# Patient Record
Sex: FEMALE | Race: WHITE | NOT HISPANIC OR LATINO | Employment: FULL TIME | URBAN - METROPOLITAN AREA
[De-identification: names, ages, dates, MRNs, and addresses within clinical notes are randomized per-mention and may not be internally consistent; named-entity substitution may affect disease eponyms.]

---

## 2017-01-16 ENCOUNTER — OFFICE VISIT (OUTPATIENT)
Dept: PAIN MEDICINE | Facility: CLINIC | Age: 32
End: 2017-01-16
Attending: NEUROLOGICAL SURGERY
Payer: COMMERCIAL

## 2017-01-16 VITALS
SYSTOLIC BLOOD PRESSURE: 115 MMHG | TEMPERATURE: 99 F | HEIGHT: 63 IN | RESPIRATION RATE: 18 BRPM | BODY MASS INDEX: 28.9 KG/M2 | WEIGHT: 163.13 LBS | HEART RATE: 87 BPM | DIASTOLIC BLOOD PRESSURE: 83 MMHG

## 2017-01-16 DIAGNOSIS — M79.10 MYALGIA: ICD-10-CM

## 2017-01-16 PROCEDURE — 20553 NJX 1/MLT TRIGGER POINTS 3/>: CPT | Mod: S$GLB,,, | Performed by: ANESTHESIOLOGY

## 2017-01-16 PROCEDURE — 99999 PR PBB SHADOW E&M-EST. PATIENT-LVL III: CPT | Mod: PBBFAC,,, | Performed by: ANESTHESIOLOGY

## 2017-01-16 PROCEDURE — 99244 OFF/OP CNSLTJ NEW/EST MOD 40: CPT | Mod: 25,S$GLB,, | Performed by: ANESTHESIOLOGY

## 2017-01-16 RX ORDER — ORPHENADRINE CITRATE 100 MG/1
100 TABLET, EXTENDED RELEASE ORAL 2 TIMES DAILY
COMMUNITY
End: 2018-10-26

## 2017-01-16 RX ORDER — BUPIVACAINE HYDROCHLORIDE 2.5 MG/ML
10 INJECTION, SOLUTION EPIDURAL; INFILTRATION; INTRACAUDAL
Status: COMPLETED | OUTPATIENT
Start: 2017-01-16 | End: 2017-01-16

## 2017-01-16 RX ORDER — METHYLPREDNISOLONE ACETATE 40 MG/ML
40 INJECTION, SUSPENSION INTRA-ARTICULAR; INTRALESIONAL; INTRAMUSCULAR; SOFT TISSUE ONCE
Status: COMPLETED | OUTPATIENT
Start: 2017-01-16 | End: 2017-01-16

## 2017-01-16 RX ADMIN — METHYLPREDNISOLONE ACETATE 40 MG: 40 INJECTION, SUSPENSION INTRA-ARTICULAR; INTRALESIONAL; INTRAMUSCULAR; SOFT TISSUE at 03:01

## 2017-01-16 RX ADMIN — BUPIVACAINE HYDROCHLORIDE 25 MG: 2.5 INJECTION, SOLUTION EPIDURAL; INFILTRATION; INTRACAUDAL at 03:01

## 2017-01-16 NOTE — MR AVS SNAPSHOT
[facility]  [address]  [place] [address]  Phone: [phone]  Fax: [phone]                  Jossy Leslie   2017 1:30 PM   Office Visit    Description:  Female : 1985   Provider:  Ruby Burroughs MD   Department:  [facility]           Reason for Visit     Neck Pain                To Do List           Future Appointments        Provider Department Dept Phone    2017 11:00 AM Aylin Rabago NP [facility] [phone]      Goals (5 Years of Data)     None      [facility]     [facility] -  Assistance  Registered nurses in the [facility] provide clinical advisement, health education, appointment booking, and other advisory services.  Call for this free service at [phone].             Medications           Message regarding Medications     Verify the changes and/or additions to your medication regime listed below are the same as discussed with your clinician today.  If any of these changes or additions are incorrect, please notify your healthcare provider.             Verify that the below list of medications is an accurate representation of the medications you are currently taking.  If none reported, the list may be blank. If incorrect, please contact your healthcare provider. Carry this list with you in case of emergency.           Current Medications     albuterol 90 mcg/actuation inhaler Inhale 2 puffs into the lungs every 6 (six) hours as needed for Wheezing.    cetirizine (ZYRTEC) 10 MG tablet Take 10 mg by mouth once daily.    eletriptan (RELPAX) 40 MG tablet Take 40 mg by mouth as needed. may repeat in 2 hours if necessary    montelukast (SINGULAIR) 10 mg tablet Take 1 tablet (10 mg total) by mouth every evening.    orphenadrine (NORFLEX) 100 mg tablet Take 100 mg by mouth 2 (two) times daily.    omeprazole (PRILOSEC) 10 MG capsule Take 1 capsule (10 mg total) by mouth  "once daily.           Clinical Reference Information           Vital Signs - Last Recorded  Most recent update: 1/16/2017  1:47 PM by Екатерина Ortega MA    BP Pulse Temp Resp Ht Wt    115/83 87 98.9 °F (37.2 °C) (Oral) 18 5' 3" (1.6 m) 74 kg (163 lb 2.3 oz)    BMI                28.9 kg/m2          Blood Pressure          Most Recent Value    BP  115/83      Allergies as of 1/16/2017     Shellfish Containing Products      Immunizations Administered on Date of Encounter - 1/16/2017     None      "

## 2017-01-16 NOTE — LETTER
January 16, 2017      Chintan Dockery MD  [address]  [facility] [address]           [facility]  [address]  [facility] [address]  Phone: [phone]  Fax: [phone]          Patient: Jossy Leslie   MR Number: [id number]   YOB: 1985   Date of Visit: 1/16/2017       Dear Dr. Chintan Dockery:    Thank you for referring Jossy Leslie to me for evaluation. Attached you will find relevant portions of my assessment and plan of care.    If you have questions, please do not hesitate to call me. I look forward to following Jossy Leslie along with you.    Sincerely,    Ruby Burroughs MD    Enclosure  CC:  No Recipients    If you would like to receive this communication electronically, please contact [email or web address] or [phone] to request more information on [facility] access.    For providers and/or their staff who would like to refer a patient to [facility], please contact us through our one-stop-shop provider referral line, [facility], [place], at [phone].    If you feel you have received this communication in error or would no longer like to receive these types of communications, please e-mail [email or web address]

## 2017-01-16 NOTE — PROGRESS NOTES
Chronic Pain - New Consult    Referring Physician: Chintan Dockery MD    Chief Complaint: No chief complaint on file.       SUBJECTIVE: Disclaimer: This note has been generated using voice-recognition software. There may be typographical errors that have been missed during proof-reading    Initial encounter:    Jossy Leslie presents to the clinic for the evaluation of neck pain. The pain started approximately 5 years ago following no injury or accident caused symptoms and symptoms have been unchanged.    Brief history:    32 yo woman with 4-5 years of neck pain. It is localized to the neck, denies arm pain, weakness, numbness or tingling. Is part of the healthy back program which helps with her pain. Has tried many muscle relaxants in the past and is currently taking Norflex with only minimal pain relief. Was referred for paracervical/trapezial TPIs.    Pain Description:    The pain is located in the neck area and radiates to the lower cervical area.      At BEST  1/10     At WORST  9/10 on the WORST day.      On average pain is rated as 3/10.     Today the pain is rated as 2/10    The pain is described as aching, burning, tight band and tingling      Symptoms interfere with daily activity.     Exacerbating factors: Sitting, Laying and Bending.      Mitigating factors medications and physical therapy.     Patient denies night fever/night sweats, urinary incontinence, bowel incontinence, significant weight loss, significant motor weakness and loss of sensations.  Patient denies any suicidal or homicidal ideations    Pain Medications:  Current:  norflex    Tried in Past:  NSAIDs -Never  TCA -Never  SNRI -Never  Anti-convulsants -Never  Muscle Relaxants -Multiple types with minimal efficacy  Opioids-Never    Physical Therapy/Home Exercise: yes  Healthy back program     report:  Not applicable    Pain Procedures: none    Chiropractor -never  Acupuncture - in past  TENS unit -uses. Helps with pain.  Spinal  decompression -never  Joint replacement -never    Imaging:   Xray Cervical spine  Cervical spine 4 views.  Soft tissues and airway are normal.  C1-C2 normal.  No fracture or subluxation.  Posterior articulation structures, alignment normal.  Limited range of motion in extension.   Impression    Limited range of motion on extension.  ______________________________________     Electronically signed by: MÓNICA DESAI MD  Date: 04/07/16  Time: 10:01          Past Medical History   Diagnosis Date    Eosinophilic esophagitis      Past Surgical History   Procedure Laterality Date    Tonsillectomy      Esophagogastroduodenoscopy       Social History     Social History    Marital status: Single     Spouse name: N/A    Number of children: N/A    Years of education: N/A     Occupational History    Not on file.     Social History Main Topics    Smoking status: Never Smoker    Smokeless tobacco: Not on file    Alcohol use No    Drug use: No    Sexual activity: Yes     Partners: Male     Birth control/ protection: Condom     Other Topics Concern    Not on file     Social History Narrative     Family History   Problem Relation Age of Onset    Hypertension Mother     Hypertension Father     Heart disease Maternal Grandmother     Cancer Maternal Grandfather     Diabetes Paternal Grandmother     Cancer Paternal Grandmother     No Known Problems Paternal Grandfather     No Known Problems Sister     No Known Problems Brother     No Known Problems Maternal Aunt     No Known Problems Maternal Uncle     No Known Problems Paternal Aunt     No Known Problems Paternal Uncle     Amblyopia Neg Hx     Blindness Neg Hx     Cataracts Neg Hx     Glaucoma Neg Hx     Macular degeneration Neg Hx     Retinal detachment Neg Hx     Strabismus Neg Hx     Stroke Neg Hx     Thyroid disease Neg Hx        Review of patient's allergies indicates:   Allergen Reactions    Shellfish containing products Itching       Current  "Outpatient Prescriptions   Medication Sig    albuterol 90 mcg/actuation inhaler Inhale 2 puffs into the lungs every 6 (six) hours as needed for Wheezing.    cetirizine (ZYRTEC) 10 MG tablet Take 10 mg by mouth once daily.    eletriptan (RELPAX) 40 MG tablet Take 40 mg by mouth as needed. may repeat in 2 hours if necessary    montelukast (SINGULAIR) 10 mg tablet Take 1 tablet (10 mg total) by mouth every evening.    omeprazole (PRILOSEC) 10 MG capsule Take 1 capsule (10 mg total) by mouth once daily.     No current facility-administered medications for this visit.        REVIEW OF SYSTEMS:    GENERAL:  No weight loss, malaise or fevers.  HEENT:   No recent changes in vision or hearing  NECK:  Negative for lumps, no difficulty with swallowing.  RESPIRATORY:  Negative for cough, wheezing or shortness of breath, patient denies any recent URI.  CARDIOVASCULAR:  Negative for chest pain, leg swelling or palpitations.  GI:  Negative for abdominal discomfort, blood in stools or black stools or change in bowel habits.  MUSCULOSKELETAL:  See HPI.  SKIN:  Negative for lesions, rash, and itching.  PSYCH:  No mood disorder or recent psychosocial stressors.  Patients sleep is not disturbed secondary to pain.  HEMATOLOGY/LYMPHOLOGY:  Negative for prolonged bleeding, bruising easily or swollen nodes.  Patient is not currently taking any anti-coagulants  ENDO: No history of diabetes or thyroid dysfunction  NEURO:   No history of headaches, syncope, paralysis, seizures or tremors.  All other reviewed and negative other than HPI.    OBJECTIVE:    Visit Vitals    /83    Pulse 87    Temp 98.9 °F (37.2 °C) (Oral)    Resp 18    Ht 5' 3" (1.6 m)    Wt 74 kg (163 lb 2.3 oz)    BMI 28.9 kg/m2       PHYSICAL EXAMINATION:    GENERAL: Well appearing, in no acute distress, alert and oriented x3.  PSYCH:  Mood and affect appropriate.  SKIN: Skin color, texture, turgor normal, no rashes or lesions.  HEAD/FACE:  Normocephalic, " atraumatic. Cranial nerves grossly intact.  NECK: + pain to palpation over the cervical paraspinous muscles and trapezius muscles. Spurling Negative. No pain with neck flexion, extension, or lateral flexion.   CV: RRR with palpation of the radial artery.  EXTREMITIES: UE joint ROM is full and pain free without obvious instability or laxity in all four extremities. No deformities, edema, or skin discoloration. Good capillary refill.  Bilateral upper extremity strength is normal and symmetric.  No atrophy or tone abnormalities are noted.  NEURO: Bilateral upper and lower extremity coordination and muscle stretch reflexes are physiologic and symmetric.  No loss of sensation is noted.  GAIT: normal.    ASSESSMENT: 31 y.o. year old female with neck pain, consistent with myofascial source of pain    Encounter Diagnosis   Name Primary?    Myalgia        PLAN:     -will proceed with paracervical and trapezial TPIs today  - RTC 6 weeks for followup  - Counseled patient regarding the importance of activity modification and constant sleeping habits.    The above plan and management options were discussed at length with patient. Patient is in agreement with the above and verbalized understanding. It will be communicated with the referring physician via electronic record, fax, or mail.    Procedure Note:  Trigger Point Injection:   The procedure was discussed with the patient including complications of damage, bleeding, infection, and failure of pain relief.     All medications, allergies, and relevant histories were reviewed. No recent antibiotics or infections.  A time-out was taken to verify the correct patient, procedure, laterality, and appropriate medications/allergies.    Trigger points were identified by palpation and marked. CHG prep of sites done. A 27-gauge needle was advanced to the point of maximal tenderness, and 0.5 mL of a mixture of 0.25% bupivacaine with Depomedrol 40 mg was injected after negative aspiration.  All sites done in the same manner. Patient tolerated the procedure well and without complications. Sites injected included 6 cervical/thoracic paraspinal and 6 trapezius trigger points.    The patient tolerated the procedure well and was discharged in excellent condition.      Doe Thompson MD  01/16/2017     I reviewed and edited the resident's note, I conducted my own interview and physical examination and agree with the findings.    Ruby Burroughs 01/16/2017

## 2017-02-08 ENCOUNTER — OFFICE VISIT (OUTPATIENT)
Dept: OBSTETRICS AND GYNECOLOGY | Facility: CLINIC | Age: 32
End: 2017-02-08
Payer: COMMERCIAL

## 2017-02-08 VITALS
DIASTOLIC BLOOD PRESSURE: 74 MMHG | WEIGHT: 163.56 LBS | BODY MASS INDEX: 28.98 KG/M2 | HEIGHT: 63 IN | SYSTOLIC BLOOD PRESSURE: 100 MMHG

## 2017-02-08 DIAGNOSIS — Z11.3 SCREEN FOR STD (SEXUALLY TRANSMITTED DISEASE): ICD-10-CM

## 2017-02-08 DIAGNOSIS — N94.3 PMS (PREMENSTRUAL SYNDROME): ICD-10-CM

## 2017-02-08 DIAGNOSIS — Z01.419 ENCOUNTER FOR GYNECOLOGICAL EXAMINATION WITHOUT ABNORMAL FINDING: Primary | ICD-10-CM

## 2017-02-08 PROCEDURE — 99395 PREV VISIT EST AGE 18-39: CPT | Mod: S$GLB,,, | Performed by: OBSTETRICS & GYNECOLOGY

## 2017-02-08 PROCEDURE — 99999 PR PBB SHADOW E&M-EST. PATIENT-LVL II: CPT | Mod: PBBFAC,,, | Performed by: OBSTETRICS & GYNECOLOGY

## 2017-02-08 PROCEDURE — 87591 N.GONORRHOEAE DNA AMP PROB: CPT

## 2017-02-08 NOTE — MR AVS SNAPSHOT
[facility] [address]  [address]  [facility] [address]  Phone: [phone]  Fax: [phone]                  Jossy Leslie   2017 10:00 AM   Office Visit    Description:  Female : 1985   Provider:  Patricia Mina MD   Department:  [facility] [address]           Reason for Visit     Well Woman                To Do List           Future Appointments        Provider Department Dept Phone    2017 10:00 AM Emily Saunders, PT [facility] [phone] 10:00 AM Tri Avila, PT [facility] [phone] 9:30 AM Sarah Gallegos, PTA [facility] [phone] 11:00 AM Aylin Rabago, NP [facility] [phone]      Goals (5 Years of Data)     None      [facility]     [facility] Nurse Care Line -  Assistance  Registered nurses in the [facility] provide clinical advisement, health education, appointment booking, and other advisory services.  Call for this free service at [phone].             Medications           Message regarding Medications     Verify the changes and/or additions to your medication regime listed below are the same as discussed with your clinician today.  If any of these changes or additions are incorrect, please notify your healthcare provider.             Verify that the below list of medications is an accurate representation of the medications you are currently taking.  If none reported, the list may be blank. If incorrect, please contact your healthcare provider. Carry this list with you in case of emergency.           Current Medications     albuterol 90 mcg/actuation inhaler Inhale 2 puffs into the lungs every 6 (six) hours as needed for Wheezing.    cetirizine (ZYRTEC) 10 MG tablet Take 10 mg by mouth once daily.    eletriptan (RELPAX) 40 MG tablet Take 40 mg by mouth as needed. may repeat in 2  "hours if necessary    montelukast (SINGULAIR) 10 mg tablet Take 1 tablet (10 mg total) by mouth every evening.    orphenadrine (NORFLEX) 100 mg tablet Take 100 mg by mouth 2 (two) times daily.    omeprazole (PRILOSEC) 10 MG capsule Take 1 capsule (10 mg total) by mouth once daily.           Clinical Reference Information           Your Vitals Were     BP Height Weight Last Period BMI    100/74 5' 3" (1.6 m) 74.2 kg (163 lb 9.3 oz) 01/01/2017 (Exact Date) 28.98 kg/m2      Blood Pressure          Most Recent Value    BP  100/74      Allergies as of 2/8/2017     Shellfish Containing Products      Immunizations Administered on Date of Encounter - 2/8/2017     None      Language Assistance Services     ATTENTION: Language assistance services are available, free of charge. Please call [phone].      ATENCIÓN: Si rosaline ortiz, tiene a morgan disposición servicios gratuitos de asistencia lingüística. Llame al [phone].     CHÚ Ý: N?u b?n nói Ti?ng Vi?t, có các d?ch v? h? tr? ngôn ng? mi?n phí dành cho b?n. G?i s? [phone].         [facility] [address] complies with applicable Federal civil rights laws and does not discriminate on the basis of race, color, national origin, age, disability, or sex.        "

## 2017-02-08 NOTE — PROGRESS NOTES
"CC: Well woman exam    Jossy Leslie is a 31 y.o. female  presents for a well woman exam.  She has  PMS sx. Cycles can be irregular  Not trying to get pregnant at this time.          Past Medical History   Diagnosis Date    Eosinophilic esophagitis        Past Surgical History   Procedure Laterality Date    Tonsillectomy      Esophagogastroduodenoscopy         OB History    Para Term  AB SAB TAB Ectopic Multiple Living   1 1 1       2      # Outcome Date GA Lbr Laurent/2nd Weight Sex Delivery Anes PTL Lv   1 Term     F Vag-Spont   Y          Family History   Problem Relation Age of Onset    Hypertension Mother     Hypertension Father     Heart disease Maternal Grandmother     Cancer Maternal Grandfather     Colon cancer Maternal Grandfather     Diabetes Paternal Grandmother     Cancer Paternal Grandmother     No Known Problems Sister     No Known Problems Brother     No Known Problems Maternal Aunt     No Known Problems Maternal Uncle     No Known Problems Paternal Aunt     No Known Problems Paternal Uncle     Amblyopia Neg Hx     Blindness Neg Hx     Cataracts Neg Hx     Glaucoma Neg Hx     Macular degeneration Neg Hx     Retinal detachment Neg Hx     Strabismus Neg Hx     Stroke Neg Hx     Thyroid disease Neg Hx     Breast cancer Neg Hx     Ovarian cancer Neg Hx        Social History   Substance Use Topics    Smoking status: Never Smoker    Smokeless tobacco: None    Alcohol use No       Visit Vitals    /74    Ht 5' 3" (1.6 m)    Wt 74.2 kg (163 lb 9.3 oz)    LMP 2017 (Exact Date)    BMI 28.98 kg/m2       ROS:  GENERAL: Denies weight gain or weight loss. Feeling well overall.   SKIN: Denies rash or lesions.   HEAD: Denies head injury or headache.   NODES: Denies enlarged lymph nodes.   CHEST: Denies chest pain or shortness of breath.   CARDIOVASCULAR: Denies palpitations or left sided chest pain.   ABDOMEN: No abdominal pain, constipation, " diarrhea, nausea, vomiting or rectal bleeding.   URINARY: No frequency, dysuria, hematuria, or burning on urination.  REPRODUCTIVE: See HPI.   BREASTS: The patient performs breast self-examination and denies pain, lumps, or nipple discharge.   HEMATOLOGIC: No easy bruisability or excessive bleeding.  MUSCULOSKELETAL: Denies joint pain or swelling.   NEUROLOGIC: Denies syncope or weakness.   PSYCHIATRIC: Denies depression, anxiety or mood swings.    Physical Exam:    APPEARANCE: Well nourished, well developed, in no acute distress.  AFFECT: WNL, alert and oriented x 3  SKIN: No acne or hirsutism  NECK: Neck symmetric without masses or thyromegaly  NODES: No inguinal, cervical, axillary, or femoral lymph node enlargement  CHEST: Good respiratory effect  ABDOMEN: Soft.  No tenderness or masses.  No hepatosplenomegaly.  No hernias.  BREASTS: Symmetrical, no skin changes or visible lesions.  No palpable masses, nipple discharge bilaterally.  PELVIC: Normal external genitalia without lesions.  Normal hair distribution.  Adequate perineal body, normal urethral meatus.  Vagina moist and well rugated without lesions or discharge.  Cervix pink, without lesions, discharge or tenderness.  No significant cystocele or rectocele.  Bimanual exam shows uterus to be normal size, regular, mobile and nontender.  Adnexa without masses or tenderness.    EXTREMITIES: No edema.      ASSESSMENT AND PLAN  1. Encounter for gynecological examination without abnormal finding     2. PMS (premenstrual syndrome)     3. Screen for STD (sexually transmitted disease)  C. trachomatis/N. gonorrhoeae by AMP DNA Cervix     Timed relations  Consider pregnancy when ready   Will plot her menses calendar    Patient was counseled today on A.C.S. Pap guidelines and recommendations for yearly pelvic exams, mammograms and monthly self breast exams; to see her PCP for other health maintenance.     F/U PRN

## 2017-02-09 ENCOUNTER — CLINICAL SUPPORT (OUTPATIENT)
Dept: REHABILITATION | Facility: OTHER | Age: 32
End: 2017-02-09
Attending: PHYSICAL MEDICINE & REHABILITATION
Payer: COMMERCIAL

## 2017-02-09 DIAGNOSIS — M50.30 DDD (DEGENERATIVE DISC DISEASE), CERVICAL: Primary | ICD-10-CM

## 2017-02-09 NOTE — PROGRESS NOTES
[facility]   CERVICAL SPINE TREATMENT VISIT 11      Date: 2/9/17   Pt returns to [facility] after a long absence.  Last visit 12/22/16.    Time: 10:00-11:00 am     Precautions: Standard, esophagitis    Pattern: 1 REP    Eval date:11/8/16  Reassessment due: 12/8/16, done 12/22/16  Next reassessment due:  1/22/17, done 2/9/17  Next reassessment due:  3/9/17    POC Requested.... 11/9/16  Next POC due... 2/19/17    PT/PTA face to face conference: 11/15/16 done  Conference due: 12/15 /16       Subjective     Pt returns to [facility] after a long absence reportedly due to a busy schedule during the holidays.  She last attended on 12/22/16. Since her last visit, pt reports that she has gotten trigger point injections, but is not sure if they have given her any relief of pain. She has been performing neck retraction and scapular retraction regularly throughout the day at work  and finds that they are helpful.  She continued to exercise at [employer] 3-4x per week.  Prior to treatment today, she reports 2/10 neck and right upper trap pain, which was a little better after her session.      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     Leisure: Walking around city and [place], photography    History     HPI Comments: Ms Leslie is a 30 yo female here for evaluation for the [facility] cervical program. 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 The pain is neck dominant the right is the worst, and goes to both shoulders and the back of the right head, she has occasional tingling in either hand. The  pain is intermittent, ranging from 0-7/10. The pain is a burning tightness and tingling pain. It is worse with sitting, looking down, riding in a car, watching TV, necklaces or heavy jackets on the neck. She feels best standing,  "walking or lying on her back. She feels like Excedrin and rest make it go away. She has a hard time wearing her stethoscope around her neck, because it hurts her neck. She has not done PT. She has done chiropractic care and massage therapy which has helped. She tried robaxin with no relief. She will take Excedrin or advil at times. Pattern 1     Present symptoms:  Present since: Neck pain in upper traps and scapula, tenderness aching. Inconsistent burning and "pinprick tingling" sometimes pinprick feeling into hands. 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 Recently has moved into house that requires a lot of renovations and has difficulty sanding/painting without significant increases in pain. She is planning on running for half marathon and doesn't have any LE symptoms but running does increase neck pain as well.     Headaches. Migraines weekly usually better with exidren, usually tolerable but worse about once a month. Hurts in occiput and on sides of temples, nausea sometimes concurrent, can sense when starting from neck tension     Current: 4/10  Worst: 6-7/10  Best 0/10    Worse: bending, stress, turning head, working at computer, working for several days in a row, finds scrubs pull down in area of pain, worst later in the day, running, overhead activity     Best: walking around, with movement, morning     Disturbed sleep: good sleeping habits    Previous treatments: Chiro, improved neck symptoms somewhat but increase low back pain     SPECIFIC QUESTIONS  Nausea: Can be associated with her menstral cycle but can increase with neck pain, not sure if associated with medication (excendrin migrane)  Swallowing: esophogitis, bolus can get stuck with certain foods,     Pattern of pain questions:    1.  Where is your pain the worst? Neck    2.  Is your pain constant or intermittent? Intermittent     3. Does bending forward make " "your typical pain worse? Yes     4.  What can't you do now that you use to? Get back into training for jogging for half marathon, wants to get back to jogging 3 miles without pain, Perform renovation duties without increased pain     Objective     No environmental, cultural, spiritual, developmental or education needs expressed or noted.    POSTURE At eval   Sitting: fair, forward head posture  Standing:  fair, forward head posture, reduced kyphotic curve   Protruded Head: positive    Correction of posture: Improved posture with slim-line, no significant change in symptoms  Relevant: yes, provided purchase information     NEUROLOGICAL At al   Motor deficit:   UE MMT: Right Left  Shld Elev. 5 5  Shld Flx 4 4  Bicep Flx 4 4  Tricep Ext 4 4  Wrist Ext 5 5  Wrist Flx 5 5  Finger Abd 5 5  Shld ER 4- 4-  Shld IR  4 4    MOVEMENT LOSS At eval   Reassessment 12/22/16    Reassessment 2/9/17  Protrusion: WFL    Pain: None  WFL no pain      WFL, no pain  Flexion: Mild bear  Pain: occiput stretching  WFL no pain, stretch only     WFL  Retraction: Mod bear    Pain: pleasant stretch  Min loss, stretch      Min loss, "feels good"  Extension: Mild bear  Pain: front neck stretching, increase pain at C7-T1 junction  Slight limitation ERP  Min loss, end range stretch  Lateral flexion RIGHT Mild bear     Pain:tight UT  WFL   WNL, no pain  Lateral flexion LEFT  WFL    Pain: None  WFL    WFL, end range stretch  Rotation RIGHT Mild bear    Pain: None  WFL    WNL  Rotation LEFT Mild    Pain: None  WFL c/o tightness on R    WFL c/o tightness on R        OTHER TESTS At eval   Scapular stability: moderate bilateral scapular winging   Special tests: distraction (+) compression (-)    Pt performed baseline isometric testing using the Med X equipment.  The test was conducted by the physical therapist.    Baseline IM Testing Results: 11/8/16  ROM: 90-30  Peak Torque: 184  Min Torque: 71  Flex/Ext Ratio: 2.37:1    Midpoint IM Testing Results:  ROM: " 24-96 degrees  Peak Torque:  246 in lbs  Min Torque:  141 in lbs  Flex/ext ratio: 1.74:1  Average change sum of forces:  62.8%  All test points at or above 1 SD below norms      Treatment       Pt was instructed in and performed the following:  Cardiovascular exercise and therapeutic exercise to improve posture, cervical spine and supporting musculature ROM, strength, and muscular endurance as follows:    [facility] 2/9/2017   Visit Number 11   VAS Pain Rating 2   Treadmill Time (in min.) 10   Speed 2   Retraction in Sitting 10   Scapular Retraction 10   Cervical Weight 162   Repetitions 20   Rating of Perceived Exertion 4.5   Ice - Z Lie (in min.) 10         Peripheral muscle strengthening which included 1 set of 15-20 repetitions at a slow, controlled 7 second per rep pace focused on strengthening supporting musculature for improved body mechanics and functional mobility.  Pt and therapist focused on proper form during treatment to ensure optimal strengthening of each targeted muscle group.  Machines were utilized including torso rotation, leg extension, leg curl, chest press, rows, tricep, bicep,leg press and hip abd.     Manual therapy: STM upper trap and cervical paraspinals, PAs and UPAs, downward glides grade 2-3 C4-T2, passive distraction, passive distraction with cervical retraction, passive distraction with AROM cervical retraction (supine) 10 min    HEP initiated as follows: (handouts given and patient expressed understanding and was able to demonstrate there ex appropriately prior to leaving)  Seated and supine chin tucks 10x  Scapular retractions 10x     11/11/16: Levator scapularis stretch [id number], 2x daily    11/30/16: Bilateral shoulder external rotations with theraband 2x10, 1x per day    12/22/16:  10 reps bilateral shoulder ER with red theraband    Patient demonstrated and expressed understanding of all information given.    Assessment     PT diagnosis:DDD Cervical     Pattern: Decreased  cervical strength and ROM compared to norms, bilateral scapular winging, decreased UE strength, decrease activity tolerance, fair postural alignment, moderate to severe pain severity and irritability     Pt returns to [facility] for cervical visit 11 after a long absence reportedly due to a busy schedule during the holidays.  She last attended on 12/22/16. She reports 2/10 neck and right upper trap pain prior to treatment, slightly better after her session.  She made some improvements with cervical AROM and demonstrated good form with the stretches she has been performing regularly since her last visit. She tolerated the cervical med x at her same weight and completed 20 reps with an RPE of 4.5. She completed all peripheral resistance exercises with no reports of increased symptoms or discomfort.  Pt would benefit from skilled PT care to improve strength and ROM to improve functional activity tolerance, reduce pain severity and irritability, and improve quality of life. Goals were met as noted below.    Medical necessity is demonstrated by the following problem list.  Pt presents with the following impairments:     Based on the above history, physical examination, and baseline and midpoint IM testing, an active physical therapy program is recommended.      Prognosis is: Good     GOALS: Pt is in agreement with the following goals.    Short term goals: (5 weeks or 10 visits)  1.  Pt will demonstrate increased cervical ROM as measured by med ex by 3 degrees from initial test  MET 12/22/16  2.  Pt will demonstrate increased isometric torque by 5% from initial test  MET 12/22/16  3.  Pt will tolerate exercising with dynamic weight loads increased by 5% from initial exercise  MET 12/22/16  4.  Pt will demonstrate reduced pain and improved functional outcomes as reported on the patient centered questionnaires  MET 12/22/16  5. Pt will demonstrate independence with reducing or controlling symptoms with ther ex, movement,  or position independently   MET 12/22/16    Long term goals: (10 weeks or 20 visits)  1.  Pt will demonstrate increased cervical ROM as measured by med ex by 6 degrees from initial test   MET 12/22/16  2.  Pt will demonstrate increased isometric torque by 10% from initial test  MET 12/22/16  3.  Pt will tolerate exercising with dynamic weight loads increased by 10% from initial exercise  4.  Pt will demonstrate reduced pain and improved functional outcomes as reported on the patient centered questionnaires   5. Pt will demonstrate independence with reducing or controlling symptoms with ther ex, movement, or position independently  6. Pt will demonstrate good posture and body mechanics while exercising  7. Pt will demonstrate independence with the HEP and stretching  8. Pt will demonstrate an improved flexion/extension ratio on the med ex isometric test when compared to the initial test with the ideal ratio being 1.4:1    Specific Patient expressed goals:  1. Pt will jog up to 3 miles without increase in neck pain pain.  2. Pt will perform household renovation activities without increase in neck pain.     OUTCOMES:  Neck Disability Index Review 12/22/2016 11/7/2016 4/7/2016   Pain Intensity 1 2 2   Personal Care (Washing, Dressing, etc.) 0 0 1   Lifting 1 1 3   Reading 1 3 1   Headaches 3 5 1   Concentration 1 2 1   Work 1 1 2   Driving 1 2 1   Sleeping 0 0 2   Recreation 1 2 1   Score: 10 18 15         Plan     Continue OPPT  per POC.

## 2017-02-10 LAB
C TRACH DNA SPEC QL NAA+PROBE: NEGATIVE
N GONORRHOEA DNA SPEC QL NAA+PROBE: NEGATIVE

## 2017-02-16 ENCOUNTER — CLINICAL SUPPORT (OUTPATIENT)
Dept: REHABILITATION | Facility: OTHER | Age: 32
End: 2017-02-16
Attending: PHYSICAL MEDICINE & REHABILITATION
Payer: COMMERCIAL

## 2017-02-16 DIAGNOSIS — M50.30 DDD (DEGENERATIVE DISC DISEASE), CERVICAL: Primary | ICD-10-CM

## 2017-02-16 NOTE — PROGRESS NOTES
[facility]   CERVICAL SPINE TREATMENT VISIT 12      Date: 2/16/17       Time: 10:00-11:00 am     Precautions: Standard, esophagitis    Pattern: 1 REP    Eval date:11/8/16  Reassessment due: 12/8/16, done 12/22/16  Next reassessment due:  1/22/17, done 2/9/17  Next reassessment due:  3/9/17    POC Requested.... 11/9/16  Next POC due... 2/19/17    PT/PTA face to face conference: 11/15/16 done  Conference due: 12/15 /16       Subjective     Pt returns to [facility] after a long absence reportedly due to a busy schedule during the holidays. She is eager to return to routine. She has been performing neck retraction and scapular retraction regularly throughout the day at work  and finds that they are helpful.  She continued to exercise at [employer] 3-4x per week.  Prior to treatment today, she reports 2/10 neck and right upper trap pain, which was a little better after her session.      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     Leisure: Walking around city and lake, photography    History     HPI Comments: Ms Leslie is a 30 yo female here for evaluation for the healthy back cervical program. 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 The pain is neck dominant the right is the worst, and goes to both shoulders and the back of the right head, she has occasional tingling in either hand. The  pain is intermittent, ranging from 0-7/10. The pain is a burning tightness and tingling pain. It is worse with sitting, looking down, riding in a car, watching TV, necklaces or heavy jackets on the neck. She feels best standing, walking or lying on her back. She feels like Excedrin and rest make it go away. She has a hard time wearing her stethoscope around her neck, because it hurts her neck. She has not done PT. She has done  "chiropractic care and massage therapy which has helped. She tried robaxin with no relief. She will take Excedrin or advil at times. Pattern 1     Present symptoms:  Present since: Neck pain in upper traps and scapula, tenderness aching. Inconsistent burning and "pinprick tingling" sometimes pinprick feeling into hands. 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 Recently has moved into house that requires a lot of renovations and has difficulty sanding/painting without significant increases in pain. She is planning on running for half marathon and doesn't have any LE symptoms but running does increase neck pain as well.     Headaches. Migraines weekly usually better with exidren, usually tolerable but worse about once a month. Hurts in occiput and on sides of temples, nausea sometimes concurrent, can sense when starting from neck tension     Current: 4/10  Worst: 6-7/10  Best 0/10    Worse: bending, stress, turning head, working at computer, working for several days in a row, finds scrubs pull down in area of pain, worst later in the day, running, overhead activity     Best: walking around, with movement, morning     Disturbed sleep: good sleeping habits    Previous treatments: Chiro, improved neck symptoms somewhat but increase low back pain     SPECIFIC QUESTIONS  Nausea: Can be associated with her menstral cycle but can increase with neck pain, not sure if associated with medication (excendrin migrane)  Swallowing: esophogitis, bolus can get stuck with certain foods,     Pattern of pain questions:    1.  Where is your pain the worst? Neck    2.  Is your pain constant or intermittent? Intermittent     3. Does bending forward make your typical pain worse? Yes     4.  What can't you do now that you use to? Get back into training for jogging for half marathon, wants to get back to jogging 3 miles without pain, Perform renovation " "duties without increased pain     Objective     No environmental, cultural, spiritual, developmental or education needs expressed or noted.    POSTURE At eval   Sitting: fair, forward head posture  Standing:  fair, forward head posture, reduced kyphotic curve   Protruded Head: positive    Correction of posture: Improved posture with slim-line, no significant change in symptoms  Relevant: yes, provided purchase information     NEUROLOGICAL At eval   Motor deficit:   UE MMT: Right Left  Shld Elev. 5 5  Shld Flx 4 4  Bicep Flx 4 4  Tricep Ext 4 4  Wrist Ext 5 5  Wrist Flx 5 5  Finger Abd 5 5  Shld ER 4- 4-  Shld IR  4 4    MOVEMENT LOSS At eval   Reassessment 12/22/16    Reassessment 2/9/17  Protrusion: WFL    Pain: None  WFL no pain      WFL, no pain  Flexion: Mild bear  Pain: occiput stretching  WFL no pain, stretch only     WFL  Retraction: Mod bear    Pain: pleasant stretch  Min loss, stretch      Min loss, "feels good"  Extension: Mild bear  Pain: front neck stretching, increase pain at C7-T1 junction  Slight limitation ERP  Min loss, end range stretch  Lateral flexion RIGHT Mild bear     Pain:tight UT  WFL   WNL, no pain  Lateral flexion LEFT  WFL    Pain: None  WFL    WFL, end range stretch  Rotation RIGHT Mild bear    Pain: None  WFL    WNL  Rotation LEFT Mild    Pain: None  WFL c/o tightness on R    WFL c/o tightness on R        OTHER TESTS At eval   Scapular stability: moderate bilateral scapular winging   Special tests: distraction (+) compression (-)    Pt performed baseline isometric testing using the Med X equipment.  The test was conducted by the physical therapist.    Baseline IM Testing Results: 11/8/16  ROM: 90-30  Peak Torque: 184  Min Torque: 71  Flex/Ext Ratio: 2.37:1    Midpoint IM Testing Results:  ROM: 24-96 degrees  Peak Torque:  246 in lbs  Min Torque:  141 in lbs  Flex/ext ratio: 1.74:1  Average change sum of forces:  62.8%  All test points at or above 1 SD below norms      Treatment       Pt was " instructed in and performed the following:  Cardiovascular exercise and therapeutic exercise to improve posture, cervical spine and supporting musculature ROM, strength, and muscular endurance as follows:  [facility] 2/16/2017   Visit Number 12   VAS Pain Rating 2   Treadmill Time (in min.) 10   Speed 2   Cervical Stretches - Retraction In Lying -   Retraction in Sitting 10   Scapular Retraction 10   Manual Therapy -   Cervical Extension Seat Pad -   Seat Adjustment -   Top Dead Center -   Counterweight -   Cervical Flexion -   Cervical Extension -   Cervical Peak Torque -   Cervical Weight 162   Repetitions 20   Rating of Perceived Exertion 5   Ice - Z Lie (in min.) 10         Peripheral muscle strengthening which included 1 set of 15-20 repetitions at a slow, controlled 7 second per rep pace focused on strengthening supporting musculature for improved body mechanics and functional mobility.  Pt and therapist focused on proper form during treatment to ensure optimal strengthening of each targeted muscle group.  Machines were utilized including torso rotation, leg extension, leg curl, chest press, rows, tricep, bicep,leg press and hip abd.     Manual therapy: STM upper trap and cervical paraspinals, PAs and UPAs, downward glides grade 2-3 C4-T2, passive distraction, passive distraction with cervical retraction, passive distraction with AROM cervical retraction (supine) 10 min    HEP initiated as follows: (handouts given and patient expressed understanding and was able to demonstrate there ex appropriately prior to leaving)  Seated and supine chin tucks 10x  Scapular retractions 10x     11/11/16: Levator scapularis stretch [id number], 2x daily    11/30/16: Bilateral shoulder external rotations with theraband 2x10, 1x per day    12/22/16:  10 reps bilateral shoulder ER with red theraband    Patient demonstrated and expressed understanding of all information given.    Assessment     PT diagnosis:DDD Cervical      Pattern: Decreased cervical strength and ROM compared to norms, bilateral scapular winging, decreased UE strength, decrease activity tolerance, fair postural alignment, moderate to severe pain severity and irritability     Pt returns to [facility] for cervical visit 12 after a long absence reportedly due to a busy schedule during the holidays. She reports 2/10 neck and right upper trap pain prior to treatment, slightly better after her session.  She made some improvements with cervical AROM and demonstrated good form with the stretches she has been performing regularly since her last visit. She tolerated the cervical med x at her same weight and completed 20 reps with an RPE of 5. She completed all peripheral resistance exercises with no reports of increased symptoms or discomfort.  Pt would benefit from skilled PT care to improve strength and ROM to improve functional activity tolerance, reduce pain severity and irritability, and improve quality of life. Goals were met as noted below.    Medical necessity is demonstrated by the following problem list.  Pt presents with the following impairments:     Based on the above history, physical examination, and baseline and midpoint IM testing, an active physical therapy program is recommended.      Prognosis is: Good     GOALS: Pt is in agreement with the following goals.    Short term goals: (5 weeks or 10 visits)  1.  Pt will demonstrate increased cervical ROM as measured by med ex by 3 degrees from initial test  MET 12/22/16  2.  Pt will demonstrate increased isometric torque by 5% from initial test  MET 12/22/16  3.  Pt will tolerate exercising with dynamic weight loads increased by 5% from initial exercise  MET 12/22/16  4.  Pt will demonstrate reduced pain and improved functional outcomes as reported on the patient centered questionnaires  MET 12/22/16  5. Pt will demonstrate independence with reducing or controlling symptoms with ther ex, movement, or position  independently   MET 12/22/16    Long term goals: (10 weeks or 20 visits)  1.  Pt will demonstrate increased cervical ROM as measured by med ex by 6 degrees from initial test   MET 12/22/16  2.  Pt will demonstrate increased isometric torque by 10% from initial test  MET 12/22/16  3.  Pt will tolerate exercising with dynamic weight loads increased by 10% from initial exercise  4.  Pt will demonstrate reduced pain and improved functional outcomes as reported on the patient centered questionnaires   5. Pt will demonstrate independence with reducing or controlling symptoms with ther ex, movement, or position independently  6. Pt will demonstrate good posture and body mechanics while exercising  7. Pt will demonstrate independence with the HEP and stretching  8. Pt will demonstrate an improved flexion/extension ratio on the med ex isometric test when compared to the initial test with the ideal ratio being 1.4:1    Specific Patient expressed goals:  1. Pt will jog up to 3 miles without increase in neck pain pain.  2. Pt will perform household renovation activities without increase in neck pain.     OUTCOMES:  Neck Disability Index Review 12/22/2016 11/7/2016 4/7/2016   Pain Intensity 1 2 2   Personal Care (Washing, Dressing, etc.) 0 0 1   Lifting 1 1 3   Reading 1 3 1   Headaches 3 5 1   Concentration 1 2 1   Work 1 1 2   Driving 1 2 1   Sleeping 0 0 2   Recreation 1 2 1   Score: 10 18 15         Plan     Continue OPPT  per POC.

## 2017-02-20 ENCOUNTER — CLINICAL SUPPORT (OUTPATIENT)
Dept: REHABILITATION | Facility: OTHER | Age: 32
End: 2017-02-20
Attending: PHYSICAL MEDICINE & REHABILITATION
Payer: COMMERCIAL

## 2017-02-20 ENCOUNTER — OFFICE VISIT (OUTPATIENT)
Dept: PAIN MEDICINE | Facility: CLINIC | Age: 32
End: 2017-02-20
Payer: COMMERCIAL

## 2017-02-20 VITALS
TEMPERATURE: 98 F | SYSTOLIC BLOOD PRESSURE: 138 MMHG | WEIGHT: 160 LBS | HEART RATE: 85 BPM | DIASTOLIC BLOOD PRESSURE: 95 MMHG | HEIGHT: 63 IN | BODY MASS INDEX: 28.35 KG/M2

## 2017-02-20 DIAGNOSIS — M54.2 CERVICALGIA: Primary | ICD-10-CM

## 2017-02-20 DIAGNOSIS — M50.30 DDD (DEGENERATIVE DISC DISEASE), CERVICAL: Primary | ICD-10-CM

## 2017-02-20 DIAGNOSIS — M79.10 MYALGIA: ICD-10-CM

## 2017-02-20 PROCEDURE — 99999 PR PBB SHADOW E&M-EST. PATIENT-LVL III: CPT | Mod: PBBFAC,,, | Performed by: NURSE PRACTITIONER

## 2017-02-20 PROCEDURE — 99213 OFFICE O/P EST LOW 20 MIN: CPT | Mod: S$GLB,,, | Performed by: NURSE PRACTITIONER

## 2017-02-20 NOTE — PROGRESS NOTES
[facility]   CERVICAL SPINE TREATMENT VISIT 13      Date: 2/20/17       Time: 9:30-10:30 am     Precautions: Standard, esophagitis    Pattern: 1 REP    Eval date:11/8/16  Reassessment due: 12/8/16, done 12/22/16  Next reassessment due:  1/22/17, done 2/9/17  Next reassessment due:  3/9/17    POC Requested.... 11/9/16  Next POC due... 2/19/17    PT/PTA face to face conference: 11/15/16 done  Conference due: 12/15 /16 done 2/10/17  Conference due: 3/10/17      Subjective     She c/o no neck pain today. She has been performing neck retraction and scapular retraction regularly throughout the day at work  and finds that they are helpful.  She continued to exercise at [place] 3-4x per week.        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     Leisure: Walking around city and [place], photography    History     HPI Comments: Ms Leslie is a 32 yo female here for evaluation for the healthy back cervical program. 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 The pain is neck dominant the right is the worst, and goes to both shoulders and the back of the right head, she has occasional tingling in either hand. The  pain is intermittent, ranging from 0-7/10. The pain is a burning tightness and tingling pain. It is worse with sitting, looking down, riding in a car, watching TV, necklaces or heavy jackets on the neck. She feels best standing, walking or lying on her back. She feels like Excedrin and rest make it go away. She has a hard time wearing her stethoscope around her neck, because it hurts her neck. She has not done PT. She has done chiropractic care and massage therapy which has helped. She tried robaxin with no relief. She will take Excedrin or advil at times. Pattern 1     Present symptoms:  Present since: Neck pain in  "upper traps and scapula, tenderness aching. Inconsistent burning and "pinprick tingling" sometimes pinprick feeling into hands. 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 Recently has moved into house that requires a lot of renovations and has difficulty sanding/painting without significant increases in pain. She is planning on running for half marathon and doesn't have any LE symptoms but running does increase neck pain as well.     Headaches. Migraines weekly usually better with exidren, usually tolerable but worse about once a month. Hurts in occiput and on sides of temples, nausea sometimes concurrent, can sense when starting from neck tension     Current: 4/10  Worst: 6-7/10  Best 0/10    Worse: bending, stress, turning head, working at computer, working for several days in a row, finds scrubs pull down in area of pain, worst later in the day, running, overhead activity     Best: walking around, with movement, morning     Disturbed sleep: good sleeping habits    Previous treatments: Chiro, improved neck symptoms somewhat but increase low back pain     SPECIFIC QUESTIONS  Nausea: Can be associated with her menstral cycle but can increase with neck pain, not sure if associated with medication (excendrin migrane)  Swallowing: esophogitis, bolus can get stuck with certain foods,     Pattern of pain questions:    1.  Where is your pain the worst? Neck    2.  Is your pain constant or intermittent? Intermittent     3. Does bending forward make your typical pain worse? Yes     4.  What can't you do now that you use to? Get back into training for jogging for half marathon, wants to get back to jogging 3 miles without pain, Perform renovation duties without increased pain     Objective     No environmental, cultural, spiritual, developmental or education needs expressed or noted.    POSTURE At eval   Sitting: fair, forward head " "posture  Standing:  fair, forward head posture, reduced kyphotic curve   Protruded Head: positive    Correction of posture: Improved posture with slim-line, no significant change in symptoms  Relevant: yes, provided purchase information     NEUROLOGICAL At eval   Motor deficit:   UE MMT: Right Left  Shld Elev. 5 5  Shld Flx 4 4  Bicep Flx 4 4  Tricep Ext 4 4  Wrist Ext 5 5  Wrist Flx 5 5  Finger Abd 5 5  Shld ER 4- 4-  Shld IR  4 4    MOVEMENT LOSS At eval   Reassessment 12/22/16    Reassessment 2/9/17  Protrusion: WFL    Pain: None  WFL no pain      WFL, no pain  Flexion: Mild bear  Pain: occiput stretching  WFL no pain, stretch only     WFL  Retraction: Mod bear    Pain: pleasant stretch  Min loss, stretch      Min loss, "feels good"  Extension: Mild bear  Pain: front neck stretching, increase pain at C7-T1 junction  Slight limitation ERP  Min loss, end range stretch  Lateral flexion RIGHT Mild bear     Pain:tight UT  WFL   WNL, no pain  Lateral flexion LEFT  WFL    Pain: None  WFL    WFL, end range stretch  Rotation RIGHT Mild bear    Pain: None  WFL    WNL  Rotation LEFT Mild    Pain: None  WFL c/o tightness on R    WFL c/o tightness on R        OTHER TESTS At eval   Scapular stability: moderate bilateral scapular winging   Special tests: distraction (+) compression (-)    Pt performed baseline isometric testing using the Med X equipment.  The test was conducted by the physical therapist.    Baseline IM Testing Results: 11/8/16  ROM: 90-30  Peak Torque: 184  Min Torque: 71  Flex/Ext Ratio: 2.37:1    Midpoint IM Testing Results:  ROM: 24-96 degrees  Peak Torque:  246 in lbs  Min Torque:  141 in lbs  Flex/ext ratio: 1.74:1  Average change sum of forces:  62.8%  All test points at or above 1 SD below norms      Treatment       Pt was instructed in and performed the following:  Cardiovascular exercise and therapeutic exercise to improve posture, cervical spine and supporting musculature ROM, strength, and muscular " endurance as follows:    [facility] 2/20/2017   Visit Number 13   VAS Pain Rating 0   Treadmill Time (in min.) 10   Speed 2   Cervical Stretches - Retraction In Lying -   Retraction in Sitting 10   Scapular Retraction 10   Cervical Weight 171   Repetitions 16   Rating of Perceived Exertion 4   Ice - Z Lie (in min.) 10       Peripheral muscle strengthening which included 1 set of 15-20 repetitions at a slow, controlled 7 second per rep pace focused on strengthening supporting musculature for improved body mechanics and functional mobility.  Pt and therapist focused on proper form during treatment to ensure optimal strengthening of each targeted muscle group.  Machines were utilized including torso rotation, leg extension, leg curl, chest press, rows, tricep, bicep,leg press and hip abd.     Manual therapy: STM upper trap and cervical paraspinals, PAs and UPAs, downward glides grade 2-3 C4-T2, passive distraction, passive distraction with cervical retraction, passive distraction with AROM cervical retraction (supine) 10 min    HEP initiated as follows: (handouts given and patient expressed understanding and was able to demonstrate there ex appropriately prior to leaving)  Seated and supine chin tucks 10x  Scapular retractions 10x     11/11/16: Levator scapularis stretch [id number], 2x daily    11/30/16: Bilateral shoulder external rotations with theraband 2x10, 1x per day    12/22/16:  10 reps bilateral shoulder ER with red theraband    Patient demonstrated and expressed understanding of all information given.    Assessment     PT diagnosis:DDD Cervical     Pattern: Decreased cervical strength and ROM compared to norms, bilateral scapular winging, decreased UE strength, decrease activity tolerance, fair postural alignment, moderate to severe pain severity and irritability      She reports 0/10 neck pain today.   She tolerated the cervical med with a weight ^ with no neck pain. She completed all peripheral  resistance exercises with no reports of increased symptoms or discomfort.  Pt would benefit from skilled PT care to improve strength and ROM to improve functional activity tolerance, reduce pain severity and irritability, and improve quality of life. Goals were met as noted below.    Medical necessity is demonstrated by the following problem list.  Pt presents with the following impairments:     Based on the above history, physical examination, and baseline and midpoint IM testing, an active physical therapy program is recommended.      Prognosis is: Good     GOALS: Pt is in agreement with the following goals.    Short term goals: (5 weeks or 10 visits)  1.  Pt will demonstrate increased cervical ROM as measured by med ex by 3 degrees from initial test  MET 12/22/16  2.  Pt will demonstrate increased isometric torque by 5% from initial test  MET 12/22/16  3.  Pt will tolerate exercising with dynamic weight loads increased by 5% from initial exercise  MET 12/22/16  4.  Pt will demonstrate reduced pain and improved functional outcomes as reported on the patient centered questionnaires  MET 12/22/16  5. Pt will demonstrate independence with reducing or controlling symptoms with ther ex, movement, or position independently   MET 12/22/16    Long term goals: (10 weeks or 20 visits)  1.  Pt will demonstrate increased cervical ROM as measured by med ex by 6 degrees from initial test   MET 12/22/16  2.  Pt will demonstrate increased isometric torque by 10% from initial test  MET 12/22/16  3.  Pt will tolerate exercising with dynamic weight loads increased by 10% from initial exercise  4.  Pt will demonstrate reduced pain and improved functional outcomes as reported on the patient centered questionnaires   5. Pt will demonstrate independence with reducing or controlling symptoms with ther ex, movement, or position independently  6. Pt will demonstrate good posture and body mechanics while exercising  7. Pt will demonstrate  independence with the HEP and stretching  8. Pt will demonstrate an improved flexion/extension ratio on the med ex isometric test when compared to the initial test with the ideal ratio being 1.4:1    Specific Patient expressed goals:  1. Pt will jog up to 3 miles without increase in neck pain pain.  2. Pt will perform household renovation activities without increase in neck pain.     OUTCOMES:  Neck Disability Index Review 12/22/2016 11/7/2016 4/7/2016   Pain Intensity 1 2 2   Personal Care (Washing, Dressing, etc.) 0 0 1   Lifting 1 1 3   Reading 1 3 1   Headaches 3 5 1   Concentration 1 2 1   Work 1 1 2   Driving 1 2 1   Sleeping 0 0 2   Recreation 1 2 1   Score: 10 18 15         Plan     Continue OPPT  per POC.

## 2017-02-20 NOTE — PROGRESS NOTES
Chronic Pain - Established Visit    Referring Physician: No ref. provider found    Chief Complaint:   Chief Complaint   Patient presents with    Neck Pain        SUBJECTIVE: Disclaimer: This note has been generated using voice-recognition software. There may be typographical errors that have been missed during proof-reading    Interval History 2/20/2017:  The patient returns to clinic today for follow up of neck pain. She reports significant benefit with TPIs and physical therapy. She reports intermittent use of Norflex for bad days. She continues to perform home stretches with benefit. She reports occasional radiating shoulder pain that is tight in nature. She denies any shooting pain into her arms. Her pain today is 1/10.     Initial encounter:    Jossy Leslie presents to the clinic for the evaluation of neck pain. The pain started approximately 5 years ago following no injury or accident caused symptoms and symptoms have been unchanged.    Brief history:    30 yo woman with 4-5 years of neck pain. It is localized to the neck, denies arm pain, weakness, numbness or tingling. Is part of the healthy back program which helps with her pain. Has tried many muscle relaxants in the past and is currently taking Norflex with only minimal pain relief. Was referred for paracervical/trapezial TPIs.    Pain Description:    The pain is located in the neck area and radiates to the lower cervical area.      At BEST  1/10     At WORST  9/10 on the WORST day.      On average pain is rated as 3/10.     Today the pain is rated as 2/10    The pain is described as aching, burning, tight band and tingling      Symptoms interfere with daily activity.     Exacerbating factors: Sitting, Laying and Bending.      Mitigating factors medications and physical therapy.     Patient denies night fever/night sweats, urinary incontinence, bowel incontinence, significant weight loss, significant motor weakness and loss of sensations.  Patient  denies any suicidal or homicidal ideations    Pain Medications:  Current:  norflex    Tried in Past:  NSAIDs -Never  TCA -Never  SNRI -Never  Anti-convulsants -Never  Muscle Relaxants -Multiple types with minimal efficacy  Opioids-Never    Physical Therapy/Home Exercise: yes  Healthy back program     report:  Not applicable    Pain Procedures: none    Chiropractor -never  Acupuncture - in past  TENS unit -uses. Helps with pain.  Spinal decompression -never  Joint replacement -never    Imaging:   Xray Cervical spine  Cervical spine 4 views.  Soft tissues and airway are normal.  C1-C2 normal.  No fracture or subluxation.  Posterior articulation structures, alignment normal.  Limited range of motion in extension.   Impression    Limited range of motion on extension.  ______________________________________     Electronically signed by: MÓNICA DESAI MD  Date: 04/07/16  Time: 10:01          Past Medical History   Diagnosis Date    Eosinophilic esophagitis      Past Surgical History   Procedure Laterality Date    Tonsillectomy      Esophagogastroduodenoscopy       Social History     Social History    Marital status: Single     Spouse name: N/A    Number of children: N/A    Years of education: N/A     Occupational History    Not on file.     Social History Main Topics    Smoking status: Never Smoker    Smokeless tobacco: Not on file    Alcohol use No    Drug use: No    Sexual activity: Yes     Partners: Male     Birth control/ protection: Condom     Other Topics Concern    Not on file     Social History Narrative     Family History   Problem Relation Age of Onset    Hypertension Mother     Hypertension Father     Heart disease Maternal Grandmother     Cancer Maternal Grandfather     Colon cancer Maternal Grandfather     Diabetes Paternal Grandmother     Cancer Paternal Grandmother     No Known Problems Sister     No Known Problems Brother     No Known Problems Maternal Aunt     No Known  Problems Maternal Uncle     No Known Problems Paternal Aunt     No Known Problems Paternal Uncle     Amblyopia Neg Hx     Blindness Neg Hx     Cataracts Neg Hx     Glaucoma Neg Hx     Macular degeneration Neg Hx     Retinal detachment Neg Hx     Strabismus Neg Hx     Stroke Neg Hx     Thyroid disease Neg Hx     Breast cancer Neg Hx     Ovarian cancer Neg Hx        Review of patient's allergies indicates:   Allergen Reactions    Shellfish containing products Itching       Current Outpatient Prescriptions   Medication Sig    albuterol 90 mcg/actuation inhaler Inhale 2 puffs into the lungs every 6 (six) hours as needed for Wheezing.    cetirizine (ZYRTEC) 10 MG tablet Take 10 mg by mouth once daily.    eletriptan (RELPAX) 40 MG tablet Take 40 mg by mouth as needed. may repeat in 2 hours if necessary    montelukast (SINGULAIR) 10 mg tablet Take 1 tablet (10 mg total) by mouth every evening.    orphenadrine (NORFLEX) 100 mg tablet Take 100 mg by mouth 2 (two) times daily.    omeprazole (PRILOSEC) 10 MG capsule Take 1 capsule (10 mg total) by mouth once daily.     No current facility-administered medications for this visit.        REVIEW OF SYSTEMS:    GENERAL:  No weight loss, malaise or fevers.  HEENT:   No recent changes in vision or hearing  NECK:  Negative for lumps, no difficulty with swallowing.  RESPIRATORY:  Negative for cough, wheezing or shortness of breath, patient denies any recent URI.  CARDIOVASCULAR:  Negative for chest pain, leg swelling or palpitations.  GI:  Negative for abdominal discomfort, blood in stools or black stools or change in bowel habits.  MUSCULOSKELETAL:  See HPI.  SKIN:  Negative for lesions, rash, and itching.  PSYCH:  No mood disorder or recent psychosocial stressors.  Patient's sleep is not disturbed secondary to pain.  HEMATOLOGY/LYMPHOLOGY:  Negative for prolonged bleeding, bruising easily or swollen nodes.  Patient is not currently taking any  "anti-coagulants  ENDO: No history of diabetes or thyroid dysfunction  NEURO:   No history of headaches, syncope, paralysis, seizures or tremors.  All other reviewed and negative other than HPI.    OBJECTIVE:    Visit Vitals    BP (!) 138/95    Pulse 85    Temp 98 °F (36.7 °C) (Oral)    Ht 5' 3" (1.6 m)    Wt 72.6 kg (160 lb)    LMP 02/13/2017    BMI 28.34 kg/m2       PHYSICAL EXAMINATION:    GENERAL: Well appearing, in no acute distress, alert and oriented x3.  PSYCH:  Mood and affect appropriate.  SKIN: Skin color, texture, turgor normal, no rashes or lesions.  HEAD/FACE:  Normocephalic, atraumatic. Cranial nerves grossly intact.  NECK: + pain to palpation over the cervical paraspinous muscles and trapezius muscles, right greater than left. Spurling Negative. No pain with neck flexion, extension, or lateral flexion.   CV: RRR with palpation of the radial artery.  EXTREMITIES: UE joint ROM is full and pain free without obvious instability or laxity in all four extremities. No deformities, edema, or skin discoloration. Good capillary refill.  Bilateral upper extremity strength is normal and symmetric.  No atrophy or tone abnormalities are noted.  NEURO: Bilateral upper and lower extremity coordination and muscle stretch reflexes are physiologic and symmetric.  No loss of sensation is noted.  GAIT: Normal.    ASSESSMENT: 31 y.o. year old female with neck pain, consistent with the following:    Encounter Diagnoses   Name Primary?    Cervicalgia Yes    Myalgia        PLAN:     - Previous imaging was reviewed and discussed with the patient today.    - Continue physical therapy and home exercises.     - Continue Norflex as needed.     - We can repeat trigger point injections as needed. She may call to schedule.     - RTC PRN.    - Counseled patient regarding the importance of activity modification and constant sleeping habits.    The above plan and management options were discussed at length with patient. Patient " is in agreement with the above and verbalized understanding.     Aylin Rabago, DARYL  02/20/2017

## 2017-03-07 ENCOUNTER — CLINICAL SUPPORT (OUTPATIENT)
Dept: REHABILITATION | Facility: OTHER | Age: 32
End: 2017-03-07
Attending: PHYSICAL MEDICINE & REHABILITATION
Payer: COMMERCIAL

## 2017-03-07 DIAGNOSIS — M50.30 DDD (DEGENERATIVE DISC DISEASE), CERVICAL: Primary | ICD-10-CM

## 2017-03-07 NOTE — PROGRESS NOTES
[facility]   CERVICAL SPINE TREATMENT VISIT 14      Date: 3/7/17       Time: 9:30-10:30 am     Precautions: Standard, esophagitis    Pattern: 1 REP    Eval date:11/8/16  Reassessment due: 12/8/16, done 12/22/16  Next reassessment due:  1/22/17, done 2/9/17  Next reassessment due:  3/9/17, done 3/7/17    POC Requested.... 11/9/16  Next POC due... 2/19/17, sent 3/7/17    PT/PTA face to face conference: 11/15/16 done  Conference due: 12/15 /16 done 2/10/17  Conference due: 3/10/17      Subjective     She c/o no neck pain today. She has been performing neck retraction and scapular retraction regularly throughout the day at work  and finds that they are helpful.  She continued to exercise at [place] 3-4x per week.  Discussed wellness options for one time a week to maintain cervical strength.       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     Leisure: Walking around city and lake, photography    History     HPI Comments: Ms Leslie is a 32 yo female here for evaluation for the healthy back cervical program. 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 The pain is neck dominant the right is the worst, and goes to both shoulders and the back of the right head, she has occasional tingling in either hand. The  pain is intermittent, ranging from 0-7/10. The pain is a burning tightness and tingling pain. It is worse with sitting, looking down, riding in a car, watching TV, necklaces or heavy jackets on the neck. She feels best standing, walking or lying on her back. She feels like Excedrin and rest make it go away. She has a hard time wearing her stethoscope around her neck, because it hurts her neck. She has not done PT. She has done chiropractic care and massage therapy which has helped. She tried robaxin with no relief. She  "will take Excedrin or advil at times. Pattern 1     Present symptoms:  Present since: Neck pain in upper traps and scapula, tenderness aching. Inconsistent burning and "pinprick tingling" sometimes pinprick feeling into hands. 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 Recently has moved into house that requires a lot of renovations and has difficulty sanding/painting without significant increases in pain. She is planning on running for half marathon and doesn't have any LE symptoms but running does increase neck pain as well.     Headaches. Migraines weekly usually better with exidren, usually tolerable but worse about once a month. Hurts in occiput and on sides of temples, nausea sometimes concurrent, can sense when starting from neck tension     Current: 4/10  Worst: 6-7/10  Best 0/10    Worse: bending, stress, turning head, working at computer, working for several days in a row, finds scrubs pull down in area of pain, worst later in the day, running, overhead activity     Best: walking around, with movement, morning     Disturbed sleep: good sleeping habits    Previous treatments: Chiro, improved neck symptoms somewhat but increase low back pain     SPECIFIC QUESTIONS  Nausea: Can be associated with her menstral cycle but can increase with neck pain, not sure if associated with medication (excendrin migrane)  Swallowing: esophogitis, bolus can get stuck with certain foods,     Pattern of pain questions:    1.  Where is your pain the worst? Neck    2.  Is your pain constant or intermittent? Intermittent     3. Does bending forward make your typical pain worse? Yes     4.  What can't you do now that you use to? Get back into training for jogging for half marathon, wants to get back to jogging 3 miles without pain, Perform renovation duties without increased pain     Objective     No environmental, cultural, spiritual, " "developmental or education needs expressed or noted.    POSTURE At eval   Sitting: fair, forward head posture  Standing:  fair, forward head posture, reduced kyphotic curve   Protruded Head: positive    Correction of posture: Improved posture with slim-line, no significant change in symptoms  Relevant: yes, provided purchase information     MOVEMENT LOSS 3/7/17    Flexion:    WFL  Retraction:   WNL, "feels good"  Extension:   WNL end range stretch  Lateral flexion RIGHT   WNL,   Lateral flexion LEFT    WNL,  Rotation RIGH    WNL  Rotation LEFT    WNL     Baseline IM Testing Results: 11/8/16  ROM: 90-30  Peak Torque: 184  Min Torque: 71  Flex/Ext Ratio: 2.37:1    Midpoint IM Testing Results:  ROM: 24-96 degrees  Peak Torque:  246 in lbs  Min Torque:  141 in lbs  Flex/ext ratio: 1.74:1  Average change sum of forces:  62.8%  All test points at or above 1 SD below norms      Treatment       Pt was instructed in and performed the following:  Cardiovascular exercise and therapeutic exercise to improve posture, cervical spine and supporting musculature ROM, strength, and muscular endurance as follows:    [facility] 3/7/2017   Visit Number 14   VAS Pain Rating 0   Treadmill Time (in min.) 10   Speed 2   Retraction in Sitting 10   Scapular Retraction 10   Cervical Weight 171   Repetitions 18   Rating of Perceived Exertion 4   Ice - Z Lie (in min.) 10       Peripheral muscle strengthening which included 1 set of 15-20 repetitions at a slow, controlled 7 second per rep pace focused on strengthening supporting musculature for improved body mechanics and functional mobility.  Pt and therapist focused on proper form during treatment to ensure optimal strengthening of each targeted muscle group.  Machines were utilized including torso rotation, leg extension, leg curl, chest press, rows, tricep, bicep,leg press and hip abd.       HEP  as follows: (handouts given and patient expressed understanding and was able to " demonstrate there ex appropriately prior to leaving)  Seated and supine chin tucks 10x  Scapular retractions 10x     11/11/16: Levator scapularis stretch [id number], 2x daily    11/30/16: Bilateral shoulder external rotations with theraband 2x10, 1x per day    12/22/16:  10 reps bilateral shoulder ER with red theraband    Patient demonstrated and expressed understanding of all information given.    Assessment     PT diagnosis:DDD Cervical     Pattern: Decreased cervical strength and ROM compared to norms, bilateral scapular winging, decreased UE strength, decrease activity tolerance, fair postural alignment, moderate to severe pain severity and irritability      She reports 0/10 neck pain today.   She tolerated the cervical med X with no c/o neck pain. She completed all peripheral resistance exercises with no reports of increased symptoms or discomfort. Excellent increase in ROM and minimal to no c/o cervical pain. No manual therapy today as she is doing well.   Pt would benefit from skilled PT care to improve strength and ROM to improve functional activity tolerance, reduce pain severity and irritability, and improve quality of life. Goals were met as noted below.    Medical necessity is demonstrated by the following problem list.  Pt presents with the following impairments:     Based on the above history, physical examination, and baseline and midpoint IM testing, an active physical therapy program is recommended.      Prognosis is: Good     GOALS: Pt is in agreement with the following goals.    Short term goals: (5 weeks or 10 visits)  1.  Pt will demonstrate increased cervical ROM as measured by med ex by 3 degrees from initial test  MET 12/22/16  2.  Pt will demonstrate increased isometric torque by 5% from initial test  MET 12/22/16  3.  Pt will tolerate exercising with dynamic weight loads increased by 5% from initial exercise  MET 12/22/16  4.  Pt will demonstrate reduced pain and improved functional outcomes  as reported on the patient centered questionnaires  MET 12/22/16  5. Pt will demonstrate independence with reducing or controlling symptoms with ther ex, movement, or position independently   MET 12/22/16    Long term goals: (10 weeks or 20 visits)  1.  Pt will demonstrate increased cervical ROM as measured by med ex by 6 degrees from initial test   MET 12/22/16  2.  Pt will demonstrate increased isometric torque by 10% from initial test  MET 12/22/16  3.  Pt will tolerate exercising with dynamic weight loads increased by 10% from initial exercise  4.  Pt will demonstrate reduced pain and improved functional outcomes as reported on the patient centered questionnaires   5. Pt will demonstrate independence with reducing or controlling symptoms with ther ex, movement, or position independently  6. Pt will demonstrate good posture and body mechanics while exercising  7. Pt will demonstrate independence with the HEP and stretching  8. Pt will demonstrate an improved flexion/extension ratio on the med ex isometric test when compared to the initial test with the ideal ratio being 1.4:1    Specific Patient expressed goals:  1. Pt will jog up to 3 miles without increase in neck pain pain.  2. Pt will perform household renovation activities without increase in neck pain.     OUTCOMES:  Neck Disability Index Review 12/22/2016 11/7/2016 4/7/2016   Pain Intensity 1 2 2   Personal Care (Washing, Dressing, etc.) 0 0 1   Lifting 1 1 3   Reading 1 3 1   Headaches 3 5 1   Concentration 1 2 1   Work 1 1 2   Driving 1 2 1   Sleeping 0 0 2   Recreation 1 2 1   Score: 10 18 15         Plan     Continue OPPT  per POC.

## 2017-03-07 NOTE — PLAN OF CARE
Assessment     PT diagnosis:DDD Cervical     Pattern: Decreased cervical strength and ROM compared to norms, bilateral scapular winging, decreased UE strength, decrease activity tolerance, fair postural alignment, moderate to severe pain severity and irritability      She reports 0/10 neck pain today.   She tolerated the cervical med X with no c/o neck pain. She completed all peripheral resistance exercises with no reports of increased symptoms or discomfort. Excellent increase in ROM and minimal to no c/o cervical pain. No manual therapy today as she is doing well.   Pt would benefit from skilled PT care to improve strength and ROM to improve functional activity tolerance, reduce pain severity and irritability, and improve quality of life. Goals were met as noted below.    Medical necessity is demonstrated by the following problem list.  Pt presents with the following impairments:     Based on the above history, physical examination, and baseline and midpoint IM testing, an active physical therapy program is recommended.      Prognosis is: Good     GOALS: Pt is in agreement with the following goals.    Short term goals: (5 weeks or 10 visits)  1.  Pt will demonstrate increased cervical ROM as measured by med ex by 3 degrees from initial test  MET 12/22/16  2.  Pt will demonstrate increased isometric torque by 5% from initial test  MET 12/22/16  3.  Pt will tolerate exercising with dynamic weight loads increased by 5% from initial exercise  MET 12/22/16  4.  Pt will demonstrate reduced pain and improved functional outcomes as reported on the patient centered questionnaires  MET 12/22/16  5. Pt will demonstrate independence with reducing or controlling symptoms with ther ex, movement, or position independently   MET 12/22/16    Long term goals: (10 weeks or 20 visits)  1.  Pt will demonstrate increased cervical ROM as measured by med ex by 6 degrees from initial test   MET 12/22/16  2.  Pt will demonstrate  increased isometric torque by 10% from initial test  MET 12/22/16  3.  Pt will tolerate exercising with dynamic weight loads increased by 10% from initial exercise  4.  Pt will demonstrate reduced pain and improved functional outcomes as reported on the patient centered questionnaires   5. Pt will demonstrate independence with reducing or controlling symptoms with ther ex, movement, or position independently  6. Pt will demonstrate good posture and body mechanics while exercising  7. Pt will demonstrate independence with the HEP and stretching  8. Pt will demonstrate an improved flexion/extension ratio on the med ex isometric test when compared to the initial test with the ideal ratio being 1.4:1    Specific Patient expressed goals:  1. Pt will jog up to 3 miles without increase in neck pain pain.  2. Pt will perform household renovation activities without increase in neck pain.     OUTCOMES:  Neck Disability Index Review 12/22/2016 11/7/2016 4/7/2016   Pain Intensity 1 2 2   Personal Care (Washing, Dressing, etc.) 0 0 1   Lifting 1 1 3   Reading 1 3 1   Headaches 3 5 1   Concentration 1 2 1   Work 1 1 2   Driving 1 2 1   Sleeping 0 0 2   Recreation 1 2 1   Score: 10 18 15         Plan     Continue OPPT  per POC.

## 2017-03-14 ENCOUNTER — CLINICAL SUPPORT (OUTPATIENT)
Dept: REHABILITATION | Facility: OTHER | Age: 32
End: 2017-03-14
Attending: PHYSICAL MEDICINE & REHABILITATION
Payer: COMMERCIAL

## 2017-03-14 DIAGNOSIS — M50.30 DDD (DEGENERATIVE DISC DISEASE), CERVICAL: Primary | ICD-10-CM

## 2017-03-14 NOTE — PROGRESS NOTES
[facility]   CERVICAL SPINE TREATMENT VISIT 15      Date: 3/14/17       Time: 9:30-10:30 am     Precautions: Standard, esophagitis    Pattern: 1 REP    Eval date:11/8/16  Reassessment due: 12/8/16, done 12/22/16  Next reassessment due:  1/22/17, done 2/9/17  Next reassessment due:  3/9/17, done 3/7/17    POC Requested.... 11/9/16  Next POC due... 2/19/17, sent 3/7/17    PT/PTA face to face conference: 11/15/16 done  Conference due: 12/15 /16 done 2/10/17  Conference due: 3/10/17 void due to not a 6th visit      Subjective     She c/o 2/10 neck pain today. She has been performing neck retraction and scapular retraction regularly throughout the day at work  and finds that they are helpful.  She continued to exercise at [place] 3-4x per week.        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     Leisure: Walking around city [place], photography    History     HPI Comments: Ms Leslie is a 32 yo female here for evaluation for the healthy back cervical program. 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 The pain is neck dominant the right is the worst, and goes to both shoulders and the back of the right head, she has occasional tingling in either hand. The  pain is intermittent, ranging from 0-7/10. The pain is a burning tightness and tingling pain. It is worse with sitting, looking down, riding in a car, watching TV, necklaces or heavy jackets on the neck. She feels best standing, walking or lying on her back. She feels like Excedrin and rest make it go away. She has a hard time wearing her stethoscope around her neck, because it hurts her neck. She has not done PT. She has done chiropractic care and massage therapy which has helped. She tried robaxin with no relief. She will take Excedrin or advil at times. Pattern 1  "    Present symptoms:  Present since: Neck pain in upper traps and scapula, tenderness aching. Inconsistent burning and "pinprick tingling" sometimes pinprick feeling into hands. 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 Recently has moved into house that requires a lot of renovations and has difficulty sanding/painting without significant increases in pain. She is planning on running for half marathon and doesn't have any LE symptoms but running does increase neck pain as well.     Headaches. Migraines weekly usually better with exidren, usually tolerable but worse about once a month. Hurts in occiput and on sides of temples, nausea sometimes concurrent, can sense when starting from neck tension     Current: 4/10  Worst: 6-7/10  Best 0/10    Worse: bending, stress, turning head, working at computer, working for several days in a row, finds scrubs pull down in area of pain, worst later in the day, running, overhead activity     Best: walking around, with movement, morning     Disturbed sleep: good sleeping habits    Previous treatments: Chiro, improved neck symptoms somewhat but increase low back pain     SPECIFIC QUESTIONS  Nausea: Can be associated with her menstral cycle but can increase with neck pain, not sure if associated with medication (excendrin migrane)  Swallowing: esophogitis, bolus can get stuck with certain foods,     Pattern of pain questions:    1.  Where is your pain the worst? Neck    2.  Is your pain constant or intermittent? Intermittent     3. Does bending forward make your typical pain worse? Yes     4.  What can't you do now that you use to? Get back into training for jogging for half marathon, wants to get back to jogging 3 miles without pain, Perform renovation duties without increased pain     Objective     No environmental, cultural, spiritual, developmental or education needs expressed or " "noted.    POSTURE At eval   Sitting: fair, forward head posture  Standing:  fair, forward head posture, reduced kyphotic curve   Protruded Head: positive    Correction of posture: Improved posture with slim-line, no significant change in symptoms  Relevant: yes, provided purchase information     MOVEMENT LOSS 3/7/17    Flexion:    WFL  Retraction:   WNL, "feels good"  Extension:   WNL end range stretch  Lateral flexion RIGHT   WNL,   Lateral flexion LEFT    WNL,  Rotation RIGH    WNL  Rotation LEFT    WNL     Baseline IM Testing Results: 11/8/16  ROM: 90-30  Peak Torque: 184  Min Torque: 71  Flex/Ext Ratio: 2.37:1    Midpoint IM Testing Results:  ROM: 24-96 degrees  Peak Torque:  246 in lbs  Min Torque:  141 in lbs  Flex/ext ratio: 1.74:1  Average change sum of forces:  62.8%  All test points at or above 1 SD below norms      Treatment       Pt was instructed in and performed the following:  Cardiovascular exercise and therapeutic exercise to improve posture, cervical spine and supporting musculature ROM, strength, and muscular endurance as follows:    [facility] 3/14/2017   Visit Number 15   VAS Pain Rating 2   Treadmill Time (in min.) 10   Speed 2   Cervical Stretches - Retraction In Lying -   Retraction in Sitting 10   Scapular Retraction 10   Cervical Weight 171   Repetitions 20   Rating of Perceived Exertion 4   Ice - Z Lie (in min.) 10       Peripheral muscle strengthening which included 1 set of 15-20 repetitions at a slow, controlled 7 second per rep pace focused on strengthening supporting musculature for improved body mechanics and functional mobility.  Pt and therapist focused on proper form during treatment to ensure optimal strengthening of each targeted muscle group.  Machines were utilized including torso rotation, leg extension, leg curl, chest press, rows, tricep, bicep,leg press and hip abd.       HEP  as follows: (handouts given and patient expressed understanding and was able to " demonstrate there ex appropriately prior to leaving)  Seated and supine chin tucks 10x  Scapular retractions 10x     11/11/16: Levator scapularis stretch [id number], 2x daily    11/30/16: Bilateral shoulder external rotations with theraband 2x10, 1x per day    12/22/16:  10 reps bilateral shoulder ER with red theraband    Patient demonstrated and expressed understanding of all information given.    Assessment     PT diagnosis:DDD Cervical     Pattern: Decreased cervical strength and ROM compared to norms, bilateral scapular winging, decreased UE strength, decrease activity tolerance, fair postural alignment, moderate to severe pain severity and irritability      She reports 2/10 neck pain today that did decrease during and post session.  She tolerated the cervical med X with no c/o neck pain. She completed all peripheral resistance exercises with no reports of increased symptoms or discomfort.  No manual therapy today as she is doing well.   Pt would benefit from skilled PT care to improve strength and ROM to improve functional activity tolerance, reduce pain severity and irritability, and improve quality of life. Goals were met as noted below.    Medical necessity is demonstrated by the following problem list.  Pt presents with the following impairments:     Based on the above history, physical examination, and baseline and midpoint IM testing, an active physical therapy program is recommended.      Prognosis is: Good     GOALS: Pt is in agreement with the following goals.    Short term goals: (5 weeks or 10 visits)  1.  Pt will demonstrate increased cervical ROM as measured by med ex by 3 degrees from initial test  MET 12/22/16  2.  Pt will demonstrate increased isometric torque by 5% from initial test  MET 12/22/16  3.  Pt will tolerate exercising with dynamic weight loads increased by 5% from initial exercise  MET 12/22/16  4.  Pt will demonstrate reduced pain and improved functional outcomes as reported on the  patient centered questionnaires  MET 12/22/16  5. Pt will demonstrate independence with reducing or controlling symptoms with ther ex, movement, or position independently   MET 12/22/16    Long term goals: (10 weeks or 20 visits)  1.  Pt will demonstrate increased cervical ROM as measured by med ex by 6 degrees from initial test   MET 12/22/16  2.  Pt will demonstrate increased isometric torque by 10% from initial test  MET 12/22/16  3.  Pt will tolerate exercising with dynamic weight loads increased by 10% from initial exercise  4.  Pt will demonstrate reduced pain and improved functional outcomes as reported on the patient centered questionnaires   5. Pt will demonstrate independence with reducing or controlling symptoms with ther ex, movement, or position independently  6. Pt will demonstrate good posture and body mechanics while exercising  7. Pt will demonstrate independence with the HEP and stretching  8. Pt will demonstrate an improved flexion/extension ratio on the med ex isometric test when compared to the initial test with the ideal ratio being 1.4:1    Specific Patient expressed goals:  1. Pt will jog up to 3 miles without increase in neck pain pain.  2. Pt will perform household renovation activities without increase in neck pain.     OUTCOMES:  Neck Disability Index Review 12/22/2016 11/7/2016 4/7/2016   Pain Intensity 1 2 2   Personal Care (Washing, Dressing, etc.) 0 0 1   Lifting 1 1 3   Reading 1 3 1   Headaches 3 5 1   Concentration 1 2 1   Work 1 1 2   Driving 1 2 1   Sleeping 0 0 2   Recreation 1 2 1   Score: 10 18 15         Plan     Continue OPPT  per POC.

## 2017-08-15 ENCOUNTER — DOCUMENTATION ONLY (OUTPATIENT)
Dept: REHABILITATION | Facility: OTHER | Age: 32
End: 2017-08-15

## 2017-08-15 NOTE — PROGRESS NOTES
Patient Discharge Summary    Date: 8/25/2017  Date of last visit: 3/14/2017  Visits attended:15    Goals met before discharge:  Short term goals: (5 weeks or 10 visits)  1.  Pt will demonstrate increased cervical ROM as measured by med ex by 3 degrees from initial test  MET 12/22/16  2.  Pt will demonstrate increased isometric torque by 5% from initial test  MET 12/22/16  3.  Pt will tolerate exercising with dynamic weight loads increased by 5% from initial exercise  MET 12/22/16  4.  Pt will demonstrate reduced pain and improved functional outcomes as reported on the patient centered questionnaires  MET 12/22/16  5. Pt will demonstrate independence with reducing or controlling symptoms with ther ex, movement, or position independently   MET 12/22/16     Long term goals: (10 weeks or 20 visits)  1.  Pt will demonstrate increased cervical ROM as measured by med ex by 6 degrees from initial test   MET 12/22/16  2.  Pt will demonstrate increased isometric torque by 10% from initial test  MET 12/22/16  3.  Pt will tolerate exercising with dynamic weight loads increased by 10% from initial exercise  4.  Pt will demonstrate reduced pain and improved functional outcomes as reported on the patient centered questionnaires   5. Pt will demonstrate independence with reducing or controlling symptoms with ther ex, movement, or position independently  6. Pt will demonstrate good posture and body mechanics while exercising  7. Pt will demonstrate independence with the HEP and stretching  8. Pt will demonstrate an improved flexion/extension ratio on the med ex isometric test when compared to the initial test with the ideal ratio being 1.4:1     Specific Patient expressed goals:  1. Pt will jog up to 3 miles without increase in neck pain pain.  2. Pt will perform household renovation activities without increase in neck pain.    Reason for discharge: Patient failed to attend visits following 15th visit.  Patient now discharged.

## 2017-09-19 ENCOUNTER — OFFICE VISIT (OUTPATIENT)
Dept: PSYCHIATRY | Facility: CLINIC | Age: 32
End: 2017-09-19
Payer: COMMERCIAL

## 2017-09-19 DIAGNOSIS — Z86.59 HISTORY OF ANXIETY: ICD-10-CM

## 2017-09-19 DIAGNOSIS — F33.41 MAJOR DEPRESSIVE DISORDER, RECURRENT EPISODE, IN PARTIAL REMISSION: Primary | ICD-10-CM

## 2017-09-19 PROCEDURE — 90791 PSYCH DIAGNOSTIC EVALUATION: CPT | Mod: S$GLB,,, | Performed by: PSYCHOLOGIST
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
## 2017-09-29 ENCOUNTER — TELEPHONE (OUTPATIENT)
Dept: OPTOMETRY | Facility: CLINIC | Age: 32
End: 2017-09-29

## 2017-09-29 ENCOUNTER — PATIENT MESSAGE (OUTPATIENT)
Dept: OPTOMETRY | Facility: CLINIC | Age: 32
End: 2017-09-29

## 2017-09-29 NOTE — TELEPHONE ENCOUNTER
One time extension for CL Rx. Will need annual exam for updated Rx in the future.     Contact Lens Final Rx     Final Contact Lens Rx       Brand Base Curve Diameter Sphere Cylinder    Right [employer] Vision My Day  8.4 14.2 -2.50 Sphere    Left [employer] My Day 8.4 14.2 -2.75 Sphere    Expiration Date:  3/30/2018    Replacement:  Daily    Wearing Schedule:  Daily wear

## 2017-10-06 ENCOUNTER — PATIENT MESSAGE (OUTPATIENT)
Dept: FAMILY MEDICINE | Facility: CLINIC | Age: 32
End: 2017-10-06

## 2017-10-06 NOTE — TELEPHONE ENCOUNTER
Spoke to patient advised she will have to see her PCP for refills. Patient states she will call to schedule with her PCP.

## 2017-10-19 ENCOUNTER — OFFICE VISIT (OUTPATIENT)
Dept: PSYCHIATRY | Facility: CLINIC | Age: 32
End: 2017-10-19
Payer: COMMERCIAL

## 2017-10-19 DIAGNOSIS — F33.41 MAJOR DEPRESSIVE DISORDER, RECURRENT, IN PARTIAL REMISSION: Primary | ICD-10-CM

## 2017-10-19 PROCEDURE — 90834 PSYTX W PT 45 MINUTES: CPT | Mod: S$GLB,,, | Performed by: PSYCHOLOGIST
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
## 2017-10-23 ENCOUNTER — OFFICE VISIT (OUTPATIENT)
Dept: URGENT CARE | Facility: CLINIC | Age: 32
End: 2017-10-23
Payer: COMMERCIAL

## 2017-10-23 VITALS
TEMPERATURE: 99 F | HEART RATE: 80 BPM | SYSTOLIC BLOOD PRESSURE: 130 MMHG | RESPIRATION RATE: 16 BRPM | DIASTOLIC BLOOD PRESSURE: 90 MMHG | OXYGEN SATURATION: 98 % | WEIGHT: 150 LBS | BODY MASS INDEX: 26.58 KG/M2 | HEIGHT: 63 IN

## 2017-10-23 DIAGNOSIS — G44.209 TENSION HEADACHE: ICD-10-CM

## 2017-10-23 DIAGNOSIS — G43.109 MIGRAINE WITH AURA AND WITHOUT STATUS MIGRAINOSUS, NOT INTRACTABLE: Primary | ICD-10-CM

## 2017-10-23 PROCEDURE — 99213 OFFICE O/P EST LOW 20 MIN: CPT | Mod: S$GLB,,, | Performed by: FAMILY MEDICINE

## 2017-10-23 RX ORDER — ELETRIPTAN HYDROBROMIDE 40 MG/1
40 TABLET, FILM COATED ORAL
Qty: 20 TABLET | Refills: 0 | Status: SHIPPED | OUTPATIENT
Start: 2017-10-23 | End: 2018-11-29 | Stop reason: SDUPTHER

## 2017-10-23 RX ORDER — METHYLPREDNISOLONE 4 MG/1
4 TABLET ORAL DAILY
Qty: 1 PACKAGE | Refills: 0 | Status: SHIPPED | OUTPATIENT
Start: 2017-10-23 | End: 2018-08-09

## 2017-10-23 NOTE — PROGRESS NOTES
"Subjective:       Patient ID: Jossy Leslie is a 32 y.o. female.    Vitals:  height is 5' 3" (1.6 m) and weight is 68 kg (150 lb). Her temperature is 98.7 °F (37.1 °C). Her blood pressure is 130/90 (abnormal) and her pulse is 80. Her respiration is 16 and oxygen saturation is 98%.     Chief Complaint: Headache    Headache    This is a recurrent problem. The current episode started in the past 7 days. The problem occurs intermittently. The problem has been unchanged. The pain is located in the bilateral region. The pain does not radiate. The pain quality is similar to prior headaches. The quality of the pain is described as throbbing. The pain is at a severity of 5/10. The pain is mild. Associated symptoms include nausea. Pertinent negatives include no blurred vision, dizziness, fever, neck pain, numbness, photophobia, seizures, tinnitus, vomiting or weakness. The symptoms are aggravated by bright light and noise. She has tried NSAIDs and Excedrin for the symptoms. The treatment provided mild relief.     Review of Systems   Constitution: Negative for chills, fever and weakness.   HENT: Negative for congestion and tinnitus.    Eyes: Negative for blurred vision and photophobia.   Skin: Negative for rash.   Musculoskeletal: Negative for neck pain.   Gastrointestinal: Positive for nausea. Negative for vomiting.   Neurological: Positive for difficulty with concentration, headaches and sensory change. Negative for disturbances in coordination, dizziness, numbness and seizures.   Psychiatric/Behavioral: Negative for altered mental status. The patient is not nervous/anxious.    All other systems reviewed and are negative.      Objective:      Physical Exam   Constitutional: She is oriented to person, place, and time. She appears well-developed.   HENT:   Head: Normocephalic.       Nose: Nose normal.   Mouth/Throat: Oropharynx is clear and moist. No posterior oropharyngeal erythema.   Eyes: Conjunctivae and EOM are normal. " Pupils are equal, round, and reactive to light.   Neck: Normal range of motion. Neck supple. Muscular tenderness present. No spinous process tenderness present. Normal range of motion present. No Brudzinski's sign and no Kernig's sign noted.   Cardiovascular: Normal rate, regular rhythm, normal heart sounds and intact distal pulses.    Pulmonary/Chest: Breath sounds normal.   Abdominal: Bowel sounds are normal.   Musculoskeletal: She exhibits tenderness.   Neurological: She is alert and oriented to person, place, and time.   Skin: Skin is warm.   Psychiatric: She has a normal mood and affect. Her behavior is normal.       Assessment:       1. Migraine with aura and without status migrainosus, not intractable    2. Tension headache        Plan:         Migraine with aura and without status migrainosus, not intractable  -     eletriptan (RELPAX) 40 MG tablet; Take 1 tablet (40 mg total) by mouth as needed. may repeat in 2 hours if necessary  Dispense: 20 tablet; Refill: 0  -     methylPREDNISolone (MEDROL DOSEPACK) 4 mg tablet; Take 1 tablet (4 mg total) by mouth once daily. use as directed  Dispense: 1 Package; Refill: 0    Tension headache    Continue muscle relaxer

## 2017-10-23 NOTE — PATIENT INSTRUCTIONS
Migraine Headache  This often severe type of headache is different from other types of headaches in that symptoms other than pain occur with the headache. Nausea and vomiting, lightheadedness, sensitivity to light (photophobia), and other visual disturbances are common migraine symptoms. The pain may last from a few hours to several days. It is not clear why migraines occur but certain factors called triggers can raise the risk of having a migraine attack. A migraine may be triggered by emotional stress or depression, or by hormone changes during the menstrual cycle. Other triggers include birth control pills, overuse of migraine medicines, alcohol or caffeine, foods with tyramine (such as aged cheese and wine), eyestrain, weather changes, missed meals, or too little or too much sleep.  Home care  Follow these tips when taking care of yourself at home:  · Dont drive yourself home if you were given pain medicine for your headache or are having visual symptoms. Instead, have someone else drive you home. Try to sleep when you get home. You should feel much better when you wake up.  · Cold can help ease migraine symptoms. Put an ice pack on your forehead or at the base of your skull. Put heat on the back of your neck to help ease any neck spasm.  · Drink only clear liquids or eat a light diet until your symptoms get better. This will help you avoid nausea and vomiting.  How to prevent migraines  Pay attention to what seems to trigger your headache. Try to avoid the triggers when you can. If you have frequent headaches, consider keeping a headache diary. In it, write down what you were doing, feeling, or eating in the hours before each headache. Show this to your healthcare provider to help find the cause of your headaches.  If stress seems to be a trigger for your headaches, figure out what is causing stress in your life. Learn new ways to handle your stress. Ideas include regular exercise, biofeedback,  self-hypnosis, yoga, and meditation. Talk with your healthcare provider to find out more information about managing stress. Many books and digital media are also available on this subject.  Tyramine is a substance found in many foods. It can trigger a migraine in some people. These foods contain tyramine:  · Chocolate  · Yogurt  · All cheeses, but especially aged cheeses  · Smoked or pickled fish and meat, including herring, caviar, bologna, pepperoni, and salami  · Liver  · Avocados  · Bananas  · Figs  · Raisins  · Red wine  Try staying away from these foods for 1 to 2 months to see if you have fewer headaches.  How to treat future headaches  · Take time out at the first sign of a headache, if possible. Find a quiet, dark, comfortable place to sit or lie down. Let yourself relax or sleep.  · Put an ice pack on your forehead or on the area of greatest pain. A heating pad and massage may help if you are having a muscle spasm and tightness in your neck.  · If you have been prescribed a medicine to stop a migraine headache, use this at the first warning sign of the headache for best results. First signs may be an aura or pain.  · If you need to take medicine often for your migraine, talk with your healthcare provider about other ways to prevent your headaches.  Follow-up care  Follow up with your healthcare provider, or as advised. Talk with your provider if you have frequent headaches. He or she can figure out a treatment plan. Ask if you can have medicine to take at home the next time you get a bad headache. This may keep you from having to visit the emergency department in the future. You may need to see a headache specialist (neurologist) if you continue to have headaches.  When to seek medical advice  Call your healthcare provider right away if any of these occur:  · Your head pain gets worse, or doesnt get better within 24 hours  · You cant keep liquids down (repeated vomiting)  · Pain in your sinuses, ears, or  throat  · Fever of 100.4º F (38º C) or higher, or as directed by your healthcare provider  · Stiff neck  · Extreme drowsiness, confusion, or fainting  · Dizziness, or dizziness with spinning sensation (vertigo)  · Weakness in an arm or leg, or on one side of your face  · Difficulty talking or seeing  Date Last Reviewed: 8/1/2016 © 2000-2017 [employer]. [address] [place] [address], [place] [address]. All rights reserved. This information is not intended as a substitute for professional medical care. Always follow your healthcare professional's instructions.        Tension Headaches     Massaging your neck muscles can help relieve tension headache pain.     Tension headaches cause a dull, steady pain on both sides of the head and in the neck and the back of the head. The eyes may also feel tired. Tension headaches can be triggered by muscle tension and clenching, lack of sleep, poor posture, eyestrain, stress,depression, anxiety, arthritis of the neck, and other factors.  To help prevent tension headaches  Take the following steps:  · Make sure your work area is properly set up to help you avoid neck strain and eyestrain.  · Make sure that your eyeglass prescription is current and is appropriate for the work you do.  · Learn techniques for relaxing and reducing emotional stress. These include deep breathing, progressive relaxation, yoga, meditation, and biofeedback.  · Maintain a regular exercise regimen under the guidance of a doctor. This can help keep your neck and back flexible, strong, and relaxed.  To relieve the pain  Take the following steps:  · Use moist heat to relax the muscles. Soak in a hot bath or wrap a warm, moist towel around your neck.  · Brush your scalp lightly with a soft hairbrush.  · Give yourself a massage. Knead the muscles running from your shoulders up the back of your skull.  · Use an ice pack. Apply this directly to the place where you feel pain.  · Rest. Sleeping often helps  relieve headache pain.  · Drink plenty of fluids. Dehydration is another trigger for headaches.  · Use pain medicine sparingly for moderate to severe pain.   Date Last Reviewed: 10/19/2015  © 4357-0260 [employer]. [address] [place] [address], [place] [address]. All rights reserved. This information is not intended as a substitute for professional medical care. Always follow your healthcare professional's instructions.

## 2017-11-14 ENCOUNTER — PATIENT MESSAGE (OUTPATIENT)
Dept: PSYCHIATRY | Facility: CLINIC | Age: 32
End: 2017-11-14

## 2017-11-29 ENCOUNTER — OFFICE VISIT (OUTPATIENT)
Dept: PSYCHIATRY | Facility: CLINIC | Age: 32
End: 2017-11-29
Payer: COMMERCIAL

## 2017-11-29 DIAGNOSIS — F33.41 MAJOR DEPRESSIVE DISORDER, RECURRENT EPISODE, IN PARTIAL REMISSION: Primary | ICD-10-CM

## 2017-11-29 DIAGNOSIS — Z86.59 HISTORY OF ANXIETY: ICD-10-CM

## 2017-11-29 PROCEDURE — 90834 PSYTX W PT 45 MINUTES: CPT | Mod: S$GLB,,, | Performed by: PSYCHOLOGIST

## 2017-11-29 NOTE — PROGRESS NOTES
Individual Psychotherapy (PhD/LCSW)    11/29/2017    Site:  [facility]         Therapeutic Intervention: Met with patient.  Outpatient - Insight oriented psychotherapy 45 min - CPT code 89975    Chief complaint/reason for encounter: depression     Interval history and content of current session:  Jossy arrived on time to her scheduled appointment.  She attempted to try DEAR MAN with her boyfriend, but found that it was unsuccessful when he was not responsive and she lost skillfulness.  She discussed several situations in which they are unable to promote effective communication, and it appears they engage in some over-generalizations and labeling during those times.  It appears that it is difficult for her to stay mindful and redirect him back to her assertion, and we practiced this in session.  We discussed using positive reinforcement and shaping for desired behaviors.  She was encouraged to consider her values for their relationship and focus on what she would like to build with him.  Despite these issues, she does believe that she is managing stressors well and that they are working through issues in their relationship.  She would like to continue in follow-up to enhance interpersonal effectiveness with him.    Treatment plan:  · Target symptoms: depression  · Why chosen therapy is appropriate versus another modality: relevant to diagnosis  · Outcome monitoring methods: self-report  · Therapeutic intervention type: insight oriented psychotherapy    Risk parameters:  Patient reports no suicidal ideation  Patient reports no homicidal ideation  Patient reports no self-injurious behavior  Patient reports no violent behavior    Verbal deficits: None    Patient's response to intervention:  The patient's response to intervention is accepting.    Progress toward goals and other mental status changes:  The patient's progress toward goals is fair .    Diagnosis:     ICD-10-CM ICD-9-CM   1. Major depressive disorder,  recurrent episode, in partial remission F33.41 296.35   2. History of anxiety Z86.59 V11.8       Plan:  individual psychotherapy    Return to clinic: 1 month    Length of Service (minutes): 45

## 2017-12-27 ENCOUNTER — PATIENT MESSAGE (OUTPATIENT)
Dept: PSYCHIATRY | Facility: CLINIC | Age: 32
End: 2017-12-27

## 2018-01-30 ENCOUNTER — OFFICE VISIT (OUTPATIENT)
Dept: OPTOMETRY | Facility: CLINIC | Age: 33
End: 2018-01-30
Payer: COMMERCIAL

## 2018-01-30 DIAGNOSIS — H52.13 MYOPIA, BILATERAL: ICD-10-CM

## 2018-01-30 DIAGNOSIS — Z01.00 EXAMINATION OF EYES AND VISION: Primary | ICD-10-CM

## 2018-01-30 PROCEDURE — 99999 PR PBB SHADOW E&M-EST. PATIENT-LVL I: CPT | Mod: PBBFAC,,, | Performed by: OPTOMETRIST

## 2018-01-30 PROCEDURE — 92014 COMPRE OPH EXAM EST PT 1/>: CPT | Mod: S$GLB,,, | Performed by: OPTOMETRIST

## 2018-01-30 PROCEDURE — 92015 DETERMINE REFRACTIVE STATE: CPT | Mod: S$GLB,,, | Performed by: OPTOMETRIST

## 2018-01-30 NOTE — PROGRESS NOTES
Subjective:       Patient ID: Jossy Leslie is a 32 y.o. female      Chief Complaint   Patient presents with    Concerns About Ocular Health     History of Present Illness  Dls: 9/28/16 Dr. Grant    Pt states no changes in vision. Pt wears scls and single vision glasses.   Pt states no tearing no itching no burning no pain no ha's no floaters.     Eye meds:  None     Assessment/Plan:     1. Examination of eyes and vision  Good ocular health OU    2. Myopia, bilateral  Educated patient on refractive error and discussed lens options. Dispensed updated spectacle Rx. Educated about adaptation period to new specs.    Eyeglass Final Rx     Eyeglass Final Rx       Sphere Cylinder Axis    Right -3.00 +0.75 085    Left -2.75 Sphere     Type:  SVL    Expiration Date:  1/31/2019                Follow-up in about 1 year (around 1/30/2019) for Annual eye exam, Contact Lens Follow Up.

## 2018-07-25 ENCOUNTER — OFFICE VISIT (OUTPATIENT)
Dept: URGENT CARE | Facility: CLINIC | Age: 33
End: 2018-07-25
Payer: COMMERCIAL

## 2018-07-25 VITALS
RESPIRATION RATE: 19 BRPM | SYSTOLIC BLOOD PRESSURE: 124 MMHG | TEMPERATURE: 98 F | DIASTOLIC BLOOD PRESSURE: 89 MMHG | OXYGEN SATURATION: 97 % | HEIGHT: 63 IN | BODY MASS INDEX: 30.12 KG/M2 | HEART RATE: 96 BPM | WEIGHT: 170 LBS

## 2018-07-25 DIAGNOSIS — R23.2 FLUSHING REACTION: Primary | ICD-10-CM

## 2018-07-25 PROBLEM — G44.219 EPISODIC TENSION-TYPE HEADACHE, NOT INTRACTABLE: Status: ACTIVE | Noted: 2018-07-25

## 2018-07-25 PROBLEM — J45.20 MILD INTERMITTENT ASTHMA WITHOUT COMPLICATION: Status: ACTIVE | Noted: 2018-07-25

## 2018-07-25 PROBLEM — G43.709 CHRONIC MIGRAINE WITHOUT AURA WITHOUT STATUS MIGRAINOSUS, NOT INTRACTABLE: Status: ACTIVE | Noted: 2018-07-25

## 2018-07-25 PROCEDURE — 99214 OFFICE O/P EST MOD 30 MIN: CPT | Mod: S$GLB,,, | Performed by: FAMILY MEDICINE

## 2018-07-25 PROCEDURE — 3008F BODY MASS INDEX DOCD: CPT | Mod: CPTII,S$GLB,, | Performed by: FAMILY MEDICINE

## 2018-07-25 NOTE — PATIENT INSTRUCTIONS
Please return here or go to the Emergency Department for any concerns or worsening of condition.    Please drink plenty of fluids.  Please get plenty of rest.  If you were not prescribed any reflux medications, it is OK to take over the counter Pepcid or Zantac or Prilosec as directed.    Please follow up with your primary care doctor or specialist as needed.    If you  smoke, please stop smoking.

## 2018-07-25 NOTE — PROGRESS NOTES
"Subjective:       Patient ID: Jossy Leslie is a 32 y.o. female.    Vitals:  height is 5' 3" (1.6 m) and weight is 77.1 kg (170 lb). Her temperature is 97.6 °F (36.4 °C). Her blood pressure is 124/89 and her pulse is 96. Her respiration is 19 and oxygen saturation is 97%.     Chief Complaint: No chief complaint on file.    Pt states for years she has been having a flushing feeling all over her body. It mostly hap(pens when she eats, right after her face , chest , neck get really read.      Rash   This is a new problem. The current episode started more than 1 year ago. The problem is unchanged. The affected locations include the face, neck and chest. It is unknown if there was an exposure to a precipitant. Pertinent negatives include no diarrhea, fever, joint pain, shortness of breath, sore throat or vomiting. Past treatments include nothing.     Review of Systems   Constitution: Negative for chills and fever.   HENT: Negative for sore throat.    Eyes: Negative for blurred vision.   Cardiovascular: Negative for chest pain.   Respiratory: Negative for shortness of breath.    Skin: Negative for rash.   Musculoskeletal: Negative for back pain and joint pain.   Gastrointestinal: Negative for abdominal pain, diarrhea, nausea and vomiting.   Neurological: Negative for headaches.   Psychiatric/Behavioral: The patient is not nervous/anxious.        Objective:      Physical Exam   Constitutional: She is oriented to person, place, and time. She appears well-developed.   HENT:   Head: Normocephalic.   Nose: Nose normal.   Mouth/Throat: Oropharynx is clear and moist.   Eyes: Conjunctivae and EOM are normal. Pupils are equal, round, and reactive to light.   Cardiovascular: Normal rate and regular rhythm.    Pulmonary/Chest: Breath sounds normal.   Abdominal: Bowel sounds are normal.   Neurological: She is alert and oriented to person, place, and time.       Assessment:       1. Flushing reaction        Plan:         Flushing " reaction  -     ranitidine (ZANTAC) 150 MG tablet; Take 1 tablet (150 mg total) by mouth nightly.  Dispense: 30 tablet; Refill: 1  -     Ambulatory referral to Gastroenterology  -     Ambulatory Referral to Family Practice    Consider carcinoid syndrome  Patient Instructions   Please return here or go to the Emergency Department for any concerns or worsening of condition.    Please drink plenty of fluids.  Please get plenty of rest.  If you were not prescribed any reflux medications, it is OK to take over the counter Pepcid or Zantac or Prilosec as directed.    Please follow up with your primary care doctor or specialist as needed.    If you  smoke, please stop smoking.

## 2018-07-26 ENCOUNTER — TELEPHONE (OUTPATIENT)
Dept: FAMILY MEDICINE | Facility: CLINIC | Age: 33
End: 2018-07-26

## 2018-07-26 NOTE — LETTER
July 27, 2018    Jossy Danielpar  [address] [place] [address]             [facility]  [address]  [place] [address]  Phone: [phone]  Fax: [phone] Dear Mrs. Leslie:    Sorry we were unable to contact you to schedule your Gastroenterology appointment. Please give the referral department a call at [phone].        If you have any questions or concerns, please don't hesitate to call.    Sincerely,        Ashanti Ross MA

## 2018-08-07 ENCOUNTER — PATIENT MESSAGE (OUTPATIENT)
Dept: FAMILY MEDICINE | Facility: CLINIC | Age: 33
End: 2018-08-07

## 2018-08-09 ENCOUNTER — OFFICE VISIT (OUTPATIENT)
Dept: FAMILY MEDICINE | Facility: CLINIC | Age: 33
End: 2018-08-09
Payer: COMMERCIAL

## 2018-08-09 VITALS
HEIGHT: 63 IN | SYSTOLIC BLOOD PRESSURE: 116 MMHG | BODY MASS INDEX: 30.12 KG/M2 | OXYGEN SATURATION: 97 % | DIASTOLIC BLOOD PRESSURE: 86 MMHG | HEART RATE: 81 BPM | TEMPERATURE: 98 F | WEIGHT: 170 LBS

## 2018-08-09 DIAGNOSIS — Z00.00 ROUTINE PHYSICAL EXAMINATION: ICD-10-CM

## 2018-08-09 DIAGNOSIS — K90.49 FOOD INTOLERANCE: ICD-10-CM

## 2018-08-09 DIAGNOSIS — R53.83 FATIGUE, UNSPECIFIED TYPE: ICD-10-CM

## 2018-08-09 DIAGNOSIS — R23.2 FLUSHING: Primary | ICD-10-CM

## 2018-08-09 PROCEDURE — 99214 OFFICE O/P EST MOD 30 MIN: CPT | Mod: S$GLB,,, | Performed by: NURSE PRACTITIONER

## 2018-08-09 PROCEDURE — 3008F BODY MASS INDEX DOCD: CPT | Mod: CPTII,S$GLB,, | Performed by: NURSE PRACTITIONER

## 2018-08-09 PROCEDURE — 99999 PR PBB SHADOW E&M-EST. PATIENT-LVL IV: CPT | Mod: PBBFAC,,, | Performed by: NURSE PRACTITIONER

## 2018-08-09 RX ORDER — ALBUTEROL SULFATE 90 UG/1
AEROSOL, METERED RESPIRATORY (INHALATION)
COMMUNITY
Start: 2018-07-25 | End: 2019-12-16 | Stop reason: ALTCHOICE

## 2018-08-09 RX ORDER — SUMATRIPTAN SUCCINATE 25 MG/1
25 TABLET ORAL
COMMUNITY
Start: 2018-07-27 | End: 2018-10-19 | Stop reason: SDUPTHER

## 2018-08-09 NOTE — PROGRESS NOTES
Subjective:       Patient ID: Jossy Leslie is a 32 y.o. female.    Chief Complaint: Bloated and Food Intolerance    32 year old female presents to the clinic with complaint of flushing after eating off and on. She is concerned about food intolerance. She has a appointment with a allergist next month. She knows that she is allergic to crabs, shrimp, and pumpkin seeds. She says she feels bloated at times. She also been having a lot of fatigue. She takes Zyrtec, Singulair, and Zantac. She has seen GI in the past and had a EGD. She would like some labs checked. She denies any abdominal pain, nausea, vomiting, diarrhea, constipation or blood in her stool.       Past Medical History:   Diagnosis Date    Eosinophilic esophagitis     Eosinophilic esophagitis      Past Surgical History:   Procedure Laterality Date    ESOPHAGOGASTRODUODENOSCOPY      TONSILLECTOMY        reports that she has never smoked. She has never used smokeless tobacco. She reports that she does not drink alcohol or use drugs.  Review of Systems   Constitutional: Negative for activity change.   Respiratory: Negative for cough, chest tightness, shortness of breath and wheezing.    Cardiovascular: Negative for chest pain, palpitations and leg swelling.   Gastrointestinal: Negative for abdominal pain, constipation, diarrhea, nausea and vomiting.   Musculoskeletal: Negative for gait problem.   Skin: Negative for color change.   Neurological: Negative for dizziness, syncope and light-headedness.       Objective:      Physical Exam   Constitutional: She is oriented to person, place, and time. She appears well-developed and well-nourished. No distress.   Eyes: Conjunctivae and EOM are normal. Pupils are equal, round, and reactive to light. Right eye exhibits no discharge. Left eye exhibits no discharge. No scleral icterus.   Cardiovascular: Normal rate, regular rhythm and normal heart sounds.    No murmur heard.  Pulmonary/Chest: Effort normal and  breath sounds normal. No respiratory distress. She has no wheezes. She has no rales.   Abdominal: Soft. Bowel sounds are normal. There is no tenderness.   Musculoskeletal: Normal range of motion. She exhibits no edema.   Neurological: She is alert and oriented to person, place, and time.   Skin: Skin is warm and dry. She is not diaphoretic.   Psychiatric: She has a normal mood and affect.       Assessment:       1. Flushing    2. Fatigue, unspecified type    3. Food intolerance        Plan:         Flushing  - continue Zyrtec and Singulair     Fatigue, unspecified type  -     CBC auto differential; Future; Expected date: 08/09/2018  -     Comprehensive metabolic panel; Future; Expected date: 08/09/2018  -     TSH; Future; Expected date: 08/09/2018  -     Iron and TIBC; Future; Expected date: 08/09/2018  -     Ferritin; Future; Expected date: 08/09/2018  -     Vitamin B12; Future; Expected date: 08/09/2018  -     Vitamin D; Future; Expected date: 08/09/2018    Food intolerance  - see allergist as scheduled

## 2018-08-10 ENCOUNTER — LAB VISIT (OUTPATIENT)
Dept: LAB | Facility: HOSPITAL | Age: 33
End: 2018-08-10
Attending: NURSE PRACTITIONER
Payer: COMMERCIAL

## 2018-08-10 DIAGNOSIS — R53.83 FATIGUE, UNSPECIFIED TYPE: ICD-10-CM

## 2018-08-10 LAB
25(OH)D3+25(OH)D2 SERPL-MCNC: 38 NG/ML
ALBUMIN SERPL BCP-MCNC: 4.2 G/DL
ALP SERPL-CCNC: 45 U/L
ALT SERPL W/O P-5'-P-CCNC: 40 U/L
ANION GAP SERPL CALC-SCNC: 9 MMOL/L
AST SERPL-CCNC: 26 U/L
BASOPHILS # BLD AUTO: 0.04 K/UL
BASOPHILS NFR BLD: 0.5 %
BILIRUB SERPL-MCNC: 0.4 MG/DL
BUN SERPL-MCNC: 12 MG/DL
CALCIUM SERPL-MCNC: 9.7 MG/DL
CHLORIDE SERPL-SCNC: 105 MMOL/L
CO2 SERPL-SCNC: 25 MMOL/L
CREAT SERPL-MCNC: 0.9 MG/DL
DIFFERENTIAL METHOD: ABNORMAL
EOSINOPHIL # BLD AUTO: 0.5 K/UL
EOSINOPHIL NFR BLD: 6.8 %
ERYTHROCYTE [DISTWIDTH] IN BLOOD BY AUTOMATED COUNT: 14.3 %
EST. GFR  (AFRICAN AMERICAN): >60 ML/MIN/1.73 M^2
EST. GFR  (NON AFRICAN AMERICAN): >60 ML/MIN/1.73 M^2
FERRITIN SERPL-MCNC: 5 NG/ML
GLUCOSE SERPL-MCNC: 93 MG/DL
HCT VFR BLD AUTO: 39.5 %
HGB BLD-MCNC: 12.8 G/DL
IRON SERPL-MCNC: 49 UG/DL
LYMPHOCYTES # BLD AUTO: 2.2 K/UL
LYMPHOCYTES NFR BLD: 30.2 %
MCH RBC QN AUTO: 26.2 PG
MCHC RBC AUTO-ENTMCNC: 32.4 G/DL
MCV RBC AUTO: 81 FL
MONOCYTES # BLD AUTO: 0.6 K/UL
MONOCYTES NFR BLD: 8.3 %
NEUTROPHILS # BLD AUTO: 4 K/UL
NEUTROPHILS NFR BLD: 54.1 %
PLATELET # BLD AUTO: 282 K/UL
PMV BLD AUTO: 11 FL
POTASSIUM SERPL-SCNC: 3.9 MMOL/L
PROT SERPL-MCNC: 7.8 G/DL
RBC # BLD AUTO: 4.88 M/UL
SATURATED IRON: 9 %
SODIUM SERPL-SCNC: 139 MMOL/L
T4 FREE SERPL-MCNC: 0.86 NG/DL
TOTAL IRON BINDING CAPACITY: 524 UG/DL
TRANSFERRIN SERPL-MCNC: 354 MG/DL
TSH SERPL DL<=0.005 MIU/L-ACNC: 5.83 UIU/ML
VIT B12 SERPL-MCNC: 717 PG/ML
WBC # BLD AUTO: 7.35 K/UL

## 2018-08-10 PROCEDURE — 84443 ASSAY THYROID STIM HORMONE: CPT

## 2018-08-10 PROCEDURE — 80053 COMPREHEN METABOLIC PANEL: CPT

## 2018-08-10 PROCEDURE — 82306 VITAMIN D 25 HYDROXY: CPT

## 2018-08-10 PROCEDURE — 84439 ASSAY OF FREE THYROXINE: CPT

## 2018-08-10 PROCEDURE — 85025 COMPLETE CBC W/AUTO DIFF WBC: CPT

## 2018-08-10 PROCEDURE — 83540 ASSAY OF IRON: CPT

## 2018-08-10 PROCEDURE — 82607 VITAMIN B-12: CPT

## 2018-08-10 PROCEDURE — 36415 COLL VENOUS BLD VENIPUNCTURE: CPT

## 2018-08-10 PROCEDURE — 82728 ASSAY OF FERRITIN: CPT

## 2018-08-14 ENCOUNTER — TELEPHONE (OUTPATIENT)
Dept: FAMILY MEDICINE | Facility: CLINIC | Age: 33
End: 2018-08-14

## 2018-08-14 RX ORDER — QUINIDINE GLUCONATE 324 MG
480 TABLET, EXTENDED RELEASE ORAL DAILY
Qty: 30 TABLET | Refills: 11 | Status: SHIPPED | OUTPATIENT
Start: 2018-08-14 | End: 2021-08-27

## 2018-08-14 NOTE — TELEPHONE ENCOUNTER
I spoke with the patient and explained that her iron level was low and she needed to start iron. I will send a prescription to the pharmacy.I explained it may cause constipation and may need to take Colace with it.  The rest of your labs are normal. Patient verbalized understanding of above.

## 2018-08-14 NOTE — TELEPHONE ENCOUNTER
I tried to call patient to discuss lab results and unable to leave a message on the patient's voice mail.

## 2018-09-05 ENCOUNTER — OFFICE VISIT (OUTPATIENT)
Dept: ALLERGY | Facility: CLINIC | Age: 33
End: 2018-09-05
Payer: COMMERCIAL

## 2018-09-05 VITALS
WEIGHT: 170.44 LBS | HEIGHT: 63 IN | HEART RATE: 91 BPM | DIASTOLIC BLOOD PRESSURE: 90 MMHG | OXYGEN SATURATION: 97 % | BODY MASS INDEX: 30.2 KG/M2 | SYSTOLIC BLOOD PRESSURE: 120 MMHG

## 2018-09-05 DIAGNOSIS — Z91.018 FOOD ALLERGY: ICD-10-CM

## 2018-09-05 DIAGNOSIS — R23.2 FLUSHING: Primary | ICD-10-CM

## 2018-09-05 DIAGNOSIS — K20.0 EOSINOPHILIC ESOPHAGITIS: ICD-10-CM

## 2018-09-05 PROCEDURE — 99204 OFFICE O/P NEW MOD 45 MIN: CPT | Mod: S$GLB,,, | Performed by: PEDIATRICS

## 2018-09-05 PROCEDURE — 99999 PR PBB SHADOW E&M-EST. PATIENT-LVL IV: CPT | Mod: PBBFAC,,, | Performed by: PEDIATRICS

## 2018-09-05 PROCEDURE — 3008F BODY MASS INDEX DOCD: CPT | Mod: CPTII,S$GLB,, | Performed by: PEDIATRICS

## 2018-09-05 RX ORDER — EPINEPHRINE 0.3 MG/.3ML
1 INJECTION SUBCUTANEOUS ONCE
Qty: 2 DEVICE | Refills: 1 | Status: SHIPPED | OUTPATIENT
Start: 2018-09-05 | End: 2018-09-14 | Stop reason: SDUPTHER

## 2018-09-05 RX ORDER — PANTOPRAZOLE SODIUM 40 MG/1
40 TABLET, DELAYED RELEASE ORAL DAILY
Qty: 30 TABLET | Refills: 11 | Status: SHIPPED | OUTPATIENT
Start: 2018-09-05 | End: 2019-09-09

## 2018-09-05 NOTE — PROGRESS NOTES
ALLERGY & IMMUNOLOGY CLINIC - INITIAL CONSULTATION     HISTORY OF PRESENT ILLNESS     Patient ID: Jossy Leslie is a 32 y.o. female    CC: Flushing    HPI: 32 year old female with a past medical history of eosinophilic esophagitis presents to clinic for workup of flushing which she think may be related to food allergy.     She reports that for the past several years her face neck and upper chest will frequently get red and splotchy with a feeling of warmth and becoming flushed. The symptoms last for an hour or so and always self resolve without any medication needed. Symptoms happen most frequently after eating, she especially notices that it happens after she eats lunch. Does not really matter what she eats. She also reports that she gets flushed easily not related to eating as well. She says she gets these symptoms at least once a day. There is no associated tachycardia, sweating, vomiting, diarrhea. Does report occasional bloating.    Patient does report a history of food allergies to pumpkin seeds, shrimp, crab, and crawfish. Particularly after eating pumpkin seeds she reports immediate onset of hives and swelling. Had blood tests done four years ago for these allergens which were all negative. These reactions have been different than the flushing mentioned above.    Patient does have a diagnosis of EOE for the past 5 years. Was having nausea and dysphagia with foods. Had EGD done at that time. Was prescribed swallowed budesonide in the past which she never had filled. Reports using zantac and Prilosec sparingly. Reports choking episodes with almost all of her meals and reports drinking large amounts of water with all of her meals.      Past medical history is otherwise significant for mild intermittent asthma for which she uses an inhaler every once in a while. Family history is significant for mother with a history of flushing.    REVIEW OF SYSTEMS   Review of Systems   Constitutional: Negative.    HENT:  Negative.    Eyes: Negative.    Respiratory: Negative.    Cardiovascular: Negative.    Gastrointestinal: Negative.    Genitourinary: Negative.    Musculoskeletal: Negative.    Skin: Negative.    Neurological: Negative.    Endo/Heme/Allergies: Negative.    Psychiatric/Behavioral: Negative.         MEDICAL HISTORY     Past Medical History:   Diagnosis Date    Asthma     Eosinophilic esophagitis     Eosinophilic esophagitis         PHYSICAL EXAM   Physical Exam   Constitutional: She is oriented to person, place, and time and well-developed, well-nourished, and in no distress. No distress.   HENT:   Head: Normocephalic and atraumatic.   Right Ear: External ear normal.   Left Ear: External ear normal.   Nose: Nose normal.   Mouth/Throat: Oropharynx is clear and moist. No oropharyngeal exudate.   Eyes: Conjunctivae and EOM are normal. Pupils are equal, round, and reactive to light. Right eye exhibits no discharge. Left eye exhibits no discharge. No scleral icterus.   Neck: Normal range of motion.   Cardiovascular: Normal rate, regular rhythm, normal heart sounds and intact distal pulses. Exam reveals no gallop and no friction rub.   No murmur heard.  Pulmonary/Chest: Effort normal and breath sounds normal. No respiratory distress. She has no wheezes. She has no rales. She exhibits no tenderness.   Abdominal: Soft. Bowel sounds are normal. She exhibits no distension and no mass. There is no tenderness. There is no rebound and no guarding.   Neurological: She is alert and oriented to person, place, and time. No cranial nerve deficit. GCS score is 15.   Skin: Skin is warm and dry. No rash noted. She is not diaphoretic. No erythema. No pallor.   Psychiatric: Mood, memory, affect and judgment normal.      ALLERGEN TESTING     Immunocaps:   Carrot, oyster, crab, pumpkin seed, tuna, shrimp, crayfish all negative     CHART REVIEW   Reviewed previous notes and imaging   ASSESSMENT & PLAN     Jossy Leslie is a 32 y.o. female  with     Flushing: Differential is broad and based on history no specific cause is identified. Systemic diseases such as mastocytocis, carcinoid, and pheochromocytoma are unlikely given lack of common findings in those conditions. Not currently taking any medications known to cause flushing. Due to symptoms being related to meals it could potentially food induced, however she says symptoms are not related to specific foods. Can rule out anaphylaxis based on symptoms.Most likely diagnosis at this time is gustatory flushing, however lack of sweating makes this diagnosis less likely. Emotional flushing is also still in the differential. She seems to have significant choking with eating and this may be triggering the flushing episodes.  -     Tryptase; Future; Expected date: 09/05/2018  -     Food diary over the next few weeks to determine if it is related to specific foods.    Food allergy: Testing in the past has been negative but history is consistent with a possible IgE mediated food reaction.   -     EPINEPHrine (EPIPEN) 0.3 mg/0.3 mL AtIn; Inject 0.3 mLs (0.3 mg total) into the muscle once. for 1 dose  Dispense: 2 Device; Refill: 1  -     Can consider skin testing at next visit. Counseled to discontinue antihistamines 1 week prior.    Eosinophilic esophagitis: Having significant symptoms at this time. Will begin with 6-8 week trial of PPI and reassess. If no improvement seen will discuss possible elimination diets. May also consider adding swallowed steroids. Discussed at length with patient that untreated the condition can cause significant problems going forward.   -     pantoprazole (PROTONIX) 40 MG tablet; Take 1 tablet (40 mg total) by mouth once daily.  Dispense: 30 tablet; Refill: 11        Follow up: 6-8 weeks     Mor Mckenzie DO  Allergy Immunology Fellow

## 2018-09-06 DIAGNOSIS — Z91.018 FOOD ALLERGY: ICD-10-CM

## 2018-09-06 RX ORDER — EPINEPHRINE 0.3 MG/.3ML
1 INJECTION SUBCUTANEOUS ONCE
Qty: 2 DEVICE | Refills: 1 | Status: CANCELLED | OUTPATIENT
Start: 2018-09-06 | End: 2018-09-06

## 2018-09-12 DIAGNOSIS — Z91.018 FOOD ALLERGY: ICD-10-CM

## 2018-09-14 RX ORDER — EPINEPHRINE 0.3 MG/.3ML
1 INJECTION SUBCUTANEOUS ONCE
Qty: 2 EACH | Refills: 1 | Status: SHIPPED | OUTPATIENT
Start: 2018-09-14 | End: 2021-12-29

## 2018-09-21 ENCOUNTER — PATIENT MESSAGE (OUTPATIENT)
Dept: FAMILY MEDICINE | Facility: CLINIC | Age: 33
End: 2018-09-21

## 2018-10-17 ENCOUNTER — PATIENT MESSAGE (OUTPATIENT)
Dept: FAMILY MEDICINE | Facility: CLINIC | Age: 33
End: 2018-10-17

## 2018-10-17 ENCOUNTER — LAB VISIT (OUTPATIENT)
Dept: LAB | Facility: HOSPITAL | Age: 33
End: 2018-10-17
Attending: PEDIATRICS
Payer: COMMERCIAL

## 2018-10-17 DIAGNOSIS — R23.2 FLUSHING: ICD-10-CM

## 2018-10-17 PROCEDURE — 36415 COLL VENOUS BLD VENIPUNCTURE: CPT

## 2018-10-17 PROCEDURE — 83520 IMMUNOASSAY QUANT NOS NONAB: CPT

## 2018-10-19 ENCOUNTER — OFFICE VISIT (OUTPATIENT)
Dept: FAMILY MEDICINE | Facility: CLINIC | Age: 33
End: 2018-10-19
Attending: FAMILY MEDICINE
Payer: COMMERCIAL

## 2018-10-19 ENCOUNTER — LAB VISIT (OUTPATIENT)
Dept: LAB | Facility: HOSPITAL | Age: 33
End: 2018-10-19
Attending: FAMILY MEDICINE
Payer: COMMERCIAL

## 2018-10-19 ENCOUNTER — PATIENT MESSAGE (OUTPATIENT)
Dept: FAMILY MEDICINE | Facility: CLINIC | Age: 33
End: 2018-10-19

## 2018-10-19 VITALS
BODY MASS INDEX: 30.62 KG/M2 | SYSTOLIC BLOOD PRESSURE: 116 MMHG | HEART RATE: 85 BPM | DIASTOLIC BLOOD PRESSURE: 89 MMHG | OXYGEN SATURATION: 97 % | WEIGHT: 172.81 LBS | HEIGHT: 63 IN

## 2018-10-19 DIAGNOSIS — D50.0 IRON DEFICIENCY ANEMIA DUE TO CHRONIC BLOOD LOSS: ICD-10-CM

## 2018-10-19 DIAGNOSIS — E03.8 HYPOTHYROIDISM DUE TO HASHIMOTO'S THYROIDITIS: Primary | ICD-10-CM

## 2018-10-19 DIAGNOSIS — Z00.00 ROUTINE GENERAL MEDICAL EXAMINATION AT A HEALTH CARE FACILITY: ICD-10-CM

## 2018-10-19 DIAGNOSIS — R79.89 ABNORMAL TSH: ICD-10-CM

## 2018-10-19 DIAGNOSIS — E06.3 HYPOTHYROIDISM DUE TO HASHIMOTO'S THYROIDITIS: Primary | ICD-10-CM

## 2018-10-19 DIAGNOSIS — G43.709 CHRONIC MIGRAINE WITHOUT AURA WITHOUT STATUS MIGRAINOSUS, NOT INTRACTABLE: ICD-10-CM

## 2018-10-19 DIAGNOSIS — Z00.00 ROUTINE GENERAL MEDICAL EXAMINATION AT A HEALTH CARE FACILITY: Primary | ICD-10-CM

## 2018-10-19 DIAGNOSIS — J45.20 MILD INTERMITTENT ASTHMA WITHOUT COMPLICATION: ICD-10-CM

## 2018-10-19 LAB
BASOPHILS # BLD AUTO: 0.02 K/UL
BASOPHILS NFR BLD: 0.3 %
CHOLEST SERPL-MCNC: 171 MG/DL
CHOLEST/HDLC SERPL: 5 {RATIO}
DIFFERENTIAL METHOD: ABNORMAL
EOSINOPHIL # BLD AUTO: 0.4 K/UL
EOSINOPHIL NFR BLD: 6.4 %
ERYTHROCYTE [DISTWIDTH] IN BLOOD BY AUTOMATED COUNT: 15.5 %
FERRITIN SERPL-MCNC: 6 NG/ML
HCT VFR BLD AUTO: 39.3 %
HDLC SERPL-MCNC: 34 MG/DL
HDLC SERPL: 19.9 %
HGB BLD-MCNC: 12.6 G/DL
IRON SERPL-MCNC: 63 UG/DL
LDLC SERPL CALC-MCNC: 114.8 MG/DL
LYMPHOCYTES # BLD AUTO: 1.8 K/UL
LYMPHOCYTES NFR BLD: 29.8 %
MCH RBC QN AUTO: 26 PG
MCHC RBC AUTO-ENTMCNC: 32.1 G/DL
MCV RBC AUTO: 81 FL
MONOCYTES # BLD AUTO: 0.4 K/UL
MONOCYTES NFR BLD: 6.9 %
NEUTROPHILS # BLD AUTO: 3.4 K/UL
NEUTROPHILS NFR BLD: 56.4 %
NONHDLC SERPL-MCNC: 137 MG/DL
PLATELET # BLD AUTO: 264 K/UL
PMV BLD AUTO: 10.4 FL
RBC # BLD AUTO: 4.84 M/UL
SATURATED IRON: 13 %
T4 FREE SERPL-MCNC: 0.84 NG/DL
THYROPEROXIDASE IGG SERPL-ACNC: 1229.2 IU/ML
TOTAL IRON BINDING CAPACITY: 490 UG/DL
TRANSFERRIN SERPL-MCNC: 331 MG/DL
TRIGL SERPL-MCNC: 111 MG/DL
TRYPTASE LEVEL: 2.6 NG/ML
TSH SERPL DL<=0.005 MIU/L-ACNC: 4.84 UIU/ML
WBC # BLD AUTO: 6.05 K/UL

## 2018-10-19 PROCEDURE — 83540 ASSAY OF IRON: CPT

## 2018-10-19 PROCEDURE — 85025 COMPLETE CBC W/AUTO DIFF WBC: CPT

## 2018-10-19 PROCEDURE — 84439 ASSAY OF FREE THYROXINE: CPT

## 2018-10-19 PROCEDURE — 84443 ASSAY THYROID STIM HORMONE: CPT

## 2018-10-19 PROCEDURE — 80061 LIPID PANEL: CPT

## 2018-10-19 PROCEDURE — 99395 PREV VISIT EST AGE 18-39: CPT | Mod: S$GLB,,, | Performed by: FAMILY MEDICINE

## 2018-10-19 PROCEDURE — 82728 ASSAY OF FERRITIN: CPT

## 2018-10-19 PROCEDURE — 36415 COLL VENOUS BLD VENIPUNCTURE: CPT

## 2018-10-19 PROCEDURE — 99999 PR PBB SHADOW E&M-EST. PATIENT-LVL III: CPT | Mod: PBBFAC,,, | Performed by: FAMILY MEDICINE

## 2018-10-19 PROCEDURE — 86376 MICROSOMAL ANTIBODY EACH: CPT

## 2018-10-19 PROCEDURE — 84466 ASSAY OF TRANSFERRIN: CPT

## 2018-10-19 RX ORDER — LEVOTHYROXINE SODIUM 25 UG/1
25 TABLET ORAL
Qty: 30 TABLET | Refills: 2 | Status: SHIPPED | OUTPATIENT
Start: 2018-10-19 | End: 2018-11-22

## 2018-10-19 NOTE — LETTER
October 19, 2018      Other  [address]  [place]  [place] [address]           [address]  [place] [address]  Phone: [phone]  Fax: [phone]          Patient: Jossy Leslie   MR Number: [id number]   YOB: 1985   Date of Visit: 10/19/2018       Dear Other:    Thank you for referring Jossy Leslie to me for evaluation. Attached you will find relevant portions of my assessment and plan of care.    If you have questions, please do not hesitate to call me. I look forward to following Jossy Leslie along with you.    Sincerely,    Ashok Nicholas MD    Enclosure  CC:  No Recipients    If you would like to receive this communication electronically, please contact [email or web address] or [phone] to request more information on [employer] access.    For providers and/or their staff who would like to refer a patient to [facility], please contact us through our one-stop-shop provider referral line, [facility], at [phone].    If you feel you have received this communication in error or would no longer like to receive these types of communications, please e-mail [email or web address]

## 2018-10-19 NOTE — PROGRESS NOTES
Subjective:       Patient ID: Jossy Leslie is a 33 y.o. female.    Chief Complaint: Annual Exam; Establish Care; and Results (Labs)    33 yr old pleasant white female with iron def anemia, migraine, asthma, and no other significant medical history presents today as new patient to me and for establishing primary care and also her evaluation of anemia. 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 There has been no bruising/bleeding easily, confusion, pallor or palpitations. Past treatments include oral iron supplements. There is no history of alcohol abuse, chronic liver disease, clotting disorder, heart failure, HIV/AIDS, inflammatory bowel disease, malnutrition, recent illness or recent trauma.     Review of Systems   Constitutional: Negative.  Negative for activity change, diaphoresis and unexpected weight change.   HENT: Negative.  Negative for congestion, ear discharge, hearing loss, rhinorrhea, sore throat, trouble swallowing and voice change.    Eyes: Negative.  Negative for pain, discharge and visual disturbance.   Respiratory: Negative.  Negative for chest tightness, shortness of breath and wheezing.    Cardiovascular: Negative.  Negative for chest pain and palpitations.   Gastrointestinal: Negative.  Negative for abdominal distention, anal bleeding, blood in stool, constipation, diarrhea, nausea and vomiting.   Endocrine: Negative.  Negative for cold intolerance, polydipsia and polyuria.   Genitourinary: Positive for menstrual problem. Negative for decreased urine volume, difficulty urinating, dysuria, frequency, hematuria and vaginal pain.   Musculoskeletal: Negative.  Negative for  arthralgias, gait problem, joint swelling, myalgias and neck pain.   Skin: Negative.  Negative for color change, pallor and wound.   Allergic/Immunologic: Negative.  Negative for environmental allergies and immunocompromised state.   Neurological: Positive for headaches. Negative for dizziness, tremors, seizures, speech difficulty and weakness.   Hematological: Negative.  Negative for adenopathy. Does not bruise/bleed easily.   Psychiatric/Behavioral: Negative.  Negative for agitation, confusion, decreased concentration, dysphoric mood, hallucinations, self-injury and suicidal ideas. The patient is not nervous/anxious.        Active Ambulatory Problems     Diagnosis Date Noted    Abnormal uterine bleeding 09/30/2015    Myalgia 01/16/2017    Mild intermittent asthma without complication 07/25/2018    Chronic migraine without aura without status migrainosus, not intractable 07/25/2018    Episodic tension-type headache, not intractable 07/25/2018    BMI 31.0-31.9,adult 10/19/2018    Iron deficiency anemia due to chronic blood loss 10/19/2018     Resolved Ambulatory Problems     Diagnosis Date Noted    No Resolved Ambulatory Problems     Past Medical History:   Diagnosis Date    Asthma     Eosinophilic esophagitis     Eosinophilic esophagitis      Past Surgical History:   Procedure Laterality Date    ESOPHAGOGASTRODUODENOSCOPY      TONSILLECTOMY       Family History   Problem Relation Age of Onset    Hypertension Mother     Hypertension Father     Heart disease Maternal Grandmother     Cancer Maternal Grandfather     Colon cancer Maternal Grandfather     Diabetes Paternal Grandmother     Cancer Paternal Grandmother     No Known Problems Sister     No Known Problems Brother     No Known Problems Maternal Aunt     No Known Problems Maternal Uncle     No Known Problems Paternal Aunt     No Known Problems Paternal Uncle     Amblyopia Neg Hx     Blindness Neg Hx     Cataracts Neg Hx     Glaucoma  Neg Hx     Macular degeneration Neg Hx     Retinal detachment Neg Hx     Strabismus Neg Hx     Stroke Neg Hx     Thyroid disease Neg Hx     Breast cancer Neg Hx     Ovarian cancer Neg Hx      Social History     Socioeconomic History    Marital status: Single     Spouse name: Not on file    Number of children: Not on file    Years of education: Not on file    Highest education level: Not on file   Social Needs    Financial resource strain: Not on file    Food insecurity - worry: Not on file    Food insecurity - inability: Not on file    Transportation needs - medical: Not on file    Transportation needs - non-medical: Not on file   Occupational History    Not on file   Tobacco Use    Smoking status: Never Smoker    Smokeless tobacco: Never Used   Substance and Sexual Activity    Alcohol use: No     Alcohol/week: 0.0 oz    Drug use: No    Sexual activity: Yes     Partners: Male     Birth control/protection: Condom   Other Topics Concern    Not on file   Social History Narrative    Not on file     Review of patient's allergies indicates:   Allergen Reactions    Pumpkin Anaphylaxis    Shellfish containing products Itching     Current Outpatient Medications on File Prior to Visit   Medication Sig Dispense Refill    albuterol 90 mcg/actuation inhaler albuterol sulfate 2 puffs inhalation   every 4 hours As needed Until directed to stop      aspirin-acetaminophen-caffeine 250-250-65 mg (EXCEDRIN MIGRAINE) 250-250-65 mg per tablet Excedrin Migraine           cetirizine (ZYRTEC) 10 MG tablet Take 10 mg by mouth once daily.      EPINEPHrine (EPIPEN) 0.3 mg/0.3 mL AtIn Inject 0.3 mLs (0.3 mg total) into the muscle once. for 1 dose 2 each 1    ferrous gluconate (FERGON) 240 (27 FE) MG tablet Take 2 tablets (480 mg total) by mouth once daily. 30 tablet 11    montelukast (SINGULAIR) 10 mg tablet Take 1 tablet (10 mg total) by mouth every evening. 30 tablet 2    pantoprazole (PROTONIX) 40 MG tablet  Take 1 tablet (40 mg total) by mouth once daily. 30 tablet 11    ranitidine (ZANTAC) 150 MG tablet Take 1 tablet (150 mg total) by mouth nightly. 30 tablet 1    sumatriptan (IMITREX) 25 MG Tab TAKE 1 TABLET BY MOUTH AS INSTRUCTED UNTIL DIRECTED TO STOP, TAKE ONE TABLET BY MOUTH EVERY 2 HOURS FOR ONE DOSE AS NEEDED FOR HEADACHES 10 tablet 1    eletriptan (RELPAX) 40 MG tablet Take 1 tablet (40 mg total) by mouth as needed. may repeat in 2 hours if necessary 20 tablet 0    orphenadrine (NORFLEX) 100 mg tablet Take 100 mg by mouth 2 (two) times daily.      [DISCONTINUED] sumatriptan (IMITREX) 25 MG Tab Take 25 mg by mouth every 2 (two) hours as needed.        No current facility-administered medications on file prior to visit.        Objective:       Vitals:    10/19/18 0925   BP: 116/89   Pulse: 85       Physical Exam   Constitutional: She is oriented to person, place, and time. She appears well-developed and well-nourished. No distress.   HENT:   Head: Normocephalic and atraumatic.   Right Ear: External ear normal.   Left Ear: External ear normal.   Nose: Nose normal.   Mouth/Throat: Oropharynx is clear and moist. No oropharyngeal exudate.   Eyes: Conjunctivae and EOM are normal. Pupils are equal, round, and reactive to light. Right eye exhibits no discharge. Left eye exhibits no discharge. No scleral icterus.   Neck: Normal range of motion. Neck supple. No JVD present. No tracheal deviation present. No thyromegaly present.   Cardiovascular: Normal rate, regular rhythm, normal heart sounds and intact distal pulses. Exam reveals no gallop and no friction rub.   No murmur heard.  Pulmonary/Chest: Effort normal and breath sounds normal. No stridor. She has no wheezes. She has no rales. She exhibits no tenderness.   Abdominal: Soft. Bowel sounds are normal. She exhibits no distension and no mass. There is no tenderness. There is no rebound and no guarding. No hernia.   Musculoskeletal: Normal range of motion. She  exhibits no edema or tenderness.   Lymphadenopathy:     She has no cervical adenopathy.   Neurological: She is alert and oriented to person, place, and time. She has normal reflexes. She displays normal reflexes. No cranial nerve deficit. She exhibits normal muscle tone. Coordination normal.   Skin: Skin is warm and dry. No rash noted. She is not diaphoretic. No erythema. No pallor.   Psychiatric: She has a normal mood and affect. Her behavior is normal. Judgment and thought content normal.       Assessment:       1. Routine general medical examination at a health care facility    2. BMI 31.0-31.9,adult    3. Iron deficiency anemia due to chronic blood loss    4. Mild intermittent asthma without complication    5. Chronic migraine without aura without status migrainosus, not intractable    6. Abnormal TSH        Plan:       Jossy was seen today for annual exam, establish care and results.    Diagnoses and all orders for this visit:    Routine general medical examination at a health care facility  -     Cancel: CBC auto differential; Future  -     Cancel: Lipid panel; Future  -     Cancel: TSH; Future  -     CBC auto differential; Future  -     Cancel: Comprehensive metabolic panel; Future  -     Lipid panel; Future  -     TSH; Future  -     Thyroid peroxidase antibody; Future  -     Iron and TIBC; Future  -     Ferritin; Future    BMI 31.0-31.9,adult    Iron deficiency anemia due to chronic blood loss  -     Cancel: CBC auto differential; Future  -     Cancel: Iron and TIBC; Future  -     Cancel: Ferritin; Future  -     CBC auto differential; Future  -     Iron and TIBC; Future  -     Ferritin; Future    Mild intermittent asthma without complication    Chronic migraine without aura without status migrainosus, not intractable    Abnormal TSH  -     Cancel: TSH; Future  -     Cancel: Thyroid peroxidase antibody; Future  -     TSH; Future  -     Thyroid peroxidase antibody; Future      Wellness check  -normal  exam  -labs    Asthma  -well controlled    Migraine  -controlled    Abnormal TSH  -labs    Anemia  -iron def  -lab repeat    Diet/exercise recommended    Spent adequate time in obtaining history and explaining differentials    40 minutes spent during this visit of which greater than 50% devoted to face-face counseling and coordination of care regarding diagnosis and management plan    Follow-up in about 1 year (around 10/19/2019), or if symptoms worsen or fail to improve.

## 2018-10-26 ENCOUNTER — OFFICE VISIT (OUTPATIENT)
Dept: OBSTETRICS AND GYNECOLOGY | Facility: CLINIC | Age: 33
End: 2018-10-26
Payer: COMMERCIAL

## 2018-10-26 VITALS
WEIGHT: 170.19 LBS | DIASTOLIC BLOOD PRESSURE: 66 MMHG | BODY MASS INDEX: 30.16 KG/M2 | SYSTOLIC BLOOD PRESSURE: 120 MMHG | HEIGHT: 63 IN

## 2018-10-26 DIAGNOSIS — Z12.4 PAP SMEAR FOR CERVICAL CANCER SCREENING: ICD-10-CM

## 2018-10-26 DIAGNOSIS — Z01.419 ENCOUNTER FOR ANNUAL ROUTINE GYNECOLOGICAL EXAMINATION: Primary | ICD-10-CM

## 2018-10-26 DIAGNOSIS — N93.9 ABNORMAL UTERINE BLEEDING (AUB): ICD-10-CM

## 2018-10-26 DIAGNOSIS — Z11.3 SCREENING EXAMINATION FOR STD (SEXUALLY TRANSMITTED DISEASE): ICD-10-CM

## 2018-10-26 PROCEDURE — 87624 HPV HI-RISK TYP POOLED RSLT: CPT

## 2018-10-26 PROCEDURE — 99395 PREV VISIT EST AGE 18-39: CPT | Mod: S$GLB,,, | Performed by: OBSTETRICS & GYNECOLOGY

## 2018-10-26 PROCEDURE — 88175 CYTOPATH C/V AUTO FLUID REDO: CPT

## 2018-10-26 PROCEDURE — 99999 PR PBB SHADOW E&M-EST. PATIENT-LVL III: CPT | Mod: PBBFAC,,, | Performed by: OBSTETRICS & GYNECOLOGY

## 2018-10-26 PROCEDURE — 87491 CHLMYD TRACH DNA AMP PROBE: CPT

## 2018-10-26 PROCEDURE — 87660 TRICHOMONAS VAGIN DIR PROBE: CPT

## 2018-10-27 LAB
C TRACH DNA SPEC QL NAA+PROBE: NOT DETECTED
CANDIDA RRNA VAG QL PROBE: NEGATIVE
G VAGINALIS RRNA GENITAL QL PROBE: NEGATIVE
N GONORRHOEA DNA SPEC QL NAA+PROBE: NOT DETECTED
T VAGINALIS RRNA GENITAL QL PROBE: NEGATIVE

## 2018-10-29 ENCOUNTER — PATIENT MESSAGE (OUTPATIENT)
Dept: OBSTETRICS AND GYNECOLOGY | Facility: CLINIC | Age: 33
End: 2018-10-29

## 2018-11-01 ENCOUNTER — PATIENT MESSAGE (OUTPATIENT)
Dept: OBSTETRICS AND GYNECOLOGY | Facility: CLINIC | Age: 33
End: 2018-11-01

## 2018-11-01 LAB
HPV HR 12 DNA CVX QL NAA+PROBE: NEGATIVE
HPV16 AG SPEC QL: NEGATIVE
HPV18 DNA SPEC QL NAA+PROBE: NEGATIVE

## 2018-11-06 ENCOUNTER — PATIENT MESSAGE (OUTPATIENT)
Dept: FAMILY MEDICINE | Facility: CLINIC | Age: 33
End: 2018-11-06

## 2018-11-06 DIAGNOSIS — E03.8 HYPOTHYROIDISM DUE TO HASHIMOTO'S THYROIDITIS: Primary | ICD-10-CM

## 2018-11-06 DIAGNOSIS — E06.3 HYPOTHYROIDISM DUE TO HASHIMOTO'S THYROIDITIS: Primary | ICD-10-CM

## 2018-11-09 ENCOUNTER — PATIENT MESSAGE (OUTPATIENT)
Dept: ALLERGY | Facility: CLINIC | Age: 33
End: 2018-11-09

## 2018-11-09 ENCOUNTER — TELEPHONE (OUTPATIENT)
Dept: ALLERGY | Facility: CLINIC | Age: 33
End: 2018-11-09

## 2018-11-09 DIAGNOSIS — K20.0 EOSINOPHILIC ESOPHAGITIS: Primary | ICD-10-CM

## 2018-11-09 NOTE — TELEPHONE ENCOUNTER
"Patient is requesting a call back from Dr. Mckenzie.  First patient is requesting a new referral to a GI doctor, then patient stated that she had Oral swelling and stabbing pain in throat and back. Occurred after eating something with Tumeric.  Patient took benadryl and symptoms resolved. Asked if she had to go to the ER. 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 Patient would like some food testing as well. I went down the entire list of foods that we do skin testing for. Patient wanted testing on foods that were not listed.     Please call back when in clinic.      "

## 2018-11-09 NOTE — TELEPHONE ENCOUNTER
----- Message from Minda Thakur sent at 11/8/2018  3:02 PM CST -----  Contact: self/[phone]  Pt is calling to speak with the doctor in regards to getting another referral for Gastro. She states that when she called the dept they state that they did not have a referral for the pt. Pt also states that she had a a reaction to something after lunch one day. She now wants to see if she can do a food allergy testing. Please advise.          Thanks

## 2018-11-21 ENCOUNTER — LAB VISIT (OUTPATIENT)
Dept: LAB | Facility: HOSPITAL | Age: 33
End: 2018-11-21
Attending: FAMILY MEDICINE
Payer: COMMERCIAL

## 2018-11-21 DIAGNOSIS — E06.3 HYPOTHYROIDISM DUE TO HASHIMOTO'S THYROIDITIS: ICD-10-CM

## 2018-11-21 DIAGNOSIS — E03.8 HYPOTHYROIDISM DUE TO HASHIMOTO'S THYROIDITIS: ICD-10-CM

## 2018-11-21 LAB
T3FREE SERPL-MCNC: 2.6 PG/ML
T4 FREE SERPL-MCNC: 0.94 NG/DL
TSH SERPL DL<=0.005 MIU/L-ACNC: 6.22 UIU/ML

## 2018-11-21 PROCEDURE — 84439 ASSAY OF FREE THYROXINE: CPT

## 2018-11-21 PROCEDURE — 84443 ASSAY THYROID STIM HORMONE: CPT

## 2018-11-21 PROCEDURE — 84481 FREE ASSAY (FT-3): CPT

## 2018-11-21 PROCEDURE — 36415 COLL VENOUS BLD VENIPUNCTURE: CPT

## 2018-11-22 ENCOUNTER — PATIENT MESSAGE (OUTPATIENT)
Dept: FAMILY MEDICINE | Facility: CLINIC | Age: 33
End: 2018-11-22

## 2018-11-22 DIAGNOSIS — E03.8 HYPOTHYROIDISM DUE TO HASHIMOTO'S THYROIDITIS: ICD-10-CM

## 2018-11-22 DIAGNOSIS — E06.3 HYPOTHYROIDISM DUE TO HASHIMOTO'S THYROIDITIS: ICD-10-CM

## 2018-11-22 RX ORDER — LEVOTHYROXINE SODIUM 50 UG/1
50 CAPSULE ORAL
Qty: 30 TABLET | Refills: 2 | Status: SHIPPED | OUTPATIENT
Start: 2018-11-22 | End: 2018-11-27

## 2018-11-23 ENCOUNTER — PATIENT MESSAGE (OUTPATIENT)
Dept: FAMILY MEDICINE | Facility: CLINIC | Age: 33
End: 2018-11-23

## 2018-11-26 ENCOUNTER — HOSPITAL ENCOUNTER (OUTPATIENT)
Dept: RADIOLOGY | Facility: HOSPITAL | Age: 33
Discharge: HOME | End: 2018-11-26
Attending: FAMILY MEDICINE
Payer: COMMERCIAL

## 2018-11-26 DIAGNOSIS — E03.8 HYPOTHYROIDISM DUE TO HASHIMOTO'S THYROIDITIS: ICD-10-CM

## 2018-11-26 DIAGNOSIS — E06.3 HYPOTHYROIDISM DUE TO HASHIMOTO'S THYROIDITIS: ICD-10-CM

## 2018-11-26 PROCEDURE — 76536 US EXAM OF HEAD AND NECK: CPT | Mod: TC

## 2018-11-26 PROCEDURE — 76536 US EXAM OF HEAD AND NECK: CPT | Mod: 26,,, | Performed by: RADIOLOGY

## 2018-11-27 DIAGNOSIS — E03.8 HYPOTHYROIDISM DUE TO HASHIMOTO'S THYROIDITIS: ICD-10-CM

## 2018-11-27 DIAGNOSIS — E06.3 HYPOTHYROIDISM DUE TO HASHIMOTO'S THYROIDITIS: ICD-10-CM

## 2018-11-27 RX ORDER — LEVOTHYROXINE SODIUM 50 UG/1
50 TABLET ORAL DAILY
Qty: 30 TABLET | Refills: 2 | Status: SHIPPED | OUTPATIENT
Start: 2018-11-27 | End: 2019-03-27

## 2018-11-27 NOTE — TELEPHONE ENCOUNTER
----- Message from Jeremy Brooke PharmD sent at 11/27/2018  7:40 AM CST -----  Good morning, a rx was sent over for levothyroxine capsules ( tirosint ) but these are not covered by pt insurance. Is it ok to dispense the tablets. If so please send over updated rx to [facility] in [place] thank you

## 2018-11-29 ENCOUNTER — OFFICE VISIT (OUTPATIENT)
Dept: NEUROLOGY | Facility: CLINIC | Age: 33
End: 2018-11-29
Payer: COMMERCIAL

## 2018-11-29 VITALS
HEART RATE: 88 BPM | DIASTOLIC BLOOD PRESSURE: 83 MMHG | BODY MASS INDEX: 30.82 KG/M2 | HEIGHT: 63 IN | WEIGHT: 173.94 LBS | SYSTOLIC BLOOD PRESSURE: 122 MMHG

## 2018-11-29 DIAGNOSIS — G43.109 MIGRAINE WITH AURA AND WITHOUT STATUS MIGRAINOSUS, NOT INTRACTABLE: ICD-10-CM

## 2018-11-29 DIAGNOSIS — G43.809 OTHER MIGRAINE WITHOUT STATUS MIGRAINOSUS, NOT INTRACTABLE: Primary | ICD-10-CM

## 2018-11-29 PROCEDURE — 99204 OFFICE O/P NEW MOD 45 MIN: CPT | Mod: S$GLB,,, | Performed by: NEUROLOGICAL SURGERY

## 2018-11-29 PROCEDURE — 99999 PR PBB SHADOW E&M-EST. PATIENT-LVL III: CPT | Mod: PBBFAC,,, | Performed by: NEUROLOGICAL SURGERY

## 2018-11-29 PROCEDURE — 3008F BODY MASS INDEX DOCD: CPT | Mod: CPTII,S$GLB,, | Performed by: NEUROLOGICAL SURGERY

## 2018-11-29 RX ORDER — TOPIRAMATE 50 MG/1
50 CAPSULE, EXTENDED RELEASE ORAL DAILY
Qty: 30 CAPSULE | Refills: 0 | Status: SHIPPED | OUTPATIENT
Start: 2018-11-29 | End: 2018-12-30

## 2018-11-29 RX ORDER — TOPIRAMATE 25 MG/1
25 CAPSULE, EXTENDED RELEASE ORAL DAILY
Qty: 30 CAPSULE | Refills: 0 | Status: SHIPPED | OUTPATIENT
Start: 2018-11-29 | End: 2019-03-27

## 2018-11-29 RX ORDER — ELETRIPTAN HYDROBROMIDE 40 MG/1
40 TABLET, FILM COATED ORAL
Qty: 12 TABLET | Refills: 5 | Status: SHIPPED | OUTPATIENT
Start: 2018-11-29 | End: 2019-03-27 | Stop reason: SDUPTHER

## 2018-11-29 NOTE — PROGRESS NOTES
Chief Complaint   Patient presents with    Headache        Jossy Leslie is a 33 y.o. female with a history of multiple medical diagnoses as listed below that presents for evaluation of headaches.  The patient has been having headaches for the last couple years without an explanation.  The patient has noted than increased levels of stress and decreased sleep has been a trigger for headaches.  Headaches tend to be in the base of the neck and also he has been having pain across the front of the head.  The pain across the front of the head feels like a throbbing sensation has been up to a 7 to 8/10 in intensity.  Pain seems to be ongoing for hours before she gets any relief.  She has been taking Excedrin migraine as well as sumatriptan and Relpax in order to treat her pain.  The symptoms have been the responsive to these treatments although she found that Relpax has been much more consistently reliable in controlling her headache pain rather than Imitrex.  The patient has been having headaches since she was a youth and says that overall the symptoms seem to be about the same as when she was younger with the exception of an increasing frequency compared to her younger years.  She has never taking any preventative medications for headaches.  She has a strong family history with several family members will also have headaches.  The patient has recently been diagnosed and treated for thyroid insufficiency.  She has no history of any other autoimmune phenomenon.     PAST MEDICAL HISTORY:  Past Medical History:   Diagnosis Date    Asthma     Eosinophilic esophagitis     Eosinophilic esophagitis     Thyroid disease        PAST SURGICAL HISTORY:  Past Surgical History:   Procedure Laterality Date    ESOPHAGOGASTRODUODENOSCOPY      TONSILLECTOMY         SOCIAL HISTORY:  Social History     Socioeconomic History    Marital status: Single     Spouse name: Not on file    Number of children: Not on file    Years of  education: Not on file    Highest education level: Not on file   Social Needs    Financial resource strain: Not on file    Food insecurity - worry: Not on file    Food insecurity - inability: Not on file    Transportation needs - medical: Not on file    Transportation needs - non-medical: Not on file   Occupational History    Not on file   Tobacco Use    Smoking status: Never Smoker    Smokeless tobacco: Never Used   Substance and Sexual Activity    Alcohol use: No     Alcohol/week: 0.0 oz    Drug use: No    Sexual activity: Not Currently     Partners: Male     Birth control/protection: Condom   Other Topics Concern    Not on file   Social History Narrative    Not on file       FAMILY HISTORY:  Family History   Problem Relation Age of Onset    Hypertension Mother     Hypertension Father     Heart disease Maternal Grandmother     Cancer Maternal Grandfather     Colon cancer Maternal Grandfather     Diabetes Paternal Grandmother     Cancer Paternal Grandmother     No Known Problems Sister     No Known Problems Brother     No Known Problems Maternal Aunt     No Known Problems Maternal Uncle     No Known Problems Paternal Aunt     No Known Problems Paternal Uncle     Amblyopia Neg Hx     Blindness Neg Hx     Cataracts Neg Hx     Glaucoma Neg Hx     Macular degeneration Neg Hx     Retinal detachment Neg Hx     Strabismus Neg Hx     Stroke Neg Hx     Thyroid disease Neg Hx     Breast cancer Neg Hx     Ovarian cancer Neg Hx        ALLERGIES AND MEDICATIONS: updated and reviewed.  Review of patient's allergies indicates:   Allergen Reactions    Pumpkin Anaphylaxis    Shellfish containing products Itching     Current Outpatient Medications   Medication Sig Dispense Refill    albuterol 90 mcg/actuation inhaler albuterol sulfate 2 puffs inhalation   every 4 hours As needed Until directed to stop      aspirin-acetaminophen-caffeine 250-250-65 mg (EXCEDRIN MIGRAINE) 250-250-65 mg per  tablet Excedrin Migraine           cetirizine (ZYRTEC) 10 MG tablet Take 10 mg by mouth once daily.      eletriptan (RELPAX) 40 MG tablet Take 1 tablet (40 mg total) by mouth as needed. may repeat in 2 hours if necessary 12 tablet 5    EPINEPHrine (EPIPEN) 0.3 mg/0.3 mL AtIn Inject 0.3 mLs (0.3 mg total) into the muscle once. for 1 dose 2 each 1    ferrous gluconate (FERGON) 240 (27 FE) MG tablet Take 2 tablets (480 mg total) by mouth once daily. 30 tablet 11    levothyroxine (SYNTHROID) 50 MCG tablet Take 1 tablet (50 mcg total) by mouth once daily. 30 tablet 2    montelukast (SINGULAIR) 10 mg tablet Take 1 tablet (10 mg total) by mouth every evening. 30 tablet 2    pantoprazole (PROTONIX) 40 MG tablet Take 1 tablet (40 mg total) by mouth once daily. 30 tablet 11    ranitidine (ZANTAC) 150 MG tablet Take 1 tablet (150 mg total) by mouth nightly. 30 tablet 1    sumatriptan (IMITREX) 25 MG Tab TAKE 1 TABLET BY MOUTH AS INSTRUCTED UNTIL DIRECTED TO STOP, TAKE ONE TABLET BY MOUTH EVERY 2 HOURS FOR ONE DOSE AS NEEDED FOR HEADACHES 10 tablet 1    topiramate (TROKENDI XR) 25 mg Cp24 Take one CAPSULE by mouth once daily 30 capsule 0    topiramate (TROKENDI XR) 50 mg Cp24 Take one CAPSULE by mouth once daily for 14 days 30 capsule 0     No current facility-administered medications for this visit.        Review of Systems   Constitutional: Negative for activity change, appetite change, fever and unexpected weight change.   HENT: Negative for trouble swallowing and voice change.    Eyes: Positive for photophobia and visual disturbance.   Respiratory: Negative for apnea and shortness of breath.    Cardiovascular: Negative for chest pain and leg swelling.   Gastrointestinal: Positive for nausea and vomiting. Negative for constipation.   Genitourinary: Negative for difficulty urinating.   Musculoskeletal: Negative for back pain, gait problem and neck pain.   Skin: Negative for color change and pallor.   Neurological:  Positive for headaches. Negative for dizziness, seizures, syncope, weakness and numbness.   Hematological: Negative for adenopathy.   Psychiatric/Behavioral: Negative for agitation, confusion and decreased concentration.       Neurologic Exam     Mental Status   Oriented to person, place, and time.   Registration: recalls 3 of 3 objects.   Attention: normal. Concentration: normal.   Speech: speech is normal   Level of consciousness: alert  Knowledge: good.     Cranial Nerves     CN II   Visual fields full to confrontation.   Right visual field deficit: none  Left visual field deficit: none     CN III, IV, VI   Pupils are equal, round, and reactive to light.  Extraocular motions are normal.   Right pupil: Size: 3 mm. Shape: regular. Accommodation: intact.   Left pupil: Size: 3 mm. Shape: regular. Accommodation: intact.   CN III: no CN III palsy  CN VI: no CN VI palsy  Nystagmus: none   Diplopia: none  Ophthalmoparesis: none  Upgaze: normal  Downgaze: normal  Conjugate gaze: present    CN V   Facial sensation intact.   Right facial sensation deficit: none  Left facial sensation deficit: none    CN VII   Facial expression full, symmetric.   Right facial weakness: none  Left facial weakness: none    CN VIII   CN VIII normal.     CN IX, X   CN IX normal.   CN X normal.   Palate: symmetric    CN XI   CN XI normal.   Right sternocleidomastoid strength: normal  Left sternocleidomastoid strength: normal  Right trapezius strength: normal  Left trapezius strength: normal    CN XII   CN XII normal.   Tongue deviation: none    Motor Exam   Muscle bulk: normal  Overall muscle tone: normal  Right arm tone: normal  Left arm tone: normal  Right leg tone: normal  Left leg tone: normal    Strength   Strength 5/5 throughout.     Sensory Exam   Right arm light touch: normal  Left arm light touch: normal  Right leg light touch: normal  Left leg light touch: normal  Right arm vibration: normal  Left arm vibration: normal  Right leg  vibration: normal  Left leg vibration: normal  Right arm proprioception: normal  Left arm proprioception: normal  Right leg proprioception: normal  Left leg proprioception: normal  Right arm pinprick: normal  Left arm pinprick: normal  Right leg pinprick: normal  Left leg pinprick: normal    Gait, Coordination, and Reflexes     Gait  Gait: normal    Coordination   Romberg: negative  Finger to nose coordination: normal  Heel to shin coordination: normal  Tandem walking coordination: normal    Tremor   Resting tremor: absent    Reflexes   Right brachioradialis: 2+  Left brachioradialis: 2+  Right biceps: 2+  Left biceps: 2+  Right triceps: 2+  Left triceps: 2+  Right patellar: 2+  Left patellar: 2+  Right achilles: 2+  Left achilles: 2+  Right plantar: normal  Left plantar: normal      Physical Exam   Constitutional: She is oriented to person, place, and time. She appears well-developed and well-nourished.   HENT:   Head: Normocephalic and atraumatic.   Eyes: EOM are normal. Pupils are equal, round, and reactive to light.   Neck: Normal range of motion.   Cardiovascular: Normal rate and intact distal pulses.   Pulmonary/Chest: Effort normal. No apnea. No respiratory distress.   Musculoskeletal: Normal range of motion.   Neurological: She is alert and oriented to person, place, and time. She has normal strength. She has a normal Finger-Nose-Finger Test, a normal Heel to Shin Test, a normal Romberg Test and a normal Tandem Gait Test. Gait normal.   Reflex Scores:       Tricep reflexes are 2+ on the right side and 2+ on the left side.       Bicep reflexes are 2+ on the right side and 2+ on the left side.       Brachioradialis reflexes are 2+ on the right side and 2+ on the left side.       Patellar reflexes are 2+ on the right side and 2+ on the left side.       Achilles reflexes are 2+ on the right side and 2+ on the left side.  Skin: Skin is warm and dry.   Psychiatric: She has a normal mood and affect. Her speech is  "normal and behavior is normal. Thought content normal.   Vitals reviewed.      Vitals:    11/29/18 1258   BP: 122/83   BP Location: Right arm   Patient Position: Sitting   BP Method: Large (Automatic)   Pulse: 88   Weight: 78.9 kg (173 lb 15.1 oz)   Height: 5' 2.5" (1.588 m)       Assessment & Plan:    Problem List Items Addressed This Visit     None      Visit Diagnoses     Other migraine without status migrainosus, not intractable    -  Primary    Relevant Medications    topiramate (TROKENDI XR) 25 mg Cp24    topiramate (TROKENDI XR) 50 mg Cp24    Other Relevant Orders    X-Ray Cervical Spine 5 View W Flex Extxt    Migraine with aura and without status migrainosus, not intractable        Relevant Medications    eletriptan (RELPAX) 40 MG tablet          Follow-up: Follow-up in about 3 months (around 2/28/2019).   More than 50% of this 45 minute encounter was spent in counseling and coordinating care of her headaches      "

## 2018-12-07 ENCOUNTER — PATIENT MESSAGE (OUTPATIENT)
Dept: OBSTETRICS AND GYNECOLOGY | Facility: CLINIC | Age: 33
End: 2018-12-07

## 2018-12-10 ENCOUNTER — HOSPITAL ENCOUNTER (OUTPATIENT)
Dept: RADIOLOGY | Facility: HOSPITAL | Age: 33
Discharge: HOME | End: 2018-12-10
Attending: NEUROLOGICAL SURGERY
Payer: COMMERCIAL

## 2018-12-10 DIAGNOSIS — G43.809 OTHER MIGRAINE WITHOUT STATUS MIGRAINOSUS, NOT INTRACTABLE: ICD-10-CM

## 2018-12-10 PROCEDURE — 72052 X-RAY EXAM NECK SPINE 6/>VWS: CPT | Mod: 26,,, | Performed by: RADIOLOGY

## 2018-12-10 PROCEDURE — 72052 X-RAY EXAM NECK SPINE 6/>VWS: CPT | Mod: TC,FY

## 2018-12-20 ENCOUNTER — PATIENT MESSAGE (OUTPATIENT)
Dept: OBSTETRICS AND GYNECOLOGY | Facility: CLINIC | Age: 33
End: 2018-12-20

## 2018-12-28 ENCOUNTER — PATIENT MESSAGE (OUTPATIENT)
Dept: OBSTETRICS AND GYNECOLOGY | Facility: CLINIC | Age: 33
End: 2018-12-28

## 2018-12-28 ENCOUNTER — PATIENT MESSAGE (OUTPATIENT)
Dept: FAMILY MEDICINE | Facility: CLINIC | Age: 33
End: 2018-12-28

## 2018-12-28 DIAGNOSIS — R10.2 ACUTE PELVIC PAIN: Primary | ICD-10-CM

## 2019-01-08 ENCOUNTER — PATIENT MESSAGE (OUTPATIENT)
Dept: OBSTETRICS AND GYNECOLOGY | Facility: CLINIC | Age: 34
End: 2019-01-08

## 2019-01-10 RX ORDER — SUMATRIPTAN SUCCINATE 25 MG/1
TABLET ORAL
Qty: 10 TABLET | Refills: 1 | Status: SHIPPED | OUTPATIENT
Start: 2019-01-10 | End: 2019-03-27 | Stop reason: SDUPTHER

## 2019-01-24 ENCOUNTER — PROCEDURE VISIT (OUTPATIENT)
Dept: OBSTETRICS AND GYNECOLOGY | Facility: CLINIC | Age: 34
End: 2019-01-24
Payer: COMMERCIAL

## 2019-01-24 DIAGNOSIS — R10.2 PELVIC PAIN IN FEMALE: Primary | ICD-10-CM

## 2019-01-24 DIAGNOSIS — N93.9 ABNORMAL UTERINE BLEEDING (AUB): ICD-10-CM

## 2019-01-24 PROCEDURE — 76830 TRANSVAGINAL US NON-OB: CPT | Mod: S$GLB,,, | Performed by: OBSTETRICS & GYNECOLOGY

## 2019-01-24 PROCEDURE — 76830 PR  ECHOGRAPHY,TRANSVAGINAL: ICD-10-PCS | Mod: S$GLB,,, | Performed by: OBSTETRICS & GYNECOLOGY

## 2019-01-25 ENCOUNTER — TELEPHONE (OUTPATIENT)
Dept: OBSTETRICS AND GYNECOLOGY | Facility: CLINIC | Age: 34
End: 2019-01-25

## 2019-01-25 DIAGNOSIS — N83.201 CYST OF RIGHT OVARY: Primary | ICD-10-CM

## 2019-01-25 NOTE — TELEPHONE ENCOUNTER
----- Message from Wen Boston sent at 1/25/2019 12:30 PM CST -----  Contact: Self [phone]  Patient would like to speak with you about getting test results. Please advise.

## 2019-01-25 NOTE — TELEPHONE ENCOUNTER
Called patient and discussed US with cyst. We discussed possibilities of endometriosis vs cystadenoma vs teratoma other ovarian pathology. Discussed treatment options would be observation vs surgery. She is no longer having the pain and would like to do the least invasive option at this point so will repeat US in 6-8 weeks and if not resolved will proceed with surgery.     Please call and schedule US in 6-8 weeks and a visit with me afterwards so we can discuss it.

## 2019-01-25 NOTE — TELEPHONE ENCOUNTER
Called patient to schedule. Patient states she will message back through [employer] because she is driving. Verbalized understanding

## 2019-02-22 ENCOUNTER — PATIENT MESSAGE (OUTPATIENT)
Dept: OBSTETRICS AND GYNECOLOGY | Facility: CLINIC | Age: 34
End: 2019-02-22

## 2019-02-22 ENCOUNTER — TELEPHONE (OUTPATIENT)
Dept: GASTROENTEROLOGY | Facility: HOSPITAL | Age: 34
End: 2019-02-22

## 2019-02-25 NOTE — TELEPHONE ENCOUNTER
Please see if we can move up her ultrasound. Okl to move to radiology if needed just let me know if you need an order.

## 2019-02-27 ENCOUNTER — TELEPHONE (OUTPATIENT)
Dept: OBSTETRICS AND GYNECOLOGY | Facility: CLINIC | Age: 34
End: 2019-02-27

## 2019-02-27 ENCOUNTER — PROCEDURE VISIT (OUTPATIENT)
Dept: OBSTETRICS AND GYNECOLOGY | Facility: CLINIC | Age: 34
End: 2019-02-27
Payer: COMMERCIAL

## 2019-02-27 DIAGNOSIS — N83.201 CYST OF RIGHT OVARY: ICD-10-CM

## 2019-02-27 PROCEDURE — 76830 TRANSVAGINAL US NON-OB: CPT | Mod: S$GLB,,, | Performed by: OBSTETRICS & GYNECOLOGY

## 2019-02-27 PROCEDURE — 76830 PR  ECHOGRAPHY,TRANSVAGINAL: ICD-10-PCS | Mod: S$GLB,,, | Performed by: OBSTETRICS & GYNECOLOGY

## 2019-02-27 NOTE — TELEPHONE ENCOUNTER
Called patient and discussed ultrasound. We discussed the cyst is still there and is about the same size. At this point I think it is unlikely to resolve on its own. We discussed the options of observation vs removal. We discussed the impact on fertility which she is concerned about. She would like to get pregnant but not right now. After long discussion will proceed with lsc cystectomy. She also need EGD now so will have to coordinate.

## 2019-03-12 ENCOUNTER — TELEPHONE (OUTPATIENT)
Dept: ADMINISTRATIVE | Facility: OTHER | Age: 34
End: 2019-03-12

## 2019-03-15 ENCOUNTER — TELEPHONE (OUTPATIENT)
Dept: ADMINISTRATIVE | Facility: OTHER | Age: 34
End: 2019-03-15

## 2019-03-19 ENCOUNTER — TELEPHONE (OUTPATIENT)
Dept: ADMINISTRATIVE | Facility: OTHER | Age: 34
End: 2019-03-19

## 2019-03-19 NOTE — TELEPHONE ENCOUNTER
----- Message from Eli Blevins MD sent at 2/27/2019  3:08 PM CST -----  Can you please set her up for lsc ovarian cystectomy? I would like an assistant thanks.     Eli Blevins

## 2019-03-19 NOTE — TELEPHONE ENCOUNTER
Called patient to schedule surgery and she stated that she was not ready to schedule surgery and will call the office when she is ready to proceed.

## 2019-03-27 ENCOUNTER — OFFICE VISIT (OUTPATIENT)
Dept: NEUROLOGY | Facility: CLINIC | Age: 34
End: 2019-03-27
Payer: COMMERCIAL

## 2019-03-27 VITALS
HEART RATE: 74 BPM | DIASTOLIC BLOOD PRESSURE: 82 MMHG | BODY MASS INDEX: 29.72 KG/M2 | SYSTOLIC BLOOD PRESSURE: 124 MMHG | HEIGHT: 63 IN | WEIGHT: 167.75 LBS

## 2019-03-27 DIAGNOSIS — G43.709 CHRONIC MIGRAINE WITHOUT AURA WITHOUT STATUS MIGRAINOSUS, NOT INTRACTABLE: ICD-10-CM

## 2019-03-27 DIAGNOSIS — G43.109 MIGRAINE WITH AURA AND WITHOUT STATUS MIGRAINOSUS, NOT INTRACTABLE: Primary | ICD-10-CM

## 2019-03-27 PROCEDURE — 99214 PR OFFICE/OUTPT VISIT, EST, LEVL IV, 30-39 MIN: ICD-10-PCS | Mod: S$GLB,,, | Performed by: NEUROLOGICAL SURGERY

## 2019-03-27 PROCEDURE — 99214 OFFICE O/P EST MOD 30 MIN: CPT | Mod: S$GLB,,, | Performed by: NEUROLOGICAL SURGERY

## 2019-03-27 PROCEDURE — 99999 PR PBB SHADOW E&M-EST. PATIENT-LVL III: CPT | Mod: PBBFAC,,, | Performed by: NEUROLOGICAL SURGERY

## 2019-03-27 PROCEDURE — 3008F PR BODY MASS INDEX (BMI) DOCUMENTED: ICD-10-PCS | Mod: CPTII,S$GLB,, | Performed by: NEUROLOGICAL SURGERY

## 2019-03-27 PROCEDURE — 3008F BODY MASS INDEX DOCD: CPT | Mod: CPTII,S$GLB,, | Performed by: NEUROLOGICAL SURGERY

## 2019-03-27 PROCEDURE — 99999 PR PBB SHADOW E&M-EST. PATIENT-LVL III: ICD-10-PCS | Mod: PBBFAC,,, | Performed by: NEUROLOGICAL SURGERY

## 2019-03-27 RX ORDER — SUMATRIPTAN SUCCINATE 25 MG/1
25 TABLET ORAL
Qty: 10 TABLET | Refills: 1 | Status: SHIPPED | OUTPATIENT
Start: 2019-03-27 | End: 2019-05-31 | Stop reason: SDUPTHER

## 2019-03-27 RX ORDER — ELETRIPTAN HYDROBROMIDE 40 MG/1
40 TABLET, FILM COATED ORAL
Qty: 12 TABLET | Refills: 5 | Status: SHIPPED | OUTPATIENT
Start: 2019-03-27 | End: 2021-05-31

## 2019-03-27 RX ORDER — LEVOTHYROXINE SODIUM 50 UG/1
50 TABLET ORAL DAILY
Qty: 90 TABLET | Refills: 0 | Status: SHIPPED | OUTPATIENT
Start: 2019-03-27 | End: 2019-09-09

## 2019-03-27 NOTE — PROGRESS NOTES
Chief Complaint   Patient presents with    Headache        Jossy Leslie is a 33 y.o. female with a history of multiple medical diagnoses as listed below that presents for evaluation of headaches.  The patient has been having headaches for the last couple years without an explanation.  The patient has noted than increased levels of stress and decreased sleep has been a trigger for headaches.  Headaches tend to be in the base of the neck and also he has been having pain across the front of the head.  The pain across the front of the head feels like a throbbing sensation has been up to a 7 to 8/10 in intensity.  Pain seems to be ongoing for hours before she gets any relief.  She has been taking Excedrin migraine as well as sumatriptan and Relpax in order to treat her pain.  The symptoms have been the responsive to these treatments although she found that Relpax has been much more consistently reliable in controlling her headache pain rather than Imitrex.  The patient has been having headaches since she was a youth and says that overall the symptoms seem to be about the same as when she was younger with the exception of an increasing frequency compared to her younger years.  She has never taking any preventative medications for headaches.  She has a strong family history with several family members will also have headaches.  The patient has recently been diagnosed and treated for thyroid insufficiency.  She has no history of any other autoimmune phenomenon.    Interval History  03/27/2019  Patient presents to clinic for routine follow-up.  She was taking extended release Topamax as directed but feels that the medication caused her to have too much alteration in her diet in appetite.  The medication caused both appetite suppression and cause her to have poor taste whenever she ate or drink anything including water which she feels was too much for her to tolerate the medication.  Since stopping medication she says  the headaches have been relatively sparse and have been well controlled with alternating Relpax and sumatriptan to address the headaches that still occur intermittently.  She says that this has been working for her allows her to be able to control her headaches relatively quickly despite the lack of a preventative medication.  She has considered alternative therapy such as a daily injectable medication, but feels that due to the sparse nature of headache she would like to defer from making decision at this time.     PAST MEDICAL HISTORY:  Past Medical History:   Diagnosis Date    Asthma     Eosinophilic esophagitis     Eosinophilic esophagitis     Thyroid disease        PAST SURGICAL HISTORY:  Past Surgical History:   Procedure Laterality Date    ESOPHAGOGASTRODUODENOSCOPY      TONSILLECTOMY         SOCIAL HISTORY:  Social History     Socioeconomic History    Marital status: Single     Spouse name: Not on file    Number of children: Not on file    Years of education: Not on file    Highest education level: Not on file   Occupational History    Not on file   Social Needs    Financial resource strain: Not on file    Food insecurity:     Worry: Not on file     Inability: Not on file    Transportation needs:     Medical: Not on file     Non-medical: Not on file   Tobacco Use    Smoking status: Never Smoker    Smokeless tobacco: Never Used   Substance and Sexual Activity    Alcohol use: No     Alcohol/week: 0.0 oz    Drug use: No    Sexual activity: Not Currently     Partners: Male     Birth control/protection: Condom   Lifestyle    Physical activity:     Days per week: Not on file     Minutes per session: Not on file    Stress: Not on file   Relationships    Social connections:     Talks on phone: Not on file     Gets together: Not on file     Attends Jain service: Not on file     Active member of club or organization: Not on file     Attends meetings of clubs or organizations: Not on file      Relationship status: Not on file    Intimate partner violence:     Fear of current or ex partner: Not on file     Emotionally abused: Not on file     Physically abused: Not on file     Forced sexual activity: Not on file   Other Topics Concern    Not on file   Social History Narrative    Not on file       FAMILY HISTORY:  Family History   Problem Relation Age of Onset    Hypertension Mother     Hypertension Father     Heart disease Maternal Grandmother     Cancer Maternal Grandfather     Colon cancer Maternal Grandfather     Diabetes Paternal Grandmother     Cancer Paternal Grandmother     No Known Problems Sister     No Known Problems Brother     No Known Problems Maternal Aunt     No Known Problems Maternal Uncle     No Known Problems Paternal Aunt     No Known Problems Paternal Uncle     Amblyopia Neg Hx     Blindness Neg Hx     Cataracts Neg Hx     Glaucoma Neg Hx     Macular degeneration Neg Hx     Retinal detachment Neg Hx     Strabismus Neg Hx     Stroke Neg Hx     Thyroid disease Neg Hx     Breast cancer Neg Hx     Ovarian cancer Neg Hx        ALLERGIES AND MEDICATIONS: updated and reviewed.  Review of patient's allergies indicates:   Allergen Reactions    Pumpkin Anaphylaxis    Shellfish containing products Itching     Current Outpatient Medications   Medication Sig Dispense Refill    albuterol 90 mcg/actuation inhaler albuterol sulfate 2 puffs inhalation   every 4 hours As needed Until directed to stop      aspirin-acetaminophen-caffeine 250-250-65 mg (EXCEDRIN MIGRAINE) 250-250-65 mg per tablet Excedrin Migraine           cetirizine (ZYRTEC) 10 MG tablet Take 10 mg by mouth once daily.      eletriptan (RELPAX) 40 MG tablet Take 1 tablet (40 mg total) by mouth as needed. may repeat in 2 hours if necessary 12 tablet 5    EPINEPHrine (EPIPEN) 0.3 mg/0.3 mL AtIn Inject 0.3 mLs (0.3 mg total) into the muscle once. for 1 dose 2 each 1    ferrous gluconate (FERGON) 240 (27 FE)  MG tablet Take 2 tablets (480 mg total) by mouth once daily. 30 tablet 11    levothyroxine (SYNTHROID) 50 MCG tablet Take 1 tablet (50 mcg total) by mouth once daily. 90 tablet 0    montelukast (SINGULAIR) 10 mg tablet Take 1 tablet (10 mg total) by mouth every evening. 30 tablet 2    pantoprazole (PROTONIX) 40 MG tablet Take 1 tablet (40 mg total) by mouth once daily. 30 tablet 11    ranitidine (ZANTAC) 150 MG tablet Take 1 tablet (150 mg total) by mouth nightly. 30 tablet 1    rifAXIMin (XIFAXAN) 550 mg Tab Take 1 tablet (550 mg total) by mouth 3 (three) times daily. 42 tablet 0    sumatriptan (IMITREX) 25 MG Tab TAKE 1 TABLET BY MOUTH AS INSTRUCTED UNTIL DIRECTED TO STOP, TAKE ONE TABLET BY MOUTH EVERY 2 HOURS FOR ONE DOSE AS NEEDED FOR HEADACHES 10 tablet 1     No current facility-administered medications for this visit.        Review of Systems   Constitutional: Negative for activity change, appetite change, fever and unexpected weight change.   HENT: Negative for trouble swallowing and voice change.    Eyes: Positive for photophobia and visual disturbance.   Respiratory: Negative for apnea and shortness of breath.    Cardiovascular: Negative for chest pain and leg swelling.   Gastrointestinal: Positive for nausea and vomiting. Negative for constipation.   Genitourinary: Negative for difficulty urinating.   Musculoskeletal: Negative for back pain, gait problem and neck pain.   Skin: Negative for color change and pallor.   Neurological: Positive for headaches. Negative for dizziness, seizures, syncope, weakness and numbness.   Hematological: Negative for adenopathy.   Psychiatric/Behavioral: Negative for agitation, confusion and decreased concentration.       Neurologic Exam     Mental Status   Oriented to person, place, and time.   Registration: recalls 3 of 3 objects.   Attention: normal. Concentration: normal.   Speech: speech is normal   Level of consciousness: alert  Knowledge: good.     Cranial  Nerves     CN II   Visual fields full to confrontation.   Right visual field deficit: none  Left visual field deficit: none     CN III, IV, VI   Pupils are equal, round, and reactive to light.  Extraocular motions are normal.   Right pupil: Size: 3 mm. Shape: regular. Accommodation: intact.   Left pupil: Size: 3 mm. Shape: regular. Accommodation: intact.   CN III: no CN III palsy  CN VI: no CN VI palsy  Nystagmus: none   Diplopia: none  Ophthalmoparesis: none  Upgaze: normal  Downgaze: normal  Conjugate gaze: present    CN V   Facial sensation intact.   Right facial sensation deficit: none  Left facial sensation deficit: none    CN VII   Facial expression full, symmetric.   Right facial weakness: none  Left facial weakness: none    CN VIII   CN VIII normal.     CN IX, X   CN IX normal.   CN X normal.   Palate: symmetric    CN XI   CN XI normal.   Right sternocleidomastoid strength: normal  Left sternocleidomastoid strength: normal  Right trapezius strength: normal  Left trapezius strength: normal    CN XII   CN XII normal.   Tongue deviation: none    Motor Exam   Muscle bulk: normal  Overall muscle tone: normal  Right arm tone: normal  Left arm tone: normal  Right leg tone: normal  Left leg tone: normal    Strength   Strength 5/5 throughout.     Sensory Exam   Right arm light touch: normal  Left arm light touch: normal  Right leg light touch: normal  Left leg light touch: normal  Right arm vibration: normal  Left arm vibration: normal  Right leg vibration: normal  Left leg vibration: normal  Right arm proprioception: normal  Left arm proprioception: normal  Right leg proprioception: normal  Left leg proprioception: normal  Right arm pinprick: normal  Left arm pinprick: normal  Right leg pinprick: normal  Left leg pinprick: normal    Gait, Coordination, and Reflexes     Gait  Gait: normal    Coordination   Romberg: negative  Finger to nose coordination: normal  Heel to shin coordination: normal  Tandem walking  "coordination: normal    Tremor   Resting tremor: absent    Reflexes   Right brachioradialis: 2+  Left brachioradialis: 2+  Right biceps: 2+  Left biceps: 2+  Right triceps: 2+  Left triceps: 2+  Right patellar: 2+  Left patellar: 2+  Right achilles: 2+  Left achilles: 2+  Right plantar: normal  Left plantar: normal      Physical Exam   Constitutional: She is oriented to person, place, and time. She appears well-developed and well-nourished.   HENT:   Head: Normocephalic and atraumatic.   Eyes: Pupils are equal, round, and reactive to light. EOM are normal.   Neck: Normal range of motion.   Cardiovascular: Normal rate and intact distal pulses.   Pulmonary/Chest: Effort normal. No apnea. No respiratory distress.   Musculoskeletal: Normal range of motion.   Neurological: She is alert and oriented to person, place, and time. She has normal strength. She has a normal Finger-Nose-Finger Test, a normal Heel to Shin Test, a normal Romberg Test and a normal Tandem Gait Test. Gait normal.   Reflex Scores:       Tricep reflexes are 2+ on the right side and 2+ on the left side.       Bicep reflexes are 2+ on the right side and 2+ on the left side.       Brachioradialis reflexes are 2+ on the right side and 2+ on the left side.       Patellar reflexes are 2+ on the right side and 2+ on the left side.       Achilles reflexes are 2+ on the right side and 2+ on the left side.  Skin: Skin is warm and dry.   Psychiatric: She has a normal mood and affect. Her speech is normal and behavior is normal. Thought content normal.   Vitals reviewed.      Vitals:    03/27/19 1455   BP: 124/82   BP Location: Right arm   Patient Position: Sitting   BP Method: Large (Automatic)   Pulse: 74   Weight: 76.1 kg (167 lb 12.3 oz)   Height: 5' 2.5" (1.588 m)       Assessment & Plan:    Problem List Items Addressed This Visit     Chronic migraine without aura without status migrainosus, not intractable    Overview     Patient did not respond well to " extended release Topamax to control her headaches, so at this time she will be treated only with abortive medications as needed.  Discussion has been started as to which medication would be the best for prevention in the future.  The patient seems to think that injectable medication such as a CGRP receptor blocker would be her best option           Other Visit Diagnoses     Migraine with aura and without status migrainosus, not intractable    -  Primary    Relevant Medications    sumatriptan (IMITREX) 25 MG Tab    eletriptan (RELPAX) 40 MG tablet          Follow-up: Follow up in about 6 months (around 9/27/2019).   More than 50% of this 25 minute encounter was spent in counseling and coordinating care of her headaches

## 2019-04-02 ENCOUNTER — TELEPHONE (OUTPATIENT)
Dept: GASTROENTEROLOGY | Facility: HOSPITAL | Age: 34
End: 2019-04-02

## 2019-04-02 ENCOUNTER — TELEPHONE (OUTPATIENT)
Dept: GASTROENTEROLOGY | Facility: CLINIC | Age: 34
End: 2019-04-02

## 2019-04-02 DIAGNOSIS — R13.10 DYSPHAGIA, UNSPECIFIED TYPE: Primary | ICD-10-CM

## 2019-04-17 ENCOUNTER — TELEPHONE (OUTPATIENT)
Dept: OBSTETRICS AND GYNECOLOGY | Facility: CLINIC | Age: 34
End: 2019-04-17

## 2019-04-17 NOTE — TELEPHONE ENCOUNTER
----- Message from Maryam Ramirez sent at 4/17/2019 10:37 AM CDT -----  Contact: Self/ [phone]  Patient called in requesting to speak with you. Patient prefers to speak with a nurse.     Please call and advise.

## 2019-04-18 ENCOUNTER — ANESTHESIA EVENT (OUTPATIENT)
Dept: ENDOSCOPY | Facility: HOSPITAL | Age: 34
End: 2019-04-18
Payer: COMMERCIAL

## 2019-04-18 ENCOUNTER — ANESTHESIA (OUTPATIENT)
Dept: ENDOSCOPY | Facility: HOSPITAL | Age: 34
End: 2019-04-18
Payer: COMMERCIAL

## 2019-04-18 ENCOUNTER — HOSPITAL ENCOUNTER (OUTPATIENT)
Facility: HOSPITAL | Age: 34
Discharge: HOME | End: 2019-04-18
Attending: INTERNAL MEDICINE | Admitting: INTERNAL MEDICINE
Payer: COMMERCIAL

## 2019-04-18 VITALS
WEIGHT: 165 LBS | RESPIRATION RATE: 18 BRPM | TEMPERATURE: 98 F | SYSTOLIC BLOOD PRESSURE: 118 MMHG | OXYGEN SATURATION: 98 % | HEART RATE: 71 BPM | BODY MASS INDEX: 29.23 KG/M2 | DIASTOLIC BLOOD PRESSURE: 78 MMHG | HEIGHT: 63 IN

## 2019-04-18 DIAGNOSIS — R13.10 DYSPHAGIA: ICD-10-CM

## 2019-04-18 PROCEDURE — 88305 TISSUE SPECIMEN TO PATHOLOGY - SURGERY: ICD-10-PCS | Mod: 26,,, | Performed by: PATHOLOGY

## 2019-04-18 PROCEDURE — 43249 ESOPH EGD DILATION <30 MM: CPT | Mod: ,,, | Performed by: INTERNAL MEDICINE

## 2019-04-18 PROCEDURE — 43249 ESOPH EGD DILATION <30 MM: CPT | Performed by: INTERNAL MEDICINE

## 2019-04-18 PROCEDURE — 63600175 PHARM REV CODE 636 W HCPCS: Performed by: NURSE ANESTHETIST, CERTIFIED REGISTERED

## 2019-04-18 PROCEDURE — 27201012 HC FORCEPS, HOT/COLD, DISP: Performed by: INTERNAL MEDICINE

## 2019-04-18 PROCEDURE — 25000003 PHARM REV CODE 250: Performed by: INTERNAL MEDICINE

## 2019-04-18 PROCEDURE — C1726 CATH, BAL DIL, NON-VASCULAR: HCPCS | Performed by: INTERNAL MEDICINE

## 2019-04-18 PROCEDURE — 43239 PR EGD, FLEX, W/BIOPSY, SGL/MULTI: ICD-10-PCS | Mod: 59,,, | Performed by: INTERNAL MEDICINE

## 2019-04-18 PROCEDURE — 37000009 HC ANESTHESIA EA ADD 15 MINS: Performed by: INTERNAL MEDICINE

## 2019-04-18 PROCEDURE — 37000008 HC ANESTHESIA 1ST 15 MINUTES: Performed by: INTERNAL MEDICINE

## 2019-04-18 PROCEDURE — 88305 TISSUE EXAM BY PATHOLOGIST: CPT | Performed by: PATHOLOGY

## 2019-04-18 PROCEDURE — 43249 PR EGD, FLEX, W/BALL DILATION, < 30MM: ICD-10-PCS | Mod: ,,, | Performed by: INTERNAL MEDICINE

## 2019-04-18 PROCEDURE — 43239 EGD BIOPSY SINGLE/MULTIPLE: CPT | Performed by: INTERNAL MEDICINE

## 2019-04-18 PROCEDURE — 43239 EGD BIOPSY SINGLE/MULTIPLE: CPT | Mod: 59,,, | Performed by: INTERNAL MEDICINE

## 2019-04-18 PROCEDURE — 88305 TISSUE EXAM BY PATHOLOGIST: CPT | Mod: 26,,, | Performed by: PATHOLOGY

## 2019-04-18 RX ORDER — ONDANSETRON 2 MG/ML
INJECTION INTRAMUSCULAR; INTRAVENOUS
Status: DISCONTINUED | OUTPATIENT
Start: 2019-04-18 | End: 2019-04-18

## 2019-04-18 RX ORDER — LIDOCAINE HCL/PF 100 MG/5ML
SYRINGE (ML) INTRAVENOUS
Status: DISCONTINUED | OUTPATIENT
Start: 2019-04-18 | End: 2019-04-18

## 2019-04-18 RX ORDER — MIDAZOLAM HYDROCHLORIDE 1 MG/ML
INJECTION, SOLUTION INTRAMUSCULAR; INTRAVENOUS
Status: DISCONTINUED | OUTPATIENT
Start: 2019-04-18 | End: 2019-04-18

## 2019-04-18 RX ORDER — FENTANYL CITRATE 50 UG/ML
INJECTION, SOLUTION INTRAMUSCULAR; INTRAVENOUS
Status: DISCONTINUED | OUTPATIENT
Start: 2019-04-18 | End: 2019-04-18

## 2019-04-18 RX ORDER — PROPOFOL 10 MG/ML
VIAL (ML) INTRAVENOUS
Status: DISCONTINUED | OUTPATIENT
Start: 2019-04-18 | End: 2019-04-18

## 2019-04-18 RX ORDER — SODIUM CHLORIDE 0.9 % (FLUSH) 0.9 %
10 SYRINGE (ML) INJECTION
Status: DISCONTINUED | OUTPATIENT
Start: 2019-04-18 | End: 2019-04-18 | Stop reason: HOSPADM

## 2019-04-18 RX ORDER — SODIUM CHLORIDE 9 MG/ML
INJECTION, SOLUTION INTRAVENOUS CONTINUOUS
Status: DISCONTINUED | OUTPATIENT
Start: 2019-04-18 | End: 2019-04-18 | Stop reason: HOSPADM

## 2019-04-18 RX ADMIN — PROPOFOL 30 MG: 10 INJECTION, EMULSION INTRAVENOUS at 08:04

## 2019-04-18 RX ADMIN — LIDOCAINE HYDROCHLORIDE 60 MG: 20 INJECTION, SOLUTION INTRAVENOUS at 08:04

## 2019-04-18 RX ADMIN — MIDAZOLAM 2 MG: 1 INJECTION INTRAMUSCULAR; INTRAVENOUS at 07:04

## 2019-04-18 RX ADMIN — FENTANYL CITRATE 50 MCG: 50 INJECTION, SOLUTION INTRAMUSCULAR; INTRAVENOUS at 08:04

## 2019-04-18 RX ADMIN — PROPOFOL 60 MG: 10 INJECTION, EMULSION INTRAVENOUS at 08:04

## 2019-04-18 RX ADMIN — SODIUM CHLORIDE: 0.9 INJECTION, SOLUTION INTRAVENOUS at 07:04

## 2019-04-18 RX ADMIN — ONDANSETRON 4 MG: 2 INJECTION, SOLUTION INTRAMUSCULAR; INTRAVENOUS at 08:04

## 2019-04-18 NOTE — [employer] PATIENT INSTRUCTIONS
Discharge Summary/Instructions after an Endoscopic Procedure  Patient Name: Jossy Leslie  Patient MRN: [id number]  Patient YOB: 1985  Thursday, April 18, 2019  Karmen Marquez MD  RESTRICTIONS:  During your procedure today, you received medications for sedation.  These   medications may affect your judgment, balance and coordination.  Therefore,   for 24 hours, you have the following restrictions:   - DO NOT drive a car, operate machinery, make legal/financial decisions,   sign important papers or drink alcohol.    ACTIVITY:  Today: no heavy lifting, straining or running due to procedural   sedation/anesthesia.  The following day: return to full activity including work.  DIET:  Eat and drink normally unless instructed otherwise.     TREATMENT FOR COMMON SIDE EFFECTS:  - Mild abdominal pain, nausea, belching, bloating or excessive gas:  rest,   eat lightly and use a heating pad.  - Sore Throat: treat with throat lozenges and/or gargle with warm salt   water.  - Because air was used during the procedure, expelling large amounts of air   from your rectum or belching is normal.  - If a bowel prep was taken, you may not have a bowel movement for 1-3 days.    This is normal.  SYMPTOMS TO WATCH FOR AND REPORT TO YOUR PHYSICIAN:  1. Abdominal pain or bloating, other than gas cramps.  2. Chest pain.  3. Back pain.  4. Signs of infection such as: chills or fever occurring within 24 hours   after the procedure.  5. Rectal bleeding, which would show as bright red, maroon, or black stools.   (A tablespoon of blood from the rectum is not serious, especially if   hemorrhoids are present.)  6. Vomiting.  7. Weakness or dizziness.  GO DIRECTLY TO THE NEAREST EMERGENCY ROOM IF YOU HAVE ANY OF THE FOLLOWING:      Difficulty breathing              Chills and/or fever over 101 F   Persistent vomiting and/or vomiting blood   Severe abdominal pain   Severe chest pain   Black, tarry stools   Bleeding- more than one  tablespoon   Any other symptom or condition that you feel may need urgent attention  Your doctor recommends these additional instructions:  If any biopsies were taken, your doctors clinic will contact you in 1 to 2   weeks with any results.  - Discharge patient to home (via wheelchair).   - Patient has a contact number available for emergencies.  The signs and   symptoms of potential delayed complications were discussed with the   patient.  Return to normal activities tomorrow.  Written discharge   instructions were provided to the patient.   - Resume previous diet.   - Continue present medications.   - Await pathology results.  For questions, problems or results please call your physician - Karmen Marquez MD at Work:  ( [phone].  EMERGENCY PHONE NUMBER: [phone],  LAB RESULTS: [phone]  IF A COMPLICATION OR EMERGENCY SITUATION ARISES AND YOU ARE UNABLE TO REACH   YOUR PHYSICIAN - GO DIRECTLY TO THE EMERGENCY ROOM.  Karmen Marquez MD  4/18/2019 8:17:02 AM  This report has been verified and signed electronically.  [employer]

## 2019-04-18 NOTE — ANESTHESIA POSTPROCEDURE EVALUATION
Anesthesia Post Evaluation    Patient: Jossy Leslie    Procedure(s) Performed: Procedure(s) (LRB):  ESOPHAGOGASTRODUODENOSCOPY (EGD) (N/A)    Final Anesthesia Type: MAC  Patient location during evaluation: GI PACU  Patient participation: Yes- Able to Participate  Level of consciousness: awake and alert and oriented  Post-procedure vital signs: reviewed and stable  Pain management: adequate  Airway patency: patent  PONV status at discharge: No PONV  Anesthetic complications: no      Cardiovascular status: blood pressure returned to baseline and hemodynamically stable  Respiratory status: unassisted, room air and spontaneous ventilation  Hydration status: euvolemic  Follow-up not needed.          Vitals Value Taken Time   /86 4/18/2019  7:04 AM   Temp 36.6 °C (97.9 °F) 4/18/2019  7:04 AM   Pulse 88 4/18/2019  7:04 AM   Resp 18 4/18/2019  7:04 AM   SpO2 98 % 4/18/2019  7:04 AM         No case tracking events are documented in the log.      Pain/Sudarshan Score: No data recorded

## 2019-04-18 NOTE — PLAN OF CARE
Past Medical History:   Diagnosis Date    Asthma     Duodenal ulcer     Eosinophilic esophagitis     Eosinophilic esophagitis     Hiatal hernia     Thyroid disease      Admission process complete. Patient ready for procedure. Plan of care reviewed with patient. Preoperative fasting appropriate for procedure and sedation. Call light in reach and bed in lowest position.

## 2019-04-18 NOTE — H&P
[facility]  Gastroenterology  H&P    Patient Name: Jossy Leslie  MRN: [id number]  Admission Date: 4/18/2019  Code Status: Full Code    Attending Provider: Karmen Marquez MD   Primary Care Physician: Ashok Nicholas MD  Principal Problem:<principal problem not specified>    Subjective:     History of Present Illness: Dysphagia    Past Medical History:   Diagnosis Date    Asthma     Duodenal ulcer     Eosinophilic esophagitis     Eosinophilic esophagitis     Hiatal hernia     Thyroid disease        Past Surgical History:   Procedure Laterality Date    ESOPHAGOGASTRODUODENOSCOPY      TONSILLECTOMY         Review of patient's allergies indicates:   Allergen Reactions    Pumpkin Anaphylaxis    Shellfish containing products Itching     Family History     Problem Relation (Age of Onset)    Cancer Maternal Grandfather, Paternal Grandmother    Colon cancer Maternal Grandfather    Diabetes Paternal Grandmother    Heart disease Maternal Grandmother    Hypertension Mother, Father    No Known Problems Sister, Brother, Maternal Aunt, Maternal Uncle, Paternal Aunt, Paternal Uncle        Tobacco Use    Smoking status: Never Smoker    Smokeless tobacco: Never Used   Substance and Sexual Activity    Alcohol use: No     Alcohol/week: 0.0 oz    Drug use: No    Sexual activity: Not Currently     Partners: Male     Birth control/protection: Condom     Review of Systems   Constitutional: Negative for appetite change and unexpected weight change.   HENT: Positive for trouble swallowing. Negative for nosebleeds.    Respiratory: Negative for apnea and chest tightness.    Cardiovascular: Negative for chest pain and palpitations.   Gastrointestinal: Negative for abdominal distention and abdominal pain.     Objective:     Vital Signs (Most Recent):  Temp: 97.9 °F (36.6 °C) (04/18/19 0704)  Pulse: 88 (04/18/19 0704)  Resp: 18 (04/18/19 0704)  BP: 133/86 (04/18/19 0704)  SpO2: 98 % (04/18/19 0704) Vital Signs  (24h Range):  Temp:  [97.9 °F (36.6 °C)] 97.9 °F (36.6 °C)  Pulse:  [88] 88  Resp:  [18] 18  SpO2:  [98 %] 98 %  BP: (133)/(86) 133/86     Weight: 74.8 kg (165 lb) (04/18/19 0704)  Body mass index is 29.7 kg/m².    No intake or output data in the 24 hours ending 04/18/19 0729    Lines/Drains/Airways     Peripheral Intravenous Line                 Peripheral IV - Single Lumen 04/18/19 0711 22 G Right Hand less than 1 day                Physical Exam   Constitutional: She is oriented to person, place, and time. She appears well-developed and well-nourished. No distress.   HENT:   Head: Normocephalic and atraumatic.   Eyes: Conjunctivae are normal. No scleral icterus.   Neck: Normal range of motion. Neck supple. No tracheal deviation present. No thyromegaly present.   Cardiovascular: Normal rate and regular rhythm. Exam reveals no gallop and no friction rub.   No murmur heard.  Pulmonary/Chest: Effort normal and breath sounds normal. No respiratory distress. She has no wheezes.   Abdominal: Soft. Bowel sounds are normal. She exhibits no distension. There is no tenderness.   Musculoskeletal:        Right wrist: She exhibits normal range of motion and no tenderness.        Left wrist: She exhibits normal range of motion and no tenderness.   Lymphadenopathy:        Head (right side): No submental and no submandibular adenopathy present.        Head (left side): No submental and no submandibular adenopathy present.   Neurological: She is alert and oriented to person, place, and time.   Skin: Skin is warm and dry. No rash noted. She is not diaphoretic. No erythema.   Psychiatric: She has a normal mood and affect. Her behavior is normal.   Nursing note and vitals reviewed.        Assessment/Plan:     Active Diagnoses:    Diagnosis Date Noted POA    Dysphagia [R13.10] 04/18/2019 Yes      Problems Resolved During this Admission:     Plan  1. EGD, possible dilation    Karmen Marquez MD  Gastroenterology  [facility]

## 2019-04-18 NOTE — TRANSFER OF CARE
"Anesthesia Transfer of Care Note    Patient: Jossy Leslie    Procedure(s) Performed: Procedure(s) (LRB):  ESOPHAGOGASTRODUODENOSCOPY (EGD) (N/A)    Patient location: GI    Anesthesia Type: MAC    Transport from OR: Transported from OR on room air with adequate spontaneous ventilation    Post pain: adequate analgesia    Post assessment: no apparent anesthetic complications and tolerated procedure well    Post vital signs: stable    Level of consciousness: awake, alert and oriented    Nausea/Vomiting: no nausea/vomiting    Complications: none    Transfer of care protocol was followed      Last vitals:   Visit Vitals  /86 (BP Location: Left arm, Patient Position: Lying)   Pulse 88   Temp 36.6 °C (97.9 °F) (Temporal)   Resp 18   Ht 5' 2.5" (1.588 m)   Wt 74.8 kg (165 lb)   LMP 03/16/2019 (Exact Date)   SpO2 98%   Breastfeeding? No   BMI 29.70 kg/m²     "

## 2019-04-18 NOTE — ANESTHESIA PREPROCEDURE EVALUATION
04/18/2019  Jossy Leslie is a 33 y.o., female for EGD    Review of patient's allergies indicates:   Allergen Reactions    Pumpkin Anaphylaxis    Shellfish containing products Itching     Past Medical History:   Diagnosis Date    Asthma     Duodenal ulcer     Eosinophilic esophagitis     Eosinophilic esophagitis     Hiatal hernia     Thyroid disease      Past Surgical History:   Procedure Laterality Date    ESOPHAGOGASTRODUODENOSCOPY      TONSILLECTOMY       Patient Active Problem List   Diagnosis    Abnormal uterine bleeding    Myalgia    Mild intermittent asthma without complication    Chronic migraine without aura without status migrainosus, not intractable    Episodic tension-type headache, not intractable    BMI 31.0-31.9,adult    Iron deficiency anemia due to chronic blood loss    Hypothyroidism due to Hashimoto's thyroiditis    Dysphagia     Wt Readings from Last 3 Encounters:   04/18/19 74.8 kg (165 lb)   03/27/19 76.1 kg (167 lb 12.3 oz)   11/29/18 78.9 kg (173 lb 15.1 oz)     Temp Readings from Last 3 Encounters:   04/18/19 36.6 °C (97.9 °F) (Temporal)   08/09/18 36.7 °C (98 °F) (Oral)   07/25/18 36.4 °C (97.6 °F)     BP Readings from Last 3 Encounters:   04/18/19 133/86   03/27/19 124/82   11/29/18 122/83     Pulse Readings from Last 3 Encounters:   04/18/19 88   03/27/19 74   11/29/18 88         Anesthesia Evaluation    I have reviewed the Patient Summary Reports.        Review of Systems      Physical Exam  General:  Well nourished    Airway/Jaw/Neck:  Airway Findings: Mouth Opening: Normal Tongue: Normal  General Airway Assessment: Adult  Mallampati: II  Improves to II with phonation.  TM Distance: Normal, at least 6 cm       Chest/Lungs:  Chest/Lungs Findings: Clear to auscultation, Normal Respiratory Rate     Heart/Vascular:  Heart Findings: Rate: Normal  Rhythm: Regular  Rhythm  Sounds: Normal        Mental Status:  Mental Status Findings:  Cooperative, Alert and Oriented       Lab Results   Component Value Date    WBC 6.05 10/19/2018    HGB 12.6 10/19/2018    HCT 39.3 10/19/2018    MCV 81 (L) 10/19/2018     10/19/2018       Chemistry        Component Value Date/Time     08/10/2018 1419    K 3.9 08/10/2018 1419     08/10/2018 1419    CO2 25 08/10/2018 1419    BUN 12 08/10/2018 1419    CREATININE 0.9 08/10/2018 1419    GLU 93 08/10/2018 1419        Component Value Date/Time    CALCIUM 9.7 08/10/2018 1419    ALKPHOS 45 (L) 08/10/2018 1419    AST 26 08/10/2018 1419    ALT 40 08/10/2018 1419    BILITOT 0.4 08/10/2018 1419    ESTGFRAFRICA >60 08/10/2018 1419    EGFRNONAA >60 08/10/2018 1419            Anesthesia Plan  Type of Anesthesia, risks & benefits discussed:  Anesthesia Type:  MAC  Patient's Preference:   Intra-op Monitoring Plan:   Intra-op Monitoring Plan Comments:   Post Op Pain Control Plan:   Post Op Pain Control Plan Comments:   Induction:   IV  Beta Blocker:         Informed Consent: Patient understands risks and agrees with Anesthesia plan.  Questions answered. Anesthesia consent signed with patient.  ASA Score: 2     Day of Surgery Review of History & Physical: I have interviewed and examined the patient. I have reviewed the patient's H&P dated:  There are no significant changes.  H&P update referred to the provider.  H&P completed by Anesthesiologist.       Ready For Surgery From Anesthesia Perspective.

## 2019-05-15 ENCOUNTER — HOSPITAL ENCOUNTER (OUTPATIENT)
Dept: PREADMISSION TESTING | Facility: HOSPITAL | Age: 34
Discharge: HOME | End: 2019-05-15
Attending: OBSTETRICS & GYNECOLOGY
Payer: COMMERCIAL

## 2019-05-15 ENCOUNTER — ANESTHESIA EVENT (OUTPATIENT)
Dept: SURGERY | Facility: HOSPITAL | Age: 34
End: 2019-05-15
Payer: COMMERCIAL

## 2019-05-15 ENCOUNTER — OFFICE VISIT (OUTPATIENT)
Dept: OBSTETRICS AND GYNECOLOGY | Facility: CLINIC | Age: 34
End: 2019-05-15
Payer: COMMERCIAL

## 2019-05-15 VITALS
BODY MASS INDEX: 30.36 KG/M2 | DIASTOLIC BLOOD PRESSURE: 88 MMHG | SYSTOLIC BLOOD PRESSURE: 112 MMHG | WEIGHT: 168.63 LBS

## 2019-05-15 DIAGNOSIS — N84.1 CERVICAL POLYP: ICD-10-CM

## 2019-05-15 DIAGNOSIS — N83.201 CYST OF RIGHT OVARY: Primary | ICD-10-CM

## 2019-05-15 DIAGNOSIS — N83.209 OVARIAN CYST: ICD-10-CM

## 2019-05-15 DIAGNOSIS — N93.9 ABNORMAL UTERINE BLEEDING: Primary | ICD-10-CM

## 2019-05-15 PROCEDURE — 99999 PR PBB SHADOW E&M-EST. PATIENT-LVL III: CPT | Mod: PBBFAC,,, | Performed by: OBSTETRICS & GYNECOLOGY

## 2019-05-15 PROCEDURE — 99499 NO LOS: ICD-10-PCS | Mod: S$GLB,,, | Performed by: OBSTETRICS & GYNECOLOGY

## 2019-05-15 PROCEDURE — 99999 PR PBB SHADOW E&M-EST. PATIENT-LVL III: ICD-10-PCS | Mod: PBBFAC,,, | Performed by: OBSTETRICS & GYNECOLOGY

## 2019-05-15 PROCEDURE — 99499 UNLISTED E&M SERVICE: CPT | Mod: S$GLB,,, | Performed by: OBSTETRICS & GYNECOLOGY

## 2019-05-15 RX ORDER — LIDOCAINE HYDROCHLORIDE 10 MG/ML
1 INJECTION, SOLUTION EPIDURAL; INFILTRATION; INTRACAUDAL; PERINEURAL ONCE
Status: CANCELLED | OUTPATIENT
Start: 2019-05-15 | End: 2019-05-15

## 2019-05-15 RX ORDER — SODIUM CHLORIDE, SODIUM LACTATE, POTASSIUM CHLORIDE, CALCIUM CHLORIDE 600; 310; 30; 20 MG/100ML; MG/100ML; MG/100ML; MG/100ML
INJECTION, SOLUTION INTRAVENOUS CONTINUOUS
Status: CANCELLED | OUTPATIENT
Start: 2019-05-15

## 2019-05-15 NOTE — DISCHARGE INSTRUCTIONS
Your surgery is scheduled for 5/17/19.    Please report to Outpatient Surgery Intake Office on the 2nd FLOOR at 6:00a.m.          INSTRUCTIONS IMPORTANT!!!  ¨ Do not eat or drink after 12 midnight-including water. OK to brush teeth, no   gum, candy or mints!    ¨ Take only these medicines with a small swallow of water-morning of surgery: levothyroxine and Zantac        ____  Proceed to [facility] on 5/15/19 for additional blood test.        ____  Do not wear makeup, including mascara.  ____  No powder, lotions or creams to surgical area.  ____  Please remove all jewelry, including piercings and leave at home.  ____  No money or valuables needed. Please leave at home.  ____  Please bring any documents given by your doctor.  ____  If going home the same day, arrange for a ride home. You will not be able to             drive if Anesthesia was used.  ____  Wear loose fitting clothing. Allow for dressings, bandages.  ____  Stop Aspirin, Ibuprofen, Motrin and Aleve at least 3-5 days before surgery, unless otherwise instructed by your doctor, or the nurse.   You MAY use Tylenol/acetaminophen until day of surgery.  ____  Wash the surgical area with Hibiclens the night before surgery, and again the             morning of surgery.  Be sure to rinse hibiclens off completely (if instructed by   nurse).  ____  If you take diabetic medication, do not take am of surgery unless instructed by Doctor.  ____  Call MD for temperature above 101 degrees or any other signs of infection such as Urinary (bladder) infection, Upper respiratory infection, skin boils, etc.  ____ Stop taking any Fish Oil supplement or any Vitamins that contain Vitamin E at least 5 days prior to surgery.  ____ Do Not wear your contact lenses the day of your procedure.  You may wear your glasses.      ____Do not shave surgical site for 3 days prior to surgery.  ____ Practice Good hand washing before, during, and after procedure.      I have read or  had read and explained to me, and understand the above information.  Additional comments or instructions:  For additional questions call [phone]      ANESTHESIA SIDE EFFECTS  -For the first 24 hours after surgery:  Do not drive, use heavy equipment, make important decisions, or drink alcohol  -It is normal to feel sleepy for several hours.  Rest until you are more awake.  -Have someone stay with you, if needed.  They can watch for problems and help keep you safe.  -Some possible post anesthesia side effects include: nausea and vomiting, sore throat and hoarseness, sleepiness, and dizziness.        Pre-Op Bathing Instructions    Before surgery, you can play an important role in your own health.    Because skin is not sterile, we need to be sure that your skin is as free of germs as possible. By following the instructions below, you can reduce the number of germs on your skin before surgery.    IMPORTANT: You will need to shower with a special soap called Hibiclens*, available at any pharmacy.  If you are allergic to Chlorhexidine (the antiseptic in Hibiclens), use an antibacterial soap such as Dial Soap for your preoperative shower.  You will shower with Hibiclens both the night before your surgery and the morning of your surgery.  Do not use Hibiclens on the head, face or genitals to avoid injury to those areas.    STEP #1: THE NIGHT BEFORE YOUR SURGERY     1. Do not shave the area of your body where your surgery will be performed.  2. Shower and wash your hair and body as usual with your normal soap and shampoo.  3. Rinse your hair and body thoroughly after you shower to remove all soap residue.  4. With your hand, apply one packet of Hibiclens soap to the surgical site.   5. Wash the site gently for five (5) minutes. Do not scrub your skin too hard.   6. Do not wash with your regular soap after Hibiclens is used.  7. Rinse your body thoroughly.  8. Pat yourself dry with a clean, soft towel.  9. Do not use lotion,  cream, or powder.  10. Wear clean clothes.    STEP #2: THE MORNING OF YOUR SURGERY     1. Repeat Step #1.    * Not to be used by people allergic to Chlorhexidine.          Pelvic Laparoscopy    Laparoscopy is a type of surgery done using very small incisions. This type of surgery is possible because of the laparoscope (a long, slender tool with a camera and light). It lets your surgeon see inside the stomach. To perform the surgery, special instruments are inserted into the stomach through small incisions. Pelvic laparoscopy is often used to diagnose and treat the causes of pelvic problems, such as pain and infertility. Laparoscopy often involves:  · A short hospital stay. (You can most likely go home the same day.)  · A quick recovery  · Minimal anesthesia  · Small external scars  · Mild to moderate postoperative pain  Getting ready  To prepare for surgery:  · Tell your surgeon about any medicines you take. Include herbs, supplements, and over-the-counter medicines. You may need to stop taking certain medicines, such as aspirin, for 7 to 10 days before surgery.  · Do not eat or drink anything after the midnight before surgery.  · Arrange for a ride home after surgery.  Before the procedure  You will most likely be given general anesthesia to make you sleep during the procedure. A catheter may be inserted to drain urine from the bladder.   How pelvic laparoscopy is done  One or more small (quarter- or half-inch) incisions are made near the navel or the pubic hairline, or on either side of the lower belly. The laparoscope is inserted through an incision. It sends images to a video screen, allowing the surgeon a close-up view of the organs. Gas is used to inflate the belly, allowing the surgeon room to see and work. Depending on what is found, surgery to treat the problem may be done at this time.  After the procedure  · Youll be taken to a post-op area to wake up and recover from anesthesia.  · You may feel some  shoulder pain. This is due to irritation from the gas used to inflate the belly.  · You may have some discharge from the vagina. If so, ask the nurse for a pad.  · You will be asked to walk around to improve breathing and blood flow.  · If you had a catheter, it will most likely be removed before you go home.  · You can go home as soon as you recover from anesthesia and your condition is stable.  Your recovery  Recovery time depends on which procedure was done through the laparoscope. Your recovery from pelvic laparoscopy may take up to 6 weeks. If it was a simple procedure, like tubal ligation, then 2 weeks is reasonable. For laparoscopic hysterectomies, the recovery may be closer to 6 weeks. While you recover, be sure to follow your healthcare providers instructions. During this time:  · Take pain medicine as prescribed.  · Start eating solid food when you feel ready. To avoid constipation, eat fruits, vegetables, and whole grains. Drink plenty of fluids.  · Dont lift anything heavy until your healthcare provider says its safe.  · Take it easy for a few days. Ask your healthcare provider when you can return to work, exercise, and sex.  · Arrange for a follow-up visit with your healthcare provider to discuss the results of the procedure.  Call your healthcare provider  Contact your healthcare provider right away if you have any of the following:  · Have chills, or a fever of 100.4°F (38°C) or higher, or as directed by your healthcare provider  · Notice that the incision is red, swollen, or draining  · Have heavy, bright-red vaginal bleeding or a smelly discharge  · Have difficulty urinating  · Experience severe belly pain or bloating  · Have leg pain, redness, or swelling  · Have persistent nausea or vomiting  · Are not improving daily  · Fainting  · Trouble breathing   Date Last Reviewed: 12/1/2016  © 1903-6764 [employer]. [address] [place] [address], [place] [address]. All rights reserved. This  information is not intended as a substitute for professional medical care. Always follow your healthcare professional's instructions.

## 2019-05-15 NOTE — H&P (VIEW-ONLY)
Diagnosis: right ovarian cyst and cervical polyp  Planned Procedure: laparoscopic cystectomy and hysteroscopy with possible polypectomy   Date of Planned Procedure:19    Cc: I am here for preop for my surgery    HPI: Jossy Leslie is a 33 y.o. female with history of right ovarian cyst that hasn't resolved over past few months who desires removal. She was noted to have polyp on last US and was having Aub but that has resolved. She reports she would like to get on OCPs because her moods have been fluctuating with her cycles.     ROS:  GENERAL: Denies weight gain or weight loss. Feeling well overall.   SKIN: Denies rash or lesions.   HEAD: Denies head injury or headache.   CHEST: Denies chest pain or shortness of breath.   CARDIOVASCULAR: Denies palpitations or left sided chest pain.   ABDOMEN: No abdominal pain, constipation, diarrhea, nausea, vomiting or rectal bleeding.   URINARY: No frequency, dysuria, hematuria, or burning on urination.  REPRODUCTIVE: See HPI.   HEMATOLOGIC: No easy bruisability or excessive bleeding.   MUSCULOSKELETAL: Denies joint pain or swelling.   NEUROLOGIC: Denies syncope or weakness.   PSYCHIATRIC: Denies depression, anxiety or mood swings.     PMHx:   Past Medical History:   Diagnosis Date    Asthma     Duodenal ulcer     Eosinophilic esophagitis     Eosinophilic esophagitis     Hiatal hernia     Thyroid disease        Surgical hx:   Past Surgical History:   Procedure Laterality Date    ESOPHAGOGASTRODUODENOSCOPY      ESOPHAGOGASTRODUODENOSCOPY (EGD) N/A 2019    Performed [facility] END[facility]    TONSILLECTOMY         GYNhx: Patient's last menstrual period was 2019.    Obhx:     Review of patient's allergies indicates:   Allergen Reactions    Pumpkin Anaphylaxis    Shellfish containing products Itching       MEDS: Reviewed, reconciled      Current Outpatient Medications:     albuterol 90 mcg/actuation inhaler, albuterol sulfate 2 puffs  inhalation   every 4 hours As needed Until directed to stop, Disp: , Rfl:     aspirin-acetaminophen-caffeine 250-250-65 mg (EXCEDRIN MIGRAINE) 250-250-65 mg per tablet, Excedrin Migraine     , Disp: , Rfl:     cetirizine (ZYRTEC) 10 MG tablet, Take 10 mg by mouth once daily., Disp: , Rfl:     eletriptan (RELPAX) 40 MG tablet, Take 1 tablet (40 mg total) by mouth as needed. may repeat in 2 hours if necessary, Disp: 12 tablet, Rfl: 5    EPINEPHrine (EPIPEN) 0.3 mg/0.3 mL AtIn, Inject 0.3 mLs (0.3 mg total) into the muscle once. for 1 dose, Disp: 2 each, Rfl: 1    ferrous gluconate (FERGON) 240 (27 FE) MG tablet, Take 2 tablets (480 mg total) by mouth once daily., Disp: 30 tablet, Rfl: 11    levothyroxine (SYNTHROID) 50 MCG tablet, Take 1 tablet (50 mcg total) by mouth once daily., Disp: 90 tablet, Rfl: 0    montelukast (SINGULAIR) 10 mg tablet, Take 1 tablet (10 mg total) by mouth every evening., Disp: 30 tablet, Rfl: 2    pantoprazole (PROTONIX) 40 MG tablet, Take 1 tablet (40 mg total) by mouth once daily., Disp: 30 tablet, Rfl: 11    ranitidine (ZANTAC) 150 MG tablet, Take 1 tablet (150 mg total) by mouth nightly., Disp: 30 tablet, Rfl: 1    sumatriptan (IMITREX) 25 MG Tab, TAKE 1 TABLET BY MOUTH AS INSTRUCTED UNTIL DIRECTED TO STOP, TAKE ONE TABLET BY MOUTH EVERY 2 HOURS FOR ONE DOSE AS NEEDED FOR HEADACHES, Disp: 10 tablet, Rfl: 1    Social hx:    Social History     Socioeconomic History    Marital status: Single     Spouse name: Not on file    Number of children: Not on file    Years of education: Not on file    Highest education level: Not on file   Occupational History    Not on file   Social Needs    Financial resource strain: Not on file    Food insecurity:     Worry: Not on file     Inability: Not on file    Transportation needs:     Medical: Not on file     Non-medical: Not on file   Tobacco Use    Smoking status: Never Smoker    Smokeless tobacco: Never Used   Substance and Sexual  Activity    Alcohol use: No     Alcohol/week: 0.0 oz    Drug use: No    Sexual activity: Not Currently     Partners: Male     Birth control/protection: Condom   Lifestyle    Physical activity:     Days per week: Not on file     Minutes per session: Not on file    Stress: Not on file   Relationships    Social connections:     Talks on phone: Not on file     Gets together: Not on file     Attends Taoist service: Not on file     Active member of club or organization: Not on file     Attends meetings of clubs or organizations: Not on file     Relationship status: Not on file   Other Topics Concern    Not on file   Social History Narrative    Not on file       Family hx:    Family History   Problem Relation Age of Onset    Hypertension Mother     Hypertension Father     Heart disease Maternal Grandmother     Cancer Maternal Grandfather     Colon cancer Maternal Grandfather     Diabetes Paternal Grandmother     Cancer Paternal Grandmother     No Known Problems Sister     No Known Problems Brother     No Known Problems Maternal Aunt     No Known Problems Maternal Uncle     No Known Problems Paternal Aunt     No Known Problems Paternal Uncle     Amblyopia Neg Hx     Blindness Neg Hx     Cataracts Neg Hx     Glaucoma Neg Hx     Macular degeneration Neg Hx     Retinal detachment Neg Hx     Strabismus Neg Hx     Stroke Neg Hx     Thyroid disease Neg Hx     Breast cancer Neg Hx     Ovarian cancer Neg Hx        PE:   Vitals: /88   Wt 76.5 kg (168 lb 10.4 oz)   LMP 04/19/2019   BMI 30.36 kg/m²   APPEARANCE: Well nourished, well developed, in no acute distress.  SKIN: Normal skin turgor, no lesions.  CHEST: Lungs clear to auscultation.  HEART: Regular rate and rhythm, no murmurs, rubs or gallops.  ABDOMEN: Soft. No tenderness or masses. No hepatosplenomegaly. No hernias.  PELVIC: Normal external female genitalia without lesions. Normal hair distribution. Adequate perineal body, normal  urethral meatus. Vagina moist and well rugated without lesions or discharge. Cervix pink and without lesions. No significant cystocele or rectocele. Bimanual exam showed uterus normal size, shape, position, mobile and nontender. Adnexa without masses or tenderness. Urethra and bladder normal.  EXTREMITIES: No clubbing cyanosis or edema.      Imagin/2019 Uterus: Normal Uterus position: anteverted Endometrium: clearly visualized  8.7 cm x  4.5 cm x 5.5 cm  Right Ovary 5.8x5.7x4.2 cm with     Rt ovarian cyst(s): Cysts identified  Findings: Endometriotic cyst  D1 56.0 mm  D2 55.0 mm  D3 40.0 mm  Mean 50.3 mm  Vol 64.507 cmÂ³  Left Ovary 2.5 x 2.9x2.6     19: Uterus: Normal with 1.2 cm cervical polyp  Right Ovary Findings: Complex cyst, ?Endometriotic cyst 5.8 x 4.3x 4.7 cm   Left ovary normal     A/P: Jossy Leslie is a 33 y.o. female who presents for preop evaluation.      1) Surgery:   -Risks and benefits of surgery discussed with the patient.  All questions were answered.  Consents for surgery and blood were signed by the patient today. Understands risk of damage to ovary. She is very concerned about ovarian reserve. Understands that there may not be a polyp on HSC and the risk of uterine perforation.   -Patient has been instructed to be NPO on night prior to procedure  -Preop labs ordered: CBC, BMP, T&S and UPT     -postop visit scheduled 2 week    Patient to proceed now immediately to preop

## 2019-05-15 NOTE — PROGRESS NOTES
Diagnosis: right ovarian cyst and cervical polyp  Planned Procedure: laparoscopic cystectomy and hysteroscopy with possible polypectomy   Date of Planned Procedure:19    Cc: I am here for preop for my surgery    HPI: Jossy Leslie is a 33 y.o. female with history of right ovarian cyst that hasn't resolved over past few months who desires removal. She was noted to have polyp on last US and was having Aub but that has resolved. She reports she would like to get on OCPs because her moods have been fluctuating with her cycles.     ROS:  GENERAL: Denies weight gain or weight loss. Feeling well overall.   SKIN: Denies rash or lesions.   HEAD: Denies head injury or headache.   CHEST: Denies chest pain or shortness of breath.   CARDIOVASCULAR: Denies palpitations or left sided chest pain.   ABDOMEN: No abdominal pain, constipation, diarrhea, nausea, vomiting or rectal bleeding.   URINARY: No frequency, dysuria, hematuria, or burning on urination.  REPRODUCTIVE: See HPI.   HEMATOLOGIC: No easy bruisability or excessive bleeding.   MUSCULOSKELETAL: Denies joint pain or swelling.   NEUROLOGIC: Denies syncope or weakness.   PSYCHIATRIC: Denies depression, anxiety or mood swings.     PMHx:   Past Medical History:   Diagnosis Date    Asthma     Duodenal ulcer     Eosinophilic esophagitis     Eosinophilic esophagitis     Hiatal hernia     Thyroid disease        Surgical hx:   Past Surgical History:   Procedure Laterality Date    ESOPHAGOGASTRODUODENOSCOPY      ESOPHAGOGASTRODUODENOSCOPY (EGD) N/A 2019    Performed by [facility]en [facility]quez [facility] at [facility]    TONSILLECTOMY         GYNhx: Patient's last menstrual period was 2019.    Obhx:     Review of patient's allergies indicates:   Allergen Reactions    Pumpkin Anaphylaxis    Shellfish containing products Itching       MEDS: Reviewed, reconciled      Current Outpatient Medications:     albuterol 90 mcg/actuation inhaler, albuterol sulfate 2 puffs  inhalation   every 4 hours As needed Until directed to stop, Disp: , Rfl:     aspirin-acetaminophen-caffeine 250-250-65 mg (EXCEDRIN MIGRAINE) 250-250-65 mg per tablet, Excedrin Migraine     , Disp: , Rfl:     cetirizine (ZYRTEC) 10 MG tablet, Take 10 mg by mouth once daily., Disp: , Rfl:     eletriptan (RELPAX) 40 MG tablet, Take 1 tablet (40 mg total) by mouth as needed. may repeat in 2 hours if necessary, Disp: 12 tablet, Rfl: 5    EPINEPHrine (EPIPEN) 0.3 mg/0.3 mL AtIn, Inject 0.3 mLs (0.3 mg total) into the muscle once. for 1 dose, Disp: 2 each, Rfl: 1    ferrous gluconate (FERGON) 240 (27 FE) MG tablet, Take 2 tablets (480 mg total) by mouth once daily., Disp: 30 tablet, Rfl: 11    levothyroxine (SYNTHROID) 50 MCG tablet, Take 1 tablet (50 mcg total) by mouth once daily., Disp: 90 tablet, Rfl: 0    montelukast (SINGULAIR) 10 mg tablet, Take 1 tablet (10 mg total) by mouth every evening., Disp: 30 tablet, Rfl: 2    pantoprazole (PROTONIX) 40 MG tablet, Take 1 tablet (40 mg total) by mouth once daily., Disp: 30 tablet, Rfl: 11    ranitidine (ZANTAC) 150 MG tablet, Take 1 tablet (150 mg total) by mouth nightly., Disp: 30 tablet, Rfl: 1    sumatriptan (IMITREX) 25 MG Tab, TAKE 1 TABLET BY MOUTH AS INSTRUCTED UNTIL DIRECTED TO STOP, TAKE ONE TABLET BY MOUTH EVERY 2 HOURS FOR ONE DOSE AS NEEDED FOR HEADACHES, Disp: 10 tablet, Rfl: 1    Social hx:    Social History     Socioeconomic History    Marital status: Single     Spouse name: Not on file    Number of children: Not on file    Years of education: Not on file    Highest education level: Not on file   Occupational History    Not on file   Social Needs    Financial resource strain: Not on file    Food insecurity:     Worry: Not on file     Inability: Not on file    Transportation needs:     Medical: Not on file     Non-medical: Not on file   Tobacco Use    Smoking status: Never Smoker    Smokeless tobacco: Never Used   Substance and Sexual  Activity    Alcohol use: No     Alcohol/week: 0.0 oz    Drug use: No    Sexual activity: Not Currently     Partners: Male     Birth control/protection: Condom   Lifestyle    Physical activity:     Days per week: Not on file     Minutes per session: Not on file    Stress: Not on file   Relationships    Social connections:     Talks on phone: Not on file     Gets together: Not on file     Attends Temple service: Not on file     Active member of club or organization: Not on file     Attends meetings of clubs or organizations: Not on file     Relationship status: Not on file   Other Topics Concern    Not on file   Social History Narrative    Not on file       Family hx:    Family History   Problem Relation Age of Onset    Hypertension Mother     Hypertension Father     Heart disease Maternal Grandmother     Cancer Maternal Grandfather     Colon cancer Maternal Grandfather     Diabetes Paternal Grandmother     Cancer Paternal Grandmother     No Known Problems Sister     No Known Problems Brother     No Known Problems Maternal Aunt     No Known Problems Maternal Uncle     No Known Problems Paternal Aunt     No Known Problems Paternal Uncle     Amblyopia Neg Hx     Blindness Neg Hx     Cataracts Neg Hx     Glaucoma Neg Hx     Macular degeneration Neg Hx     Retinal detachment Neg Hx     Strabismus Neg Hx     Stroke Neg Hx     Thyroid disease Neg Hx     Breast cancer Neg Hx     Ovarian cancer Neg Hx        PE:   Vitals: /88   Wt 76.5 kg (168 lb 10.4 oz)   LMP 04/19/2019   BMI 30.36 kg/m²   APPEARANCE: Well nourished, well developed, in no acute distress.  SKIN: Normal skin turgor, no lesions.  CHEST: Lungs clear to auscultation.  HEART: Regular rate and rhythm, no murmurs, rubs or gallops.  ABDOMEN: Soft. No tenderness or masses. No hepatosplenomegaly. No hernias.  PELVIC: Normal external female genitalia without lesions. Normal hair distribution. Adequate perineal body, normal  urethral meatus. Vagina moist and well rugated without lesions or discharge. Cervix pink and without lesions. No significant cystocele or rectocele. Bimanual exam showed uterus normal size, shape, position, mobile and nontender. Adnexa without masses or tenderness. Urethra and bladder normal.  EXTREMITIES: No clubbing cyanosis or edema.      Imagin/2019 Uterus: Normal Uterus position: anteverted Endometrium: clearly visualized  8.7 cm x  4.5 cm x 5.5 cm  Right Ovary 5.8x5.7x4.2 cm with     Rt ovarian cyst(s): Cysts identified  Findings: Endometriotic cyst  D1 56.0 mm  D2 55.0 mm  D3 40.0 mm  Mean 50.3 mm  Vol 64.507 cmÂ³  Left Ovary 2.5 x 2.9x2.6     19: Uterus: Normal with 1.2 cm cervical polyp  Right Ovary Findings: Complex cyst, ?Endometriotic cyst 5.8 x 4.3x 4.7 cm   Left ovary normal     A/P: Jossy Leslie is a 33 y.o. female who presents for preop evaluation.      1) Surgery:   -Risks and benefits of surgery discussed with the patient.  All questions were answered.  Consents for surgery and blood were signed by the patient today. Understands risk of damage to ovary. She is very concerned about ovarian reserve. Understands that there may not be a polyp on HSC and the risk of uterine perforation.   -Patient has been instructed to be NPO on night prior to procedure  -Preop labs ordered: CBC, BMP, T&S and UPT     -postop visit scheduled 2 week    Patient to proceed now immediately to preop

## 2019-05-15 NOTE — ANESTHESIA PREPROCEDURE EVALUATION
05/17/2019  Jossy Leslie is a 33 y.o., female scheduled for laparoscopic excision of right ovarian cyst on 5/17/19.    Past Medical History:   Diagnosis Date    Asthma     Duodenal ulcer     Eosinophilic esophagitis     Eosinophilic esophagitis     Hiatal hernia     Thyroid disease      Past Surgical History:   Procedure Laterality Date    ESOPHAGOGASTRODUODENOSCOPY      ESOPHAGOGASTRODUODENOSCOPY (EGD) N/A 4/18/2019    Performed by Karmen [facility]quez MD at [facility]    TONSILLECTOMY         Anesthesia Evaluation    I have reviewed the Patient Summary Reports.    I have reviewed the Nursing Notes.   I have reviewed the Medications.     Review of Systems  Anesthesia Hx:  No problems with previous Anesthesia  Denies Family Hx of Anesthesia complications.    Social:  Non-Smoker, Social Alcohol Use    Hematology/Oncology:  Hematology Normal        Cardiovascular:  Cardiovascular Normal Exercise tolerance: good   Denies Angina.        Pulmonary:   Denies Shortness of breath.  Asthma:   Inhaler use is rescue inhaler PRN.    Renal/:  Renal/ Normal     Hepatic/GI:   Hiatal Hernia,    Neurological:  Neurology Normal    Endocrine:   Hypothyroidism        Physical Exam  General:  Well nourished    Airway/Jaw/Neck:  Airway Findings: Mouth Opening: Normal Tongue: Normal  General Airway Assessment: Adult  Mallampati: II  Improves to II with phonation.  TM Distance: Normal, at least 6 cm       Chest/Lungs:  Chest/Lungs Findings: Clear to auscultation, Normal Respiratory Rate     Heart/Vascular:  Heart Findings: Rate: Normal  Rhythm: Regular Rhythm  Sounds: Normal        Mental Status:  Mental Status Findings:  Cooperative, Alert and Oriented       Lab Results   Component Value Date    WBC 6.90 05/15/2019    HGB 12.4 05/15/2019    HCT 39.3 05/15/2019     05/15/2019    CHOL 171 10/19/2018    TRIG 111  10/19/2018    HDL 34 (L) 10/19/2018    ALT 40 08/10/2018    AST 26 08/10/2018     05/15/2019    K 3.9 05/15/2019     05/15/2019    CREATININE 0.8 05/15/2019    BUN 11 05/15/2019    CO2 26 05/15/2019    TSH 6.217 (H) 11/21/2018           Anesthesia Plan  Type of Anesthesia, risks & benefits discussed:  Anesthesia Type:  general  Patient's Preference:   Intra-op Monitoring Plan:   Intra-op Monitoring Plan Comments:   Post Op Pain Control Plan:   Post Op Pain Control Plan Comments:   Induction:   IV  Beta Blocker:  Patient is not currently on a Beta-Blocker (No further documentation required).       Informed Consent: Patient understands risks and agrees with Anesthesia plan.  Questions answered. Anesthesia consent signed with patient.  ASA Score: 2     Day of Surgery Review of History & Physical:        Anesthesia Plan Notes:           Ready For Surgery From Anesthesia Perspective.

## 2019-05-15 NOTE — PRE-PROCEDURE INSTRUCTIONS
Mother Gilberto Laird  [phone]    Allergies, medical, surgical, family and psychosocial histories reviewed with patient. Periop plan of care reviewed. Preop instructions given, including medications to take and to hold. Hibiclens soap and instructions on use given. Time allotted for questions to be addressed.  Patient verbalized understanding.

## 2019-05-17 ENCOUNTER — HOSPITAL ENCOUNTER (OUTPATIENT)
Facility: HOSPITAL | Age: 34
Discharge: HOME | End: 2019-05-17
Attending: OBSTETRICS & GYNECOLOGY | Admitting: OBSTETRICS & GYNECOLOGY
Payer: COMMERCIAL

## 2019-05-17 ENCOUNTER — ANESTHESIA (OUTPATIENT)
Dept: SURGERY | Facility: HOSPITAL | Age: 34
End: 2019-05-17
Payer: COMMERCIAL

## 2019-05-17 VITALS
HEART RATE: 66 BPM | SYSTOLIC BLOOD PRESSURE: 121 MMHG | RESPIRATION RATE: 15 BRPM | HEIGHT: 63 IN | BODY MASS INDEX: 28.35 KG/M2 | WEIGHT: 160 LBS | DIASTOLIC BLOOD PRESSURE: 67 MMHG | TEMPERATURE: 98 F | OXYGEN SATURATION: 100 %

## 2019-05-17 DIAGNOSIS — Z87.42 STATUS POST OVARIAN CYSTECTOMY: ICD-10-CM

## 2019-05-17 DIAGNOSIS — Z98.890 STATUS POST OVARIAN CYSTECTOMY: ICD-10-CM

## 2019-05-17 DIAGNOSIS — N83.209 OVARIAN CYST: ICD-10-CM

## 2019-05-17 DIAGNOSIS — N83.201 CYST OF RIGHT OVARY: ICD-10-CM

## 2019-05-17 PROBLEM — N84.1 CERVICAL POLYP: Status: ACTIVE | Noted: 2019-05-17

## 2019-05-17 PROCEDURE — 71000016 HC POSTOP RECOV ADDL HR: Performed by: OBSTETRICS & GYNECOLOGY

## 2019-05-17 PROCEDURE — 25000003 PHARM REV CODE 250: Performed by: NURSE ANESTHETIST, CERTIFIED REGISTERED

## 2019-05-17 PROCEDURE — 36000708 HC OR TIME LEV III 1ST 15 MIN: Performed by: OBSTETRICS & GYNECOLOGY

## 2019-05-17 PROCEDURE — 58662 PR LAP,FULGURATE/EXCISE LESIONS: ICD-10-PCS | Mod: 80,,, | Performed by: OBSTETRICS & GYNECOLOGY

## 2019-05-17 PROCEDURE — 27201423 OPTIME MED/SURG SUP & DEVICES STERILE SUPPLY: Performed by: OBSTETRICS & GYNECOLOGY

## 2019-05-17 PROCEDURE — 63600175 PHARM REV CODE 636 W HCPCS: Performed by: OBSTETRICS & GYNECOLOGY

## 2019-05-17 PROCEDURE — 25000003 PHARM REV CODE 250: Performed by: NURSE PRACTITIONER

## 2019-05-17 PROCEDURE — 37000009 HC ANESTHESIA EA ADD 15 MINS: Performed by: OBSTETRICS & GYNECOLOGY

## 2019-05-17 PROCEDURE — 58558 HYSTEROSCOPY BIOPSY: CPT | Mod: 51,,, | Performed by: OBSTETRICS & GYNECOLOGY

## 2019-05-17 PROCEDURE — 37000008 HC ANESTHESIA 1ST 15 MINUTES: Performed by: OBSTETRICS & GYNECOLOGY

## 2019-05-17 PROCEDURE — 71000015 HC POSTOP RECOV 1ST HR: Performed by: OBSTETRICS & GYNECOLOGY

## 2019-05-17 PROCEDURE — 58662 LAPAROSCOPY EXCISE LESIONS: CPT | Mod: 80,,, | Performed by: OBSTETRICS & GYNECOLOGY

## 2019-05-17 PROCEDURE — 88305 TISSUE EXAM BY PATHOLOGIST: CPT | Performed by: PATHOLOGY

## 2019-05-17 PROCEDURE — 36000709 HC OR TIME LEV III EA ADD 15 MIN: Performed by: OBSTETRICS & GYNECOLOGY

## 2019-05-17 PROCEDURE — 88305 TISSUE SPECIMEN TO PATHOLOGY - SURGERY: ICD-10-PCS | Mod: 26,,, | Performed by: PATHOLOGY

## 2019-05-17 PROCEDURE — 58558 PR HYSTEROSCOPY,W/ENDO BX: ICD-10-PCS | Mod: 51,,, | Performed by: OBSTETRICS & GYNECOLOGY

## 2019-05-17 PROCEDURE — 63600175 PHARM REV CODE 636 W HCPCS: Performed by: NURSE ANESTHETIST, CERTIFIED REGISTERED

## 2019-05-17 PROCEDURE — 58662 PR LAP,FULGURATE/EXCISE LESIONS: ICD-10-PCS | Mod: ,,, | Performed by: OBSTETRICS & GYNECOLOGY

## 2019-05-17 PROCEDURE — 58662 LAPAROSCOPY EXCISE LESIONS: CPT | Mod: ,,, | Performed by: OBSTETRICS & GYNECOLOGY

## 2019-05-17 PROCEDURE — 71000033 HC RECOVERY, INTIAL HOUR: Performed by: OBSTETRICS & GYNECOLOGY

## 2019-05-17 PROCEDURE — 71000039 HC RECOVERY, EACH ADD'L HOUR: Performed by: OBSTETRICS & GYNECOLOGY

## 2019-05-17 PROCEDURE — 88305 TISSUE EXAM BY PATHOLOGIST: CPT | Mod: 26,,, | Performed by: PATHOLOGY

## 2019-05-17 PROCEDURE — 63600175 PHARM REV CODE 636 W HCPCS: Performed by: ANESTHESIOLOGY

## 2019-05-17 RX ORDER — PHENYLEPHRINE HYDROCHLORIDE 10 MG/ML
INJECTION INTRAVENOUS
Status: DISCONTINUED | OUTPATIENT
Start: 2019-05-17 | End: 2019-05-17

## 2019-05-17 RX ORDER — LIDOCAINE HYDROCHLORIDE 10 MG/ML
1 INJECTION, SOLUTION EPIDURAL; INFILTRATION; INTRACAUDAL; PERINEURAL ONCE
Status: DISCONTINUED | OUTPATIENT
Start: 2019-05-17 | End: 2019-05-17 | Stop reason: HOSPADM

## 2019-05-17 RX ORDER — PROPOFOL 10 MG/ML
VIAL (ML) INTRAVENOUS
Status: DISCONTINUED | OUTPATIENT
Start: 2019-05-17 | End: 2019-05-17

## 2019-05-17 RX ORDER — FENTANYL CITRATE 50 UG/ML
INJECTION, SOLUTION INTRAMUSCULAR; INTRAVENOUS
Status: DISCONTINUED | OUTPATIENT
Start: 2019-05-17 | End: 2019-05-17

## 2019-05-17 RX ORDER — HYDROCODONE BITARTRATE AND ACETAMINOPHEN 10; 325 MG/1; MG/1
1 TABLET ORAL EVERY 4 HOURS PRN
Status: DISCONTINUED | OUTPATIENT
Start: 2019-05-17 | End: 2019-05-17 | Stop reason: HOSPADM

## 2019-05-17 RX ORDER — IBUPROFEN 800 MG/1
800 TABLET ORAL EVERY 6 HOURS PRN
Qty: 30 TABLET | Refills: 2 | Status: SHIPPED | OUTPATIENT
Start: 2019-05-17 | End: 2020-05-16

## 2019-05-17 RX ORDER — HYDROCODONE BITARTRATE AND ACETAMINOPHEN 5; 325 MG/1; MG/1
1 TABLET ORAL EVERY 4 HOURS PRN
Status: DISCONTINUED | OUTPATIENT
Start: 2019-05-17 | End: 2019-05-17 | Stop reason: HOSPADM

## 2019-05-17 RX ORDER — ACETAMINOPHEN 10 MG/ML
INJECTION, SOLUTION INTRAVENOUS
Status: DISCONTINUED | OUTPATIENT
Start: 2019-05-17 | End: 2019-05-17

## 2019-05-17 RX ORDER — SODIUM CHLORIDE 0.9 % (FLUSH) 0.9 %
10 SYRINGE (ML) INJECTION
Status: DISCONTINUED | OUTPATIENT
Start: 2019-05-17 | End: 2019-05-17 | Stop reason: HOSPADM

## 2019-05-17 RX ORDER — LIDOCAINE HCL/PF 100 MG/5ML
SYRINGE (ML) INTRAVENOUS
Status: DISCONTINUED | OUTPATIENT
Start: 2019-05-17 | End: 2019-05-17

## 2019-05-17 RX ORDER — GLYCOPYRROLATE 0.2 MG/ML
INJECTION INTRAMUSCULAR; INTRAVENOUS
Status: DISCONTINUED | OUTPATIENT
Start: 2019-05-17 | End: 2019-05-17

## 2019-05-17 RX ORDER — SUCCINYLCHOLINE CHLORIDE 20 MG/ML
INJECTION INTRAMUSCULAR; INTRAVENOUS
Status: DISCONTINUED | OUTPATIENT
Start: 2019-05-17 | End: 2019-05-17

## 2019-05-17 RX ORDER — HYDROMORPHONE HYDROCHLORIDE 2 MG/ML
0.4 INJECTION, SOLUTION INTRAMUSCULAR; INTRAVENOUS; SUBCUTANEOUS EVERY 5 MIN PRN
Status: DISPENSED | OUTPATIENT
Start: 2019-05-17 | End: 2019-05-17

## 2019-05-17 RX ORDER — ONDANSETRON HYDROCHLORIDE 2 MG/ML
INJECTION, SOLUTION INTRAMUSCULAR; INTRAVENOUS
Status: DISCONTINUED | OUTPATIENT
Start: 2019-05-17 | End: 2019-05-17

## 2019-05-17 RX ORDER — ONDANSETRON 8 MG/1
8 TABLET, ORALLY DISINTEGRATING ORAL EVERY 8 HOURS PRN
Status: DISCONTINUED | OUTPATIENT
Start: 2019-05-17 | End: 2019-05-17 | Stop reason: HOSPADM

## 2019-05-17 RX ORDER — DIPHENHYDRAMINE HCL 25 MG
25 CAPSULE ORAL EVERY 4 HOURS PRN
Status: DISCONTINUED | OUTPATIENT
Start: 2019-05-17 | End: 2019-05-17 | Stop reason: HOSPADM

## 2019-05-17 RX ORDER — DIPHENHYDRAMINE HYDROCHLORIDE 50 MG/ML
25 INJECTION INTRAMUSCULAR; INTRAVENOUS EVERY 4 HOURS PRN
Status: DISCONTINUED | OUTPATIENT
Start: 2019-05-17 | End: 2019-05-17 | Stop reason: HOSPADM

## 2019-05-17 RX ORDER — CEFAZOLIN SODIUM 2 G/50ML
2 SOLUTION INTRAVENOUS
Status: COMPLETED | OUTPATIENT
Start: 2019-05-17 | End: 2019-05-17

## 2019-05-17 RX ORDER — ROCURONIUM BROMIDE 10 MG/ML
INJECTION, SOLUTION INTRAVENOUS
Status: DISCONTINUED | OUTPATIENT
Start: 2019-05-17 | End: 2019-05-17

## 2019-05-17 RX ORDER — KETOROLAC TROMETHAMINE 30 MG/ML
INJECTION, SOLUTION INTRAMUSCULAR; INTRAVENOUS
Status: DISCONTINUED | OUTPATIENT
Start: 2019-05-17 | End: 2019-05-17

## 2019-05-17 RX ORDER — ONDANSETRON 2 MG/ML
4 INJECTION INTRAMUSCULAR; INTRAVENOUS DAILY PRN
Status: DISCONTINUED | OUTPATIENT
Start: 2019-05-17 | End: 2019-05-17 | Stop reason: HOSPADM

## 2019-05-17 RX ORDER — NEOSTIGMINE METHYLSULFATE 1 MG/ML
INJECTION, SOLUTION INTRAVENOUS
Status: DISCONTINUED | OUTPATIENT
Start: 2019-05-17 | End: 2019-05-17

## 2019-05-17 RX ORDER — OXYCODONE AND ACETAMINOPHEN 5; 325 MG/1; MG/1
1 TABLET ORAL EVERY 4 HOURS PRN
Qty: 10 TABLET | Refills: 0 | Status: SHIPPED | OUTPATIENT
Start: 2019-05-17 | End: 2019-05-31

## 2019-05-17 RX ORDER — MIDAZOLAM HYDROCHLORIDE 1 MG/ML
INJECTION INTRAMUSCULAR; INTRAVENOUS
Status: DISCONTINUED | OUTPATIENT
Start: 2019-05-17 | End: 2019-05-17

## 2019-05-17 RX ORDER — SODIUM CHLORIDE, SODIUM LACTATE, POTASSIUM CHLORIDE, CALCIUM CHLORIDE 600; 310; 30; 20 MG/100ML; MG/100ML; MG/100ML; MG/100ML
INJECTION, SOLUTION INTRAVENOUS CONTINUOUS
Status: DISCONTINUED | OUTPATIENT
Start: 2019-05-17 | End: 2019-05-17 | Stop reason: HOSPADM

## 2019-05-17 RX ORDER — IBUPROFEN 600 MG/1
600 TABLET ORAL EVERY 6 HOURS PRN
Status: DISCONTINUED | OUTPATIENT
Start: 2019-05-17 | End: 2019-05-17 | Stop reason: HOSPADM

## 2019-05-17 RX ADMIN — HYDROMORPHONE HYDROCHLORIDE 0.4 MG: 2 INJECTION, SOLUTION INTRAMUSCULAR; INTRAVENOUS; SUBCUTANEOUS at 10:05

## 2019-05-17 RX ADMIN — ROCURONIUM BROMIDE 5 MG: 10 INJECTION, SOLUTION INTRAVENOUS at 08:05

## 2019-05-17 RX ADMIN — SODIUM CHLORIDE, SODIUM LACTATE, POTASSIUM CHLORIDE, AND CALCIUM CHLORIDE: .6; .31; .03; .02 INJECTION, SOLUTION INTRAVENOUS at 06:05

## 2019-05-17 RX ADMIN — FENTANYL CITRATE 25 MCG: 50 INJECTION, SOLUTION INTRAMUSCULAR; INTRAVENOUS at 09:05

## 2019-05-17 RX ADMIN — FENTANYL CITRATE 50 MCG: 50 INJECTION, SOLUTION INTRAMUSCULAR; INTRAVENOUS at 10:05

## 2019-05-17 RX ADMIN — KETOROLAC TROMETHAMINE 30 MG: 30 INJECTION, SOLUTION INTRAMUSCULAR; INTRAVENOUS at 09:05

## 2019-05-17 RX ADMIN — GLYCOPYRROLATE 0.6 MG: 0.2 INJECTION, SOLUTION INTRAMUSCULAR; INTRAVENOUS at 09:05

## 2019-05-17 RX ADMIN — ONDANSETRON 4 MG: 2 INJECTION INTRAMUSCULAR; INTRAVENOUS at 10:05

## 2019-05-17 RX ADMIN — METHYLPREDNISOLONE SODIUM SUCCINATE 40 MG: 40 INJECTION, POWDER, FOR SOLUTION INTRAMUSCULAR; INTRAVENOUS at 08:05

## 2019-05-17 RX ADMIN — PHENYLEPHRINE HYDROCHLORIDE 50 MCG: 10 INJECTION INTRAVENOUS at 08:05

## 2019-05-17 RX ADMIN — MIDAZOLAM HYDROCHLORIDE 2 MG: 1 INJECTION, SOLUTION INTRAMUSCULAR; INTRAVENOUS at 08:05

## 2019-05-17 RX ADMIN — FENTANYL CITRATE 150 MCG: 50 INJECTION, SOLUTION INTRAMUSCULAR; INTRAVENOUS at 08:05

## 2019-05-17 RX ADMIN — PROPOFOL 150 MG: 10 INJECTION, EMULSION INTRAVENOUS at 08:05

## 2019-05-17 RX ADMIN — ACETAMINOPHEN 1000 MG: 10 INJECTION, SOLUTION INTRAVENOUS at 08:05

## 2019-05-17 RX ADMIN — NEOSTIGMINE METHYLSULFATE 3 MG: 1 INJECTION INTRAVENOUS at 09:05

## 2019-05-17 RX ADMIN — ROCURONIUM BROMIDE 20 MG: 10 INJECTION, SOLUTION INTRAVENOUS at 08:05

## 2019-05-17 RX ADMIN — MIDAZOLAM HYDROCHLORIDE 2 MG: 1 INJECTION, SOLUTION INTRAMUSCULAR; INTRAVENOUS at 07:05

## 2019-05-17 RX ADMIN — ONDANSETRON 8 MG: 2 INJECTION, SOLUTION INTRAMUSCULAR; INTRAVENOUS at 09:05

## 2019-05-17 RX ADMIN — CEFAZOLIN SODIUM 2 G: 2 SOLUTION INTRAVENOUS at 08:05

## 2019-05-17 RX ADMIN — SUCCINYLCHOLINE CHLORIDE 100 MG: 20 INJECTION, SOLUTION INTRAMUSCULAR; INTRAVENOUS at 08:05

## 2019-05-17 RX ADMIN — LIDOCAINE HYDROCHLORIDE 70 MG: 20 INJECTION, SOLUTION INTRAVENOUS at 08:05

## 2019-05-17 RX ADMIN — PHENYLEPHRINE HYDROCHLORIDE 50 MCG: 10 INJECTION INTRAVENOUS at 09:05

## 2019-05-17 NOTE — OP NOTE
OPERATIVE REPORT     05/17/2019  Procedure:  Laparoscopic right ovarian cystectomy, lysis of adhesions, right ovarian cystotomy, hysteroscopy with polypectomy    Pre-operative Diagnosis: right ovarian cyst and cervical polyp   Post-operative Diagnosis: same   Surgeon: Eli Blevins MD  Assistant: Min Mustafa MD and Soraya Hernandez  Anesthesia: General  Findings: normal appearing liver, pelvic side walls, anterior cul de sac,  otherwise normal appearing uterus, fallopian tubes, bilateral ovaries were stuck to posterior cul de sac and large right ovarian cyst was seen and small left ovarian cyst was seen; on hysteroscopy a small endocervical polyp as seen with normal uterine cavity and bilateral osta.    EBL: 20 cc  Specimen: none   Complications: none          TECHNIQUE:  The patient was taken to the Operating Room with IV fluids running and placed under general anesthesia which was found to be adequate.  Her legs were then placed in Yohannes stirrups.  She was then prepared and draped in normal sterile fashion.  Single tooth tenaculum  was placed on the anterior cervix then a mckee canula was placed in the cervix  Attention was turned to   the patient's abdomen.  An infraumbilical incision was made with the scalpel.  A verse needle was then inserted and intrapertioneal placement was confirmed with a droplet test. The CO2 insufflation was turned on and ava to an appropriate 15mmg Hg. The verse needle was removed and a  5 mm trocar was placed and the above findings were seen. 2 more ports a 5 mm on the right and a 10mm on the left were placed 2 cm medical and superior from the ASIS with care to avoid the epigastrics. The anterior abdomen along with liver was inspected and no abnormalities noted. The above findings were seen. An incision was made along the right cyst and the cyst wall was dissected from the ovarian tissue with traction and counter traction. The ligasure was used to come across the cyst base to make  the ovarian base hemostatic. In the process the endometrioma was ruptured and the fluid was suctioned out of the pelvis. The ovary was able to be freed from the posterior cul de sac. The left ovary was adhered to the posterior cul de sac and we freed with blunt dissection. A small endometrioma was ruptured and the small cyst wall was removed. The tubes looked normal. The area was inspected and no bleeding was noted. A omental adhesion was taken down from the left side wall with the ligasure. Dave was applied to the posterior cul de sac.     All instruments were then removed from the abdomen. The trocar sites were noted to be hemostatic. The fascia from the 10mm trocar site was closed with 0 vicryl. The skin incsion was closed with 3-0 monocryl in a subcuticular fashion.     Attention was then turned to the vagina. An weighted speculum was placed into the vagina and a right angle retractor was used to visualize the cervix.     The cervix was dilated using Hegar dilators to 8. The hysteroscope was advanced through the cervix into the uterus where the above findings were seen. A polyp forceps was used to grasp the polyp and remove it and the area was reinspected and noted to be hemostatic.      The single tooth tenaculum was removed and the sites were made hemostatic with silver nitrate. No vaginal bleeding was noted.      The patient tolerated the procedure well.  Sponge, lap and needle counts were   correct x3.    She was extubated successfully in the Operating Room and then taken to the PACU in stable condition.

## 2019-05-17 NOTE — PLAN OF CARE
VSS  NAD  Discharge instructions given with claimed understanding by pt and family. Patient has ride home with family or friend. Claims pain level is ___2___ at this time.  Has voided without difficulty if required by surgical type.

## 2019-05-17 NOTE — TRANSFER OF CARE
"Anesthesia Transfer of Care Note    Patient: Jossy Leslie    Procedure(s) Performed: Procedure(s) (LRB):  EXCISION, CYST, OVARY, LAPAROSCOPIC (Right)  HYSTEROSCOPY (N/A)    Patient location: PACU    Anesthesia Type: general    Transport from OR: Transported from OR on 6-10 L/min O2 by face mask with adequate spontaneous ventilation    Post pain: adequate analgesia    Post assessment: no apparent anesthetic complications and tolerated procedure well    Post vital signs: stable    Level of consciousness: awake, alert and oriented    Nausea/Vomiting: no nausea/vomiting    Complications: none    Transfer of care protocol was followed      Last vitals:   Visit Vitals  /68   Pulse 82   Temp 36.2 °C (97.2 °F) (Skin)   Resp 16   Ht 5' 2.5" (1.588 m)   Wt 72.6 kg (160 lb)   LMP 04/19/2019   SpO2 100%   Breastfeeding? No   BMI 28.80 kg/m²     "

## 2019-05-17 NOTE — INTERVAL H&P NOTE
The patient has been examined and the H&P has been reviewed:    I concur with the findings and no changes have occurred since H&P was written. Plan is for right ovarian cystectomy and hysteroscopy polypectomy. Discussed plan with mom and patient including how to care for incisions afterwards and what to expect postoperatively.     Anesthesia/Surgery risks, benefits and alternative options discussed and understood by patient/family.          Active Hospital Problems    Diagnosis  POA    Ovarian cyst [N83.209]  Yes      Resolved Hospital Problems   No resolved problems to display.

## 2019-05-17 NOTE — BRIEF OP NOTE
BRIEF OPERATIVE REPORT     05/17/2019  Procedure: laproscopic right ovarian cystectomy, lysis of adhesions, left ovarian cystotomy, hysteroscopy polypectomy   Pre-operative Diagnosis: right ovarian cyst and cervical polyp   Post-operative Diagnosis: same   Surgeon: Eli Blevins MD  Assistant: Mj Mustafa MD and  Soraya Hernandez  Anesthesia: General   Findings: large right ovarian endometrioma, small left endometrioma, bilaeral ovaries adhedered to posterior cul de sac, small bowel also adhered to the posterior cul de sac, bilateral tubes and ovaries appeared normal, small cervical polyp with normal appearing endometrium and bilateral ostia opening.   EBL: 20 cc  Specimen: right ovarian cyst and cervical polyp   Complications: none

## 2019-05-17 NOTE — ANESTHESIA POSTPROCEDURE EVALUATION
Anesthesia Post Evaluation    Patient: Jossy Leslie    Procedure(s) Performed: Procedure(s) (LRB):  EXCISION, CYST, OVARY, LAPAROSCOPIC (Right)  HYSTEROSCOPY (N/A)    Final Anesthesia Type: general  Patient location during evaluation: PACU  Patient participation: Yes- Able to Participate  Level of consciousness: awake and alert  Post-procedure vital signs: reviewed and stable  Pain management: adequate  Airway patency: patent  PONV status at discharge: nausea (controlled)  Anesthetic complications: no      Cardiovascular status: blood pressure returned to baseline and hemodynamically stable  Respiratory status: unassisted, spontaneous ventilation and room air  Hydration status: euvolemic  Follow-up not needed.          Vitals Value Taken Time   /67 5/17/2019 11:25 AM   Temp 36.8 °C (98.2 °F) 5/17/2019 11:25 AM   Pulse 66 5/17/2019 11:25 AM   Resp 15 5/17/2019 11:25 AM   SpO2 100 % 5/17/2019 11:25 AM         Event Time     Out of Recovery 11:18:00          Pain/Sudarshan Score: Pain Rating Prior to Med Admin: 6 (5/17/2019 10:44 AM)  Sudarshan Score: 9 (5/17/2019 11:13 AM)

## 2019-05-17 NOTE — DISCHARGE SUMMARY
Admitting Diagnosis: right ovarian cyst and cervical polyp   Discharge Diagnosis: s/p  Laparoscopic right ovarian cystectomy, lysis of adhesions, right ovarian cystotomy, hysteroscopy with polypectomy  Procedure: Laparoscopic right ovarian cystectomy, lysis of adhesions, right ovarian cystotomy, hysteroscopy with polypectomy    Service: OB-GYN, Eli Blevins   Consults: none   Disposition: home  Condition as Discharge: Stable   Hospital Course: Patient was transferred to outpatient surgery after her procedure.  Her recovery was uncomplicated and by post-op day 0 she was tolerating PO without N/V, ambulating without difficulty, her pain was well controlled with PO pain medications and she had passed flatus. She was stable and ready for discharge.   Medications: OTC ibuprofen, Percocet  Follow up: in 2 week in clinic   Instructions: Keep incision clean and dry, continue to use Is, continue ambulation, take medications as needed and take stool softener as needed, pelvic rest until instructed otherwise by physician  Call or return to ED for fever >100.4, foul smelling vaginal discharge, heavy vaginal bleeding, abdominal pain not responsive to medications or other concerns.

## 2019-05-17 NOTE — PLAN OF CARE
Safety precautions maintained. Call bell in reach. Non skid socks on. Bed locked and in lowest position. Instructed pt to call for assistance. Pt verbalizes understanding.

## 2019-05-17 NOTE — DISCHARGE INSTRUCTIONS
ANESTHESIA  -For the first 24 hours after surgery:  Do not drive, use heavy equipment, make important decisions, or drink alcohol  -It is normal to feel sleepy for several hours.  Rest until you are more awake.  -Have someone stay with you, if needed.  They can watch for problems and help keep you safe.  -Some possible post anesthesia side effects include: nausea and vomiting, sore throat and hoarseness, sleepiness, and dizziness.    PAIN  -If you have pain after surgery, pain medicine will help you feel better.  Take it as directed, before pain becomes severe.  Most pain relievers taken by mouth need at least 20-30 minutes to start working.  -Do not drive or drink alcohol while taking pain medicine.  -Pain medication can upset your stomach.  Taking them with a little food may help.  -Other ways to help control pain: elevation, ice, and relaxation  -Call your surgeon if still having unmanageable pain an hour after taking pain medicine.  -Pain medicine can cause constipation.  Taking an over-the counter stool softener while on prescription pain medicine and drinking plenty of fluids can prevent this side effect.  -Call your surgeon if you have severe side effects like: breathing problems, trouble waking up, dizziness, confusion, or severe constipation.    NAUSEA  -Some people have nausea after surgery.  This is often because of anesthesia, pain, pain medicine, or the stress of surgery.  -Do not push yourself to eat.  Start off with clear liquids and soup.  Slowly move to solid foods.  Don't eat fatty, rich, spicy foods at first.  Eat smaller amounts.  -If you develop persistent nausea and vomiting please notify your surgeon immediately.    BLEEDING  -Different types of surgery require different types of care and dressing changes.  It is important to follow all instructions and advice from your surgeon.  Change dressing as directed.  Call your surgeon for any concerns regarding postop bleeding.    SIGNS OF  INFECTION  -Signs of infection include: fever, swelling, drainage, and redness  -Notify your surgeon if you have a fever of 100.4 F (38.0 C) or higher.  -Notify your surgeon if you notice redness, swelling, increased pain, pus, or a foul smell at the incision site.    You may remove dressing in 48 hours and shower after. No tub baths x 2 weeks. No heavy lifting (greater than 10 pounds ) until cleared by MD    ***  BATHING:                   You may shower after your dressing is removed, but no tub baths, hot tubs, saunas or swimming until you see the doctor.    DRESSING:  ? Remove your bandage ________________. If there are skin tapes over the incision, leave them in place. They will start to come off in 5-7 days.  ACTIVITY LEVEL: If you have received sedation or an anesthetic, you may feel sleepy for   several hours. Rest until you are more awake. Gradually resume your normal activities  ? No heavy lifting or straining, nothing over 10 lbs., like a gallon of milk.  ? Pelvic rest- no sex, tampons or douching until follow up or instructed by doctor.  DIET:  You may resume your home diet. If nausea is present, increase your diet gradually with fluids and bland foods.    Medications:  Pain medication should be taken only if needed and as directed. If antibiotics are prescribed, the medication should be taken until completed. You will be given an updated list of you medications.  ? No driving, alcoholic beverages or signing legal documents for next 24 hours or while taking pain medication    CALL THE DOCTOR:    For any obvious bleeding (some dried blood over the incision is normal).      Redness, swelling, foul smell around incision or fever over 101.   Shortness of breath, Coughing up Bloody Sputum or Pains or Swelling in your calves.   Persistent pain or nausea not relieved by medication.   If vaginal bleeding is in excess of a normal period.   Problems urinating    If any unusual problems or difficulties occur  contact your doctor. If you cannot contact your doctor but feel your signs and symptoms warrant a physicians attention return to the emergency room.

## 2019-05-31 ENCOUNTER — OFFICE VISIT (OUTPATIENT)
Dept: OBSTETRICS AND GYNECOLOGY | Facility: CLINIC | Age: 34
End: 2019-05-31
Payer: COMMERCIAL

## 2019-05-31 VITALS
BODY MASS INDEX: 29.34 KG/M2 | SYSTOLIC BLOOD PRESSURE: 108 MMHG | HEIGHT: 63 IN | WEIGHT: 165.56 LBS | DIASTOLIC BLOOD PRESSURE: 62 MMHG

## 2019-05-31 DIAGNOSIS — Z87.42 STATUS POST OVARIAN CYSTECTOMY: ICD-10-CM

## 2019-05-31 DIAGNOSIS — Z98.890 STATUS POST OVARIAN CYSTECTOMY: ICD-10-CM

## 2019-05-31 DIAGNOSIS — Z98.890 STATUS POST HYSTEROSCOPY: ICD-10-CM

## 2019-05-31 DIAGNOSIS — N80.9 ENDOMETRIOSIS: Primary | ICD-10-CM

## 2019-05-31 DIAGNOSIS — G43.109 MIGRAINE WITH AURA AND WITHOUT STATUS MIGRAINOSUS, NOT INTRACTABLE: ICD-10-CM

## 2019-05-31 PROCEDURE — 99999 PR PBB SHADOW E&M-EST. PATIENT-LVL III: ICD-10-PCS | Mod: PBBFAC,,, | Performed by: OBSTETRICS & GYNECOLOGY

## 2019-05-31 PROCEDURE — 99024 PR POST-OP FOLLOW-UP VISIT: ICD-10-PCS | Mod: S$GLB,,, | Performed by: OBSTETRICS & GYNECOLOGY

## 2019-05-31 PROCEDURE — 99999 PR PBB SHADOW E&M-EST. PATIENT-LVL III: CPT | Mod: PBBFAC,,, | Performed by: OBSTETRICS & GYNECOLOGY

## 2019-05-31 PROCEDURE — 99024 POSTOP FOLLOW-UP VISIT: CPT | Mod: S$GLB,,, | Performed by: OBSTETRICS & GYNECOLOGY

## 2019-05-31 RX ORDER — NORETHINDRONE ACETATE AND ETHINYL ESTRADIOL .03; 1.5 MG/1; MG/1
TABLET ORAL
Qty: 84 EACH | Refills: 3 | Status: SHIPPED | OUTPATIENT
Start: 2019-05-31 | End: 2020-07-21 | Stop reason: SDUPTHER

## 2019-05-31 NOTE — PROGRESS NOTES
CC: s/p lsc right ovarian cystectomy and lysis of adhesions and hsc polypecotomy    HPI:   Jossy Leslie is a 33 y.o. here for f/u lsc right ovarian cystectomy and DORA  HSC and polypectomy on 5/17/19. She reports normal bowel movements and urination. Pain is controlled with OTC medications.  Desires ocp for contraception.     Vitals:    05/31/19 1456   BP: 108/62       PHYSICAL EXAM:   APPEARANCE: Well nourished, well developed, in no acute distress.  ABDOMEN: Soft. No tenderness or masses. No hernias. well healing laparoscopic incisions      Pathology: 1. Right ovarian cyst (cystectomy):  Serous cystadenoma  2. Endocervix, polyp (biopsy):  Benign endocervical polyp        PLAN:   1. Contraception discussed with patient. Patient desires ocps. She would like to try them to help with mood fluctuations. We discussed the pathology and pathophysiology about endometriosis and reviewed options including ocps, depo-lupron, orlissia ect. And the ultimate treatment of BSO. She would not like to do depo-lupron and would like to do ocps for now.   2. Return to clinic in 4-6 weeks for annual exam.

## 2019-05-31 NOTE — TELEPHONE ENCOUNTER
----- Message from Cory Rivera sent at 5/31/2019  4:36 PM CDT -----  This [facility] patient mrn [phone] Jossy Leslie is requesting a new Rx for imitrex 25mg insurance only pays for 18 tabs in 30 days

## 2019-05-31 NOTE — LETTER
May 31, 2019    Jossy Leslie  [address] [place] [address]         [facility]  [address], [address]  [address] [facility]  [place] [address]  Phone: [phone] May 31, 2019     Patient: Jossy Leslie   YOB: 1985   Date of Visit: 5/31/2019       To Whom It May Concern:    It is my medical opinion that Jossy Leslie may return to light duty immediately with the following restrictions: no heavy lifting great than 15 pounds, take frequent breaks, sit when possible and allow to drink plenty of water for 1 more week or until 6/10/19 .    If you have any questions or concerns, please don't hesitate to call.    Sincerely,        Eli Blevins MD

## 2019-06-02 RX ORDER — SUMATRIPTAN SUCCINATE 25 MG/1
25 TABLET ORAL
Qty: 10 TABLET | Refills: 1 | Status: SHIPPED | OUTPATIENT
Start: 2019-06-02 | End: 2019-09-09

## 2019-06-03 ENCOUNTER — PATIENT MESSAGE (OUTPATIENT)
Dept: HEMATOLOGY/ONCOLOGY | Facility: CLINIC | Age: 34
End: 2019-06-03

## 2019-06-03 NOTE — TELEPHONE ENCOUNTER
Called pt to inform her that rx was sent to pharmacy. V/m box is full and cannot leave a message. Sent message via [employer] to pt.

## 2019-06-28 ENCOUNTER — PATIENT MESSAGE (OUTPATIENT)
Dept: OBSTETRICS AND GYNECOLOGY | Facility: CLINIC | Age: 34
End: 2019-06-28

## 2019-06-28 ENCOUNTER — TELEPHONE (OUTPATIENT)
Dept: OBSTETRICS AND GYNECOLOGY | Facility: CLINIC | Age: 34
End: 2019-06-28

## 2019-06-28 NOTE — TELEPHONE ENCOUNTER
Jossy Leslie would like to cancel the following appointments:      Eli Blevins MD in [facility] ([id number]), 6/28/2019  3:00 PM      Comments:   Out of town. Need to reschedule

## 2019-07-11 ENCOUNTER — OFFICE VISIT (OUTPATIENT)
Dept: OBSTETRICS AND GYNECOLOGY | Facility: CLINIC | Age: 34
End: 2019-07-11
Payer: COMMERCIAL

## 2019-07-11 VITALS
HEIGHT: 62 IN | BODY MASS INDEX: 31.6 KG/M2 | WEIGHT: 171.75 LBS | SYSTOLIC BLOOD PRESSURE: 112 MMHG | DIASTOLIC BLOOD PRESSURE: 74 MMHG

## 2019-07-11 DIAGNOSIS — N93.9 ABNORMAL UTERINE BLEEDING: ICD-10-CM

## 2019-07-11 DIAGNOSIS — Z98.890 STATUS POST OVARIAN CYSTECTOMY: Primary | ICD-10-CM

## 2019-07-11 DIAGNOSIS — Z98.890 STATUS POST HYSTEROSCOPY: ICD-10-CM

## 2019-07-11 DIAGNOSIS — Z87.42 STATUS POST OVARIAN CYSTECTOMY: Primary | ICD-10-CM

## 2019-07-11 PROCEDURE — 99999 PR PBB SHADOW E&M-EST. PATIENT-LVL III: CPT | Mod: PBBFAC,,, | Performed by: OBSTETRICS & GYNECOLOGY

## 2019-07-11 PROCEDURE — 99999 PR PBB SHADOW E&M-EST. PATIENT-LVL III: ICD-10-PCS | Mod: PBBFAC,,, | Performed by: OBSTETRICS & GYNECOLOGY

## 2019-07-11 PROCEDURE — 99024 POSTOP FOLLOW-UP VISIT: CPT | Mod: S$GLB,,, | Performed by: OBSTETRICS & GYNECOLOGY

## 2019-07-11 PROCEDURE — 99024 PR POST-OP FOLLOW-UP VISIT: ICD-10-PCS | Mod: S$GLB,,, | Performed by: OBSTETRICS & GYNECOLOGY

## 2019-07-11 NOTE — PROGRESS NOTES
CC: s/p lsc right ovarian cystectomy and lysis of adhesions and hsc polypecotomy    HPI:   Jossy Leslie is a 33 y.o. here for f/u lsc right ovarian cystectomy and DORA  HSC and polypectomy on 5/17/19. She reports normal bowel movements and urination. Pain is controlled with OTC medications.  Desires ocp for contraception. But she reports she didn't start them because she wanted to have a few more normal periods. She reports she had her last period a few weeks ago and had severe pain on the right side.     Vitals:    07/11/19 1347   BP: 112/74       PHYSICAL EXAM:   APPEARANCE: Well nourished, well developed, in no acute distress.  ABDOMEN: Soft. No tenderness or masses. No hernias. well healing laparoscopic incisions   Normal cervix  Normal sized uterus  No adnexal masses appreciated.       Pathology: 1. Right ovarian cyst (cystectomy):  Serous cystadenoma  2. Endocervix, polyp (biopsy):  Benign endocervical polyp        PLAN:   1. Contraception discussed with patient. Patient desires ocps. She would like to try them to help with mood fluctuations. We discussed the pathology and pathophysiology about endometriosis and reviewed options including ocps, depo-lupron, orlissia ect. And the ultimate treatment of BSO. She would not like to do depo-lupron and would like to do ocps for now. Discussed with her that taking the medications should help the pain with her cycles improve.   2. Return to clinic in 1 year for annual exam.

## 2019-09-09 DIAGNOSIS — G43.109 MIGRAINE WITH AURA AND WITHOUT STATUS MIGRAINOSUS, NOT INTRACTABLE: ICD-10-CM

## 2019-09-09 DIAGNOSIS — K20.0 EOSINOPHILIC ESOPHAGITIS: ICD-10-CM

## 2019-09-09 RX ORDER — LEVOTHYROXINE SODIUM 50 UG/1
50 TABLET ORAL DAILY
Qty: 90 TABLET | Refills: 0 | Status: SHIPPED | OUTPATIENT
Start: 2019-09-09 | End: 2019-12-09 | Stop reason: SDUPTHER

## 2019-09-09 RX ORDER — SUMATRIPTAN SUCCINATE 25 MG/1
25 TABLET ORAL
Qty: 10 TABLET | Refills: 1 | Status: SHIPPED | OUTPATIENT
Start: 2019-09-09 | End: 2019-12-09

## 2019-09-09 RX ORDER — PANTOPRAZOLE SODIUM 40 MG/1
40 TABLET, DELAYED RELEASE ORAL DAILY
Qty: 30 TABLET | Refills: 1 | Status: SHIPPED | OUTPATIENT
Start: 2019-09-09 | End: 2021-01-04 | Stop reason: SDUPTHER

## 2019-09-18 NOTE — PROGRESS NOTES
Subjective:      Patient ID: Jossy Leslie is a 33 y.o. female.    Chief Complaint:  Thyroid Problem (New patient )    History of Present Illness  Jossy Leslie presents today for Evaluation & management of hypothyroidism. This is her first visit with me.     With regards to her hypothyroidism:    Current medication:  Brand name synthroid 50 mcg daily- 1 tablet daily     Takes thyroid medication properly without food first thing in the morning     current symptoms:   - weight gain- fluctuates 5 lbs   + Fatigue   - Constipation   + Hair loss  - Brittle nails  - Mental fog   No cp  + occ palpations   + sob  Denies heat/cold intolerance     Is tolerating selenium     Denies Biotin usage       Ref. Range 10/19/2018 10:00 11/21/2018 15:59   TSH Latest Ref Range: 0.400 - 4.000 uIU/mL 4.840 (H) 6.217 (H)   T3, Free Latest Ref Range: 2.3 - 4.2 pg/mL  2.6   Free T4 Latest Ref Range: 0.71 - 1.51 ng/dL 0.84 0.94   Thyroperoxidase Antibodies Latest Ref Range: <6.0 IU/mL 1229.2 (H)      Thyroid ultrasound 11/2018:  FINDINGS:  The thyroid is normal in size.  Right lobe of the thyroid measures 4.2 x 1.6 x 1.5 cm.  Left lobe of the thyroid measures 4.7 x 1.3 x 1.3 cm.  The thyroid parenchyma is heterogeneous with mild increased vascularity, which may reflect thyroiditis.  A 0.5 x 0.4 x 0.4 cm hyperechoic nodule is noted at the superior pole of the right thyroid lobe.    Cervical lymph nodes demonstrate normal morphology and size.      Impression     Heterogeneity throughout the thyroid parenchyma with mild increased vascularity, suggesting thyroiditis.  Subcentimeter right thyroid nodule, which does not meet criteria for FNA.     She recently started a new birth control due to ovarian cysts.    Review of Systems   Constitutional: Positive for fatigue.   Eyes: Negative for visual disturbance.   Respiratory: Positive for shortness of breath (occ).    Cardiovascular: Positive for palpitations (occ). Negative for chest pain.  "  Gastrointestinal: Negative for abdominal pain.   Musculoskeletal: Negative for arthralgias.   Skin: Negative for wound.   Neurological: Negative for headaches.   Hematological: Does not bruise/bleed easily.   Psychiatric/Behavioral: Negative for sleep disturbance.     Objective:   Physical Exam   Constitutional: She is oriented to person, place, and time. She appears well-developed. No distress.   HENT:   Right Ear: External ear normal.   Left Ear: External ear normal.   Hearing Normal     Neck: No tracheal deviation present. No thyromegaly present.   No nodules.   Cardiovascular: Normal rate.   No murmur heard.  No edema present   Pulmonary/Chest: Effort normal and breath sounds normal.   Abdominal: Soft. She exhibits no mass. No hernia.   Musculoskeletal: Normal range of motion. She exhibits no edema.   Neurological: She is alert and oriented to person, place, and time. No sensory deficit. Coordination normal.   Skin: No rash noted. No pallor.   Psychiatric: She has a normal mood and affect. Judgment normal.   Vitals reviewed.    Visit Vitals  /84   Pulse 88   Ht 5' 2" (1.575 m)   Wt 76.3 kg (168 lb 3.4 oz)   LMP 08/31/2019   BMI 30.77 kg/m²      Lab Review:   No results found for: HGBA1C  Lab Results   Component Value Date    CHOL 171 10/19/2018    HDL 34 (L) 10/19/2018    LDLCALC 114.8 10/19/2018    TRIG 111 10/19/2018    CHOLHDL 19.9 (L) 10/19/2018     Lab Results   Component Value Date     05/15/2019    K 3.9 05/15/2019     05/15/2019    CO2 26 05/15/2019    GLU 78 05/15/2019    BUN 11 05/15/2019    CREATININE 0.8 05/15/2019    CALCIUM 10.0 05/15/2019    PROT 7.8 08/10/2018    ALBUMIN 4.2 08/10/2018    BILITOT 0.4 08/10/2018    ALKPHOS 45 (L) 08/10/2018    AST 26 08/10/2018    ALT 40 08/10/2018    ANIONGAP 8 05/15/2019    ESTGFRAFRICA >60 05/15/2019    EGFRNONAA >60 05/15/2019    TSH 6.217 (H) 11/21/2018     Vit D, 25-Hydroxy   Date Value Ref Range Status   08/10/2018 38 30 - 96 ng/mL Final "     Comment:     Vitamin D deficiency.........<10 ng/mL                              Vitamin D insufficiency......10-29 ng/mL       Vitamin D sufficiency........> or equal to 30 ng/mL  Vitamin D toxicity............>100 ng/mL       Assessment and Plan     1. Hypothyroidism due to Hashimoto's thyroiditis  TSH    T4, free    Comprehensive metabolic panel    Vitamin D    Hemoglobin A1c   2. Iron deficiency anemia due to chronic blood loss  CBC auto differential   3. BMI 30.0-30.9,adult       Hypothyroidism due to Hashimoto's thyroiditis  -- Clinically hypothyroid  -- Goal is a normal TSH  -- Check TFTs today and adjust dosage accordingly   -- Avoid exogenous hyperthyroidism as this can accelerate bone loss and increase risk of CV complications.  -- Advised to take LT4 on an empty stomach with water and to wait 30-45 minutes before eating or taking other medications   -- Reviewed usual  times of thyroid hormone  changes- call if start/ stop ocps, weight changes, attempting conception and during pregnancy   -- Reviewed that symptoms of hypothyroidism may not correlate with tsh, and a normal TSH is the goal of therapy.....  symptoms are not a justification for over treatment     Ok to take selenium 100 mcg bid to try and decrease thyroid antibodies     Iron deficiency anemia due to chronic blood loss  Continue iron supplements. Check CBC today    BMI 30.0-30.9,adult  -- alerted as to the increased risk of T2DM   -- encouraged dietary and lifestyle modifications   -- emphasized weight loss goals   -- Referral to bariatric medicine offered, she will alert me if she would  Like a referral  -- recommend periodic A1c       Follow up in about 1 year (around 9/20/2020).  Labs today

## 2019-09-20 ENCOUNTER — OFFICE VISIT (OUTPATIENT)
Dept: ENDOCRINOLOGY | Facility: CLINIC | Age: 34
End: 2019-09-20
Payer: COMMERCIAL

## 2019-09-20 VITALS
WEIGHT: 168.19 LBS | HEART RATE: 88 BPM | DIASTOLIC BLOOD PRESSURE: 84 MMHG | HEIGHT: 62 IN | SYSTOLIC BLOOD PRESSURE: 118 MMHG | BODY MASS INDEX: 30.95 KG/M2

## 2019-09-20 DIAGNOSIS — E03.8 HYPOTHYROIDISM DUE TO HASHIMOTO'S THYROIDITIS: Primary | ICD-10-CM

## 2019-09-20 DIAGNOSIS — D50.0 IRON DEFICIENCY ANEMIA DUE TO CHRONIC BLOOD LOSS: ICD-10-CM

## 2019-09-20 DIAGNOSIS — E06.3 HYPOTHYROIDISM DUE TO HASHIMOTO'S THYROIDITIS: Primary | ICD-10-CM

## 2019-09-20 PROCEDURE — 99999 PR PBB SHADOW E&M-EST. PATIENT-LVL III: ICD-10-PCS | Mod: PBBFAC,,, | Performed by: NURSE PRACTITIONER

## 2019-09-20 PROCEDURE — 3008F BODY MASS INDEX DOCD: CPT | Mod: CPTII,S$GLB,, | Performed by: NURSE PRACTITIONER

## 2019-09-20 PROCEDURE — 99999 PR PBB SHADOW E&M-EST. PATIENT-LVL III: CPT | Mod: PBBFAC,,, | Performed by: NURSE PRACTITIONER

## 2019-09-20 PROCEDURE — 99203 OFFICE O/P NEW LOW 30 MIN: CPT | Mod: S$GLB,,, | Performed by: NURSE PRACTITIONER

## 2019-09-20 PROCEDURE — 3008F PR BODY MASS INDEX (BMI) DOCUMENTED: ICD-10-PCS | Mod: CPTII,S$GLB,, | Performed by: NURSE PRACTITIONER

## 2019-09-20 PROCEDURE — 99203 PR OFFICE/OUTPT VISIT, NEW, LEVL III, 30-44 MIN: ICD-10-PCS | Mod: S$GLB,,, | Performed by: NURSE PRACTITIONER

## 2019-09-20 NOTE — ASSESSMENT & PLAN NOTE
-- alerted as to the increased risk of T2DM   -- encouraged dietary and lifestyle modifications   -- emphasized weight loss goals   -- Referral to bariatric medicine offered, she will alert me if she would  Like a referral  -- recommend periodic A1c

## 2019-09-20 NOTE — PATIENT INSTRUCTIONS
Selenium supplementation is found over the counter and may decrease inflammatory activity in patients with autoimmune thyroiditis,  and may also reduce the antibodies in patients who are positive for thyroid peroxidase (TPO) antibodies.  Selenium may improve symptoms in patients with mild Graves ophthalmopathy as well  Dose is 100 mcg twice daily.

## 2019-09-20 NOTE — ASSESSMENT & PLAN NOTE
-- Clinically hypothyroid  -- Goal is a normal TSH  -- Check TFTs today and adjust dosage accordingly   -- Avoid exogenous hyperthyroidism as this can accelerate bone loss and increase risk of CV complications.  -- Advised to take LT4 on an empty stomach with water and to wait 30-45 minutes before eating or taking other medications   -- Reviewed usual  times of thyroid hormone  changes- call if start/ stop ocps, weight changes, attempting conception and during pregnancy   -- Reviewed that symptoms of hypothyroidism may not correlate with tsh, and a normal TSH is the goal of therapy.....  symptoms are not a justification for over treatment     Ok to take selenium 100 mcg bid to try and decrease thyroid antibodies

## 2019-09-23 ENCOUNTER — PATIENT MESSAGE (OUTPATIENT)
Dept: ENDOCRINOLOGY | Facility: CLINIC | Age: 34
End: 2019-09-23

## 2019-10-10 ENCOUNTER — PATIENT MESSAGE (OUTPATIENT)
Dept: ALLERGY | Facility: CLINIC | Age: 34
End: 2019-10-10

## 2019-10-21 ENCOUNTER — PATIENT MESSAGE (OUTPATIENT)
Dept: OBSTETRICS AND GYNECOLOGY | Facility: CLINIC | Age: 34
End: 2019-10-21

## 2019-10-21 DIAGNOSIS — G89.29 CHRONIC FEMALE PELVIC PAIN: ICD-10-CM

## 2019-10-21 DIAGNOSIS — R10.2 CHRONIC FEMALE PELVIC PAIN: ICD-10-CM

## 2019-10-21 DIAGNOSIS — N80.9 ENDOMETRIOSIS: Primary | ICD-10-CM

## 2019-10-22 NOTE — TELEPHONE ENCOUNTER
Pt wants to know if she can be schedule for an u/s because she believe her right ovary has a cyst. Pt state she is having abdominal pain plus spotting.

## 2019-10-29 ENCOUNTER — HOSPITAL ENCOUNTER (OUTPATIENT)
Dept: RADIOLOGY | Facility: HOSPITAL | Age: 34
Discharge: HOME | End: 2019-10-29
Attending: OBSTETRICS & GYNECOLOGY
Payer: COMMERCIAL

## 2019-10-29 DIAGNOSIS — G89.29 CHRONIC FEMALE PELVIC PAIN: ICD-10-CM

## 2019-10-29 DIAGNOSIS — R10.2 CHRONIC FEMALE PELVIC PAIN: ICD-10-CM

## 2019-10-29 DIAGNOSIS — N80.9 ENDOMETRIOSIS: ICD-10-CM

## 2019-10-29 PROCEDURE — 76856 US PELVIS COMP WITH TRANSVAG NON-OB (XPD): ICD-10-PCS | Mod: 26,,, | Performed by: RADIOLOGY

## 2019-10-29 PROCEDURE — 76856 US EXAM PELVIC COMPLETE: CPT | Mod: 26,,, | Performed by: RADIOLOGY

## 2019-10-29 PROCEDURE — 76830 US PELVIS COMP WITH TRANSVAG NON-OB (XPD): ICD-10-PCS | Mod: 26,,, | Performed by: RADIOLOGY

## 2019-10-29 PROCEDURE — 76830 TRANSVAGINAL US NON-OB: CPT | Mod: 26,,, | Performed by: RADIOLOGY

## 2019-10-29 PROCEDURE — 76830 TRANSVAGINAL US NON-OB: CPT | Mod: TC

## 2019-10-30 ENCOUNTER — PATIENT MESSAGE (OUTPATIENT)
Dept: OBSTETRICS AND GYNECOLOGY | Facility: CLINIC | Age: 34
End: 2019-10-30

## 2019-10-30 DIAGNOSIS — N83.202 CYST OF LEFT OVARY: Primary | ICD-10-CM

## 2019-10-31 ENCOUNTER — PATIENT MESSAGE (OUTPATIENT)
Dept: OBSTETRICS AND GYNECOLOGY | Facility: CLINIC | Age: 34
End: 2019-10-31

## 2019-12-09 DIAGNOSIS — G43.109 MIGRAINE WITH AURA AND WITHOUT STATUS MIGRAINOSUS, NOT INTRACTABLE: ICD-10-CM

## 2019-12-09 RX ORDER — LEVOTHYROXINE SODIUM 50 UG/1
50 TABLET ORAL DAILY
Qty: 90 TABLET | Refills: 0 | Status: SHIPPED | OUTPATIENT
Start: 2019-12-09 | End: 2020-02-06

## 2019-12-09 RX ORDER — SUMATRIPTAN SUCCINATE 25 MG/1
25 TABLET ORAL
Qty: 10 TABLET | Refills: 1 | Status: SHIPPED | OUTPATIENT
Start: 2019-12-09 | End: 2020-03-16 | Stop reason: SDUPTHER

## 2019-12-11 ENCOUNTER — HOSPITAL ENCOUNTER (OUTPATIENT)
Dept: RADIOLOGY | Facility: HOSPITAL | Age: 34
Discharge: HOME | End: 2019-12-11
Attending: OBSTETRICS & GYNECOLOGY
Payer: COMMERCIAL

## 2019-12-11 DIAGNOSIS — N83.202 CYST OF LEFT OVARY: ICD-10-CM

## 2019-12-11 PROCEDURE — 76830 US PELVIS COMP WITH TRANSVAG NON-OB (XPD): ICD-10-PCS | Mod: 26,,, | Performed by: RADIOLOGY

## 2019-12-11 PROCEDURE — 76830 TRANSVAGINAL US NON-OB: CPT | Mod: 26,,, | Performed by: RADIOLOGY

## 2019-12-11 PROCEDURE — 76856 US PELVIS COMP WITH TRANSVAG NON-OB (XPD): ICD-10-PCS | Mod: 26,,, | Performed by: RADIOLOGY

## 2019-12-11 PROCEDURE — 76830 TRANSVAGINAL US NON-OB: CPT | Mod: TC

## 2019-12-11 PROCEDURE — 76856 US EXAM PELVIC COMPLETE: CPT | Mod: 26,,, | Performed by: RADIOLOGY

## 2019-12-13 ENCOUNTER — PATIENT MESSAGE (OUTPATIENT)
Dept: OBSTETRICS AND GYNECOLOGY | Facility: HOSPITAL | Age: 34
End: 2019-12-13

## 2019-12-13 DIAGNOSIS — N83.201 CYST OF RIGHT OVARY: Primary | ICD-10-CM

## 2019-12-16 ENCOUNTER — OFFICE VISIT (OUTPATIENT)
Dept: URGENT CARE | Facility: CLINIC | Age: 34
End: 2019-12-16
Payer: COMMERCIAL

## 2019-12-16 VITALS
RESPIRATION RATE: 18 BRPM | DIASTOLIC BLOOD PRESSURE: 87 MMHG | TEMPERATURE: 98 F | HEART RATE: 86 BPM | BODY MASS INDEX: 30.91 KG/M2 | OXYGEN SATURATION: 96 % | WEIGHT: 168 LBS | HEIGHT: 62 IN | SYSTOLIC BLOOD PRESSURE: 115 MMHG

## 2019-12-16 DIAGNOSIS — R05.9 COUGH: ICD-10-CM

## 2019-12-16 DIAGNOSIS — J04.0 LARYNGITIS: Primary | ICD-10-CM

## 2019-12-16 DIAGNOSIS — J02.9 SORE THROAT: ICD-10-CM

## 2019-12-16 PROCEDURE — 99214 PR OFFICE/OUTPT VISIT, EST, LEVL IV, 30-39 MIN: ICD-10-PCS | Mod: 25,S$GLB,, | Performed by: NURSE PRACTITIONER

## 2019-12-16 PROCEDURE — 96372 PR INJECTION,THERAP/PROPH/DIAG2ST, IM OR SUBCUT: ICD-10-PCS | Mod: S$GLB,,, | Performed by: FAMILY MEDICINE

## 2019-12-16 PROCEDURE — 96372 THER/PROPH/DIAG INJ SC/IM: CPT | Mod: S$GLB,,, | Performed by: FAMILY MEDICINE

## 2019-12-16 PROCEDURE — 99214 OFFICE O/P EST MOD 30 MIN: CPT | Mod: 25,S$GLB,, | Performed by: NURSE PRACTITIONER

## 2019-12-16 RX ORDER — DEXAMETHASONE SODIUM PHOSPHATE 100 MG/10ML
8 INJECTION INTRAMUSCULAR; INTRAVENOUS
Status: COMPLETED | OUTPATIENT
Start: 2019-12-16 | End: 2019-12-16

## 2019-12-16 RX ORDER — BENZONATATE 100 MG/1
100 CAPSULE ORAL 3 TIMES DAILY PRN
Qty: 20 CAPSULE | Refills: 0 | Status: SHIPPED | OUTPATIENT
Start: 2019-12-16 | End: 2020-01-30

## 2019-12-16 RX ORDER — ALBUTEROL SULFATE 90 UG/1
1-2 AEROSOL, METERED RESPIRATORY (INHALATION) EVERY 6 HOURS PRN
Qty: 1 INHALER | Refills: 0 | Status: SHIPPED | OUTPATIENT
Start: 2019-12-16 | End: 2022-11-01

## 2019-12-16 RX ORDER — PROMETHAZINE HYDROCHLORIDE AND DEXTROMETHORPHAN HYDROBROMIDE 6.25; 15 MG/5ML; MG/5ML
5 SYRUP ORAL NIGHTLY PRN
Qty: 120 ML | Refills: 0 | Status: SHIPPED | OUTPATIENT
Start: 2019-12-16 | End: 2020-01-11

## 2019-12-16 RX ADMIN — DEXAMETHASONE SODIUM PHOSPHATE 8 MG: 100 INJECTION INTRAMUSCULAR; INTRAVENOUS at 10:12

## 2019-12-16 NOTE — PROGRESS NOTES
"Subjective:       Patient ID: Jossy Leslie is a 34 y.o. female.    Vitals:  height is 5' 2" (1.575 m) and weight is 76.2 kg (168 lb). Her temperature is 98 °F (36.7 °C). Her blood pressure is 115/87 and her pulse is 86. Her respiration is 18 and oxygen saturation is 96%.     Chief Complaint: URI    Patient reports she had fever 5 days ago.  She thinks she may have had the flu.  She is not had fever and 2 days.  Patient states the cough and loss of voice are her biggest complaint right now.  The cough is keeping her up night.  She did have a flu shot.    URI    This is a new problem. Episode onset: 5 days  The problem has been gradually worsening. There has been no fever. Associated symptoms include congestion, coughing, a sore throat, vomiting and wheezing. Pertinent negatives include no ear pain, nausea, rash or sinus pain. She has tried decongestant, acetaminophen and NSAIDs for the symptoms. The treatment provided no relief.       Constitution: Positive for fatigue and fever. Negative for chills and sweating.   HENT: Positive for congestion, sore throat and trouble swallowing. Negative for ear pain, sinus pain, sinus pressure and voice change.    Neck: Negative for painful lymph nodes.   Eyes: Negative for eye redness.   Respiratory: Positive for cough and wheezing. Negative for chest tightness, sputum production, bloody sputum, COPD, shortness of breath, stridor and asthma.    Gastrointestinal: Positive for vomiting. Negative for nausea.   Musculoskeletal: Negative for muscle ache.   Skin: Negative for rash.   Allergic/Immunologic: Negative for seasonal allergies and asthma.   Hematologic/Lymphatic: Negative for swollen lymph nodes.       Objective:      Physical Exam   Constitutional: She is oriented to person, place, and time. She appears well-developed and well-nourished. She is cooperative.  Non-toxic appearance. She does not have a sickly appearance. She does not appear ill. No distress.   HENT:   Head: " Normocephalic and atraumatic.   Right Ear: Hearing, tympanic membrane, external ear and ear canal normal.   Left Ear: Hearing, tympanic membrane, external ear and ear canal normal.   Nose: Nose normal. No mucosal edema, rhinorrhea or nasal deformity. No epistaxis. Right sinus exhibits no maxillary sinus tenderness and no frontal sinus tenderness. Left sinus exhibits no maxillary sinus tenderness and no frontal sinus tenderness.   Mouth/Throat: Uvula is midline, oropharynx is clear and moist and mucous membranes are normal. No trismus in the jaw. Normal dentition. No uvula swelling. No oropharyngeal exudate, posterior oropharyngeal edema or posterior oropharyngeal erythema.   Eyes: Conjunctivae and lids are normal. No scleral icterus.   Neck: Trachea normal, full passive range of motion without pain and phonation normal. Neck supple. No neck rigidity. No edema and no erythema present.   Cardiovascular: Normal rate, regular rhythm, normal heart sounds, intact distal pulses and normal pulses.   Pulmonary/Chest: Effort normal and breath sounds normal. No respiratory distress. She has no decreased breath sounds. She has no rhonchi.   Abdominal: Normal appearance.   Musculoskeletal: Normal range of motion. She exhibits no edema or deformity.   Neurological: She is alert and oriented to person, place, and time. She exhibits normal muscle tone. Coordination normal.   Skin: Skin is warm, dry, intact, not diaphoretic and not pale.   Psychiatric: She has a normal mood and affect. Her speech is normal and behavior is normal. Judgment and thought content normal. Cognition and memory are normal.   Nursing note and vitals reviewed.            Assessment:       1. Laryngitis    2. Sore throat    3. Cough        Plan:       Fluids and rest.  Return to clinic if fever develops or worsening of symptoms in the next 2 days.  Saltwater gargles and cepacol recommended.  High suspicion that the patient had the flu 5 days ago.  However due  to the duration, too late for Tamiflu.  Patient verbalized understanding.    Laryngitis    Sore throat  -     Cancel: POCT rapid strep A    Cough    Other orders  -     dexamethasone injection 8 mg  -     benzonatate (TESSALON PERLES) 100 MG capsule; Take 1 capsule (100 mg total) by mouth 3 (three) times daily as needed for Cough.  Dispense: 20 capsule; Refill: 0  -     promethazine-dextromethorphan (PROMETHAZINE-DM) 6.25-15 mg/5 mL Syrp; Take 5 mLs by mouth nightly as needed (cough).  Dispense: 120 mL; Refill: 0  -     albuterol (PROVENTIL/VENTOLIN HFA) 90 mcg/actuation inhaler; Inhale 1-2 puffs into the lungs every 6 (six) hours as needed for Wheezing or Shortness of Breath. Rescue  Dispense: 1 Inhaler; Refill: 0         Patient Instructions     Laryngitis    Laryngitis is a swelling of the tissues around the vocal cords. Symptoms include a hoarse (scratchy) voice. The voice may be lost completely. It may be caused by a viral illness, such as a head or chest cold. It may also be due to overuse and strain of the voice. Smoking, drinking alcohol, acid reflux, allergies, or inhaling harsh chemicals may also lead to symptoms. This condition will usually resolve in 1-2 weeks.  Home care  · Rest your voice until it recovers. Talk as little as possible. If your symptoms are severe, rest at home for a day or so.  · Breathing cool steam from a humidifier/vaporizer or in a steamy shower may be helpful.  · Drink plenty of fluids to stay well hydrated.  · Do not smoke  Follow-up care  Follow up with your healthcare provider or this facility if you have not improved after one week.  When to seek medical advice  Contact your healthcare provider for any of the following:  · Severe pain with swallowing  · Trouble opening mouth  · Neck swelling, neck pain, or trouble moving neck  · Noisy breathing or trouble breathing  · Fever of 100.4°F (38.ºC) or higher, or as directed by your healthcare provider  · Drooling  · Symptoms do not  resolve in 2 weeks  Date Last Reviewed: 4/26/2015  © 5040-7612 [employer]. [address] [place] [address], [place] [address]. All rights reserved. This information is not intended as a substitute for professional medical care. Always follow your healthcare professional's instructions.

## 2019-12-16 NOTE — PATIENT INSTRUCTIONS
Laryngitis    Laryngitis is a swelling of the tissues around the vocal cords. Symptoms include a hoarse (scratchy) voice. The voice may be lost completely. It may be caused by a viral illness, such as a head or chest cold. It may also be due to overuse and strain of the voice. Smoking, drinking alcohol, acid reflux, allergies, or inhaling harsh chemicals may also lead to symptoms. This condition will usually resolve in 1-2 weeks.  Home care  · Rest your voice until it recovers. Talk as little as possible. If your symptoms are severe, rest at home for a day or so.  · Breathing cool steam from a humidifier/vaporizer or in a steamy shower may be helpful.  · Drink plenty of fluids to stay well hydrated.  · Do not smoke  Follow-up care  Follow up with your healthcare provider or this facility if you have not improved after one week.  When to seek medical advice  Contact your healthcare provider for any of the following:  · Severe pain with swallowing  · Trouble opening mouth  · Neck swelling, neck pain, or trouble moving neck  · Noisy breathing or trouble breathing  · Fever of 100.4°F (38.ºC) or higher, or as directed by your healthcare provider  · Drooling  · Symptoms do not resolve in 2 weeks  Date Last Reviewed: 4/26/2015 © 2000-2017 [employer]. [address] [place] [address], [place] [address]. All rights reserved. This information is not intended as a substitute for professional medical care. Always follow your healthcare professional's instructions.

## 2020-01-30 ENCOUNTER — OFFICE VISIT (OUTPATIENT)
Dept: URGENT CARE | Facility: CLINIC | Age: 35
End: 2020-01-30
Payer: COMMERCIAL

## 2020-01-30 VITALS
WEIGHT: 168 LBS | RESPIRATION RATE: 16 BRPM | HEART RATE: 92 BPM | TEMPERATURE: 99 F | OXYGEN SATURATION: 98 % | SYSTOLIC BLOOD PRESSURE: 110 MMHG | HEIGHT: 62 IN | DIASTOLIC BLOOD PRESSURE: 80 MMHG | BODY MASS INDEX: 30.91 KG/M2

## 2020-01-30 DIAGNOSIS — J10.1 INFLUENZA A: Primary | ICD-10-CM

## 2020-01-30 LAB
CTP QC/QA: YES
FLUAV AG NPH QL: POSITIVE
FLUBV AG NPH QL: NEGATIVE

## 2020-01-30 PROCEDURE — 99214 OFFICE O/P EST MOD 30 MIN: CPT | Mod: 25,S$GLB,, | Performed by: INTERNAL MEDICINE

## 2020-01-30 PROCEDURE — 87804 INFLUENZA ASSAY W/OPTIC: CPT | Mod: 59,QW,S$GLB, | Performed by: INTERNAL MEDICINE

## 2020-01-30 PROCEDURE — 87804 POCT INFLUENZA A/B: ICD-10-PCS | Mod: QW,S$GLB,, | Performed by: INTERNAL MEDICINE

## 2020-01-30 PROCEDURE — 99214 PR OFFICE/OUTPT VISIT, EST, LEVL IV, 30-39 MIN: ICD-10-PCS | Mod: 25,S$GLB,, | Performed by: INTERNAL MEDICINE

## 2020-01-30 RX ORDER — PROMETHAZINE HYDROCHLORIDE AND DEXTROMETHORPHAN HYDROBROMIDE 6.25; 15 MG/5ML; MG/5ML
5 SYRUP ORAL NIGHTLY PRN
Qty: 25 ML | Refills: 0 | Status: SHIPPED | OUTPATIENT
Start: 2020-01-30 | End: 2020-02-04

## 2020-01-30 RX ORDER — BENZONATATE 100 MG/1
100 CAPSULE ORAL 3 TIMES DAILY PRN
Qty: 15 CAPSULE | Refills: 0 | Status: SHIPPED | OUTPATIENT
Start: 2020-01-30 | End: 2020-01-31 | Stop reason: SDUPTHER

## 2020-01-30 RX ORDER — OSELTAMIVIR PHOSPHATE 75 MG/1
75 CAPSULE ORAL 2 TIMES DAILY
Qty: 10 CAPSULE | Refills: 0 | Status: SHIPPED | OUTPATIENT
Start: 2020-01-30 | End: 2020-01-31 | Stop reason: SDUPTHER

## 2020-01-30 RX ORDER — PROMETHAZINE HYDROCHLORIDE AND CODEINE PHOSPHATE 6.25; 1 MG/5ML; MG/5ML
5 SOLUTION ORAL NIGHTLY PRN
Qty: 25 ML | Refills: 0 | Status: SHIPPED | OUTPATIENT
Start: 2020-01-30 | End: 2020-01-30 | Stop reason: CLARIF

## 2020-01-30 NOTE — LETTER
January 30, 2020      [facility]  [place] [address]  Phone: [phone]  Fax: [phone]       Patient: Jossy Leslie   YOB: 1985  Date of Visit: 01/30/2020    To Whom It May Concern:    Lisa Leslie  was at [facility] on 01/30/2020. She may return to work/school on 2/1/2020 with no restrictions. If you have any questions or concerns, or if I can be of further assistance, please do not hesitate to contact me.    Sincerely,    Radha Allen PA-C

## 2020-01-30 NOTE — PATIENT INSTRUCTIONS
The Flu (Influenza)     The virus that causes the flu spreads through the air in droplets when someone who has the flu coughs, sneezes, laughs, or talks.   The flu (influenza) is an infection that affects your respiratory tract. This tract is made up of your mouth, nose, and lungs, and the passages between them. Unlike a cold, the flu can make you very ill. And it can lead to pneumonia, a serious lung infection. The flu can have serious complications and even cause death.  Who is at risk for the flu?  Anyone can get the flu. But you are more likely to become infected if you:  · Have a weakened immune system  · Work in a healthcare setting where you may be exposed to flu germs  · Live or work with someone who has the flu  · Havent had an annual flu shot  How does the flu spread?  The flu is caused by a virus. The virus spreads through the air in droplets when someone who has the flu coughs, sneezes, laughs, or talks. You can become infected when you inhale these viruses directly. You can also become infected when you touch a surface on which the droplets have landed and then transfer the germs to your eyes, nose, or mouth. Touching used tissues, or sharing utensils, drinking glasses, or a toothbrush from an infected person can expose you to flu viruses, too.  What are the symptoms of the flu?  Flu symptoms tend to come on quickly and may last a few days to a few weeks. They include:  · Fever usually higher than 100.4°F  (38°C) and chills  · Sore throat and headache  · Dry cough  · Runny nose  · Tiredness and weakness  · Muscle aches  Who is at risk for flu complications?  For some people, the flu can be very serious. The risk for complications is greater for:  · Children younger than age 5  · Adults ages 65 and older  · People with a chronic illness such as diabetes or heart, kidney, or lung disease  · People who live in a nursing home or long-term care facility   How is the flu treated?  The flu usually gets  better after 7 days or so. In some cases, your healthcare provider may prescribe an antiviral medicine. This may help you get well a little sooner. For the medicine to help, you need to take it as soon as possible (ideally within 48 hours) after your symptoms start. If you develop pneumonia or other serious illness, you may need to stay in the hospital.  Easing flu symptoms  · Drink lots of fluids such as water, juice, and warm soup. A good rule is to drink enough so that you urinate your normal amount.  · Get plenty of rest.  · Ask your healthcare provider what to take for fever and pain.  · Call your provider if your fever is 100.4°F (38°C) or higher, or you become dizzy, lightheaded, or short of breath.  Taking steps to protect others  · Wash your hands often, especially after coughing or sneezing. Or clean your hands with an alcohol-based hand  containing at least 60% alcohol.  · Cough or sneeze into a tissue. Then throw the tissue away and wash your hands. If you dont have a tissue, cough and sneeze into your elbow.  · Stay home until at least 24 hours after you no longer have a fever or chills. Be sure the fever isnt being hidden by fever-reducing medicine.  · Dont share food, utensils, drinking glasses, or a toothbrush with others.  · Ask your healthcare provider if others in your household should get antiviral medicine to help them avoid infection.  How can the flu be prevented?  · One of the best ways to avoid the flu is to get a flu vaccine each year. The virus that causes the flu changes from year to year. For that reason, healthcare providers recommend getting the flu vaccine each year, as soon as it's available in your area. The vaccine is given as a shot. Your healthcare provider can tell you which vaccine is right for you. A nasal spray is also available but is not recommended for the 7666-6414 flu season. The CDC says this is because the nasal spray did not seem to protect against the flu  over the last several flu seasons. In the past, it was meant for people ages 2 to 49.  · Wash your hands often. Frequent handwashing is a proven way to help prevent infection.  · Carry an alcohol-based hand gel containing at least 60% alcohol. Use it when you can't use soap and water. Then wash your hands as soon as you can.  · Avoid touching your eyes, nose, and mouth.  · At home and work, clean phones, computer keyboards, and toys often with disinfectant wipes.  · If possible, avoid close contact with others who have the flu or symptoms of the flu.  Handwashing tips  Handwashing is one of the best ways to prevent many common infections. If you are caring for or visiting someone with the flu, wash your hands each time you enter and leave the room. Follow these steps:  · Use warm water and plenty of soap. Rub your hands together well.  · Clean the whole hand, including under your nails, between your fingers, and up the wrists.  · Wash for at least 15 seconds.  · Rinse, letting the water run down your fingers, not up your wrists.  · Dry your hands well. Use a paper towel to turn off the faucet and open the door.  Using alcohol-based hand   Alcohol-based hand  are also a good choice. Use them when you can't use soap and water. Follow these steps:  · Squeeze about a tablespoon of gel into the palm of one hand.  · Rub your hands together briskly, cleaning the backs of your hands, the palms, between your fingers, and up the wrists.  · Rub until the gel is gone and your hands are completely dry.  Preventing the flu in healthcare settings  The flu is a special concern for people in hospitals and long-term care facilities. To help prevent the spread of flu, many hospitals and nursing homes take these steps:  · Healthcare providers wash their hands or use an alcohol-based hand  before and after treating each patient.  · People with the flu have private rooms and bathrooms or share a room with someone  with the same infection.  · People who are at high risk for the flu but don't have it are encouraged to get the flu and pneumonia vaccines.  · All healthcare workers are encouraged or required to get flu shots.   Date Last Reviewed: 12/1/2016  © 7646-4363 [employer]. [address] [place] [id number]. All rights reserved. This information is not intended as a substitute for professional medical care. Always follow your healthcare professional's instructions.  PLEASE READ YOUR DISCHARGE INSTRUCTIONS ENTIRELY AS IT CONTAINS IMPORTANT INFORMATION.    You have been diagnosed with Influenza.     You are contagious for 24 hours after you start the Tamilfu or 24 hours after your last fever, whichever happens last.    Please drink plenty of fluids.    Please get plenty of rest.    Please return here or go to the Emergency Department for any concerns or worsening of condition.    Tamiflu prescription has been discussed, please take to completion unless you cannot tolerate the side effects.     Take Tylenol and ibuprofen as needed for fever/chills/body aches    Please return or see your primary care doctor if you develop new or worsening symptoms.     Please arrange follow up with your primary medical clinic as soon as possible. You must understand that you've received an Urgent Care treatment only and that you may be released before all of your medical problems are known or treated. You, the patient, will arrange for follow up as instructed. If your symptoms worsen or fail to improve you should go to the Emergency Room.

## 2020-01-30 NOTE — PROGRESS NOTES
"Subjective:       Patient ID: Jossy Leslie is a 34 y.o. female.    Vitals:  height is 5' 2" (1.575 m) and weight is 76.2 kg (168 lb). Her oral temperature is 99.4 °F (37.4 °C). Her blood pressure is 110/80 and her pulse is 92. Her respiration is 16 and oxygen saturation is 98%.     Chief Complaint: Cough    33 y/o female with no known PMHx presents to urgent care c/o dry cough, body aches, feverchills, and nasal congestion that started 2 nights ago. Symptoms have been constant and severe since onset. Pt also complains of nausea and feeling fatigued. Pt has been taking OTC cough suppressant and ibuprofen with no relief. Pt denies recent illness/travel, ear pain, sore throat, difficulty swallowing, SOB, chest pain, leg pain/swelling, V/D, abdominal pain, back pain, neck pain, headache, vision changes, and rash.      Cough   This is a new problem. The current episode started in the past 7 days (x2 days). The problem has been unchanged. The problem occurs constantly. The cough is non-productive. Associated symptoms include chills and a fever. Pertinent negatives include no chest pain, headaches, myalgias, rash, sore throat or shortness of breath. She has tried OTC cough suppressant (ibuprofen) for the symptoms. The treatment provided no relief.       Constitution: Positive for chills, fatigue, fever and generalized weakness.   HENT: Positive for congestion. Negative for sore throat.    Neck: Negative for painful lymph nodes.   Cardiovascular: Negative for chest pain and leg swelling.   Eyes: Negative for double vision and blurred vision.   Respiratory: Positive for cough. Negative for shortness of breath.    Gastrointestinal: Positive for nausea. Negative for vomiting and diarrhea.   Genitourinary: Negative for dysuria, frequency, urgency and history of kidney stones.   Musculoskeletal: Negative for joint pain, joint swelling, muscle cramps and muscle ache.   Skin: Negative for color change, pale, rash and bruising. "   Allergic/Immunologic: Negative for seasonal allergies.   Neurological: Negative for dizziness, history of vertigo, light-headedness, passing out and headaches.   Hematologic/Lymphatic: Negative for swollen lymph nodes.   Psychiatric/Behavioral: Negative for nervous/anxious, sleep disturbance and depression. The patient is not nervous/anxious.        Objective:      Physical Exam   Constitutional: She is oriented to person, place, and time. She appears well-developed and well-nourished. She is cooperative.  Non-toxic appearance. She does not have a sickly appearance. She does not appear ill. No distress.   HENT:   Head: Normocephalic and atraumatic.   Right Ear: Hearing, tympanic membrane, external ear and ear canal normal.   Left Ear: Hearing, tympanic membrane, external ear and ear canal normal.   Nose: Mucosal edema present. No rhinorrhea or nasal deformity. No epistaxis. Right sinus exhibits no maxillary sinus tenderness and no frontal sinus tenderness. Left sinus exhibits no maxillary sinus tenderness and no frontal sinus tenderness.   Mouth/Throat: Uvula is midline and mucous membranes are normal. No trismus in the jaw. Normal dentition. No uvula swelling. Posterior oropharyngeal erythema present. No oropharyngeal exudate, posterior oropharyngeal edema or tonsillar abscesses.   Eyes: Conjunctivae, EOM and lids are normal. No scleral icterus.   Neck: Trachea normal, full passive range of motion without pain and phonation normal. Neck supple. No neck rigidity. No edema and no erythema present.   Cardiovascular: Normal rate, regular rhythm, normal heart sounds and normal pulses.   No murmur heard.  Pulmonary/Chest: Effort normal and breath sounds normal. No respiratory distress. She has no decreased breath sounds. She has no wheezes. She has no rhonchi.   Abdominal: Normal appearance.   Musculoskeletal: Normal range of motion. She exhibits no edema or deformity.   Lymphadenopathy:     She has no cervical  adenopathy.   Neurological: She is alert and oriented to person, place, and time. She exhibits normal muscle tone. Coordination normal.   Skin: Skin is warm, dry, intact, not diaphoretic and not pale.   Psychiatric: She has a normal mood and affect. Her speech is normal and behavior is normal. Judgment and thought content normal. Cognition and memory are normal.   Nursing note and vitals reviewed.        Assessment:       1. Influenza A        Plan:         Influenza A  -     POCT Influenza A/B  -     oseltamivir (TAMIFLU) 75 MG capsule; Take 1 capsule (75 mg total) by mouth 2 (two) times daily. for 5 days  Dispense: 10 capsule; Refill: 0  -     promethazine-codeine 6.25-10 mg/5 ml (PHENERGAN WITH CODEINE) 6.25-10 mg/5 mL syrup; Take 5 mLs by mouth nightly as needed.  Dispense: 25 mL; Refill: 0  -     benzonatate (TESSALON) 100 MG capsule; Take 1 capsule (100 mg total) by mouth 3 (three) times daily as needed.  Dispense: 15 capsule; Refill: 0      Results for orders placed or performed in visit on 01/30/20   POCT Influenza A/B   Result Value Ref Range    Rapid Influenza A Ag Positive (A) Negative    Rapid Influenza B Ag Negative Negative     Acceptable Yes      Patient Instructions       The Flu (Influenza)     The virus that causes the flu spreads through the air in droplets when someone who has the flu coughs, sneezes, laughs, or talks.   The flu (influenza) is an infection that affects your respiratory tract. This tract is made up of your mouth, nose, and lungs, and the passages between them. Unlike a cold, the flu can make you very ill. And it can lead to pneumonia, a serious lung infection. The flu can have serious complications and even cause death.  Who is at risk for the flu?  Anyone can get the flu. But you are more likely to become infected if you:  · Have a weakened immune system  · Work in a healthcare setting where you may be exposed to flu germs  · Live or work with someone who has the  flu  · Havent had an annual flu shot  How does the flu spread?  The flu is caused by a virus. The virus spreads through the air in droplets when someone who has the flu coughs, sneezes, laughs, or talks. You can become infected when you inhale these viruses directly. You can also become infected when you touch a surface on which the droplets have landed and then transfer the germs to your eyes, nose, or mouth. Touching used tissues, or sharing utensils, drinking glasses, or a toothbrush from an infected person can expose you to flu viruses, too.  What are the symptoms of the flu?  Flu symptoms tend to come on quickly and may last a few days to a few weeks. They include:  · Fever usually higher than 100.4°F  (38°C) and chills  · Sore throat and headache  · Dry cough  · Runny nose  · Tiredness and weakness  · Muscle aches  Who is at risk for flu complications?  For some people, the flu can be very serious. The risk for complications is greater for:  · Children younger than age 5  · Adults ages 65 and older  · People with a chronic illness such as diabetes or heart, kidney, or lung disease  · People who live in a nursing home or long-term care facility   How is the flu treated?  The flu usually gets better after 7 days or so. In some cases, your healthcare provider may prescribe an antiviral medicine. This may help you get well a little sooner. For the medicine to help, you need to take it as soon as possible (ideally within 48 hours) after your symptoms start. If you develop pneumonia or other serious illness, you may need to stay in the hospital.  Easing flu symptoms  · Drink lots of fluids such as water, juice, and warm soup. A good rule is to drink enough so that you urinate your normal amount.  · Get plenty of rest.  · Ask your healthcare provider what to take for fever and pain.  · Call your provider if your fever is 100.4°F (38°C) or higher, or you become dizzy, lightheaded, or short of breath.  Taking steps to  protect others  · Wash your hands often, especially after coughing or sneezing. Or clean your hands with an alcohol-based hand  containing at least 60% alcohol.  · Cough or sneeze into a tissue. Then throw the tissue away and wash your hands. If you dont have a tissue, cough and sneeze into your elbow.  · Stay home until at least 24 hours after you no longer have a fever or chills. Be sure the fever isnt being hidden by fever-reducing medicine.  · Dont share food, utensils, drinking glasses, or a toothbrush with others.  · Ask your healthcare provider if others in your household should get antiviral medicine to help them avoid infection.  How can the flu be prevented?  · One of the best ways to avoid the flu is to get a flu vaccine each year. The virus that causes the flu changes from year to year. For that reason, healthcare providers recommend getting the flu vaccine each year, as soon as it's available in your area. The vaccine is given as a shot. Your healthcare provider can tell you which vaccine is right for you. A nasal spray is also available but is not recommended for the 2903-5822 flu season. The CDC says this is because the nasal spray did not seem to protect against the flu over the last several flu seasons. In the past, it was meant for people ages 2 to 49.  · Wash your hands often. Frequent handwashing is a proven way to help prevent infection.  · Carry an alcohol-based hand gel containing at least 60% alcohol. Use it when you can't use soap and water. Then wash your hands as soon as you can.  · Avoid touching your eyes, nose, and mouth.  · At home and work, clean phones, computer keyboards, and toys often with disinfectant wipes.  · If possible, avoid close contact with others who have the flu or symptoms of the flu.  Handwashing tips  Handwashing is one of the best ways to prevent many common infections. If you are caring for or visiting someone with the flu, wash your hands each time you  enter and leave the room. Follow these steps:  · Use warm water and plenty of soap. Rub your hands together well.  · Clean the whole hand, including under your nails, between your fingers, and up the wrists.  · Wash for at least 15 seconds.  · Rinse, letting the water run down your fingers, not up your wrists.  · Dry your hands well. Use a paper towel to turn off the faucet and open the door.  Using alcohol-based hand   Alcohol-based hand  are also a good choice. Use them when you can't use soap and water. Follow these steps:  · Squeeze about a tablespoon of gel into the palm of one hand.  · Rub your hands together briskly, cleaning the backs of your hands, the palms, between your fingers, and up the wrists.  · Rub until the gel is gone and your hands are completely dry.  Preventing the flu in healthcare settings  The flu is a special concern for people in hospitals and long-term care facilities. To help prevent the spread of flu, many hospitals and nursing homes take these steps:  · Healthcare providers wash their hands or use an alcohol-based hand  before and after treating each patient.  · People with the flu have private rooms and bathrooms or share a room with someone with the same infection.  · People who are at high risk for the flu but don't have it are encouraged to get the flu and pneumonia vaccines.  · All healthcare workers are encouraged or required to get flu shots.   Date Last Reviewed: 12/1/2016  © 5458-0190 [employer]. [address] [place] [address], [place] [address]. All rights reserved. This information is not intended as a substitute for professional medical care. Always follow your healthcare professional's instructions.  PLEASE READ YOUR DISCHARGE INSTRUCTIONS ENTIRELY AS IT CONTAINS IMPORTANT INFORMATION.    You have been diagnosed with Influenza.     You are contagious for 24 hours after you start the Tamilfu or 24 hours after your last fever, whichever happens  last.    Please drink plenty of fluids.    Please get plenty of rest.    Please return here or go to the Emergency Department for any concerns or worsening of condition.    Tamiflu prescription has been discussed, please take to completion unless you cannot tolerate the side effects.     Take Tylenol and ibuprofen as needed for fever/chills/body aches    Please return or see your primary care doctor if you develop new or worsening symptoms.     Please arrange follow up with your primary medical clinic as soon as possible. You must understand that you've received an Urgent Care treatment only and that you may be released before all of your medical problems are known or treated. You, the patient, will arrange for follow up as instructed. If your symptoms worsen or fail to improve you should go to the Emergency Room.

## 2020-01-31 ENCOUNTER — TELEPHONE (OUTPATIENT)
Dept: URGENT CARE | Facility: CLINIC | Age: 35
End: 2020-01-31

## 2020-01-31 DIAGNOSIS — J10.1 INFLUENZA A: ICD-10-CM

## 2020-01-31 RX ORDER — OSELTAMIVIR PHOSPHATE 75 MG/1
75 CAPSULE ORAL 2 TIMES DAILY
Qty: 10 CAPSULE | Refills: 0 | Status: SHIPPED | OUTPATIENT
Start: 2020-01-31 | End: 2020-01-31 | Stop reason: SDUPTHER

## 2020-01-31 RX ORDER — BENZONATATE 100 MG/1
100 CAPSULE ORAL 3 TIMES DAILY PRN
Qty: 15 CAPSULE | Refills: 0 | Status: SHIPPED | OUTPATIENT
Start: 2020-01-31 | End: 2020-01-31 | Stop reason: SDUPTHER

## 2020-01-31 RX ORDER — BENZONATATE 100 MG/1
100 CAPSULE ORAL 3 TIMES DAILY PRN
Qty: 15 CAPSULE | Refills: 0 | Status: SHIPPED | OUTPATIENT
Start: 2020-01-31 | End: 2020-02-05

## 2020-01-31 RX ORDER — OSELTAMIVIR PHOSPHATE 75 MG/1
75 CAPSULE ORAL 2 TIMES DAILY
Qty: 10 CAPSULE | Refills: 0 | Status: SHIPPED | OUTPATIENT
Start: 2020-01-31 | End: 2020-02-05

## 2020-01-31 NOTE — TELEPHONE ENCOUNTER
Patient would like rx sent to new pharmacy that was changed in system. Please advise.     Addendum:  [facility] sent Tamiflu and Tessalon Perles to new pharmacy per patient request.

## 2020-02-06 RX ORDER — LEVOTHYROXINE SODIUM 50 UG/1
50 TABLET ORAL DAILY
Qty: 90 TABLET | Refills: 0 | Status: SHIPPED | OUTPATIENT
Start: 2020-02-06 | End: 2020-06-25

## 2020-03-06 ENCOUNTER — HOSPITAL ENCOUNTER (OUTPATIENT)
Dept: RADIOLOGY | Facility: OTHER | Age: 35
Discharge: HOME | End: 2020-03-06
Attending: OBSTETRICS & GYNECOLOGY
Payer: COMMERCIAL

## 2020-03-06 ENCOUNTER — PATIENT MESSAGE (OUTPATIENT)
Dept: OBSTETRICS AND GYNECOLOGY | Facility: CLINIC | Age: 35
End: 2020-03-06

## 2020-03-06 DIAGNOSIS — N83.201 CYST OF RIGHT OVARY: ICD-10-CM

## 2020-03-06 PROCEDURE — 76856 US PELVIS COMP WITH TRANSVAG NON-OB (XPD): ICD-10-PCS | Mod: 26,,, | Performed by: RADIOLOGY

## 2020-03-06 PROCEDURE — 76856 US EXAM PELVIC COMPLETE: CPT | Mod: TC

## 2020-03-06 PROCEDURE — 76830 US PELVIS COMP WITH TRANSVAG NON-OB (XPD): ICD-10-PCS | Mod: 26,,, | Performed by: RADIOLOGY

## 2020-03-06 PROCEDURE — 76856 US EXAM PELVIC COMPLETE: CPT | Mod: 26,,, | Performed by: RADIOLOGY

## 2020-03-06 PROCEDURE — 76830 TRANSVAGINAL US NON-OB: CPT | Mod: 26,,, | Performed by: RADIOLOGY

## 2020-03-09 ENCOUNTER — PATIENT MESSAGE (OUTPATIENT)
Dept: OBSTETRICS AND GYNECOLOGY | Facility: HOSPITAL | Age: 35
End: 2020-03-09

## 2020-03-16 ENCOUNTER — PATIENT MESSAGE (OUTPATIENT)
Dept: NEUROLOGY | Facility: CLINIC | Age: 35
End: 2020-03-16

## 2020-03-16 DIAGNOSIS — G43.109 MIGRAINE WITH AURA AND WITHOUT STATUS MIGRAINOSUS, NOT INTRACTABLE: ICD-10-CM

## 2020-03-16 RX ORDER — SUMATRIPTAN SUCCINATE 25 MG/1
25 TABLET ORAL
Qty: 10 TABLET | Refills: 1 | Status: SHIPPED | OUTPATIENT
Start: 2020-03-16 | End: 2020-06-10 | Stop reason: SDUPTHER

## 2020-03-25 ENCOUNTER — CLINICAL SUPPORT (OUTPATIENT)
Dept: NUTRITION | Facility: CLINIC | Age: 35
End: 2020-03-25
Payer: COMMERCIAL

## 2020-03-25 DIAGNOSIS — Z71.3 DIETARY COUNSELING AND SURVEILLANCE: Primary | ICD-10-CM

## 2020-03-25 PROCEDURE — 97802 PR MED NUTR THER, 1ST, INDIV, EA 15 MIN: ICD-10-PCS | Mod: 95,,, | Performed by: NUTRITIONIST

## 2020-03-25 PROCEDURE — 97802 MEDICAL NUTRITION INDIV IN: CPT | Mod: 95,,, | Performed by: NUTRITIONIST

## 2020-03-25 NOTE — PROGRESS NOTES
"Nutrition Assessment for Initial Medical Nutrition Therapy    Referring professional: self referral for [employer] [facility]  The patient location is: [place]  The chief complaint leading to consultation is: being overweight. Client's main nutrition goal is to lose weight and have more Energy  Visit type: Virtual visit with synchronous audio and video  Total time spent with patient: 1 hour  Each patient to whom he or she provides medical services by telemedicine is:  (1) informed of the relationship between the physician and patient and the respective role of any other health care provider with respect to management of the patient; and (2) notified that he or she may decline to receive medical services by telemedicine and may withdraw from such care at any time.    Notes: See below    Reason for MNT visit: Pt in for education and nutrition counseling regarding dietary surveillance for goal of body fat loss      ASSESSMENT    Age: 34 y.o.  Wt: 168 lb  Wt Readings from Last 1 Encounters:   01/30/20 76.2 kg (168 lb)     Ht: 5'2"  Ht Readings from Last 1 Encounters:   01/30/20 5' 2" (1.575 m)     BMI: 30.73  BMI Readings from Last 1 Encounters:   01/30/20 30.73 kg/m²       Clinical signs/symptoms: Patient reported low Energy    Medical History:   Past Medical History:   Diagnosis Date    Asthma     Duodenal ulcer     Eosinophilic esophagitis     Eosinophilic esophagitis     Hiatal hernia     Thyroid disease      Problem List           Resolved    Abnormal uterine bleeding         Myalgia         Mild intermittent asthma without complication         Chronic migraine without aura without status migrainosus, not intractable         Episodic tension-type headache, not intractable         BMI 30.0-30.9,adult         Iron deficiency anemia due to chronic blood loss         Hypothyroidism due to Hashimoto's thyroiditis         Dysphagia         Ovarian cyst         Cervical polyp         Status post ovarian cystectomy     "     Status post hysteroscopy               Medications:   Current Outpatient Medications:     albuterol (PROVENTIL/VENTOLIN HFA) 90 mcg/actuation inhaler, Inhale 1-2 puffs into the lungs every 6 (six) hours as needed for Wheezing or Shortness of Breath. Rescue, Disp: 1 Inhaler, Rfl: 0    aspirin-acetaminophen-caffeine 250-250-65 mg (EXCEDRIN MIGRAINE) 250-250-65 mg per tablet, Excedrin Migraine     , Disp: , Rfl:     cetirizine (ZYRTEC) 10 MG tablet, Take 10 mg by mouth once daily., Disp: , Rfl:     eletriptan (RELPAX) 40 MG tablet, Take 1 tablet (40 mg total) by mouth as needed. may repeat in 2 hours if necessary, Disp: 12 tablet, Rfl: 5    EPINEPHrine (EPIPEN) 0.3 mg/0.3 mL AtIn, Inject 0.3 mLs (0.3 mg total) into the muscle once. for 1 dose, Disp: 2 each, Rfl: 1    ferrous gluconate (FERGON) 240 (27 FE) MG tablet, Take 2 tablets (480 mg total) by mouth once daily., Disp: 30 tablet, Rfl: 11    ibuprofen (ADVIL,MOTRIN) 800 MG tablet, Take 1 tablet (800 mg total) by mouth every 6 (six) hours as needed for Pain. (Patient not taking: Reported on 1/30/2020), Disp: 30 tablet, Rfl: 2    levothyroxine (SYNTHROID) 50 MCG tablet, Take 1 tablet (50 mcg total) by mouth once daily., Disp: 90 tablet, Rfl: 0    montelukast (SINGULAIR) 10 mg tablet, Take 1 tablet (10 mg total) by mouth every evening., Disp: 30 tablet, Rfl: 2    norethindrone ac-eth estradiol (LOESTRIN 1.5/30, 21,) 1.5-30 mg-mcg Tab, Take one tablet by mouth daily, do not take sugar pills and start new pack right away (Patient not taking: Reported on 1/30/2020), Disp: 84 each, Rfl: 3    pantoprazole (PROTONIX) 40 MG tablet, Take 1 tablet (40 mg total) by mouth once daily., Disp: 30 tablet, Rfl: 1    ranitidine (ZANTAC) 150 MG tablet, Take 1 tablet (150 mg total) by mouth nightly., Disp: 30 tablet, Rfl: 1    sumatriptan (IMITREX) 25 MG Tab, TAKE 1 TABLET BY MOUTH AS INSTRUCTED UNTIL DIRECTED TO STOP, TAKE ONE TABLET BY MOUTH EVERY 2 HOURS FOR ONE DOSE  AS NEEDED FOR HEADACHES, Disp: 10 tablet, Rfl: 1    Supplements: none    Food/medication interactions:   Protonix (ANTIGERD, ANTISECRETORY) - May decrease absorption of Fe; decreases absorption of Vit B12  Zantac: (ANTIULCER): [place] diet may be recommended. Take drug at least 2 hr before or after Fe suppl. Take Mg supplement of Al/Mg antacids separately by at least 2 hr. Limit caffeine/xanthine.   Synthroid (THYROID HORMONE) - Take Fe, Ca, or Mg supplement separately from drug by >/ 4 hr (may decrease absorption); Decreased absorption also reported w/ soy, soy milk, soy infant formula, walnuts, cottonseed meal and high fiber foods; Caution w/ grapefruit + related citrus.    Imitrex (ANTI-MIGRAINE): Avoid SJW    Food allergies  intolerances: Allergy to shellfish and pumpkin seeds      Labs:  Reviewed and noted  Hemoglobin A1C   Date Value Ref Range Status   09/20/2019 5.3 4.0 - 5.6 % Final     Comment:     ADA Screening Guidelines:  5.7-6.4%  Consistent with prediabetes  >or=6.5%  Consistent with diabetes  High levels of fetal hemoglobin interfere with the HbA1C  assay. Heterozygous hemoglobin variants (HbS, HgC, etc)do  not significantly interfere with this assay.   However, presence of multiple variants may affect accuracy.       Cholesterol   Date Value Ref Range Status   10/19/2018 171 120 - 199 mg/dL Final     Comment:     The National Cholesterol Education Program (NCEP) has set the  following guidelines (reference ranges) for Cholesterol:  Optimal.....................<200 mg/dL  Borderline High.............200-239 mg/dL  High........................> or = 240 mg/dL       Triglycerides   Date Value Ref Range Status   10/19/2018 111 30 - 150 mg/dL Final     Comment:     The National Cholesterol Education Program (NCEP) has set the  following guidelines (reference values) for triglycerides:  Normal......................<150 mg/dL  Borderline High.............150-199 mg/dL  High........................962-535  mg/dL       HDL   Date Value Ref Range Status   10/19/2018 34 (L) 40 - 75 mg/dL Final     Comment:     The National Cholesterol Education Program (NCEP) has set the  following guidelines (reference values) for HDL Cholesterol:  Low...............<40 mg/dL  Optimal...........>60 mg/dL       LDL Cholesterol   Date Value Ref Range Status   10/19/2018 114.8 63.0 - 159.0 mg/dL Final     Comment:     The National Cholesterol Education Program (NCEP) has set the  following guidelines (reference values) for LDL Cholesterol:  Optimal.......................<130 mg/dL  Borderline High...............130-159 mg/dL  High..........................160-189 mg/dL  Very High.....................>190 mg/dL         Typical day recall: reviewed and noted during consult    Beverages: patient reported water intake is good some days and poor on other days; coffee a few days per week black    Dining out: Regular, 6 or more times per week    Alcohol: nonsmoker; drinks socially: [place]    Lifestyle Influences  Support System: Self    Meal preparation/shopping: self    Current Activity Level: walks 15-20 minutes per day and on some days does bike at home for 15 minutes    Patient motivation, anticipated barriers, expected compliance: Patient is moderately motivated and has verbalized understanding and intent to comply    DIAGNOSIS   Problem: Obese  Etiology: RT eating too many caloreis  Signs/Symptoms:AEB BMI of 30.73    INTERVENTION  Nutrition prescription: estimated energy requirements:   1600 calories  65-80 grams protein  140 grams carbohydrates  Focus on heart healthy fats  Fluid: 80 oz + sweat loss      Recommendations & Goals:  Patient goals and recommendations are tailored to the specific patient's needs, readiness to change, lifestyle, culture, skills, resources, & abilities. Strategies to help achieve these nutrition-related goals were discussed which can include but are not limited to SMART goal setting & mindful eating.     Aim for  a minimum of 7 hours sleep   Exercise 60 minutes most days  Eat breakfast within 1-2 hours of waking up  Try not to skip any meals or snacks, not going more than 3-4 hours without eating.   At each meal and snack, try to include a source of fiber + lean protein + healthy fat.       Written Materials Provided  These resources are intended to assist the patient in making it easier to choose recommended options when eating out & to identify better-for-you brands at the grocery store:     Meal Planning Guide with recommendations discussed along with portion sizes and a customized meal plan    Eat Fit gamal card as a reminder to download the gamal to ones smart phone which provides: current Eat Fit partners, approved menu options, food labels for carb counting, & recipes    Fast Food Lite Guide   [employer] Product list    RD contact information- for patient to contact regarding any questions, needs, and/or concerns that may arise     MONITORING & EVALUATION    Communicated with healthcare provider    Documented plan for referral to appropriate agency/healthcare provider as needed    Comprehension: fair     Motivation to change: moderate    Follow-up: Within 1 year    Counseling time: 1 Hour

## 2020-04-09 ENCOUNTER — PATIENT MESSAGE (OUTPATIENT)
Dept: OBSTETRICS AND GYNECOLOGY | Facility: CLINIC | Age: 35
End: 2020-04-09

## 2020-04-21 ENCOUNTER — PATIENT MESSAGE (OUTPATIENT)
Dept: NEUROLOGY | Facility: CLINIC | Age: 35
End: 2020-04-21

## 2020-04-21 DIAGNOSIS — Z01.84 ANTIBODY RESPONSE EXAMINATION: ICD-10-CM

## 2020-05-05 ENCOUNTER — PATIENT MESSAGE (OUTPATIENT)
Dept: DERMATOLOGY | Facility: CLINIC | Age: 35
End: 2020-05-05

## 2020-05-21 DIAGNOSIS — Z01.84 ANTIBODY RESPONSE EXAMINATION: ICD-10-CM

## 2020-06-10 DIAGNOSIS — G43.109 MIGRAINE WITH AURA AND WITHOUT STATUS MIGRAINOSUS, NOT INTRACTABLE: ICD-10-CM

## 2020-06-12 RX ORDER — SUMATRIPTAN SUCCINATE 25 MG/1
25 TABLET ORAL
Qty: 10 TABLET | Refills: 1 | Status: SHIPPED | OUTPATIENT
Start: 2020-06-12 | End: 2020-07-15 | Stop reason: DRUGHIGH

## 2020-06-16 NOTE — PROGRESS NOTES
Subjective:      Patient ID: Jossy Leslie is a 34 y.o. female.    Chief Complaint:  No chief complaint on file.      History of Present Illness  Jossy Leslie presents today for follow up of Hypothyroidism due to Hashimoto's thyroiditis.   This is a MyChart video visit.    The patient location is: home  The chief complaint leading to consultation is: hypothyroidism   Visit type: Virtual visit with synchronous audio and video  Total time spent with patient: 12 minutes   Each patient to whom he or she provides medical services by telemedicine is:  (1) informed of the relationship between the physician and patient and the respective role of any other health care provider with respect to management of the patient; and (2) notified that he or she may decline to receive medical services by telemedicine and may withdraw from such care at any time.    With regards to her hypothyroidism:     Current medication:  Brand name synthroid 50 mcg daily- 1 tablet daily      Takes thyroid medication properly without food first thing in the morning      current symptoms:   + weight gain   + Fatigue   +/- Constipation   + Hair loss  - Brittle nails  + Mental fog   + cp- could be anxiety/stress related   + occ palpations   + sob  Denies heat/cold intolerance     She stopped selenium      Denies Biotin usage         Ref. Range 10/19/2018 10:00 11/21/2018 15:59   TSH Latest Ref Range: 0.400 - 4.000 uIU/mL 4.840 (H) 6.217 (H)   T3, Free Latest Ref Range: 2.3 - 4.2 pg/mL   2.6   Free T4 Latest Ref Range: 0.71 - 1.51 ng/dL 0.84 0.94   Thyroperoxidase Antibodies Latest Ref Range: <6.0 IU/mL 1229.2 (H)        Thyroid ultrasound 11/2018:       FINDINGS:  The thyroid is normal in size.  Right lobe of the thyroid measures 4.2 x 1.6 x 1.5 cm.  Left lobe of the thyroid measures 4.7 x 1.3 x 1.3 cm.  The thyroid parenchyma is heterogeneous with mild increased vascularity, which may reflect thyroiditis.  A 0.5 x 0.4 x 0.4 cm hyperechoic nodule  is noted at the superior pole of the right thyroid lobe.    Cervical lymph nodes demonstrate normal morphology and size.       Impression       Heterogeneity throughout the thyroid parenchyma with mild increased vascularity, suggesting thyroiditis.  Subcentimeter right thyroid nodule, which does not meet criteria for FNA.      On OCPs     Review of Systems   Constitutional: Positive for fatigue and unexpected weight change (gain).   Eyes: Negative for visual disturbance.   Respiratory: Positive for shortness of breath.    Cardiovascular: Positive for chest pain and palpitations.   Gastrointestinal: Positive for constipation. Negative for abdominal pain.   Musculoskeletal: Negative for arthralgias.   Skin: Negative for wound.   Neurological: Negative for headaches.   Hematological: Does not bruise/bleed easily.   Psychiatric/Behavioral: Negative for sleep disturbance.     Lab Review:   Lab Results   Component Value Date    HGBA1C 5.3 09/20/2019      Lab Results   Component Value Date    CHOL 171 10/19/2018    HDL 34 (L) 10/19/2018    LDLCALC 114.8 10/19/2018    TRIG 111 10/19/2018    CHOLHDL 19.9 (L) 10/19/2018     Lab Results   Component Value Date     09/20/2019    K 3.9 09/20/2019     09/20/2019    CO2 23 09/20/2019    GLU 85 09/20/2019    BUN 10 09/20/2019    CREATININE 0.8 09/20/2019    CALCIUM 9.2 09/20/2019    PROT 7.0 09/20/2019    ALBUMIN 3.7 09/20/2019    BILITOT 0.2 09/20/2019    ALKPHOS 31 (L) 09/20/2019    AST 20 09/20/2019    ALT 19 09/20/2019    ANIONGAP 8 09/20/2019    ESTGFRAFRICA >60.0 09/20/2019    EGFRNONAA >60.0 09/20/2019    TSH 3.807 09/20/2019     Vit D, 25-Hydroxy   Date Value Ref Range Status   09/20/2019 43 30 - 96 ng/mL Final     Comment:     Vitamin D deficiency.........<10 ng/mL                              Vitamin D insufficiency......10-29 ng/mL       Vitamin D sufficiency........> or equal to 30 ng/mL  Vitamin D toxicity............>100 ng/mL       Assessment and Plan      1. Hypothyroidism due to Hashimoto's thyroiditis  TSH    T4, free   2. BMI 30.0-30.9,adult  Hemoglobin A1C   3. Iron deficiency anemia due to chronic blood loss  CBC auto differential     Hypothyroidism due to Hashimoto's thyroiditis  -- Clinically hypothyroid  -- Goal is a normal TSH  -- Check TFTs today and adjust dosage accordingly   -- Avoid exogenous hyperthyroidism as this can accelerate bone loss and increase risk of CV complications.  -- Advised to take LT4 on an empty stomach with water and to wait 30-45 minutes before eating or taking other medications   -- Reviewed usual  times of thyroid hormone  changes- call if start/ stop ocps, weight changes, attempting conception and during pregnancy   -- Reviewed that symptoms of hypothyroidism may not correlate with tsh, and a normal TSH is the goal of therapy.....  symptoms are not a justification for over treatment     Ok to take selenium 100 mcg bid to try and decrease thyroid antibodies     BMI 30.0-30.9,adult  -- alerted as to the increased risk of T2DM   -- encouraged dietary and lifestyle modifications   -- emphasized weight loss goals   -- Referral to bariatric medicine offered, she will alert me if she would  Like a referral  -- recommend periodic A1c     Iron deficiency anemia due to chronic blood loss  Continue iron supplements. Check CBC today      Follow up in about 1 year (around 6/17/2021).    I spent 12 minutes face-to-face with the patient, over half of the visit was spent on counseling and/or coordinating the care of the patient.    Counseling includes:  Diagnostic results, impressions, recommendations   Prognosis   Risk and benefits of management/treatment options   Instructions for management treatment and or follow-up   Importance of compliance with management   Risk factor reduction   Patient education

## 2020-06-17 ENCOUNTER — OFFICE VISIT (OUTPATIENT)
Dept: ENDOCRINOLOGY | Facility: CLINIC | Age: 35
End: 2020-06-17
Payer: COMMERCIAL

## 2020-06-17 DIAGNOSIS — D50.0 IRON DEFICIENCY ANEMIA DUE TO CHRONIC BLOOD LOSS: ICD-10-CM

## 2020-06-17 DIAGNOSIS — E06.3 HYPOTHYROIDISM DUE TO HASHIMOTO'S THYROIDITIS: Primary | ICD-10-CM

## 2020-06-17 DIAGNOSIS — E03.8 HYPOTHYROIDISM DUE TO HASHIMOTO'S THYROIDITIS: Primary | ICD-10-CM

## 2020-06-17 PROCEDURE — 99214 OFFICE O/P EST MOD 30 MIN: CPT | Mod: 95,,, | Performed by: NURSE PRACTITIONER

## 2020-06-17 PROCEDURE — 99214 PR OFFICE/OUTPT VISIT, EST, LEVL IV, 30-39 MIN: ICD-10-PCS | Mod: 95,,, | Performed by: NURSE PRACTITIONER

## 2020-06-20 DIAGNOSIS — Z01.84 ANTIBODY RESPONSE EXAMINATION: ICD-10-CM

## 2020-06-24 ENCOUNTER — LAB VISIT (OUTPATIENT)
Dept: LAB | Facility: HOSPITAL | Age: 35
End: 2020-06-24
Attending: NURSE PRACTITIONER
Payer: COMMERCIAL

## 2020-06-24 DIAGNOSIS — E03.8 HYPOTHYROIDISM DUE TO HASHIMOTO'S THYROIDITIS: ICD-10-CM

## 2020-06-24 DIAGNOSIS — E06.3 HYPOTHYROIDISM DUE TO HASHIMOTO'S THYROIDITIS: ICD-10-CM

## 2020-06-24 DIAGNOSIS — Z01.84 ANTIBODY RESPONSE EXAMINATION: ICD-10-CM

## 2020-06-24 DIAGNOSIS — D50.0 IRON DEFICIENCY ANEMIA DUE TO CHRONIC BLOOD LOSS: ICD-10-CM

## 2020-06-24 LAB
BASOPHILS # BLD AUTO: 0.04 K/UL (ref 0–0.2)
BASOPHILS NFR BLD: 0.6 % (ref 0–1.9)
DIFFERENTIAL METHOD: ABNORMAL
EOSINOPHIL # BLD AUTO: 0.5 K/UL (ref 0–0.5)
EOSINOPHIL NFR BLD: 7 % (ref 0–8)
ERYTHROCYTE [DISTWIDTH] IN BLOOD BY AUTOMATED COUNT: 18.2 % (ref 11.5–14.5)
ESTIMATED AVG GLUCOSE: 100 MG/DL (ref 68–131)
HBA1C MFR BLD HPLC: 5.1 % (ref 4–5.6)
HCT VFR BLD AUTO: 37.1 % (ref 37–48.5)
HGB BLD-MCNC: 11.3 G/DL (ref 12–16)
IMM GRANULOCYTES # BLD AUTO: 0.01 K/UL (ref 0–0.04)
IMM GRANULOCYTES NFR BLD AUTO: 0.1 % (ref 0–0.5)
LYMPHOCYTES # BLD AUTO: 1.8 K/UL (ref 1–4.8)
LYMPHOCYTES NFR BLD: 27.4 % (ref 18–48)
MCH RBC QN AUTO: 23.5 PG (ref 27–31)
MCHC RBC AUTO-ENTMCNC: 30.5 G/DL (ref 32–36)
MCV RBC AUTO: 77 FL (ref 82–98)
MONOCYTES # BLD AUTO: 0.6 K/UL (ref 0.3–1)
MONOCYTES NFR BLD: 8.8 % (ref 4–15)
NEUTROPHILS # BLD AUTO: 3.8 K/UL (ref 1.8–7.7)
NEUTROPHILS NFR BLD: 56.1 % (ref 38–73)
NRBC BLD-RTO: 0 /100 WBC
PLATELET # BLD AUTO: 310 K/UL (ref 150–350)
PMV BLD AUTO: 11.3 FL (ref 9.2–12.9)
RBC # BLD AUTO: 4.8 M/UL (ref 4–5.4)
SARS-COV-2 IGG SERPLBLD QL IA.RAPID: NEGATIVE
T4 FREE SERPL-MCNC: 0.89 NG/DL (ref 0.71–1.51)
TSH SERPL DL<=0.005 MIU/L-ACNC: 5.41 UIU/ML (ref 0.4–4)
WBC # BLD AUTO: 6.69 K/UL (ref 3.9–12.7)

## 2020-06-24 PROCEDURE — 83036 HEMOGLOBIN GLYCOSYLATED A1C: CPT

## 2020-06-24 PROCEDURE — 36415 COLL VENOUS BLD VENIPUNCTURE: CPT | Mod: PO

## 2020-06-24 PROCEDURE — 85025 COMPLETE CBC W/AUTO DIFF WBC: CPT

## 2020-06-24 PROCEDURE — 84439 ASSAY OF FREE THYROXINE: CPT

## 2020-06-24 PROCEDURE — 84443 ASSAY THYROID STIM HORMONE: CPT

## 2020-06-24 PROCEDURE — 86769 SARS-COV-2 COVID-19 ANTIBODY: CPT

## 2020-06-25 ENCOUNTER — PATIENT MESSAGE (OUTPATIENT)
Dept: ENDOCRINOLOGY | Facility: CLINIC | Age: 35
End: 2020-06-25

## 2020-06-25 DIAGNOSIS — E03.8 HYPOTHYROIDISM DUE TO HASHIMOTO'S THYROIDITIS: Primary | ICD-10-CM

## 2020-06-25 DIAGNOSIS — E06.3 HYPOTHYROIDISM DUE TO HASHIMOTO'S THYROIDITIS: Primary | ICD-10-CM

## 2020-06-25 RX ORDER — LEVOTHYROXINE SODIUM 75 UG/1
75 TABLET ORAL
Qty: 30 TABLET | Refills: 11 | Status: SHIPPED | OUTPATIENT
Start: 2020-06-25 | End: 2020-06-25 | Stop reason: SDUPTHER

## 2020-07-01 ENCOUNTER — PATIENT OUTREACH (OUTPATIENT)
Dept: ADMINISTRATIVE | Facility: OTHER | Age: 35
End: 2020-07-01

## 2020-07-01 NOTE — PROGRESS NOTES
Requested updates within Care Everywhere.  Patient's chart was reviewed for overdue MADELAINE topics.  Immunizations reconciled.

## 2020-07-02 ENCOUNTER — OFFICE VISIT (OUTPATIENT)
Dept: NEUROLOGY | Facility: CLINIC | Age: 35
End: 2020-07-02
Payer: COMMERCIAL

## 2020-07-02 DIAGNOSIS — R42 DIZZINESS AND GIDDINESS: ICD-10-CM

## 2020-07-02 DIAGNOSIS — G43.709 CHRONIC MIGRAINE WITHOUT AURA WITHOUT STATUS MIGRAINOSUS, NOT INTRACTABLE: Primary | ICD-10-CM

## 2020-07-02 PROCEDURE — 99214 OFFICE O/P EST MOD 30 MIN: CPT | Mod: 95,,, | Performed by: NEUROLOGICAL SURGERY

## 2020-07-02 PROCEDURE — 99214 PR OFFICE/OUTPT VISIT, EST, LEVL IV, 30-39 MIN: ICD-10-PCS | Mod: 95,,, | Performed by: NEUROLOGICAL SURGERY

## 2020-07-02 RX ORDER — ERENUMAB-AOOE 70 MG/ML
70 INJECTION SUBCUTANEOUS
Qty: 1 ML | Refills: 5 | Status: SHIPPED | OUTPATIENT
Start: 2020-07-02 | End: 2020-12-29

## 2020-07-03 NOTE — PROGRESS NOTES
No chief complaint on file.       Jossy Leslie is a 34 y.o. female with a history of multiple medical diagnoses as listed below that presents for evaluation of headaches.  The patient has been having headaches for the last couple years without an explanation.  The patient has noted than increased levels of stress and decreased sleep has been a trigger for headaches.  Headaches tend to be in the base of the neck and also he has been having pain across the front of the head.  The pain across the front of the head feels like a throbbing sensation has been up to a 7 to 8/10 in intensity.  Pain seems to be ongoing for hours before she gets any relief.  She has been taking Excedrin migraine as well as sumatriptan and Relpax in order to treat her pain.  The symptoms have been the responsive to these treatments although she found that Relpax has been much more consistently reliable in controlling her headache pain rather than Imitrex.  The patient has been having headaches since she was a youth and says that overall the symptoms seem to be about the same as when she was younger with the exception of an increasing frequency compared to her younger years.  She has never taking any preventative medications for headaches.  She has a strong family history with several family members will also have headaches.  The patient has recently been diagnosed and treated for thyroid insufficiency.  She has no history of any other autoimmune phenomenon.    Interval History  03/27/2019  Patient presents to clinic for routine follow-up.  She was taking extended release Topamax as directed but feels that the medication caused her to have too much alteration in her diet in appetite.  The medication caused both appetite suppression and cause her to have poor taste whenever she ate or drink anything including water which she feels was too much for her to tolerate the medication.  Since stopping medication she says the headaches have been  relatively sparse and have been well controlled with alternating Relpax and sumatriptan to address the headaches that still occur intermittently.  She says that this has been working for her allows her to be able to control her headaches relatively quickly despite the lack of a preventative medication.  She has considered alternative therapy such as a daily injectable medication, but feels that due to the sparse nature of headache she would like to defer from making decision at this time.    07/02/2020  Headaches have been stable which she has been having episodes of dizziness and lightheadedness which have been worse with position especially when she bends at the waist.  These episodes have been sometimes associated with headaches but have been occurring in isolation without any headache preceding or following these episodes.  She has never had episodes like these in the past associated with headaches or in isolation..     PAST MEDICAL HISTORY:  Past Medical History:   Diagnosis Date    Asthma     Duodenal ulcer     Eosinophilic esophagitis     Eosinophilic esophagitis     Hiatal hernia     Thyroid disease        PAST SURGICAL HISTORY:  Past Surgical History:   Procedure Laterality Date    ESOPHAGOGASTRODUODENOSCOPY      ESOPHAGOGASTRODUODENOSCOPY N/A 4/18/2019    Procedure: ESOPHAGOGASTRODUODENOSCOPY (EGD);  Surgeon: Karmen Marquez MD;  Location[facility];  Service: Endoscopy;  Laterality: N/A;    HYSTEROSCOPY N/A 5/17/2019    Procedure: HYSTEROSCOPY;  Surgeon: Eli Blevins MD;  Location: [facility] OR;  Service: OB/GYN;  Laterality: N/A;    LAPAROSCOPIC SURGICAL REMOVAL OF CYST OF OVARY Right 5/17/2019    Procedure: EXCISION, CYST, OVARY, LAPAROSCOPIC;  Surgeon: Eli Blevins MD;  Location: [facility] OR;  Service: OB/GYN;  Laterality: Right;  video    TONSILLECTOMY         SOCIAL HISTORY:  Social History     Socioeconomic History    Marital status: Single     Spouse name: Not on file    Number of children:  Not on file    Years of education: Not on file    Highest education level: Not on file   Occupational History    Not on file   Social Needs    Financial resource strain: Not on file    Food insecurity     Worry: Not on file     Inability: Not on file    Transportation needs     Medical: Not on file     Non-medical: Not on file   Tobacco Use    Smoking status: Never Smoker    Smokeless tobacco: Never Used   Substance and Sexual Activity    Alcohol use: No     Alcohol/week: 0.0 standard drinks    Drug use: No    Sexual activity: Not Currently     Partners: Male     Birth control/protection: Condom   Lifestyle    Physical activity     Days per week: Not on file     Minutes per session: Not on file    Stress: Not on file   Relationships    Social connections     Talks on phone: Not on file     Gets together: Not on file     Attends Scientologist service: Not on file     Active member of club or organization: Not on file     Attends meetings of clubs or organizations: Not on file     Relationship status: Not on file   Other Topics Concern    Not on file   Social History Narrative    Not on file       FAMILY HISTORY:  Family History   Problem Relation Age of Onset    Hypertension Mother     Hypertension Father     Heart disease Maternal Grandmother     Cancer Maternal Grandfather     Colon cancer Maternal Grandfather     Diabetes Paternal Grandmother     Cancer Paternal Grandmother     No Known Problems Sister     No Known Problems Brother     No Known Problems Maternal Aunt     No Known Problems Maternal Uncle     No Known Problems Paternal Aunt     No Known Problems Paternal Uncle     Amblyopia Neg Hx     Blindness Neg Hx     Cataracts Neg Hx     Glaucoma Neg Hx     Macular degeneration Neg Hx     Retinal detachment Neg Hx     Strabismus Neg Hx     Stroke Neg Hx     Thyroid disease Neg Hx     Breast cancer Neg Hx     Ovarian cancer Neg Hx        ALLERGIES AND MEDICATIONS: updated and  reviewed.  Review of patient's allergies indicates:   Allergen Reactions    Pumpkin Anaphylaxis    Shellfish containing products Itching     Current Outpatient Medications   Medication Sig Dispense Refill    albuterol (PROVENTIL/VENTOLIN HFA) 90 mcg/actuation inhaler Inhale 1-2 puffs into the lungs every 6 (six) hours as needed for Wheezing or Shortness of Breath. Rescue 1 Inhaler 0    aspirin-acetaminophen-caffeine 250-250-65 mg (EXCEDRIN MIGRAINE) 250-250-65 mg per tablet Excedrin Migraine           cetirizine (ZYRTEC) 10 MG tablet Take 10 mg by mouth once daily.      eletriptan (RELPAX) 40 MG tablet Take 1 tablet (40 mg total) by mouth as needed. may repeat in 2 hours if necessary 12 tablet 5    EPINEPHrine (EPIPEN) 0.3 mg/0.3 mL AtIn Inject 0.3 mLs (0.3 mg total) into the muscle once. for 1 dose 2 each 1    erenumab-aooe (AIMOVIG AUTOINJECTOR) 70 mg/mL injection Inject 1 mL (70 mg total) into the skin every 28 days. 1 mL 5    ferrous gluconate (FERGON) 240 (27 FE) MG tablet Take 2 tablets (480 mg total) by mouth once daily. 30 tablet 11    montelukast (SINGULAIR) 10 mg tablet Take 1 tablet (10 mg total) by mouth every evening. 30 tablet 2    norethindrone ac-eth estradioL (LOESTRIN 1.5/30, 21,) 1.5-30 mg-mcg Tab Take one tablet by mouth daily, do not take sugar pills and start new pack right away (Patient not taking: Reported on 1/30/2020) 84 each 3    pantoprazole (PROTONIX) 40 MG tablet Take 1 tablet (40 mg total) by mouth once daily. 30 tablet 1    ranitidine (ZANTAC) 150 MG tablet Take 1 tablet (150 mg total) by mouth nightly. 30 tablet 1    sumatriptan (IMITREX) 25 MG Tab TAKE 1 TABLET BY MOUTH AS INSTRUCTED UNTIL DIRECTED TO STOP, TAKE ONE TABLET BY MOUTH EVERY 2 HOURS FOR ONE DOSE AS NEEDED FOR HEADACHES 10 tablet 1    SYNTHROID 75 mcg tablet Take 1 tablet (75 mcg total) by mouth before breakfast. BRAND NAME ONLY 30 tablet 11     No current facility-administered medications for this visit.         Review of Systems   Constitutional: Negative for activity change, appetite change, fever and unexpected weight change.   HENT: Negative for trouble swallowing and voice change.    Eyes: Positive for photophobia and visual disturbance.   Respiratory: Negative for apnea and shortness of breath.    Cardiovascular: Negative for chest pain and leg swelling.   Gastrointestinal: Positive for nausea and vomiting. Negative for constipation.   Genitourinary: Negative for difficulty urinating.   Musculoskeletal: Negative for back pain, gait problem and neck pain.   Skin: Negative for color change and pallor.   Neurological: Positive for headaches. Negative for dizziness, seizures, syncope, weakness and numbness.   Hematological: Negative for adenopathy.   Psychiatric/Behavioral: Negative for agitation, confusion and decreased concentration.       Neurologic Exam     Mental Status   Oriented to person, place, and time.   Registration: recalls 3 of 3 objects.   Attention: normal. Concentration: normal.   Speech: speech is normal   Level of consciousness: alert  Knowledge: good.     Cranial Nerves     CN II   Visual fields full to confrontation.   Right visual field deficit: none  Left visual field deficit: none     CN III, IV, VI   Pupils are equal, round, and reactive to light.  Extraocular motions are normal.   Right pupil: Size: 3 mm. Shape: regular. Accommodation: intact.   Left pupil: Size: 3 mm. Shape: regular. Accommodation: intact.   CN III: no CN III palsy  CN VI: no CN VI palsy  Nystagmus: none   Diplopia: none  Ophthalmoparesis: none  Upgaze: normal  Downgaze: normal  Conjugate gaze: present    CN V   Facial sensation intact.   Right facial sensation deficit: none  Left facial sensation deficit: none    CN VII   Facial expression full, symmetric.   Right facial weakness: none  Left facial weakness: none    CN VIII   CN VIII normal.     CN IX, X   CN IX normal.   CN X normal.   Palate: symmetric    CN XI   CN XI  normal.   Right sternocleidomastoid strength: normal  Left sternocleidomastoid strength: normal  Right trapezius strength: normal  Left trapezius strength: normal    CN XII   CN XII normal.   Tongue deviation: none    Motor Exam   Muscle bulk: normal  Overall muscle tone: normal  Right arm tone: normal  Left arm tone: normal  Right leg tone: normal  Left leg tone: normal    Strength   Strength 5/5 throughout.     Sensory Exam   Right arm light touch: normal  Left arm light touch: normal  Right leg light touch: normal  Left leg light touch: normal  Right arm vibration: normal  Left arm vibration: normal  Right leg vibration: normal  Left leg vibration: normal  Right arm proprioception: normal  Left arm proprioception: normal  Right leg proprioception: normal  Left leg proprioception: normal  Right arm pinprick: normal  Left arm pinprick: normal  Right leg pinprick: normal  Left leg pinprick: normal    Gait, Coordination, and Reflexes     Gait  Gait: normal    Coordination   Romberg: negative  Finger to nose coordination: normal  Heel to shin coordination: normal  Tandem walking coordination: normal    Tremor   Resting tremor: absent    Reflexes   Right brachioradialis: 2+  Left brachioradialis: 2+  Right biceps: 2+  Left biceps: 2+  Right triceps: 2+  Left triceps: 2+  Right patellar: 2+  Left patellar: 2+  Right achilles: 2+  Left achilles: 2+  Right plantar: normal  Left plantar: normal      Physical Exam  Vitals signs reviewed.   Constitutional:       Appearance: She is well-developed.   HENT:      Head: Normocephalic and atraumatic.   Eyes:      Extraocular Movements: EOM normal.      Pupils: Pupils are equal, round, and reactive to light.   Neck:      Musculoskeletal: Normal range of motion.   Cardiovascular:      Rate and Rhythm: Normal rate.   Pulmonary:      Effort: Pulmonary effort is normal. No respiratory distress.   Musculoskeletal: Normal range of motion.   Skin:     General: Skin is warm and dry.    Neurological:      Mental Status: She is alert and oriented to person, place, and time.      Coordination: Finger-Nose-Finger Test, Heel to Shin Test and Romberg Test normal.      Gait: Gait is intact. Tandem walk normal.      Deep Tendon Reflexes: Strength normal.      Reflex Scores:       Tricep reflexes are 2+ on the right side and 2+ on the left side.       Bicep reflexes are 2+ on the right side and 2+ on the left side.       Brachioradialis reflexes are 2+ on the right side and 2+ on the left side.       Patellar reflexes are 2+ on the right side and 2+ on the left side.       Achilles reflexes are 2+ on the right side and 2+ on the left side.  Psychiatric:         Speech: Speech normal.         Behavior: Behavior normal.         Thought Content: Thought content normal.         There were no vitals filed for this visit.    Assessment & Plan:    Problem List Items Addressed This Visit     Chronic migraine without aura without status migrainosus, not intractable - Primary    Overview     Patient did not respond well to extended release Topamax to control her headaches, so at this time she will be treated only with abortive medications as needed.  Discussion has been started as to which medication would be the best for prevention in the future.  The patient seems to think that injectable medication such as a CGRP receptor blocker would be her best option         Relevant Medications    erenumab-aooe (AIMOVIG AUTOINJECTOR) 70 mg/mL injection        Patient needs imaging to assess for intracranial problems or intracranial stenosis which could be causing her dizziness and lightheaded sensations.  Follow-up: No follow-ups on file.   More than 50% of this 25 minute encounter was spent in counseling and coordinating care of her headaches

## 2020-07-10 ENCOUNTER — HOSPITAL ENCOUNTER (OUTPATIENT)
Dept: RADIOLOGY | Facility: HOSPITAL | Age: 35
Discharge: HOME | End: 2020-07-10
Attending: NEUROLOGICAL SURGERY
Payer: COMMERCIAL

## 2020-07-10 DIAGNOSIS — R42 DIZZINESS AND GIDDINESS: ICD-10-CM

## 2020-07-10 PROCEDURE — 70544 MR ANGIOGRAPHY HEAD W/O DYE: CPT | Mod: 26,59,, | Performed by: RADIOLOGY

## 2020-07-10 PROCEDURE — 70551 MRI BRAIN WITHOUT CONTRAST: ICD-10-PCS | Mod: 26,,, | Performed by: RADIOLOGY

## 2020-07-10 PROCEDURE — 70551 MRI BRAIN STEM W/O DYE: CPT | Mod: 26,,, | Performed by: RADIOLOGY

## 2020-07-10 PROCEDURE — 70544 MRA BRAIN WITHOUT CONTRAST: ICD-10-PCS | Mod: 26,59,, | Performed by: RADIOLOGY

## 2020-07-10 PROCEDURE — 70544 MR ANGIOGRAPHY HEAD W/O DYE: CPT | Mod: TC,PO

## 2020-07-10 PROCEDURE — 70551 MRI BRAIN STEM W/O DYE: CPT | Mod: TC,PO

## 2020-07-15 DIAGNOSIS — G43.709 CHRONIC MIGRAINE WITHOUT AURA WITHOUT STATUS MIGRAINOSUS, NOT INTRACTABLE: Primary | ICD-10-CM

## 2020-07-15 RX ORDER — SUMATRIPTAN SUCCINATE 100 MG/1
100 TABLET ORAL
Qty: 9 TABLET | Refills: 5 | Status: SHIPPED | OUTPATIENT
Start: 2020-07-15 | End: 2021-07-06 | Stop reason: SDUPTHER

## 2020-07-15 RX ORDER — RIMEGEPANT SULFATE 75 MG/75MG
75 TABLET, ORALLY DISINTEGRATING ORAL ONCE AS NEEDED
Qty: 8 TABLET | Refills: 2 | Status: SHIPPED | OUTPATIENT
Start: 2020-07-15 | End: 2020-07-22

## 2020-07-21 DIAGNOSIS — N80.9 ENDOMETRIOSIS: ICD-10-CM

## 2020-07-21 RX ORDER — NORETHINDRONE ACETATE AND ETHINYL ESTRADIOL .03; 1.5 MG/1; MG/1
TABLET ORAL
Qty: 84 EACH | Refills: 0 | Status: SHIPPED | OUTPATIENT
Start: 2020-07-21 | End: 2020-09-04 | Stop reason: SDUPTHER

## 2020-07-22 DIAGNOSIS — G43.709 CHRONIC MIGRAINE WITHOUT AURA WITHOUT STATUS MIGRAINOSUS, NOT INTRACTABLE: Primary | ICD-10-CM

## 2020-07-22 RX ORDER — RIMEGEPANT SULFATE 75 MG/75MG
75 TABLET, ORALLY DISINTEGRATING ORAL ONCE AS NEEDED
Qty: 16 TABLET | Refills: 2 | Status: SHIPPED | OUTPATIENT
Start: 2020-07-22 | End: 2021-03-28

## 2020-08-20 ENCOUNTER — LAB VISIT (OUTPATIENT)
Dept: LAB | Facility: HOSPITAL | Age: 35
End: 2020-08-20
Attending: FAMILY MEDICINE
Payer: COMMERCIAL

## 2020-08-20 DIAGNOSIS — E06.3 HYPOTHYROIDISM DUE TO HASHIMOTO'S THYROIDITIS: ICD-10-CM

## 2020-08-20 DIAGNOSIS — E03.8 HYPOTHYROIDISM DUE TO HASHIMOTO'S THYROIDITIS: ICD-10-CM

## 2020-08-20 LAB
T4 FREE SERPL-MCNC: 0.88 NG/DL (ref 0.71–1.51)
TSH SERPL DL<=0.005 MIU/L-ACNC: 3.16 UIU/ML (ref 0.4–4)

## 2020-08-20 PROCEDURE — 84439 ASSAY OF FREE THYROXINE: CPT

## 2020-08-20 PROCEDURE — 84443 ASSAY THYROID STIM HORMONE: CPT

## 2020-08-20 PROCEDURE — 36415 COLL VENOUS BLD VENIPUNCTURE: CPT | Mod: PO

## 2020-08-21 ENCOUNTER — OFFICE VISIT (OUTPATIENT)
Dept: OPTOMETRY | Facility: CLINIC | Age: 35
End: 2020-08-21
Payer: COMMERCIAL

## 2020-08-21 DIAGNOSIS — H43.393 VITREOUS FLOATERS, BILATERAL: ICD-10-CM

## 2020-08-21 DIAGNOSIS — Z46.0 FITTING AND ADJUSTMENT OF SPECTACLES AND CONTACT LENSES: Primary | ICD-10-CM

## 2020-08-21 DIAGNOSIS — H52.13 MYOPIA, BILATERAL: Primary | ICD-10-CM

## 2020-08-21 DIAGNOSIS — D31.32 CHOROIDAL NEVUS, LEFT: ICD-10-CM

## 2020-08-21 PROCEDURE — 92310 CONTACT LENS FITTING OU: CPT | Mod: CSM,S$GLB,, | Performed by: OPTOMETRIST

## 2020-08-21 PROCEDURE — 99999 PR PBB SHADOW E&M-EST. PATIENT-LVL I: ICD-10-PCS | Mod: PBBFAC,,, | Performed by: OPTOMETRIST

## 2020-08-21 PROCEDURE — 92012 INTRM OPH EXAM EST PATIENT: CPT | Mod: S$GLB,,, | Performed by: OPTOMETRIST

## 2020-08-21 PROCEDURE — 92012 PR EYE EXAM, EST PATIENT,INTERMED: ICD-10-PCS | Mod: S$GLB,,, | Performed by: OPTOMETRIST

## 2020-08-21 PROCEDURE — 92015 DETERMINE REFRACTIVE STATE: CPT | Mod: S$GLB,,, | Performed by: OPTOMETRIST

## 2020-08-21 PROCEDURE — 99999 PR PBB SHADOW E&M-EST. PATIENT-LVL I: CPT | Mod: PBBFAC,,, | Performed by: OPTOMETRIST

## 2020-08-21 PROCEDURE — 92015 PR REFRACTION: ICD-10-PCS | Mod: S$GLB,,, | Performed by: OPTOMETRIST

## 2020-08-21 PROCEDURE — 92310 PR CONTACT LENS FITTING (NO CHANGE): ICD-10-PCS | Mod: CSM,S$GLB,, | Performed by: OPTOMETRIST

## 2020-08-21 NOTE — PROGRESS NOTES
HPI     Pt here for annual and cl fit  Patient denies diplopia, headaches, flashes/floaters, pain, and   itching/burning/tearing.    Pt does not use any eye drops.  Happy with current cls  No c/o about va    Last edited by Karina Coleman on 8/21/2020  1:04 PM. (History)            Assessment /Plan     For exam results, see Encounter Report.    Myopia, bilateral   Rx specs   CL fit today: dispensed trials of dailies   OK to order if happy   Remove nightly, replace daily      Vitreous floaters, bilateral  Choroidal nevus, left   Pt unable to stay for DFE today   Return for DFE

## 2020-08-21 NOTE — LETTER
August 21, 2020      Ashok Nicholas MD  [address]  [address]  [facility] [address]           [facility]  [address]  [place] [address]  Phone: [phone]  Fax: [phone]          Patient: Jossy Leslie   MR Number: [id number]   YOB: 1985   Date of Visit: 8/21/2020       Dear Dr. Ashok Nicholas:    Thank you for referring Jossy Leslie to me for evaluation. Attached you will find relevant portions of my assessment and plan of care.    If you have questions, please do not hesitate to call me. I look forward to following Jossy Leslie along with you.    Sincerely,    Xochitl Her, OD    Enclosure  CC:  No Recipients    If you would like to receive this communication electronically, please contact [email or web address] or [phone] to request more information on [employer] Link access.    For providers and/or their staff who would like to refer a patient to [facility], please contact us through our one-stop-shop provider referral line, [facility], at [phone].    If you feel you have received this communication in error or would no longer like to receive these types of communications, please e-mail [email or web address]

## 2020-09-04 ENCOUNTER — OFFICE VISIT (OUTPATIENT)
Dept: OBSTETRICS AND GYNECOLOGY | Facility: CLINIC | Age: 35
End: 2020-09-04
Payer: COMMERCIAL

## 2020-09-04 VITALS
BODY MASS INDEX: 31.48 KG/M2 | DIASTOLIC BLOOD PRESSURE: 84 MMHG | WEIGHT: 171.06 LBS | SYSTOLIC BLOOD PRESSURE: 122 MMHG | HEIGHT: 62 IN

## 2020-09-04 DIAGNOSIS — R10.2 PELVIC PAIN: ICD-10-CM

## 2020-09-04 DIAGNOSIS — N80.9 ENDOMETRIOSIS: ICD-10-CM

## 2020-09-04 DIAGNOSIS — Z01.419 WELL WOMAN EXAM WITH ROUTINE GYNECOLOGICAL EXAM: Primary | ICD-10-CM

## 2020-09-04 PROCEDURE — 99999 PR PBB SHADOW E&M-EST. PATIENT-LVL III: ICD-10-PCS | Mod: PBBFAC,,, | Performed by: OBSTETRICS & GYNECOLOGY

## 2020-09-04 PROCEDURE — 99999 PR PBB SHADOW E&M-EST. PATIENT-LVL III: CPT | Mod: PBBFAC,,, | Performed by: OBSTETRICS & GYNECOLOGY

## 2020-09-04 PROCEDURE — 99395 PR PREVENTIVE VISIT,EST,18-39: ICD-10-PCS | Mod: S$GLB,,, | Performed by: OBSTETRICS & GYNECOLOGY

## 2020-09-04 PROCEDURE — 99395 PREV VISIT EST AGE 18-39: CPT | Mod: S$GLB,,, | Performed by: OBSTETRICS & GYNECOLOGY

## 2020-09-04 RX ORDER — NORETHINDRONE ACETATE AND ETHINYL ESTRADIOL .03; 1.5 MG/1; MG/1
TABLET ORAL
Qty: 84 EACH | Refills: 3 | Status: SHIPPED | OUTPATIENT
Start: 2020-09-04 | End: 2021-05-31

## 2020-09-04 NOTE — PROGRESS NOTES
usHistory & Physical  Gynecology      SUBJECTIVE:     Chief Complaint: Annual Exam       History of Present Illness:  Annual Exam-Premenopausal  Patient presents for annual exam. The patient has complaints today right sided low pelvic pain.  Describes it as a dull pain for the past several months. Has had a cyst there that was removed one year ago.  She has had this pain for many months and has had several ultrasound while the pain has been present and the most recent in March didn't show any abnormality.  Doesn't have issues with constipation.  Menstrual cycles are absent with loestrin.  Pain has not changed with or without her period.  The patient is not currently sexually active. GYN screening history: last pap: approximate date 2018 paphpv and was normal. The patient participates in regular exercise: yes.  Smoking status:  no    Contraception: loestrin    FH:  Breast cancer: neg  Colon cancer: maternal grandmother  Ovarian cancer: neg    Review of patient's allergies indicates:   Allergen Reactions    Pumpkin Anaphylaxis    Shellfish containing products Itching       Past Medical History:   Diagnosis Date    Asthma     Duodenal ulcer     Eosinophilic esophagitis     Eosinophilic esophagitis     Hiatal hernia     Thyroid disease      Past Surgical History:   Procedure Laterality Date    ESOPHAGOGASTRODUODENOSCOPY      ESOPHAGOGASTRODUODENOSCOPY N/A 2019    Procedure: ESOPHAGOGASTRODUODENOSCOPY (EGD);  Surgeon: Karmen Marquez MD;  Location: [facility];  Service: Endoscopy;  Laterality: N/A;    HYSTEROSCOPY N/A 2019    Procedure: HYSTEROSCOPY;  Surgeon: Eli Blevins MD;  Location: [facility] OR;  Service: OB/GYN;  Laterality: N/A;    LAPAROSCOPIC SURGICAL REMOVAL OF CYST OF OVARY Right 2019    Procedure: EXCISION, CYST, OVARY, LAPAROSCOPIC;  Surgeon: Eli Blevins MD;  Location: [facility] OR;  Service: OB/GYN;  Laterality: Right;  video    TONSILLECTOMY       OB History        1    Para   1     Term   1            AB        Living   1       SAB        TAB        Ectopic        Multiple        Live Births   1               Family History   Problem Relation Age of Onset    Hypertension Mother     Hypertension Father     Heart disease Maternal Grandmother     Cancer Maternal Grandfather     Colon cancer Maternal Grandfather     Diabetes Paternal Grandmother     Cancer Paternal Grandmother     No Known Problems Sister     No Known Problems Brother     No Known Problems Maternal Aunt     No Known Problems Maternal Uncle     No Known Problems Paternal Aunt     No Known Problems Paternal Uncle     Amblyopia Neg Hx     Blindness Neg Hx     Cataracts Neg Hx     Glaucoma Neg Hx     Macular degeneration Neg Hx     Retinal detachment Neg Hx     Strabismus Neg Hx     Stroke Neg Hx     Thyroid disease Neg Hx     Breast cancer Neg Hx     Ovarian cancer Neg Hx      Social History     Tobacco Use    Smoking status: Never Smoker    Smokeless tobacco: Never Used   Substance Use Topics    Alcohol use: Yes     Alcohol/week: 0.0 standard drinks    Drug use: No       Current Outpatient Medications   Medication Sig    aspirin-acetaminophen-caffeine 250-250-65 mg (EXCEDRIN MIGRAINE) 250-250-65 mg per tablet Excedrin Migraine         norethindrone ac-eth estradioL (LOESTRIN 1.5/30, 21,) 1.5-30 mg-mcg Tab Take one tablet by mouth daily, do not take sugar pills and start new pack right away    pantoprazole (PROTONIX) 40 MG tablet Take 1 tablet (40 mg total) by mouth once daily.    SYNTHROID 75 mcg tablet Take 1 tablet (75 mcg total) by mouth before breakfast. BRAND NAME ONLY    albuterol (PROVENTIL/VENTOLIN HFA) 90 mcg/actuation inhaler Inhale 1-2 puffs into the lungs every 6 (six) hours as needed for Wheezing or Shortness of Breath. Rescue (Patient not taking: Reported on 2020)    cetirizine (ZYRTEC) 10 MG tablet Take 10 mg by mouth once daily.    eletriptan (RELPAX) 40 MG tablet Take  1 tablet (40 mg total) by mouth as needed. may repeat in 2 hours if necessary (Patient not taking: Reported on 9/4/2020)    EPINEPHrine (EPIPEN) 0.3 mg/0.3 mL AtIn Inject 0.3 mLs (0.3 mg total) into the muscle once. for 1 dose    erenumab-aooe (AIMOVIG AUTOINJECTOR) 70 mg/mL injection Inject 1 mL (70 mg total) into the skin every 28 days. (Patient not taking: Reported on 9/4/2020)    ferrous gluconate (FERGON) 240 (27 FE) MG tablet Take 2 tablets (480 mg total) by mouth once daily. (Patient not taking: Reported on 9/4/2020)    ranitidine (ZANTAC) 150 MG tablet Take 1 tablet (150 mg total) by mouth nightly.    sumatriptan (IMITREX) 100 MG tablet Take 1 tablet (100 mg total) by mouth as needed for Migraine. Take at the onset of headache, may take 1 more in 1 hour if needed. (Patient not taking: Reported on 9/4/2020)     No current facility-administered medications for this visit.          Review of Systems:  Review of Systems   Constitutional: Negative for activity change, appetite change and fever.   Respiratory: Negative for shortness of breath.    Cardiovascular: Negative for chest pain.   Gastrointestinal: Negative for abdominal pain, constipation, diarrhea, nausea (slight nausea present) and vomiting.   Genitourinary: Positive for pelvic pain. Negative for menorrhagia, menstrual problem, vaginal bleeding, vaginal discharge and vaginal pain.   Integumentary:  Negative for nipple discharge.   Neurological: Positive for headaches. Negative for numbness.   Breast: Negative for mastodynia and nipple discharge       OBJECTIVE:     Physical Exam:  Physical Exam  Vitals signs and nursing note reviewed.   Constitutional:       Appearance: She is well-developed.   Neck:      Musculoskeletal: Normal range of motion and neck supple.      Thyroid: No thyromegaly.      Trachea: No tracheal deviation.   Cardiovascular:      Rate and Rhythm: Normal rate and regular rhythm.      Heart sounds: Normal heart sounds.    Pulmonary:      Effort: Pulmonary effort is normal.      Breath sounds: Normal breath sounds.   Chest:      Breasts: Breasts are symmetrical.         Right: No inverted nipple, mass, nipple discharge, skin change or tenderness.         Left: No inverted nipple, mass, nipple discharge, skin change or tenderness.   Abdominal:      Palpations: Abdomen is soft.   Genitourinary:     General: Normal vulva.      Labia:         Right: No rash, tenderness, lesion or injury.         Left: No rash, tenderness, lesion or injury.       Urethra: No prolapse, urethral pain, urethral swelling or urethral lesion.      Vagina: Normal. No signs of injury and foreign body. No vaginal discharge, erythema, tenderness or bleeding.      Cervix: No cervical motion tenderness, discharge or friability.      Uterus: Not deviated, not enlarged, not fixed and not tender.       Adnexa:         Right: No mass, tenderness or fullness.          Left: No mass, tenderness or fullness.        Rectum: No anal fissure or external hemorrhoid.      Comments: Urethral meatus: normal size, anterior vaginal wall with no prolapse, no lesions  Bladder: no fullness, masses or tenderness  No obvious pain on exam  Neurological:      Mental Status: She is alert and oriented to person, place, and time.   Psychiatric:         Behavior: Behavior normal.         Thought Content: Thought content normal.         Judgment: Judgment normal.         Chaperoned by: Nasir    ASSESSMENT:       ICD-10-CM ICD-9-CM    1. Well woman exam with routine gynecological exam  Z01.419 V72.31    2. Endometriosis  N80.9 617.9 norethindrone ac-eth estradioL (LOESTRIN 1.5/30, 21,) 1.5-30 mg-mcg Tab   3. Pelvic pain  R10.2 [id number] US Pelvis Comp with Transvag NON-OB (xpd          Plan:      Jossy was seen today for annual exam.    Diagnoses and all orders for this visit:    Well woman exam with routine gynecological exam    Endometriosis  -     norethindrone ac-eth estradioL (LOESTRIN 1.5/30,  21,) 1.5-30 mg-mcg Tab; Take one tablet by mouth daily, do not take sugar pills and start new pack right away    Pelvic pain  -     US Pelvis Comp with Transvag NON-OB (xpd; Future        Orders Placed This Encounter   Procedures    US Pelvis Comp with Transvag NON-OB (xpd       Well Woman:  - Pap smear up to date  - Birth control: refilled  - GC/CT:n/a  - Mammogram: due age 39  - Smoking cessation: n/a  - Labs: none required   - Vaccines: none required  - Exercise counseling    Pelvic pain:  - discussed possible scar tissue after surgery, possible cyst  - discussed treatment options- consider laparoscopy if persists  - ultrasound ordered today    Follow up in one year for annual or prn.    Evelin Ahuja

## 2020-09-10 ENCOUNTER — OFFICE VISIT (OUTPATIENT)
Dept: OPTOMETRY | Facility: CLINIC | Age: 35
End: 2020-09-10
Payer: COMMERCIAL

## 2020-09-10 DIAGNOSIS — H43.393 VITREOUS FLOATERS, BILATERAL: Primary | ICD-10-CM

## 2020-09-10 DIAGNOSIS — D31.32 CHOROIDAL NEVUS, LEFT: ICD-10-CM

## 2020-09-10 PROCEDURE — 99999 PR PBB SHADOW E&M-EST. PATIENT-LVL II: CPT | Mod: PBBFAC,,, | Performed by: OPTOMETRIST

## 2020-09-10 PROCEDURE — 92014 COMPRE OPH EXAM EST PT 1/>: CPT | Mod: S$GLB,,, | Performed by: OPTOMETRIST

## 2020-09-10 PROCEDURE — 99999 PR PBB SHADOW E&M-EST. PATIENT-LVL II: ICD-10-PCS | Mod: PBBFAC,,, | Performed by: OPTOMETRIST

## 2020-09-10 PROCEDURE — 92014 PR EYE EXAM, EST PATIENT,COMPREHESV: ICD-10-PCS | Mod: S$GLB,,, | Performed by: OPTOMETRIST

## 2020-09-10 NOTE — PROGRESS NOTES
HPI     Pt here for dfe only   Pt says she has been noticing a little bit of dizziness x last visit but   think its because she hasnt filled her new rx yet    Last edited by Karina Coleman on 9/10/2020 11:15 AM. (History)            Assessment /Plan     For exam results, see Encounter Report.    Vitreous floaters, bilateral   No holes, tears or RD today, monitor yearly   Discussed RD precautions, return sooner if vision worsens    Choroidal nevus, left   Stable, monitor    RTC 1 year

## 2020-09-15 ENCOUNTER — OFFICE VISIT (OUTPATIENT)
Dept: DERMATOLOGY | Facility: CLINIC | Age: 35
End: 2020-09-15
Payer: COMMERCIAL

## 2020-09-15 DIAGNOSIS — Z12.83 SKIN CANCER SCREENING: ICD-10-CM

## 2020-09-15 DIAGNOSIS — D22.9 BENIGN MOLE: ICD-10-CM

## 2020-09-15 DIAGNOSIS — L81.4 LENTIGO: ICD-10-CM

## 2020-09-15 DIAGNOSIS — B07.9 WART OF FACE: Primary | ICD-10-CM

## 2020-09-15 PROCEDURE — 17110 PR DESTRUCTION BENIGN LESIONS UP TO 14: ICD-10-PCS | Mod: S$GLB,,, | Performed by: DERMATOLOGY

## 2020-09-15 PROCEDURE — 99999 PR PBB SHADOW E&M-EST. PATIENT-LVL III: ICD-10-PCS | Mod: PBBFAC,,, | Performed by: DERMATOLOGY

## 2020-09-15 PROCEDURE — 99999 PR PBB SHADOW E&M-EST. PATIENT-LVL III: CPT | Mod: PBBFAC,,, | Performed by: DERMATOLOGY

## 2020-09-15 PROCEDURE — 99202 PR OFFICE/OUTPT VISIT, NEW, LEVL II, 15-29 MIN: ICD-10-PCS | Mod: 25,S$GLB,, | Performed by: DERMATOLOGY

## 2020-09-15 PROCEDURE — 99202 OFFICE O/P NEW SF 15 MIN: CPT | Mod: 25,S$GLB,, | Performed by: DERMATOLOGY

## 2020-09-15 PROCEDURE — 17110 DESTRUCTION B9 LES UP TO 14: CPT | Mod: S$GLB,,, | Performed by: DERMATOLOGY

## 2020-09-15 NOTE — PATIENT INSTRUCTIONS

## 2020-09-15 NOTE — PROGRESS NOTES
Subjective:       Patient ID:  Jossy Leslie is a 34 y.o. female who presents for   Chief Complaint   Patient presents with    Skin Check     UBSE  family h/o of skin cancer      Bump of the face for a couple of months.  No tx.  Pt picks.      Review of Systems   Constitutional: Negative.    HENT: Negative.    Respiratory: Negative.    Musculoskeletal: Negative.    Skin: Negative for daily sunscreen use.   Hematologic/Lymphatic: Does not bruise/bleed easily.        Objective:    Physical Exam   Constitutional: She appears well-developed and well-nourished. No distress.   Eyes: No conjunctival no injection.   Neurological: She is alert and oriented to person, place, and time. She is not disoriented.   Psychiatric: She has a normal mood and affect.   Skin:   Areas Examined (abnormalities noted in diagram):   Head / Face Inspection Performed  Neck Inspection Performed  Chest / Axilla Inspection Performed  Abdomen Inspection Performed  Back Inspection Performed  RUE Inspected  LUE Inspection Performed  Nails and Digits Inspection Performed                   Diagram Legend     Erythematous scaling macule/papule c/w actinic keratosis       Vascular papule c/w angioma      Pigmented verrucoid papule/plaque c/w seborrheic keratosis      Yellow umbilicated papule c/w sebaceous hyperplasia      Irregularly shaped tan macule c/w lentigo     1-2 mm smooth white papules consistent with Milia      Movable subcutaneous cyst with punctum c/w epidermal inclusion cyst      Subcutaneous movable cyst c/w pilar cyst      Firm pink to brown papule c/w dermatofibroma      Pedunculated fleshy papule(s) c/w skin tag(s)      Evenly pigmented macule c/w junctional nevus     Mildly variegated pigmented, slightly irregular-bordered macule c/w mildly atypical nevus      Flesh colored to evenly pigmented papule c/w intradermal nevus       Pink pearly papule/plaque c/w basal cell carcinoma      Erythematous hyperkeratotic cursted plaque c/w  SCC      Surgical scar with no sign of skin cancer recurrence      Open and closed comedones      Inflammatory papules and pustules      Verrucoid papule consistent consistent with wart     Erythematous eczematous patches and plaques     Dystrophic onycholytic nail with subungual debris c/w onychomycosis     Umbilicated papule    Erythematous-base heme-crusted tan verrucoid plaque consistent with inflamed seborrheic keratosis     Erythematous Silvery Scaling Plaque c/w Psoriasis     See annotation      Assessment / Plan:        Wart of face  Warts are caused by the human papillomavirus (HPV). There are over 150 types of HPV. This virus can spread between people. But you can be exposed to the virus and not get warts. Warts tend to form where skin is damaged or broken. But they can also appear elsewhere. Left untreated, warts can grow in number. They can also spread to other parts of the body.    Different treatment options were reviewed.  For small warts or recurrences, over the counter acid treatments can be used until irritation occurs or mild scabbing.    20-40% salicylic acid can be used with occlusion for significant warts daily with careful paring and debulking with breaks as needed for discomfort or sensitivity.  This can be done on a protracted basis for clearance of warts over time.    Cryosurgery procedure note:    Verbal consent from the patient is obtained including, but not limited to, risk of hypopigmentation/hyperpigmentation, scar, recurrence of lesion. Liquid nitrogen cryosurgery is applied to 1 verruca with prior paring, as detailed in the physical exam, to produce a freeze injury. 2 consecutive freeze thaw cycles are applied to each verruca. The patient is aware that blisters (possibly blood blisters) may form.    Lentigo  Discussed with patient the benign nature of these lesions and that no treatment is indicated.    Patient instructed in importance in daily sun protection. Sun avoidance and  topical protection discussed.     Patient encouraged to wear hat for all outdoor exposure.     Also discussed sun protective clothing.  Patient can try lemon juice focally and carefully to avoid irritation or dryness in effort to lighten dark color.    Benign mole  Discussed all of the following with the patient.    Patient to check their breasts (if applicable), buttocks, and groin with a mirror.  Patient deferred our examination of these areas today.    Each month, check your body for any spots such as freckles, age spots, and moles.  Watch for color changes and shape changes and growth in size.    Have your pandya or  pay attention to any dark color changes to moles of the scalp.    Moles, also called nevi, are small, colored (pigmented) marks on the skin. They have no known purpose. Many moles appear before age 30, but they also increase frequently as people age. Moles most often are not cancer (benign) and are harmless. But some become cancerous (malignant). Thats why you need to watch the moles on your body and tell your healthcare provider about any that concern you.    Skin cancer screening  Waist up examination performed today.  No suspicious lesions were noted unless otherwise documented in this note.    Patient did not want a waist down exam today.             Follow up in about 1 year (around 9/15/2021) for TBSE.

## 2020-10-02 ENCOUNTER — PATIENT MESSAGE (OUTPATIENT)
Dept: NEUROLOGY | Facility: CLINIC | Age: 35
End: 2020-10-02

## 2020-10-05 ENCOUNTER — PATIENT MESSAGE (OUTPATIENT)
Dept: ADMINISTRATIVE | Facility: HOSPITAL | Age: 35
End: 2020-10-05

## 2020-10-08 DIAGNOSIS — G44.221 CHRONIC TENSION-TYPE HEADACHE, INTRACTABLE: Primary | ICD-10-CM

## 2020-10-08 DIAGNOSIS — G44.221 CHRONIC TENSION-TYPE HEADACHE, INTRACTABLE: ICD-10-CM

## 2020-10-08 RX ORDER — CYCLOBENZAPRINE HCL 10 MG
10 TABLET ORAL NIGHTLY
Qty: 60 TABLET | Refills: 3 | Status: SHIPPED | OUTPATIENT
Start: 2020-10-08 | End: 2020-10-08 | Stop reason: SDUPTHER

## 2020-10-08 RX ORDER — CYCLOBENZAPRINE HCL 10 MG
10 TABLET ORAL NIGHTLY
Qty: 60 TABLET | Refills: 3 | Status: SHIPPED | OUTPATIENT
Start: 2020-10-08 | End: 2020-12-11

## 2020-10-15 ENCOUNTER — HOSPITAL ENCOUNTER (OUTPATIENT)
Dept: RADIOLOGY | Facility: OTHER | Age: 35
Discharge: HOME | End: 2020-10-15
Attending: OBSTETRICS & GYNECOLOGY
Payer: COMMERCIAL

## 2020-10-15 DIAGNOSIS — R10.2 PELVIC PAIN: ICD-10-CM

## 2020-10-15 PROCEDURE — 76856 US EXAM PELVIC COMPLETE: CPT | Mod: 26,,, | Performed by: RADIOLOGY

## 2020-10-15 PROCEDURE — 76830 TRANSVAGINAL US NON-OB: CPT | Mod: 26,,, | Performed by: RADIOLOGY

## 2020-10-15 PROCEDURE — 76830 TRANSVAGINAL US NON-OB: CPT | Mod: TC

## 2020-10-15 PROCEDURE — 76856 US PELVIS COMP WITH TRANSVAG NON-OB (XPD): ICD-10-PCS | Mod: 26,,, | Performed by: RADIOLOGY

## 2020-10-15 PROCEDURE — 76830 US PELVIS COMP WITH TRANSVAG NON-OB (XPD): ICD-10-PCS | Mod: 26,,, | Performed by: RADIOLOGY

## 2020-12-29 ENCOUNTER — OFFICE VISIT (OUTPATIENT)
Dept: ENDOCRINOLOGY | Facility: CLINIC | Age: 35
End: 2020-12-29
Payer: COMMERCIAL

## 2020-12-29 DIAGNOSIS — E03.8 HYPOTHYROIDISM DUE TO HASHIMOTO'S THYROIDITIS: ICD-10-CM

## 2020-12-29 DIAGNOSIS — E06.3 HYPOTHYROIDISM DUE TO HASHIMOTO'S THYROIDITIS: ICD-10-CM

## 2020-12-29 DIAGNOSIS — E04.1 THYROID NODULE: ICD-10-CM

## 2020-12-29 PROCEDURE — 99213 OFFICE O/P EST LOW 20 MIN: CPT | Mod: 95,,, | Performed by: INTERNAL MEDICINE

## 2020-12-29 PROCEDURE — 99213 PR OFFICE/OUTPT VISIT, EST, LEVL III, 20-29 MIN: ICD-10-PCS | Mod: 95,,, | Performed by: INTERNAL MEDICINE

## 2020-12-29 RX ORDER — LEVOTHYROXINE SODIUM 75 UG/1
75 TABLET ORAL
Qty: 30 TABLET | Refills: 11 | Status: SHIPPED | OUTPATIENT
Start: 2020-12-29 | End: 2021-02-25

## 2020-12-29 NOTE — PROGRESS NOTES
Subjective:      Patient ID: Jossy Leslie is a 35 y.o. female.    Chief Complaint:  No chief complaint on file.      History of Present Illness  Here for a follow up visit for hypothyroidism due to hashimoto's thyroiditis. Last seen by Ms. Castaneda in 6/2020.    Reports increased dose of synthroid at her last visit with Ms. Castaneda. Switched jobs and is now working at [employer]. Ran out of prescription for about one month. Now currently on synthroid for the past three weeks.   Reports increased anxiety, palpitations but she was off synthroid at the time. (switched jobs at the time so this may be attributed to life stressors.) + nausea this weekend that has resolved.  Reports weight gain of a few pounds.   Home scale: 175 lbs.     Started OCPs during the summer time. (approx 6/2020)     Current medication is levorthyroxine 75 mcg daily, three weeks.   Takes it properly without food first thing in the morning with water. Waits 30 - 45 minutes before breakfast or other pills.      Review of Systems   Constitutional: Negative for fever.   HENT: Negative for congestion.    Eyes: Negative for visual disturbance.   Respiratory: Negative for shortness of breath.    Cardiovascular: Negative for chest pain.   Gastrointestinal: Negative for abdominal pain.   Genitourinary: Negative for dysuria.   Musculoskeletal: Negative for arthralgias.   Skin: Negative for rash.   Neurological: Negative for weakness.   Hematological: Does not bruise/bleed easily.   Psychiatric/Behavioral: Negative for sleep disturbance.       Objective:   Physical Exam  There were no vitals filed for this visit.    BP Readings from Last 3 Encounters:   09/04/20 122/84   01/30/20 110/80   12/16/19 115/87     Wt Readings from Last 1 Encounters:   09/04/20 1304 77.6 kg (171 lb 1.2 oz)         There is no height or weight on file to calculate BMI.    Lab Review:   Lab Results   Component Value Date    HGBA1C 5.1 06/24/2020     Lab Results   Component Value Date     CHOL 171 10/19/2018    HDL 34 (L) 10/19/2018    LDLCALC 114.8 10/19/2018    TRIG 111 10/19/2018    CHOLHDL 19.9 (L) 10/19/2018     Lab Results   Component Value Date     09/20/2019    K 3.9 09/20/2019     09/20/2019    CO2 23 09/20/2019    GLU 85 09/20/2019    BUN 10 09/20/2019    CREATININE 0.8 09/20/2019    CALCIUM 9.2 09/20/2019    PROT 7.0 09/20/2019    ALBUMIN 3.7 09/20/2019    BILITOT 0.2 09/20/2019    ALKPHOS 31 (L) 09/20/2019    AST 20 09/20/2019    ALT 19 09/20/2019    ANIONGAP 8 09/20/2019    ESTGFRAFRICA >60.0 09/20/2019    EGFRNONAA >60.0 09/20/2019    TSH 3.163 08/20/2020     Results for TAYLOR MEJIA (MRN [id number]) as of 12/29/2020 09:42   Ref. Range 10/19/2018 10:00 11/21/2018 15:59 9/20/2019 13:50 6/24/2020 07:43 8/20/2020 08:06   TSH Latest Ref Range: 0.400 - 4.000 uIU/mL 4.840 (H) 6.217 (H) 3.807 5.408 (H) 3.163   T3, Free Latest Ref Range: 2.3 - 4.2 pg/mL  2.6      Free T4 Latest Ref Range: 0.71 - 1.51 ng/dL 0.84 0.94 0.84 0.89 0.88   Thyroperoxidase Antibodies Latest Ref Range: <6.0 IU/mL 1229.2 (H)         US 2018  FINDINGS:  The thyroid is normal in size.  Right lobe of the thyroid measures 4.2 x 1.6 x 1.5 cm.  Left lobe of the thyroid measures 4.7 x 1.3 x 1.3 cm.  The thyroid parenchyma is heterogeneous with mild increased vascularity, which may reflect thyroiditis.  A 0.5 x 0.4 x 0.4 cm hyperechoic nodule is noted at the superior pole of the right thyroid lobe.     Cervical lymph nodes demonstrate normal morphology and size.    Assessment and Plan     Hypothyroidism due to Hashimoto's thyroiditis  Unclear if symptoms related to synthroid dose  Palpitations, anxiety and difficulty falling asleep occurred while off synthroid   Reports increase in weight and use of OCPs  Used synthroid 75 mcg for 21 dys.     Repeat TSH in end of January/early February     Thyroid nodule  Sub-centimeter nodules  Last US 2018   OK to repeat in 5 years.       The patient location is: home  The chief  complaint leading to consultation is: hypothyroidism    Visit type: audiovisual    Face to Face time with patient: 25 minutes of total time spent on the encounter, which includes face to face time and non-face to face time preparing to see the patient (eg, review of tests), Obtaining and/or reviewing separately obtained history, Documenting clinical information in the electronic or other health record, Independently interpreting results (not separately reported) and communicating results to the patient/family/caregiver, or Care coordination (not separately reported).         Each patient to whom he or she provides medical services by telemedicine is:  (1) informed of the relationship between the physician and patient and the respective role of any other health care provider with respect to management of the patient; and (2) notified that he or she may decline to receive medical services by telemedicine and may withdraw from such care at any time.

## 2020-12-29 NOTE — PATIENT INSTRUCTIONS
Please make sure you are taking thyroid hormone on an empty stomach with water only, wait thirty to forty five minutes before you take any other pill, vitamin or food.       TSH in end of January or beginning February.

## 2020-12-29 NOTE — ASSESSMENT & PLAN NOTE
Unclear if symptoms related to synthroid dose  Palpitations, anxiety and difficulty falling asleep occurred while off synthroid   Reports increase in weight and use of OCPs  Used synthroid 75 mcg for 21 dys.     Repeat TSH in end of January/early February

## 2021-01-04 ENCOUNTER — PATIENT MESSAGE (OUTPATIENT)
Dept: GASTROENTEROLOGY | Facility: CLINIC | Age: 36
End: 2021-01-04

## 2021-01-04 ENCOUNTER — PATIENT MESSAGE (OUTPATIENT)
Dept: ADMINISTRATIVE | Facility: HOSPITAL | Age: 36
End: 2021-01-04

## 2021-01-04 DIAGNOSIS — K20.0 EOSINOPHILIC ESOPHAGITIS: ICD-10-CM

## 2021-01-04 RX ORDER — PANTOPRAZOLE SODIUM 40 MG/1
40 TABLET, DELAYED RELEASE ORAL DAILY
Qty: 30 TABLET | Refills: 1 | Status: SHIPPED | OUTPATIENT
Start: 2021-01-04 | End: 2021-12-09

## 2021-01-26 ENCOUNTER — PATIENT MESSAGE (OUTPATIENT)
Dept: ENDOCRINOLOGY | Facility: CLINIC | Age: 36
End: 2021-01-26

## 2021-02-19 ENCOUNTER — OFFICE VISIT (OUTPATIENT)
Dept: FAMILY MEDICINE | Facility: CLINIC | Age: 36
End: 2021-02-19
Payer: COMMERCIAL

## 2021-02-19 ENCOUNTER — HOSPITAL ENCOUNTER (EMERGENCY)
Facility: HOSPITAL | Age: 36
Discharge: HOME | End: 2021-02-19
Attending: INTERNAL MEDICINE
Payer: COMMERCIAL

## 2021-02-19 VITALS
DIASTOLIC BLOOD PRESSURE: 81 MMHG | OXYGEN SATURATION: 99 % | HEIGHT: 62 IN | RESPIRATION RATE: 20 BRPM | TEMPERATURE: 98 F | WEIGHT: 178 LBS | HEART RATE: 78 BPM | BODY MASS INDEX: 32.76 KG/M2 | SYSTOLIC BLOOD PRESSURE: 123 MMHG

## 2021-02-19 DIAGNOSIS — R51.9 RIGHT SIDED TEMPORAL HEADACHE: Primary | ICD-10-CM

## 2021-02-19 DIAGNOSIS — I10 HYPERTENSION, UNSPECIFIED TYPE: ICD-10-CM

## 2021-02-19 DIAGNOSIS — R42 LIGHTHEADEDNESS: ICD-10-CM

## 2021-02-19 DIAGNOSIS — I10 HYPERTENSION, UNSPECIFIED TYPE: Primary | ICD-10-CM

## 2021-02-19 LAB
ALBUMIN SERPL-MCNC: 3.8 G/DL (ref 3.3–5.5)
ALP SERPL-CCNC: 47 U/L (ref 42–141)
B-HCG UR QL: NEGATIVE
BILIRUB SERPL-MCNC: 0.5 MG/DL (ref 0.2–1.6)
BILIRUBIN, POC UA: NEGATIVE
BLOOD, POC UA: NEGATIVE
BUN SERPL-MCNC: 7 MG/DL (ref 7–22)
CALCIUM SERPL-MCNC: 9.4 MG/DL (ref 8–10.3)
CHLORIDE SERPL-SCNC: 105 MMOL/L (ref 98–108)
CLARITY, POC UA: CLEAR
COLOR, POC UA: YELLOW
CREAT SERPL-MCNC: 0.8 MG/DL (ref 0.6–1.2)
CTP QC/QA: YES
GLUCOSE SERPL-MCNC: 83 MG/DL (ref 73–118)
GLUCOSE, POC UA: NEGATIVE
KETONES, POC UA: NEGATIVE
LEUKOCYTE EST, POC UA: NEGATIVE
NITRITE, POC UA: NEGATIVE
PH UR STRIP: 7 [PH]
POC ALT (SGPT): 71 U/L (ref 10–47)
POC AST (SGOT): 50 U/L (ref 11–38)
POC CARDIAC TROPONIN I: 0 NG/ML
POC TCO2: 28 MMOL/L (ref 18–33)
POTASSIUM BLD-SCNC: 3.8 MMOL/L (ref 3.6–5.1)
PROTEIN, POC UA: NEGATIVE
PROTEIN, POC: 6.8 G/DL (ref 6.4–8.1)
SAMPLE: NORMAL
SODIUM BLD-SCNC: 139 MMOL/L (ref 128–145)
SPECIFIC GRAVITY, POC UA: 1.01
UROBILINOGEN, POC UA: 0.2 E.U./DL

## 2021-02-19 PROCEDURE — 84443 ASSAY THYROID STIM HORMONE: CPT

## 2021-02-19 PROCEDURE — 96374 THER/PROPH/DIAG INJ IV PUSH: CPT | Mod: ER

## 2021-02-19 PROCEDURE — 99213 PR OFFICE/OUTPT VISIT, EST, LEVL III, 20-29 MIN: ICD-10-PCS | Mod: 95,,, | Performed by: NURSE PRACTITIONER

## 2021-02-19 PROCEDURE — 80053 COMPREHEN METABOLIC PANEL: CPT | Mod: ER

## 2021-02-19 PROCEDURE — 93010 EKG 12-LEAD: ICD-10-PCS | Mod: ,,, | Performed by: INTERNAL MEDICINE

## 2021-02-19 PROCEDURE — 84439 ASSAY OF FREE THYROXINE: CPT

## 2021-02-19 PROCEDURE — 81025 URINE PREGNANCY TEST: CPT | Mod: ER | Performed by: INTERNAL MEDICINE

## 2021-02-19 PROCEDURE — 25000003 PHARM REV CODE 250: Mod: ER | Performed by: NURSE PRACTITIONER

## 2021-02-19 PROCEDURE — 63600175 PHARM REV CODE 636 W HCPCS: Mod: ER | Performed by: NURSE PRACTITIONER

## 2021-02-19 PROCEDURE — 93005 ELECTROCARDIOGRAM TRACING: CPT | Mod: ER

## 2021-02-19 PROCEDURE — 99213 OFFICE O/P EST LOW 20 MIN: CPT | Mod: 95,,, | Performed by: NURSE PRACTITIONER

## 2021-02-19 PROCEDURE — 99284 EMERGENCY DEPT VISIT MOD MDM: CPT | Mod: 25,ER

## 2021-02-19 PROCEDURE — 81003 URINALYSIS AUTO W/O SCOPE: CPT | Mod: ER

## 2021-02-19 PROCEDURE — 85025 COMPLETE CBC W/AUTO DIFF WBC: CPT | Mod: ER

## 2021-02-19 PROCEDURE — 93010 ELECTROCARDIOGRAM REPORT: CPT | Mod: ,,, | Performed by: INTERNAL MEDICINE

## 2021-02-19 RX ORDER — LOSARTAN POTASSIUM 25 MG/1
25 TABLET ORAL DAILY
Qty: 30 TABLET | Refills: 1 | Status: SHIPPED | OUTPATIENT
Start: 2021-02-19 | End: 2021-05-31

## 2021-02-19 RX ORDER — KETOROLAC TROMETHAMINE 30 MG/ML
15 INJECTION, SOLUTION INTRAMUSCULAR; INTRAVENOUS
Status: COMPLETED | OUTPATIENT
Start: 2021-02-19 | End: 2021-02-19

## 2021-02-19 RX ORDER — LOSARTAN POTASSIUM 25 MG/1
25 TABLET ORAL
Status: COMPLETED | OUTPATIENT
Start: 2021-02-19 | End: 2021-02-19

## 2021-02-19 RX ADMIN — KETOROLAC TROMETHAMINE 15 MG: 30 INJECTION, SOLUTION INTRAMUSCULAR; INTRAVENOUS at 09:02

## 2021-02-19 RX ADMIN — LOSARTAN POTASSIUM 25 MG: 25 TABLET, FILM COATED ORAL at 09:02

## 2021-02-20 LAB
T4 FREE SERPL-MCNC: 0.97 NG/DL (ref 0.71–1.51)
TSH SERPL DL<=0.005 MIU/L-ACNC: 6.97 UIU/ML (ref 0.4–4)

## 2021-02-22 ENCOUNTER — PATIENT OUTREACH (OUTPATIENT)
Dept: ADMINISTRATIVE | Facility: HOSPITAL | Age: 36
End: 2021-02-22

## 2021-02-24 ENCOUNTER — PATIENT MESSAGE (OUTPATIENT)
Dept: ENDOCRINOLOGY | Facility: CLINIC | Age: 36
End: 2021-02-24

## 2021-02-24 DIAGNOSIS — E03.8 HYPOTHYROIDISM DUE TO HASHIMOTO'S THYROIDITIS: Primary | ICD-10-CM

## 2021-02-24 DIAGNOSIS — E06.3 HYPOTHYROIDISM DUE TO HASHIMOTO'S THYROIDITIS: Primary | ICD-10-CM

## 2021-02-25 ENCOUNTER — PATIENT MESSAGE (OUTPATIENT)
Dept: ENDOCRINOLOGY | Facility: CLINIC | Age: 36
End: 2021-02-25

## 2021-02-25 ENCOUNTER — PATIENT MESSAGE (OUTPATIENT)
Dept: NEUROLOGY | Facility: CLINIC | Age: 36
End: 2021-02-25

## 2021-02-25 DIAGNOSIS — G43.709 CHRONIC MIGRAINE WITHOUT AURA WITHOUT STATUS MIGRAINOSUS, NOT INTRACTABLE: Primary | ICD-10-CM

## 2021-02-25 RX ORDER — LEVOTHYROXINE SODIUM 88 UG/1
88 TABLET ORAL
Qty: 30 TABLET | Refills: 11 | Status: SHIPPED | OUTPATIENT
Start: 2021-02-25 | End: 2022-04-13 | Stop reason: SDUPTHER

## 2021-02-25 RX ORDER — SUMATRIPTAN SUCCINATE 11 MG/1
11 CAPSULE NASAL ONCE
Qty: 16 EACH | Refills: 2 | Status: SHIPPED | OUTPATIENT
Start: 2021-02-25 | End: 2021-02-25

## 2021-02-26 RX ORDER — TIZANIDINE 4 MG/1
4 TABLET ORAL EVERY 6 HOURS PRN
Qty: 30 TABLET | Refills: 3 | Status: SHIPPED | OUTPATIENT
Start: 2021-02-26 | End: 2021-03-08

## 2021-03-08 ENCOUNTER — PATIENT MESSAGE (OUTPATIENT)
Dept: FAMILY MEDICINE | Facility: CLINIC | Age: 36
End: 2021-03-08

## 2021-03-08 ENCOUNTER — OFFICE VISIT (OUTPATIENT)
Dept: FAMILY MEDICINE | Facility: CLINIC | Age: 36
End: 2021-03-08
Payer: COMMERCIAL

## 2021-03-08 ENCOUNTER — LAB VISIT (OUTPATIENT)
Dept: LAB | Facility: HOSPITAL | Age: 36
End: 2021-03-08
Attending: INTERNAL MEDICINE
Payer: COMMERCIAL

## 2021-03-08 VITALS
DIASTOLIC BLOOD PRESSURE: 93 MMHG | SYSTOLIC BLOOD PRESSURE: 130 MMHG | RESPIRATION RATE: 18 BRPM | WEIGHT: 175.5 LBS | BODY MASS INDEX: 32.3 KG/M2 | OXYGEN SATURATION: 98 % | HEART RATE: 89 BPM | HEIGHT: 62 IN | TEMPERATURE: 98 F

## 2021-03-08 DIAGNOSIS — I10 ESSENTIAL HYPERTENSION: Primary | ICD-10-CM

## 2021-03-08 DIAGNOSIS — Z00.00 HEALTHCARE MAINTENANCE: ICD-10-CM

## 2021-03-08 DIAGNOSIS — J45.20 MILD INTERMITTENT ASTHMA WITHOUT COMPLICATION: ICD-10-CM

## 2021-03-08 DIAGNOSIS — I10 ESSENTIAL HYPERTENSION: ICD-10-CM

## 2021-03-08 DIAGNOSIS — D50.0 IRON DEFICIENCY ANEMIA DUE TO CHRONIC BLOOD LOSS: ICD-10-CM

## 2021-03-08 DIAGNOSIS — E03.8 HYPOTHYROIDISM DUE TO HASHIMOTO'S THYROIDITIS: ICD-10-CM

## 2021-03-08 DIAGNOSIS — E06.3 HYPOTHYROIDISM DUE TO HASHIMOTO'S THYROIDITIS: ICD-10-CM

## 2021-03-08 DIAGNOSIS — E66.9 OBESITY (BMI 30.0-34.9): ICD-10-CM

## 2021-03-08 DIAGNOSIS — G43.709 CHRONIC MIGRAINE WITHOUT AURA WITHOUT STATUS MIGRAINOSUS, NOT INTRACTABLE: ICD-10-CM

## 2021-03-08 PROBLEM — E66.811 OBESITY (BMI 30.0-34.9): Status: ACTIVE | Noted: 2021-03-08

## 2021-03-08 LAB
ANION GAP SERPL CALC-SCNC: 5 MMOL/L (ref 8–16)
BUN SERPL-MCNC: 8 MG/DL (ref 6–20)
CALCIUM SERPL-MCNC: 8.5 MG/DL (ref 8.7–10.5)
CHLORIDE SERPL-SCNC: 110 MMOL/L (ref 95–110)
CO2 SERPL-SCNC: 26 MMOL/L (ref 23–29)
CREAT SERPL-MCNC: 1 MG/DL (ref 0.5–1.4)
EST. GFR  (AFRICAN AMERICAN): >60 ML/MIN/1.73 M^2
EST. GFR  (NON AFRICAN AMERICAN): >60 ML/MIN/1.73 M^2
GLUCOSE SERPL-MCNC: 81 MG/DL (ref 70–110)
POTASSIUM SERPL-SCNC: 3.7 MMOL/L (ref 3.5–5.1)
SODIUM SERPL-SCNC: 141 MMOL/L (ref 136–145)

## 2021-03-08 PROCEDURE — 99214 PR OFFICE/OUTPT VISIT, EST, LEVL IV, 30-39 MIN: ICD-10-PCS | Mod: S$GLB,,, | Performed by: INTERNAL MEDICINE

## 2021-03-08 PROCEDURE — 1126F AMNT PAIN NOTED NONE PRSNT: CPT | Mod: S$GLB,,, | Performed by: INTERNAL MEDICINE

## 2021-03-08 PROCEDURE — 99999 PR PBB SHADOW E&M-EST. PATIENT-LVL V: ICD-10-PCS | Mod: PBBFAC,,, | Performed by: INTERNAL MEDICINE

## 2021-03-08 PROCEDURE — 3075F SYST BP GE 130 - 139MM HG: CPT | Mod: CPTII,S$GLB,, | Performed by: INTERNAL MEDICINE

## 2021-03-08 PROCEDURE — 36415 COLL VENOUS BLD VENIPUNCTURE: CPT | Mod: PN | Performed by: INTERNAL MEDICINE

## 2021-03-08 PROCEDURE — 3080F PR MOST RECENT DIASTOLIC BLOOD PRESSURE >= 90 MM HG: ICD-10-PCS | Mod: CPTII,S$GLB,, | Performed by: INTERNAL MEDICINE

## 2021-03-08 PROCEDURE — 99214 OFFICE O/P EST MOD 30 MIN: CPT | Mod: S$GLB,,, | Performed by: INTERNAL MEDICINE

## 2021-03-08 PROCEDURE — 3075F PR MOST RECENT SYSTOLIC BLOOD PRESS GE 130-139MM HG: ICD-10-PCS | Mod: CPTII,S$GLB,, | Performed by: INTERNAL MEDICINE

## 2021-03-08 PROCEDURE — 3080F DIAST BP >= 90 MM HG: CPT | Mod: CPTII,S$GLB,, | Performed by: INTERNAL MEDICINE

## 2021-03-08 PROCEDURE — 80048 BASIC METABOLIC PNL TOTAL CA: CPT | Performed by: INTERNAL MEDICINE

## 2021-03-08 PROCEDURE — 3008F BODY MASS INDEX DOCD: CPT | Mod: CPTII,S$GLB,, | Performed by: INTERNAL MEDICINE

## 2021-03-08 PROCEDURE — 3008F PR BODY MASS INDEX (BMI) DOCUMENTED: ICD-10-PCS | Mod: CPTII,S$GLB,, | Performed by: INTERNAL MEDICINE

## 2021-03-08 PROCEDURE — 99999 PR PBB SHADOW E&M-EST. PATIENT-LVL V: CPT | Mod: PBBFAC,,, | Performed by: INTERNAL MEDICINE

## 2021-03-08 PROCEDURE — 1126F PR PAIN SEVERITY QUANTIFIED, NO PAIN PRESENT: ICD-10-PCS | Mod: S$GLB,,, | Performed by: INTERNAL MEDICINE

## 2021-03-27 ENCOUNTER — PATIENT MESSAGE (OUTPATIENT)
Dept: FAMILY MEDICINE | Facility: CLINIC | Age: 36
End: 2021-03-27

## 2021-03-30 DIAGNOSIS — I10 ESSENTIAL HYPERTENSION: Primary | ICD-10-CM

## 2021-03-30 RX ORDER — AMLODIPINE BESYLATE 5 MG/1
5 TABLET ORAL DAILY
Qty: 30 TABLET | Refills: 11 | Status: SHIPPED | OUTPATIENT
Start: 2021-03-30 | End: 2022-06-01 | Stop reason: SDUPTHER

## 2021-04-01 ENCOUNTER — TELEPHONE (OUTPATIENT)
Dept: FAMILY MEDICINE | Facility: CLINIC | Age: 36
End: 2021-04-01

## 2021-04-05 ENCOUNTER — PATIENT MESSAGE (OUTPATIENT)
Dept: NEUROLOGY | Facility: CLINIC | Age: 36
End: 2021-04-05

## 2021-04-09 ENCOUNTER — PATIENT MESSAGE (OUTPATIENT)
Dept: FAMILY MEDICINE | Facility: CLINIC | Age: 36
End: 2021-04-09

## 2021-04-19 ENCOUNTER — PATIENT OUTREACH (OUTPATIENT)
Dept: ADMINISTRATIVE | Facility: HOSPITAL | Age: 36
End: 2021-04-19

## 2021-05-17 ENCOUNTER — PATIENT OUTREACH (OUTPATIENT)
Dept: ADMINISTRATIVE | Facility: HOSPITAL | Age: 36
End: 2021-05-17

## 2021-05-25 ENCOUNTER — PATIENT OUTREACH (OUTPATIENT)
Dept: ADMINISTRATIVE | Facility: OTHER | Age: 36
End: 2021-05-25

## 2021-05-29 ENCOUNTER — LAB VISIT (OUTPATIENT)
Dept: LAB | Facility: HOSPITAL | Age: 36
End: 2021-05-29
Attending: INTERNAL MEDICINE
Payer: COMMERCIAL

## 2021-05-29 DIAGNOSIS — Z00.00 HEALTHCARE MAINTENANCE: ICD-10-CM

## 2021-05-29 DIAGNOSIS — D50.0 IRON DEFICIENCY ANEMIA DUE TO CHRONIC BLOOD LOSS: ICD-10-CM

## 2021-05-29 DIAGNOSIS — E03.8 HYPOTHYROIDISM DUE TO HASHIMOTO'S THYROIDITIS: ICD-10-CM

## 2021-05-29 DIAGNOSIS — E06.3 HYPOTHYROIDISM DUE TO HASHIMOTO'S THYROIDITIS: ICD-10-CM

## 2021-05-29 DIAGNOSIS — I10 ESSENTIAL HYPERTENSION: ICD-10-CM

## 2021-05-29 LAB
ALBUMIN SERPL BCP-MCNC: 3.7 G/DL (ref 3.5–5.2)
ALP SERPL-CCNC: 44 U/L (ref 55–135)
ALT SERPL W/O P-5'-P-CCNC: 52 U/L (ref 10–44)
ANION GAP SERPL CALC-SCNC: 9 MMOL/L (ref 8–16)
AST SERPL-CCNC: 28 U/L (ref 10–40)
BASOPHILS # BLD AUTO: 0.04 K/UL (ref 0–0.2)
BASOPHILS NFR BLD: 0.7 % (ref 0–1.9)
BILIRUB SERPL-MCNC: 0.2 MG/DL (ref 0.1–1)
BUN SERPL-MCNC: 12 MG/DL (ref 6–20)
CALCIUM SERPL-MCNC: 9.2 MG/DL (ref 8.7–10.5)
CHLORIDE SERPL-SCNC: 110 MMOL/L (ref 95–110)
CHOLEST SERPL-MCNC: 143 MG/DL (ref 120–199)
CHOLEST/HDLC SERPL: 3.5 {RATIO} (ref 2–5)
CO2 SERPL-SCNC: 21 MMOL/L (ref 23–29)
CREAT SERPL-MCNC: 0.8 MG/DL (ref 0.5–1.4)
DIFFERENTIAL METHOD: ABNORMAL
EOSINOPHIL # BLD AUTO: 0.4 K/UL (ref 0–0.5)
EOSINOPHIL NFR BLD: 6.1 % (ref 0–8)
ERYTHROCYTE [DISTWIDTH] IN BLOOD BY AUTOMATED COUNT: 15.5 % (ref 11.5–14.5)
EST. GFR  (AFRICAN AMERICAN): >60 ML/MIN/1.73 M^2
EST. GFR  (NON AFRICAN AMERICAN): >60 ML/MIN/1.73 M^2
ESTIMATED AVG GLUCOSE: 105 MG/DL (ref 68–131)
FERRITIN SERPL-MCNC: 3 NG/ML (ref 20–300)
GLUCOSE SERPL-MCNC: 90 MG/DL (ref 70–110)
HBA1C MFR BLD: 5.3 % (ref 4–5.6)
HCT VFR BLD AUTO: 35.6 % (ref 37–48.5)
HDLC SERPL-MCNC: 41 MG/DL (ref 40–75)
HDLC SERPL: 28.7 % (ref 20–50)
HGB BLD-MCNC: 10.5 G/DL (ref 12–16)
IMM GRANULOCYTES # BLD AUTO: 0.01 K/UL (ref 0–0.04)
IMM GRANULOCYTES NFR BLD AUTO: 0.2 % (ref 0–0.5)
IRON SERPL-MCNC: 21 UG/DL (ref 30–160)
LDLC SERPL CALC-MCNC: 89 MG/DL (ref 63–159)
LYMPHOCYTES # BLD AUTO: 1.7 K/UL (ref 1–4.8)
LYMPHOCYTES NFR BLD: 28.7 % (ref 18–48)
MCH RBC QN AUTO: 23.6 PG (ref 27–31)
MCHC RBC AUTO-ENTMCNC: 29.5 G/DL (ref 32–36)
MCV RBC AUTO: 80 FL (ref 82–98)
MONOCYTES # BLD AUTO: 0.6 K/UL (ref 0.3–1)
MONOCYTES NFR BLD: 9.7 % (ref 4–15)
NEUTROPHILS # BLD AUTO: 3.2 K/UL (ref 1.8–7.7)
NEUTROPHILS NFR BLD: 54.6 % (ref 38–73)
NONHDLC SERPL-MCNC: 102 MG/DL
NRBC BLD-RTO: 0 /100 WBC
PLATELET # BLD AUTO: 319 K/UL (ref 150–450)
PMV BLD AUTO: 11.9 FL (ref 9.2–12.9)
POTASSIUM SERPL-SCNC: 4.5 MMOL/L (ref 3.5–5.1)
PROT SERPL-MCNC: 7 G/DL (ref 6–8.4)
RBC # BLD AUTO: 4.45 M/UL (ref 4–5.4)
SATURATED IRON: 4 % (ref 20–50)
SODIUM SERPL-SCNC: 140 MMOL/L (ref 136–145)
TOTAL IRON BINDING CAPACITY: 514 UG/DL (ref 250–450)
TRANSFERRIN SERPL-MCNC: 347 MG/DL (ref 200–375)
TRIGL SERPL-MCNC: 65 MG/DL (ref 30–150)
TSH SERPL DL<=0.005 MIU/L-ACNC: 1.52 UIU/ML (ref 0.4–4)
WBC # BLD AUTO: 5.86 K/UL (ref 3.9–12.7)

## 2021-05-29 PROCEDURE — 83036 HEMOGLOBIN GLYCOSYLATED A1C: CPT | Performed by: INTERNAL MEDICINE

## 2021-05-29 PROCEDURE — 80061 LIPID PANEL: CPT | Performed by: INTERNAL MEDICINE

## 2021-05-29 PROCEDURE — 86703 HIV-1/HIV-2 1 RESULT ANTBDY: CPT | Performed by: INTERNAL MEDICINE

## 2021-05-29 PROCEDURE — 83540 ASSAY OF IRON: CPT | Performed by: INTERNAL MEDICINE

## 2021-05-29 PROCEDURE — 86803 HEPATITIS C AB TEST: CPT | Performed by: INTERNAL MEDICINE

## 2021-05-29 PROCEDURE — 82728 ASSAY OF FERRITIN: CPT | Performed by: INTERNAL MEDICINE

## 2021-05-29 PROCEDURE — 85025 COMPLETE CBC W/AUTO DIFF WBC: CPT | Performed by: INTERNAL MEDICINE

## 2021-05-29 PROCEDURE — 84443 ASSAY THYROID STIM HORMONE: CPT | Performed by: INTERNAL MEDICINE

## 2021-05-29 PROCEDURE — 80053 COMPREHEN METABOLIC PANEL: CPT | Performed by: INTERNAL MEDICINE

## 2021-05-29 PROCEDURE — 36415 COLL VENOUS BLD VENIPUNCTURE: CPT | Mod: PO | Performed by: INTERNAL MEDICINE

## 2021-05-31 ENCOUNTER — OFFICE VISIT (OUTPATIENT)
Dept: FAMILY MEDICINE | Facility: CLINIC | Age: 36
End: 2021-05-31
Payer: COMMERCIAL

## 2021-05-31 ENCOUNTER — OFFICE VISIT (OUTPATIENT)
Dept: NEUROLOGY | Facility: CLINIC | Age: 36
End: 2021-05-31
Payer: COMMERCIAL

## 2021-05-31 VITALS
HEART RATE: 87 BPM | OXYGEN SATURATION: 97 % | WEIGHT: 177 LBS | DIASTOLIC BLOOD PRESSURE: 72 MMHG | SYSTOLIC BLOOD PRESSURE: 116 MMHG | TEMPERATURE: 99 F | RESPIRATION RATE: 18 BRPM | BODY MASS INDEX: 32.38 KG/M2

## 2021-05-31 DIAGNOSIS — E03.8 HYPOTHYROIDISM DUE TO HASHIMOTO'S THYROIDITIS: Primary | ICD-10-CM

## 2021-05-31 DIAGNOSIS — M54.2 CERVICALGIA: ICD-10-CM

## 2021-05-31 DIAGNOSIS — G44.219 EPISODIC TENSION-TYPE HEADACHE, NOT INTRACTABLE: ICD-10-CM

## 2021-05-31 DIAGNOSIS — E03.8 HYPOTHYROIDISM DUE TO HASHIMOTO'S THYROIDITIS: ICD-10-CM

## 2021-05-31 DIAGNOSIS — E06.3 HYPOTHYROIDISM DUE TO HASHIMOTO'S THYROIDITIS: Primary | ICD-10-CM

## 2021-05-31 DIAGNOSIS — H81.4 VERTIGO OF CENTRAL ORIGIN: ICD-10-CM

## 2021-05-31 DIAGNOSIS — G43.709 CHRONIC MIGRAINE WITHOUT AURA WITHOUT STATUS MIGRAINOSUS, NOT INTRACTABLE: ICD-10-CM

## 2021-05-31 DIAGNOSIS — R74.01 ELEVATED ALT MEASUREMENT: ICD-10-CM

## 2021-05-31 DIAGNOSIS — D50.0 IRON DEFICIENCY ANEMIA DUE TO CHRONIC BLOOD LOSS: Primary | ICD-10-CM

## 2021-05-31 DIAGNOSIS — I10 ESSENTIAL HYPERTENSION: ICD-10-CM

## 2021-05-31 DIAGNOSIS — R42 DIZZINESS AND GIDDINESS: ICD-10-CM

## 2021-05-31 DIAGNOSIS — E06.3 HYPOTHYROIDISM DUE TO HASHIMOTO'S THYROIDITIS: ICD-10-CM

## 2021-05-31 DIAGNOSIS — E66.9 OBESITY (BMI 30.0-34.9): ICD-10-CM

## 2021-05-31 LAB
HCV AB SERPL QL IA: NEGATIVE
HIV 1+2 AB+HIV1 P24 AG SERPL QL IA: NEGATIVE

## 2021-05-31 PROCEDURE — 3008F BODY MASS INDEX DOCD: CPT | Mod: CPTII,S$GLB,, | Performed by: INTERNAL MEDICINE

## 2021-05-31 PROCEDURE — 99999 PR PBB SHADOW E&M-EST. PATIENT-LVL IV: ICD-10-PCS | Mod: PBBFAC,,, | Performed by: INTERNAL MEDICINE

## 2021-05-31 PROCEDURE — 1126F PR PAIN SEVERITY QUANTIFIED, NO PAIN PRESENT: ICD-10-PCS | Mod: S$GLB,,, | Performed by: INTERNAL MEDICINE

## 2021-05-31 PROCEDURE — 1126F AMNT PAIN NOTED NONE PRSNT: CPT | Mod: S$GLB,,, | Performed by: INTERNAL MEDICINE

## 2021-05-31 PROCEDURE — 99214 PR OFFICE/OUTPT VISIT, EST, LEVL IV, 30-39 MIN: ICD-10-PCS | Mod: S$GLB,,, | Performed by: INTERNAL MEDICINE

## 2021-05-31 PROCEDURE — 3008F PR BODY MASS INDEX (BMI) DOCUMENTED: ICD-10-PCS | Mod: CPTII,S$GLB,, | Performed by: INTERNAL MEDICINE

## 2021-05-31 PROCEDURE — 99999 PR PBB SHADOW E&M-EST. PATIENT-LVL IV: CPT | Mod: PBBFAC,,, | Performed by: INTERNAL MEDICINE

## 2021-05-31 PROCEDURE — 99214 OFFICE O/P EST MOD 30 MIN: CPT | Mod: S$GLB,,, | Performed by: INTERNAL MEDICINE

## 2021-05-31 PROCEDURE — 99215 PR OFFICE/OUTPT VISIT, EST, LEVL V, 40-54 MIN: ICD-10-PCS | Mod: 95,,, | Performed by: NEUROLOGICAL SURGERY

## 2021-05-31 PROCEDURE — 99215 OFFICE O/P EST HI 40 MIN: CPT | Mod: 95,,, | Performed by: NEUROLOGICAL SURGERY

## 2021-05-31 RX ORDER — TIZANIDINE 4 MG/1
4 TABLET ORAL EVERY 6 HOURS PRN
Qty: 30 TABLET | Refills: 3 | Status: SHIPPED | OUTPATIENT
Start: 2021-05-31 | End: 2021-06-10

## 2021-05-31 RX ORDER — ONDANSETRON 4 MG/1
4 TABLET, ORALLY DISINTEGRATING ORAL 2 TIMES DAILY
Qty: 30 TABLET | Refills: 2 | Status: SHIPPED | OUTPATIENT
Start: 2021-05-31 | End: 2022-11-01

## 2021-05-31 RX ORDER — METHYLPREDNISOLONE 4 MG/1
TABLET ORAL
Qty: 1 PACKAGE | Refills: 0 | Status: SHIPPED | OUTPATIENT
Start: 2021-05-31 | End: 2021-08-27

## 2021-05-31 RX ORDER — QUINIDINE GLUCONATE 324 MG
480 TABLET, EXTENDED RELEASE ORAL DAILY
Qty: 30 TABLET | Refills: 11 | Status: CANCELLED | OUTPATIENT
Start: 2021-05-31

## 2021-05-31 RX ORDER — BUSPIRONE HYDROCHLORIDE 10 MG/1
10 TABLET ORAL 2 TIMES DAILY
COMMUNITY
End: 2021-08-27

## 2021-05-31 RX ORDER — FERROUS SULFATE 325(65) MG
325 TABLET ORAL DAILY
Qty: 30 TABLET | Refills: 2 | Status: SHIPPED | OUTPATIENT
Start: 2021-05-31 | End: 2022-11-01

## 2021-06-09 ENCOUNTER — PATIENT MESSAGE (OUTPATIENT)
Dept: OPHTHALMOLOGY | Facility: CLINIC | Age: 36
End: 2021-06-09

## 2021-06-09 ENCOUNTER — PATIENT MESSAGE (OUTPATIENT)
Dept: OPTOMETRY | Facility: CLINIC | Age: 36
End: 2021-06-09

## 2021-06-09 ENCOUNTER — HOSPITAL ENCOUNTER (OUTPATIENT)
Dept: RADIOLOGY | Facility: OTHER | Age: 36
Discharge: HOME | End: 2021-06-09
Attending: NEUROLOGICAL SURGERY
Payer: COMMERCIAL

## 2021-06-09 ENCOUNTER — TELEPHONE (OUTPATIENT)
Dept: OPHTHALMOLOGY | Facility: CLINIC | Age: 36
End: 2021-06-09

## 2021-06-09 DIAGNOSIS — R42 DIZZINESS AND GIDDINESS: ICD-10-CM

## 2021-06-09 DIAGNOSIS — H81.4 VERTIGO OF CENTRAL ORIGIN: ICD-10-CM

## 2021-06-09 DIAGNOSIS — M54.2 CERVICALGIA: ICD-10-CM

## 2021-06-09 PROCEDURE — 70544 MR ANGIOGRAPHY HEAD W/O DYE: CPT | Mod: TC,59

## 2021-06-09 PROCEDURE — 70553 MRI BRAIN STEM W/O & W/DYE: CPT | Mod: TC

## 2021-06-09 PROCEDURE — 70553 MRI BRAIN STEM W/O & W/DYE: CPT | Mod: 26,,, | Performed by: RADIOLOGY

## 2021-06-09 PROCEDURE — 72156 MRI NECK SPINE W/O & W/DYE: CPT | Mod: TC

## 2021-06-09 PROCEDURE — 72156 MRI NECK SPINE W/O & W/DYE: CPT | Mod: 26,,, | Performed by: RADIOLOGY

## 2021-06-09 PROCEDURE — 72156 MRI CERVICAL SPINE DEMYELINATING W W/O CONTRAST: ICD-10-PCS | Mod: 26,,, | Performed by: RADIOLOGY

## 2021-06-09 PROCEDURE — 70553 MRI BRAIN DEMYELINATING W/ WO CONTRAST: ICD-10-PCS | Mod: 26,,, | Performed by: RADIOLOGY

## 2021-06-09 PROCEDURE — 70544 MRV BRAIN WITHOUT CONTRAST: ICD-10-PCS | Mod: 26,59,, | Performed by: RADIOLOGY

## 2021-06-09 PROCEDURE — 25500020 PHARM REV CODE 255: Performed by: NEUROLOGICAL SURGERY

## 2021-06-09 PROCEDURE — 70544 MR ANGIOGRAPHY HEAD W/O DYE: CPT | Mod: 26,59,, | Performed by: RADIOLOGY

## 2021-06-09 PROCEDURE — A9585 GADOBUTROL INJECTION: HCPCS | Performed by: NEUROLOGICAL SURGERY

## 2021-06-09 RX ORDER — GADOBUTROL 604.72 MG/ML
8 INJECTION INTRAVENOUS
Status: COMPLETED | OUTPATIENT
Start: 2021-06-09 | End: 2021-06-09

## 2021-06-09 RX ADMIN — GADOBUTROL 8 ML: 604.72 INJECTION INTRAVENOUS at 06:06

## 2021-06-12 ENCOUNTER — HOSPITAL ENCOUNTER (OUTPATIENT)
Dept: RADIOLOGY | Facility: HOSPITAL | Age: 36
Discharge: HOME | End: 2021-06-12
Attending: NEUROLOGICAL SURGERY
Payer: COMMERCIAL

## 2021-06-12 DIAGNOSIS — M54.2 CERVICALGIA: ICD-10-CM

## 2021-06-12 PROCEDURE — 72052 X-RAY EXAM NECK SPINE 6/>VWS: CPT | Mod: 26,,, | Performed by: RADIOLOGY

## 2021-06-12 PROCEDURE — 72052 X-RAY EXAM NECK SPINE 6/>VWS: CPT | Mod: TC,FY,PO

## 2021-06-12 PROCEDURE — 72052 XR CERVICAL SPINE 5 VIEW WITH FLEX AND EXT: ICD-10-PCS | Mod: 26,,, | Performed by: RADIOLOGY

## 2021-06-21 ENCOUNTER — TELEPHONE (OUTPATIENT)
Dept: PAIN MEDICINE | Facility: CLINIC | Age: 36
End: 2021-06-21

## 2021-06-22 ENCOUNTER — OFFICE VISIT (OUTPATIENT)
Dept: PAIN MEDICINE | Facility: CLINIC | Age: 36
End: 2021-06-22
Payer: COMMERCIAL

## 2021-06-22 VITALS
DIASTOLIC BLOOD PRESSURE: 92 MMHG | HEART RATE: 90 BPM | TEMPERATURE: 98 F | SYSTOLIC BLOOD PRESSURE: 125 MMHG | RESPIRATION RATE: 19 BRPM | HEIGHT: 62 IN | WEIGHT: 165.81 LBS | BODY MASS INDEX: 30.51 KG/M2

## 2021-06-22 DIAGNOSIS — M79.18 MYOFASCIAL PAIN: Primary | ICD-10-CM

## 2021-06-22 DIAGNOSIS — M54.2 NECK PAIN: ICD-10-CM

## 2021-06-22 DIAGNOSIS — M50.30 DDD (DEGENERATIVE DISC DISEASE), CERVICAL: ICD-10-CM

## 2021-06-22 DIAGNOSIS — M54.12 CERVICAL RADICULOPATHY: ICD-10-CM

## 2021-06-22 PROCEDURE — 99999 PR PBB SHADOW E&M-EST. PATIENT-LVL IV: ICD-10-PCS | Mod: PBBFAC,,, | Performed by: ANESTHESIOLOGY

## 2021-06-22 PROCEDURE — 20553 NJX 1/MLT TRIGGER POINTS 3/>: CPT | Mod: S$GLB,,, | Performed by: ANESTHESIOLOGY

## 2021-06-22 PROCEDURE — 99204 PR OFFICE/OUTPT VISIT, NEW, LEVL IV, 45-59 MIN: ICD-10-PCS | Mod: 25,S$GLB,, | Performed by: ANESTHESIOLOGY

## 2021-06-22 PROCEDURE — 3008F BODY MASS INDEX DOCD: CPT | Mod: CPTII,S$GLB,, | Performed by: ANESTHESIOLOGY

## 2021-06-22 PROCEDURE — 3008F PR BODY MASS INDEX (BMI) DOCUMENTED: ICD-10-PCS | Mod: CPTII,S$GLB,, | Performed by: ANESTHESIOLOGY

## 2021-06-22 PROCEDURE — 99204 OFFICE O/P NEW MOD 45 MIN: CPT | Mod: 25,S$GLB,, | Performed by: ANESTHESIOLOGY

## 2021-06-22 PROCEDURE — 1125F AMNT PAIN NOTED PAIN PRSNT: CPT | Mod: S$GLB,,, | Performed by: ANESTHESIOLOGY

## 2021-06-22 PROCEDURE — 1125F PR PAIN SEVERITY QUANTIFIED, PAIN PRESENT: ICD-10-PCS | Mod: S$GLB,,, | Performed by: ANESTHESIOLOGY

## 2021-06-22 PROCEDURE — 99999 PR PBB SHADOW E&M-EST. PATIENT-LVL IV: CPT | Mod: PBBFAC,,, | Performed by: ANESTHESIOLOGY

## 2021-06-22 PROCEDURE — 20553 PR INJECT TRIGGER POINTS, > 3: ICD-10-PCS | Mod: S$GLB,,, | Performed by: ANESTHESIOLOGY

## 2021-06-22 RX ORDER — MELOXICAM 15 MG/1
15 TABLET ORAL DAILY
Qty: 30 TABLET | Refills: 1 | Status: SHIPPED | OUTPATIENT
Start: 2021-06-22 | End: 2021-07-22

## 2021-06-23 RX ORDER — BUPIVACAINE HYDROCHLORIDE 2.5 MG/ML
7 INJECTION, SOLUTION EPIDURAL; INFILTRATION; INTRACAUDAL
Status: COMPLETED | OUTPATIENT
Start: 2021-06-23 | End: 2021-06-23

## 2021-06-23 RX ORDER — KETOROLAC TROMETHAMINE 30 MG/ML
60 INJECTION, SOLUTION INTRAMUSCULAR; INTRAVENOUS
Status: COMPLETED | OUTPATIENT
Start: 2021-06-23 | End: 2021-06-23

## 2021-06-23 RX ORDER — METHYLPREDNISOLONE ACETATE 80 MG/ML
80 INJECTION, SUSPENSION INTRA-ARTICULAR; INTRALESIONAL; INTRAMUSCULAR; SOFT TISSUE
Status: COMPLETED | OUTPATIENT
Start: 2021-06-23 | End: 2021-06-23

## 2021-06-23 RX ADMIN — METHYLPREDNISOLONE ACETATE 80 MG: 80 INJECTION, SUSPENSION INTRA-ARTICULAR; INTRALESIONAL; INTRAMUSCULAR; SOFT TISSUE at 11:06

## 2021-06-23 RX ADMIN — BUPIVACAINE HYDROCHLORIDE 17.5 MG: 2.5 INJECTION, SOLUTION EPIDURAL; INFILTRATION; INTRACAUDAL at 11:06

## 2021-06-23 RX ADMIN — KETOROLAC TROMETHAMINE 60 MG: 30 INJECTION, SOLUTION INTRAMUSCULAR; INTRAVENOUS at 11:06

## 2021-07-01 ENCOUNTER — CLINICAL SUPPORT (OUTPATIENT)
Dept: REHABILITATION | Facility: HOSPITAL | Age: 36
End: 2021-07-01
Payer: COMMERCIAL

## 2021-07-01 DIAGNOSIS — M54.2 NECK PAIN: ICD-10-CM

## 2021-07-01 DIAGNOSIS — R29.3 POSTURE ABNORMALITY: ICD-10-CM

## 2021-07-01 DIAGNOSIS — M79.18 MYOFASCIAL PAIN: ICD-10-CM

## 2021-07-01 PROCEDURE — 97161 PT EVAL LOW COMPLEX 20 MIN: CPT

## 2021-07-01 PROCEDURE — 97110 THERAPEUTIC EXERCISES: CPT

## 2021-07-05 PROBLEM — R29.3 POSTURE ABNORMALITY: Status: ACTIVE | Noted: 2021-07-05

## 2021-07-06 DIAGNOSIS — G43.709 CHRONIC MIGRAINE WITHOUT AURA WITHOUT STATUS MIGRAINOSUS, NOT INTRACTABLE: ICD-10-CM

## 2021-07-06 RX ORDER — SUMATRIPTAN SUCCINATE 100 MG/1
100 TABLET ORAL
Qty: 9 TABLET | Refills: 5 | Status: SHIPPED | OUTPATIENT
Start: 2021-07-06 | End: 2021-12-20 | Stop reason: SDUPTHER

## 2021-07-15 ENCOUNTER — CLINICAL SUPPORT (OUTPATIENT)
Dept: REHABILITATION | Facility: HOSPITAL | Age: 36
End: 2021-07-15
Payer: COMMERCIAL

## 2021-07-15 DIAGNOSIS — R29.3 POSTURE ABNORMALITY: ICD-10-CM

## 2021-07-15 PROCEDURE — 97110 THERAPEUTIC EXERCISES: CPT

## 2021-07-15 PROCEDURE — 97140 MANUAL THERAPY 1/> REGIONS: CPT

## 2021-07-15 PROCEDURE — 97112 NEUROMUSCULAR REEDUCATION: CPT

## 2021-07-22 ENCOUNTER — CLINICAL SUPPORT (OUTPATIENT)
Dept: REHABILITATION | Facility: HOSPITAL | Age: 36
End: 2021-07-22
Payer: COMMERCIAL

## 2021-07-22 DIAGNOSIS — R29.3 POSTURE ABNORMALITY: ICD-10-CM

## 2021-07-22 PROCEDURE — 97140 MANUAL THERAPY 1/> REGIONS: CPT

## 2021-07-22 PROCEDURE — 97110 THERAPEUTIC EXERCISES: CPT

## 2021-07-22 PROCEDURE — 97112 NEUROMUSCULAR REEDUCATION: CPT

## 2021-07-29 ENCOUNTER — CLINICAL SUPPORT (OUTPATIENT)
Dept: REHABILITATION | Facility: HOSPITAL | Age: 36
End: 2021-07-29
Payer: COMMERCIAL

## 2021-07-29 DIAGNOSIS — R29.3 POSTURE ABNORMALITY: ICD-10-CM

## 2021-07-29 PROCEDURE — 97140 MANUAL THERAPY 1/> REGIONS: CPT

## 2021-07-29 PROCEDURE — 97110 THERAPEUTIC EXERCISES: CPT

## 2021-07-29 PROCEDURE — 97112 NEUROMUSCULAR REEDUCATION: CPT

## 2021-07-30 ENCOUNTER — PATIENT OUTREACH (OUTPATIENT)
Dept: ADMINISTRATIVE | Facility: OTHER | Age: 36
End: 2021-07-30

## 2021-08-03 ENCOUNTER — TELEPHONE (OUTPATIENT)
Dept: PAIN MEDICINE | Facility: CLINIC | Age: 36
End: 2021-08-03

## 2021-08-11 ENCOUNTER — PATIENT MESSAGE (OUTPATIENT)
Dept: REHABILITATION | Facility: HOSPITAL | Age: 36
End: 2021-08-11

## 2021-08-11 ENCOUNTER — PATIENT MESSAGE (OUTPATIENT)
Dept: PAIN MEDICINE | Facility: CLINIC | Age: 36
End: 2021-08-11

## 2021-08-18 ENCOUNTER — CLINICAL SUPPORT (OUTPATIENT)
Dept: REHABILITATION | Facility: HOSPITAL | Age: 36
End: 2021-08-18
Payer: COMMERCIAL

## 2021-08-18 DIAGNOSIS — R29.3 POSTURE ABNORMALITY: ICD-10-CM

## 2021-08-18 PROCEDURE — 97112 NEUROMUSCULAR REEDUCATION: CPT

## 2021-08-18 PROCEDURE — 97110 THERAPEUTIC EXERCISES: CPT

## 2021-08-18 PROCEDURE — 97140 MANUAL THERAPY 1/> REGIONS: CPT

## 2021-08-25 ENCOUNTER — CLINICAL SUPPORT (OUTPATIENT)
Dept: REHABILITATION | Facility: HOSPITAL | Age: 36
End: 2021-08-25
Payer: COMMERCIAL

## 2021-08-25 DIAGNOSIS — R29.3 POSTURE ABNORMALITY: ICD-10-CM

## 2021-08-25 PROCEDURE — 97140 MANUAL THERAPY 1/> REGIONS: CPT

## 2021-08-25 PROCEDURE — 97110 THERAPEUTIC EXERCISES: CPT

## 2021-08-25 PROCEDURE — 97112 NEUROMUSCULAR REEDUCATION: CPT

## 2021-08-27 ENCOUNTER — PATIENT MESSAGE (OUTPATIENT)
Dept: FAMILY MEDICINE | Facility: CLINIC | Age: 36
End: 2021-08-27

## 2021-08-27 ENCOUNTER — OFFICE VISIT (OUTPATIENT)
Dept: PAIN MEDICINE | Facility: CLINIC | Age: 36
End: 2021-08-27
Payer: COMMERCIAL

## 2021-08-27 ENCOUNTER — PATIENT MESSAGE (OUTPATIENT)
Dept: PAIN MEDICINE | Facility: CLINIC | Age: 36
End: 2021-08-27

## 2021-08-27 VITALS
HEIGHT: 62 IN | DIASTOLIC BLOOD PRESSURE: 94 MMHG | TEMPERATURE: 98 F | RESPIRATION RATE: 18 BRPM | SYSTOLIC BLOOD PRESSURE: 121 MMHG | WEIGHT: 162.06 LBS | HEART RATE: 86 BPM | BODY MASS INDEX: 29.82 KG/M2

## 2021-08-27 DIAGNOSIS — M54.12 CERVICAL RADICULOPATHY: ICD-10-CM

## 2021-08-27 DIAGNOSIS — M79.18 MYOFASCIAL PAIN: Primary | ICD-10-CM

## 2021-08-27 DIAGNOSIS — M54.2 NECK PAIN: ICD-10-CM

## 2021-08-27 DIAGNOSIS — M50.30 DDD (DEGENERATIVE DISC DISEASE), CERVICAL: ICD-10-CM

## 2021-08-27 PROCEDURE — 3074F SYST BP LT 130 MM HG: CPT | Mod: CPTII,S$GLB,, | Performed by: NURSE PRACTITIONER

## 2021-08-27 PROCEDURE — 1160F RVW MEDS BY RX/DR IN RCRD: CPT | Mod: CPTII,S$GLB,, | Performed by: NURSE PRACTITIONER

## 2021-08-27 PROCEDURE — 1159F MED LIST DOCD IN RCRD: CPT | Mod: CPTII,S$GLB,, | Performed by: NURSE PRACTITIONER

## 2021-08-27 PROCEDURE — 1159F PR MEDICATION LIST DOCUMENTED IN MEDICAL RECORD: ICD-10-PCS | Mod: CPTII,S$GLB,, | Performed by: NURSE PRACTITIONER

## 2021-08-27 PROCEDURE — 1125F AMNT PAIN NOTED PAIN PRSNT: CPT | Mod: CPTII,S$GLB,, | Performed by: NURSE PRACTITIONER

## 2021-08-27 PROCEDURE — 3044F PR MOST RECENT HEMOGLOBIN A1C LEVEL <7.0%: ICD-10-PCS | Mod: CPTII,S$GLB,, | Performed by: NURSE PRACTITIONER

## 2021-08-27 PROCEDURE — 99999 PR PBB SHADOW E&M-EST. PATIENT-LVL III: CPT | Mod: PBBFAC,,, | Performed by: NURSE PRACTITIONER

## 2021-08-27 PROCEDURE — 20552 NJX 1/MLT TRIGGER POINT 1/2: CPT | Mod: S$GLB,,, | Performed by: NURSE PRACTITIONER

## 2021-08-27 PROCEDURE — 3074F PR MOST RECENT SYSTOLIC BLOOD PRESSURE < 130 MM HG: ICD-10-PCS | Mod: CPTII,S$GLB,, | Performed by: NURSE PRACTITIONER

## 2021-08-27 PROCEDURE — 3008F BODY MASS INDEX DOCD: CPT | Mod: CPTII,S$GLB,, | Performed by: NURSE PRACTITIONER

## 2021-08-27 PROCEDURE — 3044F HG A1C LEVEL LT 7.0%: CPT | Mod: CPTII,S$GLB,, | Performed by: NURSE PRACTITIONER

## 2021-08-27 PROCEDURE — 3080F DIAST BP >= 90 MM HG: CPT | Mod: CPTII,S$GLB,, | Performed by: NURSE PRACTITIONER

## 2021-08-27 PROCEDURE — 3008F PR BODY MASS INDEX (BMI) DOCUMENTED: ICD-10-PCS | Mod: CPTII,S$GLB,, | Performed by: NURSE PRACTITIONER

## 2021-08-27 PROCEDURE — 3080F PR MOST RECENT DIASTOLIC BLOOD PRESSURE >= 90 MM HG: ICD-10-PCS | Mod: CPTII,S$GLB,, | Performed by: NURSE PRACTITIONER

## 2021-08-27 PROCEDURE — 99213 OFFICE O/P EST LOW 20 MIN: CPT | Mod: 25,S$GLB,, | Performed by: NURSE PRACTITIONER

## 2021-08-27 PROCEDURE — 1160F PR REVIEW ALL MEDS BY PRESCRIBER/CLIN PHARMACIST DOCUMENTED: ICD-10-PCS | Mod: CPTII,S$GLB,, | Performed by: NURSE PRACTITIONER

## 2021-08-27 PROCEDURE — 1125F PR PAIN SEVERITY QUANTIFIED, PAIN PRESENT: ICD-10-PCS | Mod: CPTII,S$GLB,, | Performed by: NURSE PRACTITIONER

## 2021-08-27 PROCEDURE — 99999 PR PBB SHADOW E&M-EST. PATIENT-LVL III: ICD-10-PCS | Mod: PBBFAC,,, | Performed by: NURSE PRACTITIONER

## 2021-08-27 PROCEDURE — 20552 PR INJECT TRIGGER POINT, 1 OR 2: ICD-10-PCS | Mod: S$GLB,,, | Performed by: NURSE PRACTITIONER

## 2021-08-27 PROCEDURE — 99213 PR OFFICE/OUTPT VISIT, EST, LEVL III, 20-29 MIN: ICD-10-PCS | Mod: 25,S$GLB,, | Performed by: NURSE PRACTITIONER

## 2021-08-27 RX ORDER — MELOXICAM 15 MG/1
15 TABLET ORAL DAILY
COMMUNITY
Start: 2021-08-26 | End: 2021-08-27 | Stop reason: SDUPTHER

## 2021-08-27 RX ORDER — MELOXICAM 15 MG/1
15 TABLET ORAL DAILY
Qty: 30 TABLET | Refills: 6 | Status: SHIPPED | OUTPATIENT
Start: 2021-08-27 | End: 2021-09-26

## 2021-08-27 RX ORDER — TIZANIDINE 4 MG/1
4 TABLET ORAL EVERY 8 HOURS PRN
Qty: 90 TABLET | Refills: 6 | Status: SHIPPED | OUTPATIENT
Start: 2021-08-27 | End: 2021-09-26

## 2021-08-27 RX ORDER — BUPIVACAINE HYDROCHLORIDE 2.5 MG/ML
9 INJECTION, SOLUTION EPIDURAL; INFILTRATION; INTRACAUDAL
Status: COMPLETED | OUTPATIENT
Start: 2021-08-27 | End: 2021-08-27

## 2021-08-27 RX ORDER — KETOROLAC TROMETHAMINE 30 MG/ML
30 INJECTION, SOLUTION INTRAMUSCULAR; INTRAVENOUS
Status: COMPLETED | OUTPATIENT
Start: 2021-08-27 | End: 2021-08-27

## 2021-08-27 RX ORDER — TIZANIDINE 4 MG/1
TABLET ORAL
COMMUNITY
Start: 2021-08-26 | End: 2021-08-27 | Stop reason: SDUPTHER

## 2021-08-27 RX ORDER — METHYLPREDNISOLONE ACETATE 40 MG/ML
40 INJECTION, SUSPENSION INTRA-ARTICULAR; INTRALESIONAL; INTRAMUSCULAR; SOFT TISSUE
Status: COMPLETED | OUTPATIENT
Start: 2021-08-27 | End: 2021-08-27

## 2021-08-27 RX ADMIN — BUPIVACAINE HYDROCHLORIDE 22.5 MG: 2.5 INJECTION, SOLUTION EPIDURAL; INFILTRATION; INTRACAUDAL at 09:08

## 2021-08-27 RX ADMIN — KETOROLAC TROMETHAMINE 30 MG: 30 INJECTION, SOLUTION INTRAMUSCULAR; INTRAVENOUS at 09:08

## 2021-08-27 RX ADMIN — METHYLPREDNISOLONE ACETATE 40 MG: 40 INJECTION, SUSPENSION INTRA-ARTICULAR; INTRALESIONAL; INTRAMUSCULAR; SOFT TISSUE at 09:08

## 2021-09-08 ENCOUNTER — OFFICE VISIT (OUTPATIENT)
Dept: FAMILY MEDICINE | Facility: CLINIC | Age: 36
End: 2021-09-08
Payer: COMMERCIAL

## 2021-09-08 ENCOUNTER — TELEPHONE (OUTPATIENT)
Dept: FAMILY MEDICINE | Facility: CLINIC | Age: 36
End: 2021-09-08

## 2021-09-08 DIAGNOSIS — E03.8 HYPOTHYROIDISM DUE TO HASHIMOTO'S THYROIDITIS: ICD-10-CM

## 2021-09-08 DIAGNOSIS — G43.709 CHRONIC MIGRAINE WITHOUT AURA WITHOUT STATUS MIGRAINOSUS, NOT INTRACTABLE: ICD-10-CM

## 2021-09-08 DIAGNOSIS — E06.3 HYPOTHYROIDISM DUE TO HASHIMOTO'S THYROIDITIS: ICD-10-CM

## 2021-09-08 DIAGNOSIS — I10 ESSENTIAL HYPERTENSION: ICD-10-CM

## 2021-09-08 DIAGNOSIS — D50.0 IRON DEFICIENCY ANEMIA DUE TO CHRONIC BLOOD LOSS: Primary | ICD-10-CM

## 2021-09-08 PROCEDURE — 1159F MED LIST DOCD IN RCRD: CPT | Mod: CPTII,,, | Performed by: INTERNAL MEDICINE

## 2021-09-08 PROCEDURE — 1159F PR MEDICATION LIST DOCUMENTED IN MEDICAL RECORD: ICD-10-PCS | Mod: CPTII,,, | Performed by: INTERNAL MEDICINE

## 2021-09-08 PROCEDURE — 4010F PR ACE/ARB THEARPY RXD/TAKEN: ICD-10-PCS | Mod: CPTII,,, | Performed by: INTERNAL MEDICINE

## 2021-09-08 PROCEDURE — 1160F RVW MEDS BY RX/DR IN RCRD: CPT | Mod: CPTII,,, | Performed by: INTERNAL MEDICINE

## 2021-09-08 PROCEDURE — 1160F PR REVIEW ALL MEDS BY PRESCRIBER/CLIN PHARMACIST DOCUMENTED: ICD-10-PCS | Mod: CPTII,,, | Performed by: INTERNAL MEDICINE

## 2021-09-08 PROCEDURE — 99213 OFFICE O/P EST LOW 20 MIN: CPT | Mod: 95,,, | Performed by: INTERNAL MEDICINE

## 2021-09-08 PROCEDURE — 99213 PR OFFICE/OUTPT VISIT, EST, LEVL III, 20-29 MIN: ICD-10-PCS | Mod: 95,,, | Performed by: INTERNAL MEDICINE

## 2021-09-08 PROCEDURE — 3044F HG A1C LEVEL LT 7.0%: CPT | Mod: CPTII,,, | Performed by: INTERNAL MEDICINE

## 2021-09-08 PROCEDURE — 4010F ACE/ARB THERAPY RXD/TAKEN: CPT | Mod: CPTII,,, | Performed by: INTERNAL MEDICINE

## 2021-09-08 PROCEDURE — 3044F PR MOST RECENT HEMOGLOBIN A1C LEVEL <7.0%: ICD-10-PCS | Mod: CPTII,,, | Performed by: INTERNAL MEDICINE

## 2021-09-09 ENCOUNTER — LAB VISIT (OUTPATIENT)
Dept: LAB | Facility: HOSPITAL | Age: 36
End: 2021-09-09
Attending: INTERNAL MEDICINE
Payer: COMMERCIAL

## 2021-09-09 DIAGNOSIS — D50.0 IRON DEFICIENCY ANEMIA DUE TO CHRONIC BLOOD LOSS: ICD-10-CM

## 2021-09-09 DIAGNOSIS — I10 ESSENTIAL HYPERTENSION: ICD-10-CM

## 2021-09-09 LAB
ALBUMIN SERPL BCP-MCNC: 3.8 G/DL (ref 3.5–5.2)
ALP SERPL-CCNC: 42 U/L (ref 55–135)
ALT SERPL W/O P-5'-P-CCNC: 27 U/L (ref 10–44)
ANION GAP SERPL CALC-SCNC: 10 MMOL/L (ref 8–16)
AST SERPL-CCNC: 19 U/L (ref 10–40)
BASOPHILS # BLD AUTO: 0.04 K/UL (ref 0–0.2)
BASOPHILS NFR BLD: 0.6 % (ref 0–1.9)
BILIRUB SERPL-MCNC: 0.5 MG/DL (ref 0.1–1)
BUN SERPL-MCNC: 12 MG/DL (ref 6–20)
CALCIUM SERPL-MCNC: 9.4 MG/DL (ref 8.7–10.5)
CHLORIDE SERPL-SCNC: 103 MMOL/L (ref 95–110)
CO2 SERPL-SCNC: 22 MMOL/L (ref 23–29)
CREAT SERPL-MCNC: 0.8 MG/DL (ref 0.5–1.4)
DIFFERENTIAL METHOD: ABNORMAL
EOSINOPHIL # BLD AUTO: 0.2 K/UL (ref 0–0.5)
EOSINOPHIL NFR BLD: 3.6 % (ref 0–8)
ERYTHROCYTE [DISTWIDTH] IN BLOOD BY AUTOMATED COUNT: 17.2 % (ref 11.5–14.5)
EST. GFR  (AFRICAN AMERICAN): >60 ML/MIN/1.73 M^2
EST. GFR  (NON AFRICAN AMERICAN): >60 ML/MIN/1.73 M^2
FERRITIN SERPL-MCNC: 25 NG/ML (ref 20–300)
GLUCOSE SERPL-MCNC: 84 MG/DL (ref 70–110)
HCT VFR BLD AUTO: 41.9 % (ref 37–48.5)
HGB BLD-MCNC: 13.4 G/DL (ref 12–16)
IMM GRANULOCYTES # BLD AUTO: 0.01 K/UL (ref 0–0.04)
IMM GRANULOCYTES NFR BLD AUTO: 0.2 % (ref 0–0.5)
IRON SERPL-MCNC: 90 UG/DL (ref 30–160)
LYMPHOCYTES # BLD AUTO: 1.3 K/UL (ref 1–4.8)
LYMPHOCYTES NFR BLD: 20.5 % (ref 18–48)
MCH RBC QN AUTO: 27.6 PG (ref 27–31)
MCHC RBC AUTO-ENTMCNC: 32 G/DL (ref 32–36)
MCV RBC AUTO: 86 FL (ref 82–98)
MONOCYTES # BLD AUTO: 0.6 K/UL (ref 0.3–1)
MONOCYTES NFR BLD: 9.8 % (ref 4–15)
NEUTROPHILS # BLD AUTO: 4.2 K/UL (ref 1.8–7.7)
NEUTROPHILS NFR BLD: 65.3 % (ref 38–73)
NRBC BLD-RTO: 0 /100 WBC
PLATELET # BLD AUTO: 224 K/UL (ref 150–450)
PMV BLD AUTO: 11.7 FL (ref 9.2–12.9)
POTASSIUM SERPL-SCNC: 4 MMOL/L (ref 3.5–5.1)
PROT SERPL-MCNC: 7.1 G/DL (ref 6–8.4)
RBC # BLD AUTO: 4.85 M/UL (ref 4–5.4)
SATURATED IRON: 20 % (ref 20–50)
SODIUM SERPL-SCNC: 135 MMOL/L (ref 136–145)
TOTAL IRON BINDING CAPACITY: 448 UG/DL (ref 250–450)
TRANSFERRIN SERPL-MCNC: 303 MG/DL (ref 200–375)
WBC # BLD AUTO: 6.43 K/UL (ref 3.9–12.7)

## 2021-09-09 PROCEDURE — 85025 COMPLETE CBC W/AUTO DIFF WBC: CPT | Performed by: INTERNAL MEDICINE

## 2021-09-09 PROCEDURE — 82728 ASSAY OF FERRITIN: CPT | Performed by: INTERNAL MEDICINE

## 2021-09-09 PROCEDURE — 84466 ASSAY OF TRANSFERRIN: CPT | Performed by: INTERNAL MEDICINE

## 2021-09-09 PROCEDURE — 80053 COMPREHEN METABOLIC PANEL: CPT | Performed by: INTERNAL MEDICINE

## 2021-09-09 PROCEDURE — 36415 COLL VENOUS BLD VENIPUNCTURE: CPT | Mod: PO | Performed by: INTERNAL MEDICINE

## 2021-10-26 ENCOUNTER — OFFICE VISIT (OUTPATIENT)
Dept: URGENT CARE | Facility: CLINIC | Age: 36
End: 2021-10-26
Payer: COMMERCIAL

## 2021-10-26 VITALS
SYSTOLIC BLOOD PRESSURE: 122 MMHG | DIASTOLIC BLOOD PRESSURE: 88 MMHG | BODY MASS INDEX: 26.79 KG/M2 | HEART RATE: 88 BPM | WEIGHT: 160.81 LBS | OXYGEN SATURATION: 96 % | TEMPERATURE: 97 F | HEIGHT: 65 IN | RESPIRATION RATE: 20 BRPM

## 2021-10-26 DIAGNOSIS — J01.90 ACUTE BACTERIAL SINUSITIS: Primary | ICD-10-CM

## 2021-10-26 DIAGNOSIS — R50.9 FEVER, UNSPECIFIED FEVER CAUSE: ICD-10-CM

## 2021-10-26 DIAGNOSIS — B96.89 ACUTE BACTERIAL SINUSITIS: Primary | ICD-10-CM

## 2021-10-26 LAB
CTP QC/QA: YES
CTP QC/QA: YES
POC MOLECULAR INFLUENZA A AGN: NEGATIVE
POC MOLECULAR INFLUENZA B AGN: NEGATIVE
SARS-COV-2 RDRP RESP QL NAA+PROBE: NEGATIVE

## 2021-10-26 PROCEDURE — 99213 PR OFFICE/OUTPT VISIT, EST, LEVL III, 20-29 MIN: ICD-10-PCS | Mod: S$GLB,CS,, | Performed by: PHYSICIAN ASSISTANT

## 2021-10-26 PROCEDURE — 4010F PR ACE/ARB THEARPY RXD/TAKEN: ICD-10-PCS | Mod: CPTII,S$GLB,, | Performed by: PHYSICIAN ASSISTANT

## 2021-10-26 PROCEDURE — 3044F PR MOST RECENT HEMOGLOBIN A1C LEVEL <7.0%: ICD-10-PCS | Mod: CPTII,S$GLB,, | Performed by: PHYSICIAN ASSISTANT

## 2021-10-26 PROCEDURE — 1160F RVW MEDS BY RX/DR IN RCRD: CPT | Mod: CPTII,S$GLB,, | Performed by: PHYSICIAN ASSISTANT

## 2021-10-26 PROCEDURE — 3074F PR MOST RECENT SYSTOLIC BLOOD PRESSURE < 130 MM HG: ICD-10-PCS | Mod: CPTII,S$GLB,, | Performed by: PHYSICIAN ASSISTANT

## 2021-10-26 PROCEDURE — 3008F BODY MASS INDEX DOCD: CPT | Mod: CPTII,S$GLB,, | Performed by: PHYSICIAN ASSISTANT

## 2021-10-26 PROCEDURE — 1159F PR MEDICATION LIST DOCUMENTED IN MEDICAL RECORD: ICD-10-PCS | Mod: CPTII,S$GLB,, | Performed by: PHYSICIAN ASSISTANT

## 2021-10-26 PROCEDURE — 87502 INFLUENZA DNA AMP PROBE: CPT | Mod: QW,S$GLB,, | Performed by: PHYSICIAN ASSISTANT

## 2021-10-26 PROCEDURE — 3074F SYST BP LT 130 MM HG: CPT | Mod: CPTII,S$GLB,, | Performed by: PHYSICIAN ASSISTANT

## 2021-10-26 PROCEDURE — U0002: ICD-10-PCS | Mod: QW,S$GLB,, | Performed by: PHYSICIAN ASSISTANT

## 2021-10-26 PROCEDURE — 87502 POCT INFLUENZA A/B MOLECULAR: ICD-10-PCS | Mod: QW,S$GLB,, | Performed by: PHYSICIAN ASSISTANT

## 2021-10-26 PROCEDURE — 3008F PR BODY MASS INDEX (BMI) DOCUMENTED: ICD-10-PCS | Mod: CPTII,S$GLB,, | Performed by: PHYSICIAN ASSISTANT

## 2021-10-26 PROCEDURE — 99213 OFFICE O/P EST LOW 20 MIN: CPT | Mod: S$GLB,CS,, | Performed by: PHYSICIAN ASSISTANT

## 2021-10-26 PROCEDURE — 1159F MED LIST DOCD IN RCRD: CPT | Mod: CPTII,S$GLB,, | Performed by: PHYSICIAN ASSISTANT

## 2021-10-26 PROCEDURE — U0002 COVID-19 LAB TEST NON-CDC: HCPCS | Mod: QW,S$GLB,, | Performed by: PHYSICIAN ASSISTANT

## 2021-10-26 PROCEDURE — 3079F PR MOST RECENT DIASTOLIC BLOOD PRESSURE 80-89 MM HG: ICD-10-PCS | Mod: CPTII,S$GLB,, | Performed by: PHYSICIAN ASSISTANT

## 2021-10-26 PROCEDURE — 3044F HG A1C LEVEL LT 7.0%: CPT | Mod: CPTII,S$GLB,, | Performed by: PHYSICIAN ASSISTANT

## 2021-10-26 PROCEDURE — 3079F DIAST BP 80-89 MM HG: CPT | Mod: CPTII,S$GLB,, | Performed by: PHYSICIAN ASSISTANT

## 2021-10-26 PROCEDURE — 4010F ACE/ARB THERAPY RXD/TAKEN: CPT | Mod: CPTII,S$GLB,, | Performed by: PHYSICIAN ASSISTANT

## 2021-10-26 PROCEDURE — 1160F PR REVIEW ALL MEDS BY PRESCRIBER/CLIN PHARMACIST DOCUMENTED: ICD-10-PCS | Mod: CPTII,S$GLB,, | Performed by: PHYSICIAN ASSISTANT

## 2021-10-26 RX ORDER — AMOXICILLIN AND CLAVULANATE POTASSIUM 875; 125 MG/1; MG/1
1 TABLET, FILM COATED ORAL 2 TIMES DAILY
Qty: 20 TABLET | Refills: 0 | Status: SHIPPED | OUTPATIENT
Start: 2021-10-26 | End: 2021-11-05

## 2021-12-02 ENCOUNTER — OFFICE VISIT (OUTPATIENT)
Dept: OBSTETRICS AND GYNECOLOGY | Facility: CLINIC | Age: 36
End: 2021-12-02
Attending: OBSTETRICS & GYNECOLOGY
Payer: COMMERCIAL

## 2021-12-02 VITALS
WEIGHT: 161.19 LBS | DIASTOLIC BLOOD PRESSURE: 80 MMHG | HEIGHT: 64 IN | SYSTOLIC BLOOD PRESSURE: 110 MMHG | BODY MASS INDEX: 27.52 KG/M2

## 2021-12-02 DIAGNOSIS — Z01.419 WELL WOMAN EXAM: Primary | ICD-10-CM

## 2021-12-02 DIAGNOSIS — Z30.09 GENERAL COUNSELING AND ADVICE FOR CONTRACEPTIVE MANAGEMENT: ICD-10-CM

## 2021-12-02 DIAGNOSIS — Z20.2 POSSIBLE EXPOSURE TO STD: ICD-10-CM

## 2021-12-02 DIAGNOSIS — N80.9 ENDOMETRIOSIS: ICD-10-CM

## 2021-12-02 DIAGNOSIS — R10.2 PELVIC PAIN: ICD-10-CM

## 2021-12-02 PROCEDURE — 87491 CHLMYD TRACH DNA AMP PROBE: CPT | Performed by: OBSTETRICS & GYNECOLOGY

## 2021-12-02 PROCEDURE — 99395 PREV VISIT EST AGE 18-39: CPT | Mod: 25,S$GLB,, | Performed by: OBSTETRICS & GYNECOLOGY

## 2021-12-02 PROCEDURE — 99395 PR PREVENTIVE VISIT,EST,18-39: ICD-10-PCS | Mod: 25,S$GLB,, | Performed by: OBSTETRICS & GYNECOLOGY

## 2021-12-02 PROCEDURE — 4010F ACE/ARB THERAPY RXD/TAKEN: CPT | Mod: CPTII,S$GLB,, | Performed by: OBSTETRICS & GYNECOLOGY

## 2021-12-02 PROCEDURE — 88175 CYTOPATH C/V AUTO FLUID REDO: CPT | Performed by: OBSTETRICS & GYNECOLOGY

## 2021-12-02 PROCEDURE — 4010F PR ACE/ARB THEARPY RXD/TAKEN: ICD-10-PCS | Mod: CPTII,S$GLB,, | Performed by: OBSTETRICS & GYNECOLOGY

## 2021-12-02 PROCEDURE — 99999 PR PBB SHADOW E&M-EST. PATIENT-LVL III: CPT | Mod: PBBFAC,,, | Performed by: OBSTETRICS & GYNECOLOGY

## 2021-12-02 PROCEDURE — 87481 CANDIDA DNA AMP PROBE: CPT | Mod: 59 | Performed by: OBSTETRICS & GYNECOLOGY

## 2021-12-02 PROCEDURE — 99999 PR PBB SHADOW E&M-EST. PATIENT-LVL III: ICD-10-PCS | Mod: PBBFAC,,, | Performed by: OBSTETRICS & GYNECOLOGY

## 2021-12-02 PROCEDURE — 87661 TRICHOMONAS VAGINALIS AMPLIF: CPT | Mod: 59 | Performed by: OBSTETRICS & GYNECOLOGY

## 2021-12-02 PROCEDURE — 87591 N.GONORRHOEAE DNA AMP PROB: CPT | Mod: 59 | Performed by: OBSTETRICS & GYNECOLOGY

## 2021-12-02 PROCEDURE — 87624 HPV HI-RISK TYP POOLED RSLT: CPT | Performed by: OBSTETRICS & GYNECOLOGY

## 2021-12-02 RX ORDER — MELOXICAM 15 MG/1
15 TABLET ORAL DAILY
COMMUNITY
Start: 2021-10-04 | End: 2022-08-29

## 2021-12-02 RX ORDER — TIZANIDINE 4 MG/1
4 TABLET ORAL 3 TIMES DAILY
COMMUNITY
Start: 2021-10-04 | End: 2022-10-03

## 2021-12-05 ENCOUNTER — PATIENT MESSAGE (OUTPATIENT)
Dept: FAMILY MEDICINE | Facility: CLINIC | Age: 36
End: 2021-12-05
Payer: COMMERCIAL

## 2021-12-06 DIAGNOSIS — U07.1 COVID-19 VIRUS INFECTION: Primary | ICD-10-CM

## 2021-12-06 LAB
C TRACH DNA SPEC QL NAA+PROBE: NOT DETECTED
N GONORRHOEA DNA SPEC QL NAA+PROBE: NOT DETECTED

## 2021-12-07 ENCOUNTER — PATIENT MESSAGE (OUTPATIENT)
Dept: OBSTETRICS AND GYNECOLOGY | Facility: CLINIC | Age: 36
End: 2021-12-07
Payer: COMMERCIAL

## 2021-12-07 LAB
BACTERIAL VAGINOSIS DNA: NEGATIVE
CANDIDA GLABRATA DNA: POSITIVE
CANDIDA KRUSEI DNA: NEGATIVE
CANDIDA RRNA VAG QL PROBE: NEGATIVE
CLINICAL INFO: NORMAL
CYTO CVX: NORMAL
CYTOLOGIST CVX/VAG CYTO: NORMAL
CYTOLOGIST CVX/VAG CYTO: NORMAL
CYTOLOGY CMNT CVX/VAG CYTO-IMP: NORMAL
CYTOLOGY PAP THIN PREP EXPLANATION: NORMAL
DATE OF PREVIOUS PAP: NORMAL
DATE PREVIOUS BX: NO
GEN CATEG CVX/VAG CYTO-IMP: NORMAL
HPV I/H RISK 4 DNA CVX QL NAA+PROBE: NOT DETECTED
LMP START DATE: NORMAL
MICROORGANISM CVX/VAG CYTO: NORMAL
PATHOLOGIST CVX/VAG CYTO: NORMAL
SERVICE CMNT-IMP: NORMAL
SPECIMEN SOURCE CVX/VAG CYTO: NORMAL
STAT OF ADQ CVX/VAG CYTO-IMP: NORMAL
T VAGINALIS RRNA GENITAL QL PROBE: NEGATIVE

## 2021-12-07 RX ORDER — FLUCONAZOLE 150 MG/1
150 TABLET ORAL EVERY OTHER DAY
Qty: 3 TABLET | Refills: 0 | Status: SHIPPED | OUTPATIENT
Start: 2021-12-07 | End: 2021-12-12

## 2021-12-08 ENCOUNTER — INFUSION (OUTPATIENT)
Dept: INFECTIOUS DISEASES | Facility: HOSPITAL | Age: 36
End: 2021-12-08
Attending: INTERNAL MEDICINE
Payer: COMMERCIAL

## 2021-12-08 VITALS
TEMPERATURE: 99 F | HEART RATE: 84 BPM | BODY MASS INDEX: 28.52 KG/M2 | DIASTOLIC BLOOD PRESSURE: 80 MMHG | HEIGHT: 62 IN | OXYGEN SATURATION: 95 % | SYSTOLIC BLOOD PRESSURE: 125 MMHG | RESPIRATION RATE: 18 BRPM | WEIGHT: 155 LBS

## 2021-12-08 DIAGNOSIS — U07.1 COVID-19 VIRUS INFECTION: ICD-10-CM

## 2021-12-08 DIAGNOSIS — U07.1 COVID-19: Primary | ICD-10-CM

## 2021-12-08 PROCEDURE — 25000003 PHARM REV CODE 250: Performed by: INTERNAL MEDICINE

## 2021-12-08 PROCEDURE — M0243 CASIRIVI AND IMDEVI INFUSION: HCPCS | Performed by: INTERNAL MEDICINE

## 2021-12-08 PROCEDURE — 63600175 PHARM REV CODE 636 W HCPCS: Performed by: INTERNAL MEDICINE

## 2021-12-08 RX ORDER — DIPHENHYDRAMINE HYDROCHLORIDE 50 MG/ML
25 INJECTION INTRAMUSCULAR; INTRAVENOUS ONCE AS NEEDED
Status: DISCONTINUED | OUTPATIENT
Start: 2021-12-08 | End: 2022-11-01

## 2021-12-08 RX ORDER — ACETAMINOPHEN 325 MG/1
650 TABLET ORAL ONCE AS NEEDED
Status: DISCONTINUED | OUTPATIENT
Start: 2021-12-08 | End: 2022-11-01

## 2021-12-08 RX ORDER — SODIUM CHLORIDE 0.9 % (FLUSH) 0.9 %
10 SYRINGE (ML) INJECTION
Status: DISCONTINUED | OUTPATIENT
Start: 2021-12-08 | End: 2022-11-01

## 2021-12-08 RX ORDER — ALBUTEROL SULFATE 90 UG/1
2 AEROSOL, METERED RESPIRATORY (INHALATION)
Status: DISCONTINUED | OUTPATIENT
Start: 2021-12-08 | End: 2022-11-01

## 2021-12-08 RX ORDER — ONDANSETRON 4 MG/1
4 TABLET, ORALLY DISINTEGRATING ORAL ONCE AS NEEDED
Status: DISCONTINUED | OUTPATIENT
Start: 2021-12-08 | End: 2022-11-01

## 2021-12-08 RX ORDER — EPINEPHRINE 0.3 MG/.3ML
0.3 INJECTION SUBCUTANEOUS
Status: DISCONTINUED | OUTPATIENT
Start: 2021-12-08 | End: 2021-12-29

## 2021-12-08 RX ADMIN — CASIRIVIMAB AND IMDEVIMAB 600 MG: 600; 600 INJECTION, SOLUTION, CONCENTRATE INTRAVENOUS at 10:12

## 2021-12-13 ENCOUNTER — OFFICE VISIT (OUTPATIENT)
Dept: FAMILY MEDICINE | Facility: CLINIC | Age: 36
End: 2021-12-13
Payer: COMMERCIAL

## 2021-12-13 VITALS
RESPIRATION RATE: 16 BRPM | HEART RATE: 77 BPM | DIASTOLIC BLOOD PRESSURE: 76 MMHG | OXYGEN SATURATION: 96 % | TEMPERATURE: 99 F | SYSTOLIC BLOOD PRESSURE: 124 MMHG | WEIGHT: 157.44 LBS | BODY MASS INDEX: 28.79 KG/M2

## 2021-12-13 DIAGNOSIS — D50.0 IRON DEFICIENCY ANEMIA DUE TO CHRONIC BLOOD LOSS: ICD-10-CM

## 2021-12-13 DIAGNOSIS — R42 DIZZINESS: Primary | ICD-10-CM

## 2021-12-13 DIAGNOSIS — E06.3 HYPOTHYROIDISM DUE TO HASHIMOTO'S THYROIDITIS: ICD-10-CM

## 2021-12-13 DIAGNOSIS — E03.8 HYPOTHYROIDISM DUE TO HASHIMOTO'S THYROIDITIS: ICD-10-CM

## 2021-12-13 DIAGNOSIS — Z71.89 ADVICE GIVEN ABOUT COVID-19 VIRUS INFECTION: ICD-10-CM

## 2021-12-13 PROCEDURE — 4010F ACE/ARB THERAPY RXD/TAKEN: CPT | Mod: CPTII,S$GLB,, | Performed by: INTERNAL MEDICINE

## 2021-12-13 PROCEDURE — 99999 PR PBB SHADOW E&M-EST. PATIENT-LVL V: ICD-10-PCS | Mod: PBBFAC,,, | Performed by: INTERNAL MEDICINE

## 2021-12-13 PROCEDURE — 4010F PR ACE/ARB THEARPY RXD/TAKEN: ICD-10-PCS | Mod: CPTII,S$GLB,, | Performed by: INTERNAL MEDICINE

## 2021-12-13 PROCEDURE — 99214 OFFICE O/P EST MOD 30 MIN: CPT | Mod: S$GLB,,, | Performed by: INTERNAL MEDICINE

## 2021-12-13 PROCEDURE — 99999 PR PBB SHADOW E&M-EST. PATIENT-LVL V: CPT | Mod: PBBFAC,,, | Performed by: INTERNAL MEDICINE

## 2021-12-13 PROCEDURE — 99214 PR OFFICE/OUTPT VISIT, EST, LEVL IV, 30-39 MIN: ICD-10-PCS | Mod: S$GLB,,, | Performed by: INTERNAL MEDICINE

## 2021-12-13 RX ORDER — MECLIZINE HYDROCHLORIDE 25 MG/1
25 TABLET ORAL 3 TIMES DAILY PRN
Qty: 15 TABLET | Refills: 0 | Status: SHIPPED | OUTPATIENT
Start: 2021-12-13 | End: 2022-11-01

## 2021-12-16 ENCOUNTER — PATIENT MESSAGE (OUTPATIENT)
Dept: NEUROLOGY | Facility: CLINIC | Age: 36
End: 2021-12-16
Payer: COMMERCIAL

## 2021-12-20 DIAGNOSIS — G43.709 CHRONIC MIGRAINE WITHOUT AURA WITHOUT STATUS MIGRAINOSUS, NOT INTRACTABLE: ICD-10-CM

## 2021-12-20 RX ORDER — SUMATRIPTAN SUCCINATE 100 MG/1
100 TABLET ORAL
Qty: 9 TABLET | Refills: 5 | Status: SHIPPED | OUTPATIENT
Start: 2021-12-20 | End: 2023-01-07 | Stop reason: SDUPTHER

## 2021-12-21 ENCOUNTER — OFFICE VISIT (OUTPATIENT)
Dept: OPTOMETRY | Facility: CLINIC | Age: 36
End: 2021-12-21
Payer: COMMERCIAL

## 2021-12-21 ENCOUNTER — PATIENT MESSAGE (OUTPATIENT)
Dept: FAMILY MEDICINE | Facility: CLINIC | Age: 36
End: 2021-12-21
Payer: COMMERCIAL

## 2021-12-21 DIAGNOSIS — D31.32 CHOROIDAL NEVUS, LEFT: ICD-10-CM

## 2021-12-21 DIAGNOSIS — H35.033 HYPERTENSIVE RETINOPATHY OF BOTH EYES: ICD-10-CM

## 2021-12-21 DIAGNOSIS — H43.393 VITREOUS FLOATERS, BILATERAL: ICD-10-CM

## 2021-12-21 DIAGNOSIS — H52.7 REFRACTIVE ERROR: ICD-10-CM

## 2021-12-21 DIAGNOSIS — H04.123 DRY EYE SYNDROME OF BOTH EYES: ICD-10-CM

## 2021-12-21 DIAGNOSIS — H53.10 EYE STRAIN, BILATERAL: Primary | ICD-10-CM

## 2021-12-21 PROCEDURE — 92014 COMPRE OPH EXAM EST PT 1/>: CPT | Mod: S$GLB,,, | Performed by: OPTOMETRIST

## 2021-12-21 PROCEDURE — 4010F ACE/ARB THERAPY RXD/TAKEN: CPT | Mod: CPTII,S$GLB,, | Performed by: OPTOMETRIST

## 2021-12-21 PROCEDURE — 92014 PR EYE EXAM, EST PATIENT,COMPREHESV: ICD-10-PCS | Mod: S$GLB,,, | Performed by: OPTOMETRIST

## 2021-12-21 PROCEDURE — 92015 DETERMINE REFRACTIVE STATE: CPT | Mod: S$GLB,,, | Performed by: OPTOMETRIST

## 2021-12-21 PROCEDURE — 99999 PR PBB SHADOW E&M-EST. PATIENT-LVL III: ICD-10-PCS | Mod: PBBFAC,,, | Performed by: OPTOMETRIST

## 2021-12-21 PROCEDURE — 99999 PR PBB SHADOW E&M-EST. PATIENT-LVL III: CPT | Mod: PBBFAC,,, | Performed by: OPTOMETRIST

## 2021-12-21 PROCEDURE — 4010F PR ACE/ARB THEARPY RXD/TAKEN: ICD-10-PCS | Mod: CPTII,S$GLB,, | Performed by: OPTOMETRIST

## 2021-12-21 PROCEDURE — 92015 PR REFRACTION: ICD-10-PCS | Mod: S$GLB,,, | Performed by: OPTOMETRIST

## 2021-12-23 ENCOUNTER — OFFICE VISIT (OUTPATIENT)
Dept: NEUROLOGY | Facility: CLINIC | Age: 36
End: 2021-12-23
Payer: COMMERCIAL

## 2021-12-23 VITALS
HEART RATE: 86 BPM | SYSTOLIC BLOOD PRESSURE: 128 MMHG | WEIGHT: 157.88 LBS | BODY MASS INDEX: 29.05 KG/M2 | DIASTOLIC BLOOD PRESSURE: 87 MMHG | HEIGHT: 62 IN

## 2021-12-23 DIAGNOSIS — R42 DIZZINESS AND GIDDINESS: ICD-10-CM

## 2021-12-23 DIAGNOSIS — R41.3 MEMORY LOSS: Primary | ICD-10-CM

## 2021-12-23 DIAGNOSIS — G43.709 CHRONIC MIGRAINE WITHOUT AURA WITHOUT STATUS MIGRAINOSUS, NOT INTRACTABLE: ICD-10-CM

## 2021-12-23 PROCEDURE — 3044F PR MOST RECENT HEMOGLOBIN A1C LEVEL <7.0%: ICD-10-PCS | Mod: CPTII,S$GLB,, | Performed by: NEUROLOGICAL SURGERY

## 2021-12-23 PROCEDURE — 3008F PR BODY MASS INDEX (BMI) DOCUMENTED: ICD-10-PCS | Mod: CPTII,S$GLB,, | Performed by: NEUROLOGICAL SURGERY

## 2021-12-23 PROCEDURE — 3074F SYST BP LT 130 MM HG: CPT | Mod: CPTII,S$GLB,, | Performed by: NEUROLOGICAL SURGERY

## 2021-12-23 PROCEDURE — 3008F BODY MASS INDEX DOCD: CPT | Mod: CPTII,S$GLB,, | Performed by: NEUROLOGICAL SURGERY

## 2021-12-23 PROCEDURE — 99215 PR OFFICE/OUTPT VISIT, EST, LEVL V, 40-54 MIN: ICD-10-PCS | Mod: S$GLB,,, | Performed by: NEUROLOGICAL SURGERY

## 2021-12-23 PROCEDURE — 99999 PR PBB SHADOW E&M-EST. PATIENT-LVL IV: CPT | Mod: PBBFAC,,, | Performed by: NEUROLOGICAL SURGERY

## 2021-12-23 PROCEDURE — 3079F DIAST BP 80-89 MM HG: CPT | Mod: CPTII,S$GLB,, | Performed by: NEUROLOGICAL SURGERY

## 2021-12-23 PROCEDURE — 3074F PR MOST RECENT SYSTOLIC BLOOD PRESSURE < 130 MM HG: ICD-10-PCS | Mod: CPTII,S$GLB,, | Performed by: NEUROLOGICAL SURGERY

## 2021-12-23 PROCEDURE — 4010F ACE/ARB THERAPY RXD/TAKEN: CPT | Mod: CPTII,S$GLB,, | Performed by: NEUROLOGICAL SURGERY

## 2021-12-23 PROCEDURE — 3079F PR MOST RECENT DIASTOLIC BLOOD PRESSURE 80-89 MM HG: ICD-10-PCS | Mod: CPTII,S$GLB,, | Performed by: NEUROLOGICAL SURGERY

## 2021-12-23 PROCEDURE — 3044F HG A1C LEVEL LT 7.0%: CPT | Mod: CPTII,S$GLB,, | Performed by: NEUROLOGICAL SURGERY

## 2021-12-23 PROCEDURE — 99215 OFFICE O/P EST HI 40 MIN: CPT | Mod: S$GLB,,, | Performed by: NEUROLOGICAL SURGERY

## 2021-12-23 PROCEDURE — 4010F PR ACE/ARB THEARPY RXD/TAKEN: ICD-10-PCS | Mod: CPTII,S$GLB,, | Performed by: NEUROLOGICAL SURGERY

## 2021-12-23 PROCEDURE — 99999 PR PBB SHADOW E&M-EST. PATIENT-LVL IV: ICD-10-PCS | Mod: PBBFAC,,, | Performed by: NEUROLOGICAL SURGERY

## 2021-12-23 RX ORDER — UBROGEPANT 100 MG/1
100 TABLET ORAL ONCE AS NEEDED
Qty: 30 TABLET | Refills: 2 | Status: SHIPPED | OUTPATIENT
Start: 2021-12-23 | End: 2021-12-23

## 2021-12-23 RX ORDER — HEPARIN 100 UNIT/ML
500 SYRINGE INTRAVENOUS
Status: CANCELLED | OUTPATIENT
Start: 2021-12-23

## 2021-12-23 RX ORDER — SODIUM CHLORIDE 0.9 % (FLUSH) 0.9 %
10 SYRINGE (ML) INJECTION
Status: CANCELLED | OUTPATIENT
Start: 2021-12-23

## 2021-12-25 ENCOUNTER — HOSPITAL ENCOUNTER (EMERGENCY)
Facility: HOSPITAL | Age: 36
Discharge: HOME | End: 2021-12-26
Attending: EMERGENCY MEDICINE
Payer: COMMERCIAL

## 2021-12-25 VITALS
DIASTOLIC BLOOD PRESSURE: 90 MMHG | OXYGEN SATURATION: 99 % | HEART RATE: 86 BPM | WEIGHT: 157 LBS | TEMPERATURE: 99 F | SYSTOLIC BLOOD PRESSURE: 135 MMHG | BODY MASS INDEX: 28.72 KG/M2 | RESPIRATION RATE: 20 BRPM

## 2021-12-25 DIAGNOSIS — R06.00 DYSPNEA, UNSPECIFIED TYPE: ICD-10-CM

## 2021-12-25 DIAGNOSIS — R00.2 PALPITATIONS: Primary | ICD-10-CM

## 2021-12-25 DIAGNOSIS — R00.2 HEART PALPITATIONS: ICD-10-CM

## 2021-12-25 LAB
ALBUMIN SERPL BCP-MCNC: 3.9 G/DL (ref 3.5–5.2)
ALP SERPL-CCNC: 48 U/L (ref 55–135)
ALT SERPL W/O P-5'-P-CCNC: 41 U/L (ref 10–44)
ANION GAP SERPL CALC-SCNC: 7 MMOL/L (ref 8–16)
AST SERPL-CCNC: 28 U/L (ref 10–40)
B-HCG UR QL: NEGATIVE
BASOPHILS # BLD AUTO: 0.04 K/UL (ref 0–0.2)
BASOPHILS NFR BLD: 0.5 % (ref 0–1.9)
BILIRUB SERPL-MCNC: 0.3 MG/DL (ref 0.1–1)
BUN SERPL-MCNC: 8 MG/DL (ref 6–20)
CALCIUM SERPL-MCNC: 9.4 MG/DL (ref 8.7–10.5)
CHLORIDE SERPL-SCNC: 110 MMOL/L (ref 95–110)
CO2 SERPL-SCNC: 25 MMOL/L (ref 23–29)
CREAT SERPL-MCNC: 0.9 MG/DL (ref 0.5–1.4)
CTP QC/QA: YES
D DIMER PPP IA.FEU-MCNC: 0.74 MG/L FEU
DIFFERENTIAL METHOD: NORMAL
EOSINOPHIL # BLD AUTO: 0.3 K/UL (ref 0–0.5)
EOSINOPHIL NFR BLD: 3.8 % (ref 0–8)
ERYTHROCYTE [DISTWIDTH] IN BLOOD BY AUTOMATED COUNT: 13.8 % (ref 11.5–14.5)
EST. GFR  (AFRICAN AMERICAN): >60 ML/MIN/1.73 M^2
EST. GFR  (NON AFRICAN AMERICAN): >60 ML/MIN/1.73 M^2
GLUCOSE SERPL-MCNC: 117 MG/DL (ref 70–110)
HCT VFR BLD AUTO: 41.6 % (ref 37–48.5)
HGB BLD-MCNC: 13.7 G/DL (ref 12–16)
IMM GRANULOCYTES # BLD AUTO: 0.02 K/UL (ref 0–0.04)
IMM GRANULOCYTES NFR BLD AUTO: 0.3 % (ref 0–0.5)
LACTATE SERPL-SCNC: 1.3 MMOL/L (ref 0.5–2.2)
LYMPHOCYTES # BLD AUTO: 1.9 K/UL (ref 1–4.8)
LYMPHOCYTES NFR BLD: 26.2 % (ref 18–48)
MCH RBC QN AUTO: 29.3 PG (ref 27–31)
MCHC RBC AUTO-ENTMCNC: 32.9 G/DL (ref 32–36)
MCV RBC AUTO: 89 FL (ref 82–98)
MONOCYTES # BLD AUTO: 0.6 K/UL (ref 0.3–1)
MONOCYTES NFR BLD: 7.6 % (ref 4–15)
NEUTROPHILS # BLD AUTO: 4.6 K/UL (ref 1.8–7.7)
NEUTROPHILS NFR BLD: 61.6 % (ref 38–73)
NRBC BLD-RTO: 0 /100 WBC
PLATELET # BLD AUTO: 270 K/UL (ref 150–450)
PMV BLD AUTO: 10.6 FL (ref 9.2–12.9)
POCT GLUCOSE: 96 MG/DL (ref 70–110)
POTASSIUM SERPL-SCNC: 3.5 MMOL/L (ref 3.5–5.1)
PROT SERPL-MCNC: 7.4 G/DL (ref 6–8.4)
RBC # BLD AUTO: 4.67 M/UL (ref 4–5.4)
SODIUM SERPL-SCNC: 142 MMOL/L (ref 136–145)
T4 FREE SERPL-MCNC: 1 NG/DL (ref 0.71–1.51)
TROPONIN I SERPL DL<=0.01 NG/ML-MCNC: 0.01 NG/ML (ref 0–0.03)
TSH SERPL DL<=0.005 MIU/L-ACNC: 6.62 UIU/ML (ref 0.4–4)
WBC # BLD AUTO: 7.4 K/UL (ref 3.9–12.7)

## 2021-12-25 PROCEDURE — 80053 COMPREHEN METABOLIC PANEL: CPT | Performed by: EMERGENCY MEDICINE

## 2021-12-25 PROCEDURE — 84443 ASSAY THYROID STIM HORMONE: CPT | Performed by: EMERGENCY MEDICINE

## 2021-12-25 PROCEDURE — 81025 URINE PREGNANCY TEST: CPT | Performed by: EMERGENCY MEDICINE

## 2021-12-25 PROCEDURE — 99285 EMERGENCY DEPT VISIT HI MDM: CPT | Mod: CR,,, | Performed by: EMERGENCY MEDICINE

## 2021-12-25 PROCEDURE — 85025 COMPLETE CBC W/AUTO DIFF WBC: CPT | Performed by: EMERGENCY MEDICINE

## 2021-12-25 PROCEDURE — 93005 ELECTROCARDIOGRAM TRACING: CPT

## 2021-12-25 PROCEDURE — 93010 EKG 12-LEAD: ICD-10-PCS | Mod: 76,,, | Performed by: INTERNAL MEDICINE

## 2021-12-25 PROCEDURE — 84439 ASSAY OF FREE THYROXINE: CPT | Performed by: EMERGENCY MEDICINE

## 2021-12-25 PROCEDURE — 93010 ELECTROCARDIOGRAM REPORT: CPT | Mod: ,,, | Performed by: INTERNAL MEDICINE

## 2021-12-25 PROCEDURE — 99285 PR EMERGENCY DEPT VISIT,LEVEL V: ICD-10-PCS | Mod: CR,,, | Performed by: EMERGENCY MEDICINE

## 2021-12-25 PROCEDURE — 96360 HYDRATION IV INFUSION INIT: CPT

## 2021-12-25 PROCEDURE — 85379 FIBRIN DEGRADATION QUANT: CPT | Performed by: EMERGENCY MEDICINE

## 2021-12-25 PROCEDURE — 84484 ASSAY OF TROPONIN QUANT: CPT | Performed by: EMERGENCY MEDICINE

## 2021-12-25 PROCEDURE — 93010 ELECTROCARDIOGRAM REPORT: CPT | Mod: 76,,, | Performed by: INTERNAL MEDICINE

## 2021-12-25 PROCEDURE — 83605 ASSAY OF LACTIC ACID: CPT | Performed by: EMERGENCY MEDICINE

## 2021-12-25 PROCEDURE — 99285 EMERGENCY DEPT VISIT HI MDM: CPT | Mod: 25

## 2021-12-25 PROCEDURE — 96361 HYDRATE IV INFUSION ADD-ON: CPT

## 2021-12-25 PROCEDURE — 25000003 PHARM REV CODE 250: Performed by: EMERGENCY MEDICINE

## 2021-12-25 PROCEDURE — 82962 GLUCOSE BLOOD TEST: CPT

## 2021-12-25 RX ADMIN — SODIUM CHLORIDE 1000 ML: 0.9 INJECTION, SOLUTION INTRAVENOUS at 09:12

## 2021-12-25 RX ADMIN — IOHEXOL 100 ML: 350 INJECTION, SOLUTION INTRAVENOUS at 11:12

## 2021-12-26 PROCEDURE — 25500020 PHARM REV CODE 255: Performed by: EMERGENCY MEDICINE

## 2021-12-26 NOTE — ED TRIAGE NOTES
"Jossy Leslie, an 36 y.o. female presents to the ED driving herself.  C/o cardiac "palpitations laying and standing".  SOB with activity with headaches in her forehead with pounding pressure at 8/10 without relief.      Chief Complaint   Patient presents with    Shortness of Breath     Covid + on 12/3. Having worsening SOB and heart palpitations.      Review of patient's allergies indicates:   Allergen Reactions    Pumpkin Anaphylaxis    Corn containing products     Shellfish containing products Itching    Soy     Wheat containing prod      Past Medical History:   Diagnosis Date    Asthma     Duodenal ulcer     [facility] 2015    Eosinophilic esophagitis     2019    Hiatal hernia     Hypertension 2/19/2021    Hypothyroidism due to Hashimoto's thyroiditis     Thyroid disease      "

## 2021-12-26 NOTE — PROVIDER PROGRESS NOTES - EMERGENCY DEPT.
Encounter Date: 12/25/2021    ED Physician Progress Notes        Physician Note:   Signed out to me.    Here with COVID infection early December and complaining of shortness of breath and palpitations.  Labs are benign, dimer was mildly elevated.  Pending CT PE.    She also is being worked up as an outpatient by Neurology for headaches.  She has outpatient MRI scheduled.  Her neuro exam is benign per previous attending no indication for emergent MRI at this time.    She will be discharged home with Cardiology referral, outpatient MRI, outpatient follow-up with neurology as scheduled.

## 2021-12-26 NOTE — DISCHARGE INSTRUCTIONS
Follow up with your doctor and cardiology as scheduled  Out pt MRI as ordered  Return to ED for any concerns    If you develop any new, worsening or worrisome symptoms please return to emergency department for re-evaluation.

## 2021-12-26 NOTE — ED PROVIDER NOTES
Encounter Date: 12/25/2021       History     Chief Complaint   Patient presents with    Shortness of Breath     Covid + on 12/3. Having worsening SOB and heart palpitations.      37 y/o F with medical history of HTN, Hasimotos thyroiditis and prior eosinophilic esophagitis presents with a complaint of intermittent palpitations and sob since testing positive for covid on 12/3/21. Had mild symtoms- lost sense of taste and smell (which have not returned) on 12/2 and tested positive next day. After about 5 days was still having fatigue and generally not feeling well so got regeneron infusion. Since then intermittent palpitations and sob. Mild chest discomfort. More with exertion. Also notes she has had some intermittent dizziness for which she has seen neurology and they have ordered a MRI but it is not scheduled yet.  No fevers. No n/v. No weakness.        Review of patient's allergies indicates:   Allergen Reactions    Pumpkin Anaphylaxis    Corn containing products     Shellfish containing products Itching    Soy     Wheat containing prod      Past Medical History:   Diagnosis Date    Asthma     Duodenal ulcer     [facility] 2015    Eosinophilic esophagitis     2019    Hiatal hernia     Hypertension 2/19/2021    Hypothyroidism due to Hashimoto's thyroiditis     Thyroid disease      Past Surgical History:   Procedure Laterality Date    ESOPHAGOGASTRODUODENOSCOPY      ESOPHAGOGASTRODUODENOSCOPY N/A 4/18/2019    Procedure: ESOPHAGOGASTRODUODENOSCOPY (EGD);  Surgeon: Karmen Marquez MD;  Location: [facility];  Service: Endoscopy;  Laterality: N/A;    HYSTEROSCOPY N/A 5/17/2019    Procedure: HYSTEROSCOPY;  Surgeon: Eli Blevins MD;  Location: [facility] OR;  Service: OB/GYN;  Laterality: N/A;    LAPAROSCOPIC SURGICAL REMOVAL OF CYST OF OVARY Right 5/17/2019    Procedure: EXCISION, CYST, OVARY, LAPAROSCOPIC;  Surgeon: Eli Blevins MD;  Location: [facility] OR;  Service: OB/GYN;  Laterality: Right;  video     TONSILLECTOMY       Family History   Problem Relation Age of Onset    Hypertension Mother     Hypertension Father     Hyperlipidemia Father     Heart disease Maternal Grandmother     Colon cancer Maternal Grandfather 70    Diabetes Paternal Grandmother     Asbestos Paternal Grandmother     Heart attack Paternal Grandfather 70    Hypertension Sister     No Known Problems Maternal Aunt     No Known Problems Maternal Uncle     No Known Problems Paternal Aunt     No Known Problems Paternal Uncle     Hypertension Other     Breast cancer Paternal Aunt 50    Amblyopia Neg Hx     Blindness Neg Hx     Cataracts Neg Hx     Glaucoma Neg Hx     Macular degeneration Neg Hx     Retinal detachment Neg Hx     Strabismus Neg Hx     Stroke Neg Hx     Thyroid disease Neg Hx     Ovarian cancer Neg Hx      Social History     Tobacco Use    Smoking status: Never Smoker    Smokeless tobacco: Never Used   Substance Use Topics    Alcohol use: Yes     Alcohol/week: 0.0 standard drinks     Comment: socially, minimal    Drug use: No     Review of Systems   Constitutional: Negative for fever.   HENT: Negative for congestion and sore throat.    Eyes: Negative for visual disturbance.   Respiratory: Positive for chest tightness and shortness of breath.    Cardiovascular: Positive for palpitations. Negative for leg swelling.   Gastrointestinal: Negative for abdominal pain, diarrhea, nausea and vomiting.   Genitourinary: Negative for dysuria.   Musculoskeletal: Negative for back pain.   Skin: Negative for rash.   Neurological: Positive for dizziness and light-headedness. Negative for syncope.       Physical Exam     Initial Vitals [12/25/21 2023]   BP Pulse Resp Temp SpO2   (!) 157/92 (!) 118 18 98.8 °F (37.1 °C) 98 %      MAP       --         Physical Exam    Nursing note and vitals reviewed.  Constitutional: She appears well-developed and well-nourished. She is not diaphoretic. No distress.   HENT:   Head:  Normocephalic and atraumatic.   Eyes: Conjunctivae and EOM are normal. Pupils are equal, round, and reactive to light.   Neck: Neck supple.   Cardiovascular: Normal rate, regular rhythm, normal heart sounds and intact distal pulses.   No murmur heard.  Pulmonary/Chest: Breath sounds normal. No respiratory distress. She has no wheezes. She has no rales.   Abdominal: Abdomen is soft. Bowel sounds are normal. She exhibits no distension. There is no abdominal tenderness. There is no rebound.   Musculoskeletal:         General: No edema.      Cervical back: Neck supple.     Neurological: She is alert and oriented to person, place, and time. She has normal strength. No sensory deficit.   Skin: Skin is warm and dry.         ED Course   Procedures  Labs Reviewed   COMPREHENSIVE METABOLIC PANEL - Abnormal; Notable for the following components:       Result Value    Glucose 117 (*)     Alkaline Phosphatase 48 (*)     Anion Gap 7 (*)     All other components within normal limits   TSH - Abnormal; Notable for the following components:    TSH 6.621 (*)     All other components within normal limits   D DIMER, QUANTITATIVE - Abnormal; Notable for the following components:    D-Dimer 0.74 (*)     All other components within normal limits   CBC W/ AUTO DIFFERENTIAL   LACTIC ACID, PLASMA   TROPONIN I   T4, FREE   POCT URINE PREGNANCY   POCT GLUCOSE     EKG Readings: (Independently Interpreted)   Sinus tachycardia, rate 105, no acute ischemic changes     ECG Results          EKG 12-lead (Final result)  Result time 12/26/21 10:42:42    Final result by Interface, Lab In [facility] (12/26/21 10:42:42)                 Narrative:    Test Reason :     Vent. Rate : 105 BPM     Atrial Rate : 105 BPM     P-R Int : 178 ms          QRS Dur : 076 ms      QT Int : 336 ms       P-R-T Axes : 055 071 081 degrees     QTc Int : 444 ms    Sinus tachycardia  Otherwise normal ECG  When compared with ECG of 25-DEC-2021 20:30,  No significant change was  found  Confirmed by Alex HURST MD ([id number]) on 12/26/2021 10:42:33 AM    Referred By: RADHA   SELF           Confirmed By:Alex HURST MD                             EKG 12-lead (Final result)  Result time 12/26/21 10:45:17    Final result by Interface, Lab In [facility] (12/26/21 10:45:17)                 Narrative:    Test Reason : R00.2,    Vent. Rate : 112 BPM     Atrial Rate : 112 BPM     P-R Int : 174 ms          QRS Dur : 076 ms      QT Int : 336 ms       P-R-T Axes : 068 072 052 degrees     QTc Int : 458 ms    Sinus tachycardia  Otherwise normal ECG  When compared with ECG of 19-FEB-2021 21:29,  No significant change was found  Confirmed by Alex HURST MD ([id number]) on 12/26/2021 10:45:09 AM    Referred By: RADHA   SELF           Confirmed By:Alex HURST MD                            Imaging Results          CTA Chest Non-Coronary (PE Study) (Final result)  Result time 12/25/21 23:50:01    Final result by Malik Cardenas MD (12/25/21 23:50:01)                 Impression:      No obvious evidence of a pulmonary embolism involving either the right or left lung parenchyma.      Electronically signed by: Malik Cardenas  Date:    12/25/2021  Time:    23:50             Narrative:    EXAMINATION:  CTA CHEST NON CORONARY    CLINICAL HISTORY:  Pulmonary embolism (PE) suspected, positive D-dimer;    TECHNIQUE:  Low dose axial images, sagittal and coronal reformations were obtained from the thoracic inlet to the lung bases following the IV administration of 100 mL of Omnipaque 350.  Contrast timing was optimized to evaluate the pulmonary arteries.    COMPARISON:  None    FINDINGS:  Pulmonary vasculature: Satisfactory opacification of the pulmonary arterial system with no filling defect to the segmental level.    Aorta: Left-sided aortic arch.  No aneurysm and no significant atherosclerosis    Base of Neck: No significant abnormality.    Thoracic soft tissues: Normal.    Heart: Normal size. No  effusion.    Suellen/Mediastinum: No pathologic tomasa enlargement.    Airways: Patent.    Lungs/Pleura: The lungs are essentially clear there is a small amount of atelectasis noted in the dependent aspects of both lung parenchyma especially on the right.    Esophagus: Normal.    Upper Abdomen: No abnormality of the partially imaged upper abdomen.    Bones: No acute fracture. No suspicious lytic or sclerotic lesions.                               X-Ray Chest PA And Lateral (Final result)  Result time 12/25/21 22:33:48    Final result by Malik Cardenas MD (12/25/21 22:33:48)                 Impression:      No acute abnormality.    Electronically signed by resident: Nina Linares  Date:    12/25/2021  Time:    22:15    Electronically signed by: Malik Cardenas  Date:    12/25/2021  Time:    22:33             Narrative:    EXAMINATION:  XR CHEST PA AND LATERAL    CLINICAL HISTORY:  Chest Pain;    TECHNIQUE:  PA and lateral views of the chest were performed.    COMPARISON:  Chest radiograph 12/02/2015    FINDINGS:  The lungs are clear, with normal appearance of pulmonary vasculature and no pleural effusion or pneumothorax.    The cardiomediastinal silhouette is normal in size. The hilar and mediastinal contours are unremarkable.    Osseous structures appear intact.                              X-Rays:   Independently Interpreted Readings:   Chest X-Ray: Normal heart size.  No infiltrates.  No acute abnormalities.     Medications   sodium chloride 0.9% bolus 1,000 mL (0 mLs Intravenous Stopped 12/25/21 2311)   iohexoL (OMNIPAQUE 350) injection 100 mL (100 mLs Intravenous Given 12/25/21 2338)     Medical Decision Making:   History:   Old Medical Records: I decided to obtain old medical records.  Initial Assessment:   37 y/o F with hashimotos thyroiditis with intermittent palpitation and sob since being covid positive  Ddx: hyperthyroid, PE, arrythmia, dehydration, electrolyte abnlty, unlikely ACS  Mildly tachy but  otherwise nl ecg  Will send blood work including d-dimer   Hydrate with 1L saline    Pt concerned about dizziness- no concern for acute CVA. Pt has order for outpt MRIbrain has had 2 MRI brains this year. Do not emergent MRI brain is indicated.  Independently Interpreted Test(s):   I have ordered and independently interpreted X-rays - see prior notes.  I have ordered and independently interpreted EKG Reading(s) - see prior notes  Clinical Tests:   Lab Tests: Ordered and Reviewed  Radiological Study: Ordered and Reviewed  Medical Tests: Ordered and Reviewed  ED Management:  11:15 PM  Slightly elevated d-dimer blood work other wise unremarkable. CTA chest ordered to rule out PE. Pt signed out to on-coming staff.  Discussed wit hpt possible referral to cardiology for Holter if CTA negative for PE. She statesshe has an appointment with cardiology on coming Wednesday (4 days)                      Clinical Impression:   Final diagnoses:  [R00.2] Heart palpitations  [R00.2] Palpitations (Primary)  [R06.00] Dyspnea, unspecified type          ED Disposition Condition    Discharge Stable        ED Prescriptions     None        Follow-up Information     Follow up With Specialties Details Why Contact Info Additional Information    Jerry Lockwood MD Internal Medicine Schedule an appointment as soon as possible for a visit   [address] [facility] [address]  [phone]       [facility]-Cardiology Sv 3rd Floor Cardiology Schedule an appointment as soon as possible for a visit   [address]  [facility] [address] [phone] Cardiology Services Clinics - 3rd floor           Jannet Parekh MD  12/26/21 6726

## 2021-12-27 ENCOUNTER — TELEPHONE (OUTPATIENT)
Dept: NEUROLOGY | Facility: CLINIC | Age: 36
End: 2021-12-27
Payer: COMMERCIAL

## 2021-12-27 ENCOUNTER — INFUSION (OUTPATIENT)
Dept: INFUSION THERAPY | Facility: HOSPITAL | Age: 36
End: 2021-12-27
Attending: NEUROLOGICAL SURGERY
Payer: COMMERCIAL

## 2021-12-27 VITALS
OXYGEN SATURATION: 99 % | HEART RATE: 91 BPM | SYSTOLIC BLOOD PRESSURE: 123 MMHG | DIASTOLIC BLOOD PRESSURE: 81 MMHG | RESPIRATION RATE: 16 BRPM

## 2021-12-27 DIAGNOSIS — G43.709 CHRONIC MIGRAINE WITHOUT AURA WITHOUT STATUS MIGRAINOSUS, NOT INTRACTABLE: Primary | ICD-10-CM

## 2021-12-27 PROCEDURE — 63600175 PHARM REV CODE 636 W HCPCS: Performed by: NEUROLOGICAL SURGERY

## 2021-12-27 PROCEDURE — 96365 THER/PROPH/DIAG IV INF INIT: CPT

## 2021-12-27 PROCEDURE — 25000003 PHARM REV CODE 250: Performed by: NEUROLOGICAL SURGERY

## 2021-12-27 RX ORDER — HEPARIN 100 UNIT/ML
500 SYRINGE INTRAVENOUS
Status: CANCELLED | OUTPATIENT
Start: 2021-12-27

## 2021-12-27 RX ORDER — SODIUM CHLORIDE 0.9 % (FLUSH) 0.9 %
10 SYRINGE (ML) INJECTION
Status: CANCELLED | OUTPATIENT
Start: 2021-12-27

## 2021-12-27 RX ADMIN — DEXTROSE 1000 MG: 50 INJECTION, SOLUTION INTRAVENOUS at 02:12

## 2021-12-28 ENCOUNTER — OFFICE VISIT (OUTPATIENT)
Dept: OTOLARYNGOLOGY | Facility: CLINIC | Age: 36
End: 2021-12-28
Payer: COMMERCIAL

## 2021-12-28 ENCOUNTER — INFUSION (OUTPATIENT)
Dept: INFUSION THERAPY | Facility: HOSPITAL | Age: 36
End: 2021-12-28
Attending: NEUROLOGICAL SURGERY
Payer: COMMERCIAL

## 2021-12-28 VITALS
OXYGEN SATURATION: 97 % | SYSTOLIC BLOOD PRESSURE: 127 MMHG | TEMPERATURE: 99 F | HEART RATE: 101 BPM | RESPIRATION RATE: 16 BRPM | DIASTOLIC BLOOD PRESSURE: 75 MMHG

## 2021-12-28 VITALS — HEART RATE: 101 BPM | TEMPERATURE: 98 F | DIASTOLIC BLOOD PRESSURE: 97 MMHG | SYSTOLIC BLOOD PRESSURE: 140 MMHG

## 2021-12-28 DIAGNOSIS — R26.89 IMBALANCE: ICD-10-CM

## 2021-12-28 DIAGNOSIS — G43.709 CHRONIC MIGRAINE WITHOUT AURA WITHOUT STATUS MIGRAINOSUS, NOT INTRACTABLE: Primary | ICD-10-CM

## 2021-12-28 DIAGNOSIS — R43.9 DYSOSMIA: ICD-10-CM

## 2021-12-28 DIAGNOSIS — Z86.16 HISTORY OF COVID-19: ICD-10-CM

## 2021-12-28 DIAGNOSIS — R06.00 DYSPNEA, UNSPECIFIED TYPE: Primary | ICD-10-CM

## 2021-12-28 PROCEDURE — 31231 NASAL ENDOSCOPY DX: CPT | Mod: S$GLB,,, | Performed by: OTOLARYNGOLOGY

## 2021-12-28 PROCEDURE — 3080F DIAST BP >= 90 MM HG: CPT | Mod: CPTII,S$GLB,, | Performed by: OTOLARYNGOLOGY

## 2021-12-28 PROCEDURE — 3044F HG A1C LEVEL LT 7.0%: CPT | Mod: CPTII,S$GLB,, | Performed by: OTOLARYNGOLOGY

## 2021-12-28 PROCEDURE — 1159F PR MEDICATION LIST DOCUMENTED IN MEDICAL RECORD: ICD-10-PCS | Mod: CPTII,S$GLB,, | Performed by: OTOLARYNGOLOGY

## 2021-12-28 PROCEDURE — 3077F SYST BP >= 140 MM HG: CPT | Mod: CPTII,S$GLB,, | Performed by: OTOLARYNGOLOGY

## 2021-12-28 PROCEDURE — 99204 OFFICE O/P NEW MOD 45 MIN: CPT | Mod: 25,S$GLB,, | Performed by: OTOLARYNGOLOGY

## 2021-12-28 PROCEDURE — 1159F MED LIST DOCD IN RCRD: CPT | Mod: CPTII,S$GLB,, | Performed by: OTOLARYNGOLOGY

## 2021-12-28 PROCEDURE — 63600175 PHARM REV CODE 636 W HCPCS: Performed by: NEUROLOGICAL SURGERY

## 2021-12-28 PROCEDURE — 25000003 PHARM REV CODE 250: Performed by: NEUROLOGICAL SURGERY

## 2021-12-28 PROCEDURE — 1160F PR REVIEW ALL MEDS BY PRESCRIBER/CLIN PHARMACIST DOCUMENTED: ICD-10-PCS | Mod: CPTII,S$GLB,, | Performed by: OTOLARYNGOLOGY

## 2021-12-28 PROCEDURE — 1160F RVW MEDS BY RX/DR IN RCRD: CPT | Mod: CPTII,S$GLB,, | Performed by: OTOLARYNGOLOGY

## 2021-12-28 PROCEDURE — 3044F PR MOST RECENT HEMOGLOBIN A1C LEVEL <7.0%: ICD-10-PCS | Mod: CPTII,S$GLB,, | Performed by: OTOLARYNGOLOGY

## 2021-12-28 PROCEDURE — 4010F ACE/ARB THERAPY RXD/TAKEN: CPT | Mod: CPTII,S$GLB,, | Performed by: OTOLARYNGOLOGY

## 2021-12-28 PROCEDURE — 3080F PR MOST RECENT DIASTOLIC BLOOD PRESSURE >= 90 MM HG: ICD-10-PCS | Mod: CPTII,S$GLB,, | Performed by: OTOLARYNGOLOGY

## 2021-12-28 PROCEDURE — 96365 THER/PROPH/DIAG IV INF INIT: CPT

## 2021-12-28 PROCEDURE — 4010F PR ACE/ARB THEARPY RXD/TAKEN: ICD-10-PCS | Mod: CPTII,S$GLB,, | Performed by: OTOLARYNGOLOGY

## 2021-12-28 PROCEDURE — 99204 PR OFFICE/OUTPT VISIT, NEW, LEVL IV, 45-59 MIN: ICD-10-PCS | Mod: 25,S$GLB,, | Performed by: OTOLARYNGOLOGY

## 2021-12-28 PROCEDURE — 31231 PR NASAL ENDOSCOPY, DX: ICD-10-PCS | Mod: S$GLB,,, | Performed by: OTOLARYNGOLOGY

## 2021-12-28 PROCEDURE — 3077F PR MOST RECENT SYSTOLIC BLOOD PRESSURE >= 140 MM HG: ICD-10-PCS | Mod: CPTII,S$GLB,, | Performed by: OTOLARYNGOLOGY

## 2021-12-28 RX ORDER — HEPARIN 100 UNIT/ML
500 SYRINGE INTRAVENOUS
Status: CANCELLED | OUTPATIENT
Start: 2021-12-28

## 2021-12-28 RX ORDER — SODIUM CHLORIDE 0.9 % (FLUSH) 0.9 %
10 SYRINGE (ML) INJECTION
Status: CANCELLED | OUTPATIENT
Start: 2021-12-28

## 2021-12-28 RX ADMIN — DEXTROSE 1000 MG: 50 INJECTION, SOLUTION INTRAVENOUS at 03:12

## 2021-12-29 ENCOUNTER — OFFICE VISIT (OUTPATIENT)
Dept: CARDIOLOGY | Facility: CLINIC | Age: 36
End: 2021-12-29
Payer: COMMERCIAL

## 2021-12-29 ENCOUNTER — INFUSION (OUTPATIENT)
Dept: INFUSION THERAPY | Facility: HOSPITAL | Age: 36
End: 2021-12-29
Attending: NEUROLOGICAL SURGERY
Payer: COMMERCIAL

## 2021-12-29 VITALS
DIASTOLIC BLOOD PRESSURE: 81 MMHG | DIASTOLIC BLOOD PRESSURE: 88 MMHG | RESPIRATION RATE: 16 BRPM | WEIGHT: 162.63 LBS | OXYGEN SATURATION: 98 % | HEART RATE: 98 BPM | SYSTOLIC BLOOD PRESSURE: 124 MMHG | TEMPERATURE: 98 F | OXYGEN SATURATION: 95 % | HEART RATE: 82 BPM | BODY MASS INDEX: 29.74 KG/M2 | SYSTOLIC BLOOD PRESSURE: 132 MMHG

## 2021-12-29 DIAGNOSIS — G43.709 CHRONIC MIGRAINE WITHOUT AURA WITHOUT STATUS MIGRAINOSUS, NOT INTRACTABLE: Primary | ICD-10-CM

## 2021-12-29 DIAGNOSIS — R00.2 PALPITATIONS: Primary | ICD-10-CM

## 2021-12-29 PROCEDURE — 99204 PR OFFICE/OUTPT VISIT, NEW, LEVL IV, 45-59 MIN: ICD-10-PCS | Mod: 25,S$GLB,, | Performed by: INTERNAL MEDICINE

## 2021-12-29 PROCEDURE — 3079F DIAST BP 80-89 MM HG: CPT | Mod: CPTII,S$GLB,, | Performed by: INTERNAL MEDICINE

## 2021-12-29 PROCEDURE — 1160F PR REVIEW ALL MEDS BY PRESCRIBER/CLIN PHARMACIST DOCUMENTED: ICD-10-PCS | Mod: CPTII,S$GLB,, | Performed by: INTERNAL MEDICINE

## 2021-12-29 PROCEDURE — 4010F PR ACE/ARB THEARPY RXD/TAKEN: ICD-10-PCS | Mod: CPTII,S$GLB,, | Performed by: INTERNAL MEDICINE

## 2021-12-29 PROCEDURE — 3044F PR MOST RECENT HEMOGLOBIN A1C LEVEL <7.0%: ICD-10-PCS | Mod: CPTII,S$GLB,, | Performed by: INTERNAL MEDICINE

## 2021-12-29 PROCEDURE — 99999 PR PBB SHADOW E&M-EST. PATIENT-LVL III: ICD-10-PCS | Mod: PBBFAC,,, | Performed by: INTERNAL MEDICINE

## 2021-12-29 PROCEDURE — 63600175 PHARM REV CODE 636 W HCPCS: Performed by: NEUROLOGICAL SURGERY

## 2021-12-29 PROCEDURE — 3079F PR MOST RECENT DIASTOLIC BLOOD PRESSURE 80-89 MM HG: ICD-10-PCS | Mod: CPTII,S$GLB,, | Performed by: INTERNAL MEDICINE

## 2021-12-29 PROCEDURE — 3075F SYST BP GE 130 - 139MM HG: CPT | Mod: CPTII,S$GLB,, | Performed by: INTERNAL MEDICINE

## 2021-12-29 PROCEDURE — 99999 PR PBB SHADOW E&M-EST. PATIENT-LVL III: CPT | Mod: PBBFAC,,, | Performed by: INTERNAL MEDICINE

## 2021-12-29 PROCEDURE — 99204 OFFICE O/P NEW MOD 45 MIN: CPT | Mod: 25,S$GLB,, | Performed by: INTERNAL MEDICINE

## 2021-12-29 PROCEDURE — 96365 THER/PROPH/DIAG IV INF INIT: CPT

## 2021-12-29 PROCEDURE — 1159F MED LIST DOCD IN RCRD: CPT | Mod: CPTII,S$GLB,, | Performed by: INTERNAL MEDICINE

## 2021-12-29 PROCEDURE — 3008F BODY MASS INDEX DOCD: CPT | Mod: CPTII,S$GLB,, | Performed by: INTERNAL MEDICINE

## 2021-12-29 PROCEDURE — 3008F PR BODY MASS INDEX (BMI) DOCUMENTED: ICD-10-PCS | Mod: CPTII,S$GLB,, | Performed by: INTERNAL MEDICINE

## 2021-12-29 PROCEDURE — 25000003 PHARM REV CODE 250: Performed by: NEUROLOGICAL SURGERY

## 2021-12-29 PROCEDURE — 93010 EKG 12-LEAD: ICD-10-PCS | Mod: S$GLB,,, | Performed by: INTERNAL MEDICINE

## 2021-12-29 PROCEDURE — 3075F PR MOST RECENT SYSTOLIC BLOOD PRESS GE 130-139MM HG: ICD-10-PCS | Mod: CPTII,S$GLB,, | Performed by: INTERNAL MEDICINE

## 2021-12-29 PROCEDURE — 93010 ELECTROCARDIOGRAM REPORT: CPT | Mod: S$GLB,,, | Performed by: INTERNAL MEDICINE

## 2021-12-29 PROCEDURE — 1159F PR MEDICATION LIST DOCUMENTED IN MEDICAL RECORD: ICD-10-PCS | Mod: CPTII,S$GLB,, | Performed by: INTERNAL MEDICINE

## 2021-12-29 PROCEDURE — 4010F ACE/ARB THERAPY RXD/TAKEN: CPT | Mod: CPTII,S$GLB,, | Performed by: INTERNAL MEDICINE

## 2021-12-29 PROCEDURE — 3044F HG A1C LEVEL LT 7.0%: CPT | Mod: CPTII,S$GLB,, | Performed by: INTERNAL MEDICINE

## 2021-12-29 PROCEDURE — 93005 ELECTROCARDIOGRAM TRACING: CPT

## 2021-12-29 PROCEDURE — 1160F RVW MEDS BY RX/DR IN RCRD: CPT | Mod: CPTII,S$GLB,, | Performed by: INTERNAL MEDICINE

## 2021-12-29 RX ORDER — HEPARIN 100 UNIT/ML
500 SYRINGE INTRAVENOUS
Status: CANCELLED | OUTPATIENT
Start: 2021-12-29

## 2021-12-29 RX ORDER — SODIUM CHLORIDE 0.9 % (FLUSH) 0.9 %
10 SYRINGE (ML) INJECTION
Status: CANCELLED | OUTPATIENT
Start: 2021-12-29

## 2021-12-29 RX ADMIN — DEXTROSE 1000 MG: 50 INJECTION, SOLUTION INTRAVENOUS at 01:12

## 2021-12-30 ENCOUNTER — HOSPITAL ENCOUNTER (OUTPATIENT)
Dept: CARDIOLOGY | Facility: HOSPITAL | Age: 36
Discharge: HOME | End: 2021-12-30
Attending: INTERNAL MEDICINE
Payer: COMMERCIAL

## 2021-12-30 ENCOUNTER — INFUSION (OUTPATIENT)
Dept: INFUSION THERAPY | Facility: HOSPITAL | Age: 36
End: 2021-12-30
Attending: NEUROLOGICAL SURGERY
Payer: COMMERCIAL

## 2021-12-30 VITALS
RESPIRATION RATE: 16 BRPM | TEMPERATURE: 98 F | HEART RATE: 69 BPM | SYSTOLIC BLOOD PRESSURE: 142 MMHG | OXYGEN SATURATION: 95 % | DIASTOLIC BLOOD PRESSURE: 89 MMHG

## 2021-12-30 VITALS — BODY MASS INDEX: 29.81 KG/M2 | HEIGHT: 62 IN | WEIGHT: 162 LBS

## 2021-12-30 DIAGNOSIS — R06.00 DYSPNEA, UNSPECIFIED TYPE: Primary | ICD-10-CM

## 2021-12-30 DIAGNOSIS — R00.2 PALPITATIONS: ICD-10-CM

## 2021-12-30 DIAGNOSIS — G43.709 CHRONIC MIGRAINE WITHOUT AURA WITHOUT STATUS MIGRAINOSUS, NOT INTRACTABLE: Primary | ICD-10-CM

## 2021-12-30 DIAGNOSIS — J45.20 MILD INTERMITTENT ASTHMA WITHOUT COMPLICATION: ICD-10-CM

## 2021-12-30 LAB
AV INDEX (PROSTH): 0.8
AV MEAN GRADIENT: 4 MMHG
AV PEAK GRADIENT: 6 MMHG
AV VALVE AREA: 2.46 CM2
AV VELOCITY RATIO: 0.7
BSA FOR ECHO PROCEDURE: 1.79 M2
CV ECHO LV RWT: 0.39 CM
DOP CALC AO PEAK VEL: 1.25 M/S
DOP CALC AO VTI: 25.3 CM
DOP CALC LVOT AREA: 3.1 CM2
DOP CALC LVOT DIAMETER: 1.98 CM
DOP CALC LVOT PEAK VEL: 0.88 M/S
DOP CALC LVOT STROKE VOLUME: 62.14 CM3
DOP CALCLVOT PEAK VEL VTI: 20.19 CM
E WAVE DECELERATION TIME: 189.51 MSEC
E/A RATIO: 1.39
E/E' RATIO: 9.7 M/S
ECHO LV POSTERIOR WALL: 0.89 CM (ref 0.6–1.1)
EJECTION FRACTION: 65 %
FRACTIONAL SHORTENING: 44 % (ref 28–44)
INTERVENTRICULAR SEPTUM: 1.1 CM (ref 0.6–1.1)
IVRT: 94.2 MSEC
LA MAJOR: 5.4 CM
LA MINOR: 4.6 CM
LA WIDTH: 3.95 CM
LEFT ATRIUM SIZE: 3.87 CM
LEFT ATRIUM VOLUME INDEX: 36.9 ML/M2
LEFT ATRIUM VOLUME: 64.55 CM3
LEFT INTERNAL DIMENSION IN SYSTOLE: 2.57 CM (ref 2.1–4)
LEFT VENTRICLE DIASTOLIC VOLUME INDEX: 54.9 ML/M2
LEFT VENTRICLE DIASTOLIC VOLUME: 96.08 ML
LEFT VENTRICLE MASS INDEX: 89 G/M2
LEFT VENTRICLE SYSTOLIC VOLUME INDEX: 13.7 ML/M2
LEFT VENTRICLE SYSTOLIC VOLUME: 23.9 ML
LEFT VENTRICULAR INTERNAL DIMENSION IN DIASTOLE: 4.57 CM (ref 3.5–6)
LEFT VENTRICULAR MASS: 156.06 G
LV LATERAL E/E' RATIO: 8.82 M/S
LV SEPTAL E/E' RATIO: 10.78 M/S
MV PEAK A VEL: 0.7 M/S
MV PEAK E VEL: 0.97 M/S
MV STENOSIS PRESSURE HALF TIME: 54.96 MS
MV VALVE AREA P 1/2 METHOD: 4 CM2
PISA TR MAX VEL: 2.57 M/S
PULM VEIN S/D RATIO: 0.98
PV PEAK D VEL: 0.48 M/S
PV PEAK S VEL: 0.47 M/S
PV PEAK VELOCITY: 0.91 CM/S
RA MAJOR: 5.66 CM
RA PRESSURE: 15 MMHG
RA WIDTH: 4.47 CM
RIGHT VENTRICULAR END-DIASTOLIC DIMENSION: 3.58 CM
RV TISSUE DOPPLER FREE WALL SYSTOLIC VELOCITY 1 (APICAL 4 CHAMBER VIEW): 13.94 CM/S
SINUS: 3 CM
STJ: 2.39 CM
TDI LATERAL: 0.11 M/S
TDI SEPTAL: 0.09 M/S
TDI: 0.1 M/S
TR MAX PG: 26 MMHG
TRICUSPID ANNULAR PLANE SYSTOLIC EXCURSION: 2.54 CM
TV REST PULMONARY ARTERY PRESSURE: 41 MMHG

## 2021-12-30 PROCEDURE — 93306 TTE W/DOPPLER COMPLETE: CPT | Mod: 26,,, | Performed by: INTERNAL MEDICINE

## 2021-12-30 PROCEDURE — 93227 XTRNL ECG REC<48 HR R&I: CPT | Mod: ,,, | Performed by: INTERNAL MEDICINE

## 2021-12-30 PROCEDURE — 93306 TTE W/DOPPLER COMPLETE: CPT

## 2021-12-30 PROCEDURE — 93227 HOLTER MONITOR - 24 HOUR (CUPID ONLY): ICD-10-PCS | Mod: ,,, | Performed by: INTERNAL MEDICINE

## 2021-12-30 PROCEDURE — 93225 XTRNL ECG REC<48 HRS REC: CPT

## 2021-12-30 PROCEDURE — 93306 ECHO (CUPID ONLY): ICD-10-PCS | Mod: 26,,, | Performed by: INTERNAL MEDICINE

## 2021-12-30 PROCEDURE — 63600175 PHARM REV CODE 636 W HCPCS: Performed by: NEUROLOGICAL SURGERY

## 2021-12-30 PROCEDURE — 96365 THER/PROPH/DIAG IV INF INIT: CPT

## 2021-12-30 PROCEDURE — 25000003 PHARM REV CODE 250: Performed by: NEUROLOGICAL SURGERY

## 2021-12-30 RX ORDER — HEPARIN 100 UNIT/ML
500 SYRINGE INTRAVENOUS
Status: CANCELLED | OUTPATIENT
Start: 2021-12-30

## 2021-12-30 RX ORDER — SODIUM CHLORIDE 0.9 % (FLUSH) 0.9 %
10 SYRINGE (ML) INJECTION
Status: CANCELLED | OUTPATIENT
Start: 2021-12-30

## 2021-12-30 RX ADMIN — DEXTROSE 1000 MG: 50 INJECTION, SOLUTION INTRAVENOUS at 10:12

## 2022-01-03 ENCOUNTER — PATIENT MESSAGE (OUTPATIENT)
Dept: NEUROLOGY | Facility: CLINIC | Age: 37
End: 2022-01-03
Payer: COMMERCIAL

## 2022-01-03 ENCOUNTER — PATIENT MESSAGE (OUTPATIENT)
Dept: CARDIOLOGY | Facility: CLINIC | Age: 37
End: 2022-01-03
Payer: COMMERCIAL

## 2022-01-03 LAB
OHS CV EVENT MONITOR DAY: 0
OHS CV HOLTER LENGTH DECIMAL HOURS: 23.98
OHS CV HOLTER LENGTH HOURS: 23
OHS CV HOLTER LENGTH MINUTES: 59
OHS CV HOLTER SINUS AVERAGE HR: 79
OHS CV HOLTER SINUS MAX HR: 148
OHS CV HOLTER SINUS MIN HR: 42

## 2022-01-04 ENCOUNTER — HOSPITAL ENCOUNTER (OUTPATIENT)
Dept: RADIOLOGY | Facility: HOSPITAL | Age: 37
Discharge: HOME | End: 2022-01-04
Attending: NEUROLOGICAL SURGERY
Payer: COMMERCIAL

## 2022-01-04 ENCOUNTER — OFFICE VISIT (OUTPATIENT)
Dept: PULMONOLOGY | Facility: CLINIC | Age: 37
End: 2022-01-04
Payer: COMMERCIAL

## 2022-01-04 ENCOUNTER — PATIENT MESSAGE (OUTPATIENT)
Dept: OTOLARYNGOLOGY | Facility: CLINIC | Age: 37
End: 2022-01-04
Payer: COMMERCIAL

## 2022-01-04 ENCOUNTER — HOSPITAL ENCOUNTER (OUTPATIENT)
Dept: PULMONOLOGY | Facility: CLINIC | Age: 37
Discharge: HOME | End: 2022-01-04
Payer: COMMERCIAL

## 2022-01-04 VITALS
OXYGEN SATURATION: 98 % | BODY MASS INDEX: 28.35 KG/M2 | SYSTOLIC BLOOD PRESSURE: 108 MMHG | HEART RATE: 90 BPM | DIASTOLIC BLOOD PRESSURE: 70 MMHG | WEIGHT: 160 LBS | HEIGHT: 63 IN

## 2022-01-04 DIAGNOSIS — R42 DIZZINESS AND GIDDINESS: ICD-10-CM

## 2022-01-04 DIAGNOSIS — R00.2 PALPITATIONS: ICD-10-CM

## 2022-01-04 DIAGNOSIS — R06.09 DYSPNEA ON EXERTION: Primary | ICD-10-CM

## 2022-01-04 DIAGNOSIS — U07.1 PNEUMONIA DUE TO COVID-19 VIRUS: ICD-10-CM

## 2022-01-04 DIAGNOSIS — R06.00 DYSPNEA, UNSPECIFIED TYPE: ICD-10-CM

## 2022-01-04 DIAGNOSIS — J34.89 OTHER SPECIFIED DISORDERS OF NOSE AND NASAL SINUSES: ICD-10-CM

## 2022-01-04 DIAGNOSIS — R06.00 DYSPNEA, UNSPECIFIED TYPE: Primary | ICD-10-CM

## 2022-01-04 DIAGNOSIS — Z13.9 SCREENING PROCEDURE: Primary | ICD-10-CM

## 2022-01-04 DIAGNOSIS — J12.82 PNEUMONIA DUE TO COVID-19 VIRUS: ICD-10-CM

## 2022-01-04 LAB
CTP QC/QA: YES
DLCO ADJ PRE: 22.47 ML/(MIN*MMHG) (ref 19.75–31.22)
DLCO SINGLE BREATH LLN: 19.75
DLCO SINGLE BREATH PRE REF: 89 %
DLCO SINGLE BREATH REF: 25.49
DLCOC SBVA LLN: 3.71
DLCOC SBVA PRE REF: 86.3 %
DLCOC SBVA REF: 5.34
DLCOC SINGLE BREATH LLN: 19.75
DLCOC SINGLE BREATH PRE REF: 88.2 %
DLCOC SINGLE BREATH REF: 25.49
DLCOCSBVAULN: 6.98
DLCOCSINGLEBREATHULN: 31.22
DLCOSINGLEBREATHULN: 31.22
DLCOVA LLN: 3.71
DLCOVA PRE REF: 87.1 %
DLCOVA PRE: 4.65 ML/(MIN*MMHG*L) (ref 3.71–6.98)
DLCOVA REF: 5.34
DLCOVAULN: 6.98
DLVAADJ PRE: 4.61 ML/(MIN*MMHG*L) (ref 3.71–6.98)
FEF 25 75 LLN: 2
FEF 25 75 PRE REF: 93.4 %
FEF 25 75 REF: 3.22
FEV05 LLN: 1.26
FEV05 REF: 2.12
FEV1 FVC LLN: 72
FEV1 FVC PRE REF: 97.1 %
FEV1 FVC REF: 83
FEV1 LLN: 2.38
FEV1 PRE REF: 104.1 %
FEV1 REF: 2.98
FVC LLN: 2.88
FVC PRE REF: 106.7 %
FVC REF: 3.6
IVC PRE: 3.57 L (ref 2.88–4.32)
IVC SINGLE BREATH LLN: 2.88
IVC SINGLE BREATH PRE REF: 99.3 %
IVC SINGLE BREATH REF: 3.6
IVCSINGLEBREATHULN: 4.32
PEF LLN: 5.19
PEF PRE REF: 139.2 %
PEF REF: 6.85
PHYSICIAN COMMENT: ABNORMAL
PRE DLCO: 22.68 ML/(MIN*MMHG) (ref 19.75–31.22)
PRE FEF 25 75: 3.01 L/S (ref 2–4.45)
PRE FET 100: 6.58 SEC
PRE FEV05 REF: 117.9 %
PRE FEV1 FVC: 80.77 % (ref 72.01–94.38)
PRE FEV1: 3.1 L (ref 2.38–3.58)
PRE FEV5: 2.49 L (ref 1.26–2.97)
PRE FVC: 3.84 L (ref 2.88–4.32)
PRE PEF: 9.53 L/S (ref 5.19–8.5)
SARS-COV-2 AG RESP QL IA.RAPID: NEGATIVE
VA PRE: 4.88 L (ref 4.62–4.62)
VA SINGLE BREATH PRE REF: 105.6 %
VA SINGLE BREATH REF: 4.62

## 2022-01-04 PROCEDURE — 3074F PR MOST RECENT SYSTOLIC BLOOD PRESSURE < 130 MM HG: ICD-10-PCS | Mod: CPTII,S$GLB,, | Performed by: INTERNAL MEDICINE

## 2022-01-04 PROCEDURE — 94010 BREATHING CAPACITY TEST: ICD-10-PCS | Mod: S$GLB,,, | Performed by: INTERNAL MEDICINE

## 2022-01-04 PROCEDURE — 70544 MR ANGIOGRAPHY HEAD W/O DYE: CPT | Mod: 26,,, | Performed by: RADIOLOGY

## 2022-01-04 PROCEDURE — A9585 GADOBUTROL INJECTION: HCPCS | Performed by: NEUROLOGICAL SURGERY

## 2022-01-04 PROCEDURE — 99999 PR PBB SHADOW E&M-EST. PATIENT-LVL IV: CPT | Mod: PBBFAC,,, | Performed by: INTERNAL MEDICINE

## 2022-01-04 PROCEDURE — 99999 PR PBB SHADOW E&M-EST. PATIENT-LVL IV: ICD-10-PCS | Mod: PBBFAC,,, | Performed by: INTERNAL MEDICINE

## 2022-01-04 PROCEDURE — 3008F PR BODY MASS INDEX (BMI) DOCUMENTED: ICD-10-PCS | Mod: CPTII,S$GLB,, | Performed by: INTERNAL MEDICINE

## 2022-01-04 PROCEDURE — 94729 PR C02/MEMBANE DIFFUSE CAPACITY: ICD-10-PCS | Mod: S$GLB,,, | Performed by: INTERNAL MEDICINE

## 2022-01-04 PROCEDURE — 87811 SARS CORONAVIRUS 2 ANTIGEN POCT, MANUAL READ: ICD-10-PCS | Mod: S$GLB,,, | Performed by: INTERNAL MEDICINE

## 2022-01-04 PROCEDURE — 25500020 PHARM REV CODE 255: Performed by: NEUROLOGICAL SURGERY

## 2022-01-04 PROCEDURE — 94010 BREATHING CAPACITY TEST: CPT | Mod: S$GLB,,, | Performed by: INTERNAL MEDICINE

## 2022-01-04 PROCEDURE — 3078F PR MOST RECENT DIASTOLIC BLOOD PRESSURE < 80 MM HG: ICD-10-PCS | Mod: CPTII,S$GLB,, | Performed by: INTERNAL MEDICINE

## 2022-01-04 PROCEDURE — 99204 PR OFFICE/OUTPT VISIT, NEW, LEVL IV, 45-59 MIN: ICD-10-PCS | Mod: S$GLB,,, | Performed by: INTERNAL MEDICINE

## 2022-01-04 PROCEDURE — 99204 OFFICE O/P NEW MOD 45 MIN: CPT | Mod: S$GLB,,, | Performed by: INTERNAL MEDICINE

## 2022-01-04 PROCEDURE — 3078F DIAST BP <80 MM HG: CPT | Mod: CPTII,S$GLB,, | Performed by: INTERNAL MEDICINE

## 2022-01-04 PROCEDURE — 3074F SYST BP LT 130 MM HG: CPT | Mod: CPTII,S$GLB,, | Performed by: INTERNAL MEDICINE

## 2022-01-04 PROCEDURE — 94729 DIFFUSING CAPACITY: CPT | Mod: S$GLB,,, | Performed by: INTERNAL MEDICINE

## 2022-01-04 PROCEDURE — 70544 MRA BRAIN WITHOUT CONTRAST: ICD-10-PCS | Mod: 26,,, | Performed by: RADIOLOGY

## 2022-01-04 PROCEDURE — 70544 MR ANGIOGRAPHY HEAD W/O DYE: CPT | Mod: TC

## 2022-01-04 PROCEDURE — 87811 SARS-COV-2 COVID19 W/OPTIC: CPT | Mod: S$GLB,,, | Performed by: INTERNAL MEDICINE

## 2022-01-04 PROCEDURE — 70553 MRI BRAIN STEM W/O & W/DYE: CPT | Mod: 26,,, | Performed by: RADIOLOGY

## 2022-01-04 PROCEDURE — 3008F BODY MASS INDEX DOCD: CPT | Mod: CPTII,S$GLB,, | Performed by: INTERNAL MEDICINE

## 2022-01-04 PROCEDURE — 70553 MRI BRAIN STEM W/O & W/DYE: CPT | Mod: TC

## 2022-01-04 PROCEDURE — 70553 MRI BRAIN W WO CONTRAST: ICD-10-PCS | Mod: 26,,, | Performed by: RADIOLOGY

## 2022-01-04 RX ORDER — LEVALBUTEROL TARTRATE 45 UG/1
1-2 AEROSOL, METERED ORAL EVERY 4 HOURS PRN
Qty: 15 G | Refills: 0 | Status: SHIPPED | OUTPATIENT
Start: 2022-01-04 | End: 2022-11-01

## 2022-01-04 RX ORDER — GADOBUTROL 604.72 MG/ML
7.5 INJECTION INTRAVENOUS
Status: COMPLETED | OUTPATIENT
Start: 2022-01-04 | End: 2022-01-04

## 2022-01-04 RX ADMIN — GADOBUTROL 7.5 ML: 604.72 INJECTION INTRAVENOUS at 11:01

## 2022-01-04 NOTE — PROGRESS NOTES
Subjective:      Patient ID: Jossy Leslie is a 36 y.o. female.    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  She has hx of severe migraines and has to have solu medrol infusion for treatment. Has hx  Of eosinophilic esophagitis and take protonix prn. Her CTA done on 12/25 shows a small infiltrate in the lower most protion of the right lung. Could this be the site of her covid infection or chronic aspiration pneumonia?  She had a 2-D echo that showed elevation of CVP: 15 and PAP:41. Her chest x-ray does not suggest CHF and she has no pedal edema. Will repeat the 2-D in six weeks. No clinical findings today of CHF  Review of Systems   Constitutional: Negative.    HENT: Negative.    Eyes: Negative.    Respiratory: Negative.    Cardiovascular: Negative.         Abnormal 2-D on 12/30  Does not match her clinical symptoms   Will repeat in six weeks.    CVP:15  & PAP:41   Genitourinary: Negative.    Endocrine: Hashimoto's thyroiditis, on synthroid.   Musculoskeletal: Negative.    Skin: Negative.    Gastrointestinal: Negative.         Hx of eosinophilic esophagitis   Neurological: Positive for headaches.        Hx of migraines.   Hematological:        Mild chronic iron deficient anemia  Encouraged to restart her iron replacement.   Psychiatric/Behavioral: Negative.      Objective:     Physical Exam  Vitals and nursing note reviewed.   Constitutional:       General: She is not in acute distress.     Appearance: She is well-developed and well-nourished.   HENT:      Head: Normocephalic and atraumatic.      Right Ear: External ear normal.      Left Ear: External ear normal.      Nose: Nose normal.      Mouth/Throat:      Mouth: Oropharynx is clear and moist.   Eyes:      Extraocular Movements: EOM normal.      Conjunctiva/sclera: Conjunctivae  normal.      Pupils: Pupils are equal, round, and reactive to light.   Neck:      Thyroid: No thyromegaly.      Vascular: No JVD.   Cardiovascular:      Rate and Rhythm: Normal rate and regular rhythm.      Pulses: Intact distal pulses.      Heart sounds: Normal heart sounds. No murmur heard.  No gallop.    Pulmonary:      Effort: No respiratory distress.      Breath sounds: Normal breath sounds. No stridor. No wheezing or rales.      Comments: PFT's Absoultely normal    Sa02: 98% and after walking the length of the macdonald and   back 96%    Chest x-ray is clear     CTA: Negative for PE  Has small area of atelectasis right lung base.  Chest:      Chest wall: No tenderness.   Abdominal:      General: Bowel sounds are normal. There is no distension.      Palpations: Abdomen is soft. There is no mass.      Tenderness: There is no abdominal tenderness. There is no guarding or rebound.   Musculoskeletal:         General: No edema. Normal range of motion.      Cervical back: Normal range of motion and neck supple.   Lymphadenopathy:      Cervical: No cervical adenopathy.   Skin:     General: Skin is warm and dry.      Findings: No rash.   Neurological:      Mental Status: She is alert and oriented to person, place, and time.      Cranial Nerves: No cranial nerve deficit.      Deep Tendon Reflexes: Reflexes are normal and symmetric. Reflexes normal.   Psychiatric:         Mood and Affect: Mood and affect normal.         Behavior: Behavior normal.         Thought Content: Thought content normal.         Judgment: Judgment normal.         Assessment:     1. Dyspnea on exertion    2. Pneumonia due to COVID-19 virus      Outpatient Encounter Medications as of 1/4/2022   Medication Sig Dispense Refill    albuterol (PROVENTIL/VENTOLIN HFA) 90 mcg/actuation inhaler Inhale 1-2 puffs into the lungs every 6 (six) hours as needed for Wheezing or Shortness of Breath. Rescue 1 Inhaler 0    amLODIPine (NORVASC) 5 MG tablet Take 1 tablet  (5 mg total) by mouth once daily. 30 tablet 11    aspirin-acetaminophen-caffeine 250-250-65 mg (EXCEDRIN MIGRAINE) 250-250-65 mg per tablet Excedrin Migraine           cetirizine (ZYRTEC) 10 MG tablet Take 10 mg by mouth once daily.      ferrous sulfate (FEOSOL) 325 mg (65 mg iron) Tab tablet Take 1 tablet (325 mg total) by mouth once daily. 30 tablet 2    levalbuterol (XOPENEX HFA) 45 mcg/actuation inhaler Inhale 1-2 puffs into the lungs every 4 (four) hours as needed for Wheezing. Rescue 15 g 0    meclizine (ANTIVERT) 25 mg tablet Take 1 tablet (25 mg total) by mouth 3 (three) times daily as needed for Dizziness or Nausea. 15 tablet 0    meloxicam (MOBIC) 15 MG tablet Take 15 mg by mouth once daily.      ondansetron (ZOFRAN-ODT) 4 MG TbDL Take 1 tablet (4 mg total) by mouth 2 (two) times daily. 30 tablet 2    pantoprazole (PROTONIX) 40 MG tablet Take 1 tablet by mouth once daily 30 tablet 0    sumatriptan (IMITREX) 100 MG tablet Take 1 tablet (100 mg total) by mouth as needed for Migraine. Take at the onset of headache, may take 1 more in 1 hour if needed. 9 tablet 5    SYNTHROID 88 mcg tablet Take 1 tablet (88 mcg total) by mouth before breakfast. 30 tablet 11    tiZANidine (ZANAFLEX) 4 MG tablet Take 4 mg by mouth 3 (three) times daily.       Facility-Administered Encounter Medications as of 1/4/2022   Medication Dose Route Frequency Provider Last Rate Last Admin    acetaminophen tablet 650 mg  650 mg Oral Once PRN Don Miller MD        albuterol inhaler 2 puff  2 puff Inhalation Q20 Min PRN Don Miller MD        diphenhydrAMINE injection 25 mg  25 mg Intravenous Once PRN Don Miller MD        [COMPLETED] gadobutroL (GADAVIST) injection 7.5 mL  7.5 mL Intravenous ONCE PRN Clay Khan MD   7.5 mL at 01/04/22 1104    methylPREDNISolone sodium succinate injection 40 mg  40 mg Intravenous Once PRN Don Miller MD        ondansetron disintegrating tablet 4 mg  4 mg  Oral Once PRN Don Miller MD        sodium chloride 0.9% 500 mL flush bag   Intravenous PRN Don Miller MD        sodium chloride 0.9% flush 10 mL  10 mL Intravenous PRN Don Miller MD         No orders of the defined types were placed in this encounter.    Plan:   Reassured the patient that her lung function was normal. Will repeat the cardiac 2-D in six weeks.  Problem List Items Addressed This Visit     Dyspnea - Primary      Other Visit Diagnoses     Pneumonia due to COVID-19 virus

## 2022-01-04 NOTE — PROGRESS NOTES
Spoke to patient. MRI does not reveal sinus disease but incomplete evaluation. Will assess with CT sinuses.

## 2022-01-05 ENCOUNTER — HOSPITAL ENCOUNTER (OUTPATIENT)
Dept: NEUROLOGY | Facility: HOSPITAL | Age: 37
Discharge: HOME | End: 2022-01-05
Attending: NEUROLOGICAL SURGERY
Payer: COMMERCIAL

## 2022-01-05 DIAGNOSIS — R41.3 MEMORY LOSS: ICD-10-CM

## 2022-01-05 PROCEDURE — 95819 EEG AWAKE AND ASLEEP: CPT | Mod: 26,,, | Performed by: PSYCHIATRY & NEUROLOGY

## 2022-01-05 PROCEDURE — 95819 PR EEG,W/AWAKE & ASLEEP RECORD: ICD-10-PCS | Mod: 26,,, | Performed by: PSYCHIATRY & NEUROLOGY

## 2022-01-05 PROCEDURE — 95819 EEG AWAKE AND ASLEEP: CPT

## 2022-01-05 NOTE — PROCEDURES
EEG,w/awake & drowsy record    Date/Time: 1/5/2022 1:19 PM  Performed by: Shashi Swanson MD  Authorized by: Clay Khan MD            This EEG was performed to assess for evidence of underlying epilepsy.     ELECTROENCEPHALOGRAM REPORT     METHODOLOGY:  Electroencephalographic (EEG) recording is with electrodes placed according to the International 10-20 placement system.  Thirty two (32) channels of digital signal are simultaneously recorded from the scalp and may include EKG, EMG, and/or eye monitors.   Recording band pass was 0.1 to 512 hz.  Digital video recording of the patient is simultaneously recorded with the EEG.  The staff report clinical symptoms and may press an event button when the patient has symptoms of clinical interest to the treating physicians.  EEG and video recording is stored and archived in digital format.  The entire recording is visually reviewed, and the times identified by computer analysis as being spikes or seizures are reviewed again.  Activation procedures which include photic stimulation, hyperventilation and instructing patients to perform simple task are done in selected patients.   Compresses spectral analysis (CSA) is also performed on the activity recorded from each individual channel.  This is displayed as a power display of frequencies from 0 to 30 Hz over time.   The CSA analysis is done and displayed continuously.  This is reviewed for asymmetries in power between homologous areas of the scalp and for presence of changes in power which can be seen when seizures occur.  Sections of suspected abnormalities on the CSA is then compared with the original EEG recording.                [employer] software was also utilized in the review of this study.  This software suite analyzes the EEG recording in multiple domains.  Coherence and rhythmicity is computed to identify EEG sections which may contain organized seizures.  Each channel undergoes analysis to detect presence of  spike and sharp waves which have special and morphological characteristic of epileptic activity.  The routine EEG recording is converted from spacial into frequency domain.  This is then displayed comparing homologous areas to identify areas of significant asymmetry.  Algorithm to identify non-cortically generated artifact is used to separate eye movement, EMG and other artifact from the EEG.     EEG FINDINGS:  The recording was obtained with a number of standard bipolar and referential montages during wakefulness, drowsiness and sleep.  In the alert state, the posterior background rhythm was a symmetric, well-modulated 9 to 10 Hz alpha rhythm, which reacted symmetrically to eye opening.  Intermittent photic stimulation evoked symmetric posterior driving responses. Hyperventilation produced physiological slowing.  No abnormalities were activated by photic stimulation or hyperventilation.  During drowsiness, the background rhythm waxed and waned and there were periods of slowing.  During stage II sleep, symmetric V waves and sleep spindles were noted.  There were no focal abnormalities.  There were no interictal epileptiform abnormalities and no clinical or electrographic seizures were recorded.   Excessive beta activity was noted throughout the record which was diffuse.     The EKG channel revealed a sinus rhythm.     IMPRESSION:  This is a normal EEG during wakefulness, drowsiness and sleep.     CLINICAL CORRELATION:  The patient is a 36 -year-old female who is being evaluated for episodes of dizziness, lightheadedness and memory loss.  The patient is currently not maintained on any anti-seizure medications.  This is a normal EEG during wakefulness, drowsiness and sleep.  There is no evidence for neither cortical dysfunction nor an epileptic process on this recording.  No seizures were recorded during this study.

## 2022-01-07 ENCOUNTER — HOSPITAL ENCOUNTER (OUTPATIENT)
Dept: RADIOLOGY | Facility: HOSPITAL | Age: 37
Discharge: HOME | End: 2022-01-07
Attending: OTOLARYNGOLOGY
Payer: COMMERCIAL

## 2022-01-07 ENCOUNTER — PATIENT MESSAGE (OUTPATIENT)
Dept: NEUROLOGY | Facility: CLINIC | Age: 37
End: 2022-01-07
Payer: COMMERCIAL

## 2022-01-07 DIAGNOSIS — J34.89 OTHER SPECIFIED DISORDERS OF NOSE AND NASAL SINUSES: ICD-10-CM

## 2022-01-07 PROCEDURE — 70486 CT MAXILLOFACIAL W/O DYE: CPT | Mod: 26,,, | Performed by: RADIOLOGY

## 2022-01-07 PROCEDURE — 70486 CT MAXILLOFACIAL W/O DYE: CPT | Mod: TC

## 2022-01-07 PROCEDURE — 70486 CT SINUSES WITHOUT CONTRAST: ICD-10-PCS | Mod: 26,,, | Performed by: RADIOLOGY

## 2022-01-09 ENCOUNTER — PATIENT MESSAGE (OUTPATIENT)
Dept: REHABILITATION | Facility: HOSPITAL | Age: 37
End: 2022-01-09
Payer: COMMERCIAL

## 2022-01-09 ENCOUNTER — PATIENT MESSAGE (OUTPATIENT)
Dept: PAIN MEDICINE | Facility: CLINIC | Age: 37
End: 2022-01-09
Payer: COMMERCIAL

## 2022-01-10 DIAGNOSIS — M79.18 MYOFASCIAL PAIN: Primary | ICD-10-CM

## 2022-01-10 DIAGNOSIS — M54.2 NECK PAIN: ICD-10-CM

## 2022-01-12 ENCOUNTER — CLINICAL SUPPORT (OUTPATIENT)
Dept: REHABILITATION | Facility: OTHER | Age: 37
End: 2022-01-12
Payer: COMMERCIAL

## 2022-01-12 DIAGNOSIS — G44.209 TENSION HEADACHE: ICD-10-CM

## 2022-01-12 DIAGNOSIS — R29.3 POSTURAL IMBALANCE: ICD-10-CM

## 2022-01-12 DIAGNOSIS — G89.29 CHRONIC MIDLINE POSTERIOR NECK PAIN: ICD-10-CM

## 2022-01-12 DIAGNOSIS — M54.2 CHRONIC MIDLINE POSTERIOR NECK PAIN: ICD-10-CM

## 2022-01-12 DIAGNOSIS — M54.2 NECK PAIN: ICD-10-CM

## 2022-01-12 DIAGNOSIS — M79.18 MYOFASCIAL PAIN: ICD-10-CM

## 2022-01-12 PROCEDURE — 97161 PT EVAL LOW COMPLEX 20 MIN: CPT | Mod: PN

## 2022-01-12 PROCEDURE — 97110 THERAPEUTIC EXERCISES: CPT | Mod: PN

## 2022-01-12 PROCEDURE — 97140 MANUAL THERAPY 1/> REGIONS: CPT | Mod: PN

## 2022-01-18 ENCOUNTER — OFFICE VISIT (OUTPATIENT)
Dept: NEUROLOGY | Facility: CLINIC | Age: 37
End: 2022-01-18
Payer: COMMERCIAL

## 2022-01-18 DIAGNOSIS — G43.709 CHRONIC MIGRAINE WITHOUT AURA WITHOUT STATUS MIGRAINOSUS, NOT INTRACTABLE: Primary | ICD-10-CM

## 2022-01-18 PROCEDURE — 99214 PR OFFICE/OUTPT VISIT, EST, LEVL IV, 30-39 MIN: ICD-10-PCS | Mod: 95,,, | Performed by: NEUROLOGICAL SURGERY

## 2022-01-18 PROCEDURE — 99214 OFFICE O/P EST MOD 30 MIN: CPT | Mod: 95,,, | Performed by: NEUROLOGICAL SURGERY

## 2022-01-19 PROBLEM — R29.3 POSTURE ABNORMALITY: Status: RESOLVED | Noted: 2021-07-05 | Resolved: 2022-01-19

## 2022-01-19 RX ORDER — AMITRIPTYLINE HYDROCHLORIDE 25 MG/1
25 TABLET, FILM COATED ORAL NIGHTLY
Qty: 30 TABLET | Refills: 11 | Status: SHIPPED | OUTPATIENT
Start: 2022-01-19 | End: 2022-02-15

## 2022-01-19 RX ORDER — METHYLPREDNISOLONE 4 MG/1
TABLET ORAL
Qty: 1 EACH | Refills: 1 | Status: SHIPPED | OUTPATIENT
Start: 2022-01-19 | End: 2023-07-17 | Stop reason: ALTCHOICE

## 2022-01-19 NOTE — PROGRESS NOTES
Chief Complaint   Patient presents with    Memory Loss        Jossy Leslie is a 36 y.o. female with a history of multiple medical diagnoses as listed below that presents for evaluation of headaches.  The patient has been having headaches for the last couple years without an explanation.  The patient has noted than increased levels of stress and decreased sleep has been a trigger for headaches.  Headaches tend to be in the base of the neck and also he has been having pain across the front of the head.  The pain across the front of the head feels like a throbbing sensation has been up to a 7 to 8/10 in intensity.  Pain seems to be ongoing for hours before she gets any relief.  She has been taking Excedrin migraine as well as sumatriptan and Relpax in order to treat her pain.  The symptoms have been the responsive to these treatments although she found that Relpax has been much more consistently reliable in controlling her headache pain rather than Imitrex.  The patient has been having headaches since she was a youth and says that overall the symptoms seem to be about the same as when she was younger with the exception of an increasing frequency compared to her younger years.  She has never taking any preventative medications for headaches.  She has a strong family history with several family members will also have headaches.  The patient has recently been diagnosed and treated for thyroid insufficiency.  She has no history of any other autoimmune phenomenon.    Interval History  03/27/2019  Patient presents to clinic for routine follow-up.  She was taking extended release Topamax as directed but feels that the medication caused her to have too much alteration in her diet in appetite.  The medication caused both appetite suppression and cause her to have poor taste whenever she ate or drink anything including water which she feels was too much for her to tolerate the medication.  Since stopping medication she says  the headaches have been relatively sparse and have been well controlled with alternating Relpax and sumatriptan to address the headaches that still occur intermittently.  She says that this has been working for her allows her to be able to control her headaches relatively quickly despite the lack of a preventative medication.  She has considered alternative therapy such as a daily injectable medication, but feels that due to the sparse nature of headache she would like to defer from making decision at this time.    07/02/2020  Headaches have been stable which she has been having episodes of dizziness and lightheadedness which have been worse with position especially when she bends at the waist.  These episodes have been sometimes associated with headaches but have been occurring in isolation without any headache preceding or following these episodes.  She has never had episodes like these in the past associated with headaches or in isolation.    05/31/2021  She has had several problems since she had her COVID vaccine.  She says that she has been having persistent episodes of dizziness.  She does not for the symptoms of go completely away.  She has not fallen but always feels like she may fall over.  This is a made her insecure when she has been walking and ambulating.  She does not have any vertigo.  She denies having any episodes of any room spinning or any episodes of feeling like she will pass out.  She has not found anything significantly makes her symptoms worse.  She is having persistent and constant increase in her neck pain since her last visit.  She notices that position especially when sitting in the bathtub whenever she has been having a heavy bag to hold the pain will begin to increase over the course of the day.  The neck pain has been a trigger for headaches and she has had weeks at a time where her headaches have been intractable due to the severity of the neck pain.  Neck pain has not responded to  but size.  She uses ice packs and has also been using muscle relaxers as needed to try to minimize the occurrence of these symptoms.    12/23/2021  Multiple issues have been problematic since she had COVID. She feels worried and has been concerned that she has been unlike herself without any noticeable improvement in the symptoms since her intial diagnosis. She has been having worsening dizziness, headaches, and memory. She feels limited in her ADLs and though she has been able to work she does not feel like she is as competent cognitively as she is typically.    01/18/2022  She feels that there has been some improvement in her problems that were described that her previous visit about 1 month ago but she has not been able to keep herself cognitively organized compared to her baseline.  She still has lack of attention, focus, and concentration.  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  Headaches and memory have been her biggest issues but she continues to also have problem with dizziness.  Limitations have also been present her activities of daily living.  Previously steroids did help her symptoms but she found that the cause her to be very irritable causing dysfunction with her interpersonal relationships..     PAST MEDICAL HISTORY:  Past Medical History:   Diagnosis Date    Asthma     Duodenal ulcer     [facility] 2015    Eosinophilic esophagitis     2019    Hiatal hernia     Hypertension 2/19/2021    Hypothyroidism due to Hashimoto's thyroiditis     Thyroid disease        PAST SURGICAL HISTORY:  Past Surgical History:   Procedure Laterality Date    ESOPHAGOGASTRODUODENOSCOPY      ESOPHAGOGASTRODUODENOSCOPY N/A 4/18/2019    Procedure: ESOPHAGOGASTRODUODENOSCOPY (EGD);  Surgeon: Karmen Marquez MD;  [facility];  Service: Endoscopy;  Laterality: N/A;    HYSTEROSCOPY N/A  5/17/2019    Procedure: HYSTEROSCOPY;  Surgeon: Eli Blevins MD;  Location: [facility] OR;  Service: OB/GYN;  Laterality: N/A;    LAPAROSCOPIC SURGICAL REMOVAL OF CYST OF OVARY Right 5/17/2019    Procedure: EXCISION, CYST, OVARY, LAPAROSCOPIC;  Surgeon: Eli Blevins MD;  Location: [facility] OR;  Service: OB/GYN;  Laterality: Right;  video    TONSILLECTOMY         SOCIAL HISTORY:  Social History     Socioeconomic History    Marital status: Single   Occupational History    Occupation: nurse   Tobacco Use    Smoking status: Never Smoker    Smokeless tobacco: Never Used   Substance and Sexual Activity    Alcohol use: Yes     Alcohol/week: 0.0 standard drinks     Comment: socially, minimal    Drug use: No    Sexual activity: Not Currently     Partners: Male     Birth control/protection: Condom       FAMILY HISTORY:  Family History   Problem Relation Age of Onset    Hypertension Mother     Hypertension Father     Hyperlipidemia Father     Heart disease Maternal Grandmother     Colon cancer Maternal Grandfather 70    Diabetes Paternal Grandmother     Asbestos Paternal Grandmother     Heart attack Paternal Grandfather 70    Hypertension Sister     No Known Problems Maternal Aunt     No Known Problems Maternal Uncle     No Known Problems Paternal Aunt     No Known Problems Paternal Uncle     Hypertension Other     Breast cancer Paternal Aunt 50    Amblyopia Neg Hx     Blindness Neg Hx     Cataracts Neg Hx     Glaucoma Neg Hx     Macular degeneration Neg Hx     Retinal detachment Neg Hx     Strabismus Neg Hx     Stroke Neg Hx     Thyroid disease Neg Hx     Ovarian cancer Neg Hx        ALLERGIES AND MEDICATIONS: updated and reviewed.  Review of patient's allergies indicates:   Allergen Reactions    Pumpkin Anaphylaxis    Shellfish containing products Itching     Current Outpatient Medications   Medication Sig Dispense Refill    albuterol (PROVENTIL/VENTOLIN HFA) 90 mcg/actuation inhaler Inhale 1-2  puffs into the lungs every 6 (six) hours as needed for Wheezing or Shortness of Breath. Rescue 1 Inhaler 0    amitriptyline (ELAVIL) 25 MG tablet Take 1 tablet (25 mg total) by mouth every evening. Begin with one half tablet nightly for one week. 30 tablet 11    amLODIPine (NORVASC) 5 MG tablet Take 1 tablet (5 mg total) by mouth once daily. 30 tablet 11    aspirin-acetaminophen-caffeine 250-250-65 mg (EXCEDRIN MIGRAINE) 250-250-65 mg per tablet Excedrin Migraine           cetirizine (ZYRTEC) 10 MG tablet Take 10 mg by mouth once daily.      ferrous sulfate (FEOSOL) 325 mg (65 mg iron) Tab tablet Take 1 tablet (325 mg total) by mouth once daily. 30 tablet 2    levalbuterol (XOPENEX HFA) 45 mcg/actuation inhaler Inhale 1-2 puffs into the lungs every 4 (four) hours as needed for Wheezing. Rescue 15 g 0    meclizine (ANTIVERT) 25 mg tablet Take 1 tablet (25 mg total) by mouth 3 (three) times daily as needed for Dizziness or Nausea. 15 tablet 0    meloxicam (MOBIC) 15 MG tablet Take 15 mg by mouth once daily.      methylPREDNISolone (MEDROL DOSEPACK) 4 mg tablet use as directed 1 each 1    ondansetron (ZOFRAN-ODT) 4 MG TbDL Take 1 tablet (4 mg total) by mouth 2 (two) times daily. 30 tablet 2    pantoprazole (PROTONIX) 40 MG tablet Take 1 tablet by mouth once daily 30 tablet 0    sumatriptan (IMITREX) 100 MG tablet Take 1 tablet (100 mg total) by mouth as needed for Migraine. Take at the onset of headache, may take 1 more in 1 hour if needed. 9 tablet 5    SYNTHROID 88 mcg tablet Take 1 tablet (88 mcg total) by mouth before breakfast. 30 tablet 11    tiZANidine (ZANAFLEX) 4 MG tablet Take 4 mg by mouth 3 (three) times daily.       Current Facility-Administered Medications   Medication Dose Route Frequency Provider Last Rate Last Admin    acetaminophen tablet 650 mg  650 mg Oral Once PRN Don Miller MD        albuterol inhaler 2 puff  2 puff Inhalation Q20 Min PRN Don Miller MD         diphenhydrAMINE injection 25 mg  25 mg Intravenous Once PRN Don Miller MD        methylPREDNISolone sodium succinate injection 40 mg  40 mg Intravenous Once PRN Don Miller MD        ondansetron disintegrating tablet 4 mg  4 mg Oral Once PRN Don Miller MD        sodium chloride 0.9% 500 mL flush bag   Intravenous PRN Don Miller MD        sodium chloride 0.9% flush 10 mL  10 mL Intravenous PRN Don Miller MD           Review of Systems   Constitutional: Negative for activity change, appetite change, fever and unexpected weight change.   HENT: Negative for trouble swallowing and voice change.    Eyes: Positive for photophobia and visual disturbance.   Respiratory: Negative for apnea and shortness of breath.    Cardiovascular: Negative for chest pain and leg swelling.   Gastrointestinal: Positive for nausea and vomiting. Negative for constipation.   Genitourinary: Negative for difficulty urinating.   Musculoskeletal: Negative for back pain, gait problem and neck pain.   Skin: Negative for color change and pallor.   Neurological: Positive for headaches. Negative for dizziness, seizures, syncope, weakness and numbness.   Hematological: Negative for adenopathy.   Psychiatric/Behavioral: Negative for agitation, confusion and decreased concentration.       Neurologic Exam     Mental Status   Oriented to person, place, and time.   Registration: recalls 3 of 3 objects.   Attention: normal. Concentration: normal.   Speech: speech is normal   Level of consciousness: alert  Knowledge: good.     Cranial Nerves     CN II   Visual fields full to confrontation.   Right visual field deficit: none  Left visual field deficit: none     CN III, IV, VI   Pupils are equal, round, and reactive to light.  Extraocular motions are normal.   Right pupil: Size: 3 mm. Shape: regular. Accommodation: intact.   Left pupil: Size: 3 mm. Shape: regular. Accommodation: intact.   CN III: no CN III palsy  CN VI:  no CN VI palsy  Nystagmus: none   Diplopia: none  Ophthalmoparesis: none  Upgaze: normal  Downgaze: normal  Conjugate gaze: present    CN V   Facial sensation intact.   Right facial sensation deficit: none  Left facial sensation deficit: none    CN VII   Facial expression full, symmetric.   Right facial weakness: none  Left facial weakness: none    CN VIII   CN VIII normal.     CN IX, X   CN IX normal.   CN X normal.   Palate: symmetric    CN XI   CN XI normal.   Right sternocleidomastoid strength: normal  Left sternocleidomastoid strength: normal  Right trapezius strength: normal  Left trapezius strength: normal    CN XII   CN XII normal.   Tongue deviation: none    Motor Exam   Muscle bulk: normal  Overall muscle tone: normal  Right arm tone: normal  Left arm tone: normal  Right leg tone: normal  Left leg tone: normal    Strength   Strength 5/5 throughout.     Sensory Exam   Right arm light touch: normal  Left arm light touch: normal  Right leg light touch: normal  Left leg light touch: normal  Right arm vibration: normal  Left arm vibration: normal  Right leg vibration: normal  Left leg vibration: normal  Right arm proprioception: normal  Left arm proprioception: normal  Right leg proprioception: normal  Left leg proprioception: normal  Right arm pinprick: normal  Left arm pinprick: normal  Right leg pinprick: normal  Left leg pinprick: normal    Gait, Coordination, and Reflexes     Gait  Gait: normal    Coordination   Romberg: negative  Finger to nose coordination: normal  Heel to shin coordination: normal  Tandem walking coordination: normal    Tremor   Resting tremor: absent    Reflexes   Right brachioradialis: 2+  Left brachioradialis: 2+  Right biceps: 2+  Left biceps: 2+  Right triceps: 2+  Left triceps: 2+  Right patellar: 2+  Left patellar: 2+  Right achilles: 2+  Left achilles: 2+  Right plantar: normal  Left plantar: normal      Physical Exam  Vitals reviewed.   Constitutional:       Appearance: She is  well-developed.   HENT:      Head: Normocephalic and atraumatic.   Eyes:      Extraocular Movements: EOM normal.      Pupils: Pupils are equal, round, and reactive to light.   Cardiovascular:      Rate and Rhythm: Normal rate.   Pulmonary:      Effort: Pulmonary effort is normal. No respiratory distress.   Musculoskeletal:         General: Normal range of motion.      Cervical back: Normal range of motion.   Skin:     General: Skin is warm and dry.   Neurological:      Mental Status: She is alert and oriented to person, place, and time.      Coordination: Finger-Nose-Finger Test, Heel to Shin Test and Romberg Test normal.      Gait: Gait is intact. Tandem walk normal.      Deep Tendon Reflexes: Strength normal.      Reflex Scores:       Tricep reflexes are 2+ on the right side and 2+ on the left side.       Bicep reflexes are 2+ on the right side and 2+ on the left side.       Brachioradialis reflexes are 2+ on the right side and 2+ on the left side.       Patellar reflexes are 2+ on the right side and 2+ on the left side.       Achilles reflexes are 2+ on the right side and 2+ on the left side.  Psychiatric:         Speech: Speech normal.         Behavior: Behavior normal.         Thought Content: Thought content normal.         There were no vitals filed for this visit.    Assessment & Plan:    Problem List Items Addressed This Visit     Chronic migraine without aura without status migrainosus, not intractable - Primary    Overview     Patient did not respond well to extended release Topamax to control her headaches, so at this time she will be treated only with abortive medications as needed.  Discussion has been started as to which medication would be the best for prevention in the future.  The patient seems to think that injectable medication such as a CGRP receptor blocker would be her best option         Relevant Medications    methylPREDNISolone (MEDROL DOSEPACK) 4 mg tablet    amitriptyline (ELAVIL) 25 MG  tablet        Follow-up: No follow-ups on file.

## 2022-01-20 ENCOUNTER — TELEPHONE (OUTPATIENT)
Dept: NEUROLOGY | Facility: CLINIC | Age: 37
End: 2022-01-20
Payer: COMMERCIAL

## 2022-01-20 PROBLEM — R29.3 POSTURAL IMBALANCE: Status: ACTIVE | Noted: 2022-01-20

## 2022-01-20 PROBLEM — G89.29 CHRONIC MIDLINE POSTERIOR NECK PAIN: Status: ACTIVE | Noted: 2022-01-20

## 2022-01-20 PROBLEM — G44.209 TENSION HEADACHE: Status: ACTIVE | Noted: 2022-01-20

## 2022-01-20 PROBLEM — M54.2 CHRONIC MIDLINE POSTERIOR NECK PAIN: Status: ACTIVE | Noted: 2022-01-20

## 2022-01-20 NOTE — PATIENT INSTRUCTIONS
ENERGY CONSERVATION TIPS    We all get worn out sometimes, and we all have discomfort at times.  However, if you are having increased pain or fatigue that is keeping you from doing the things you want to do in your daily life,  energy conservation techniques may help you. Energy conservation means looking at your daily routines to find ways to reduce the amount of effort needed to perform certain tasks, ensuring tasks are done ergonomically correctly, eliminating unnecessary tasks, and building more rest throughout the day. Keep in mind that not every technique will work for you. These are suggestions you can use and adapt to find the right fit for you.    Remember: Energy is like money - youve only got so much, so think about what youre spending it on!  Remember: Back your back!!!   Perform tasks correctly and rest as needed to protect your back, your neck, and your joints    Rearrange Your Environment   Keep frequently used items in easily accessible places.   Watch your posture!!!  Use a lumbar roll when sitting.  Get up often to stretch and walk around.   Where good shoes if standing.  Change positions often.   Replace existing heavy items with lighter ones; for example, heavy purses or lap top bags with pull behind back packs   Install long handles on faucets and doorknobs.   Adjust work spaces, such as raising a tabletop, to eliminate awkward positions; bad posture drains energy.   Wear an apron with pockets to carry around cooking utensils or cleaning tools.    Eliminate Unnecessary Effort   Sit rather than stand whenever possible: while preparing meals, washing dishes, ironing, etc.   Use adaptive equipment to make tasks easier; try a jar opener, use a lumbar roll to help with posture, use speaker phone or head set if you talk on your phone at home or work often   Soak your dishes before washing, then let them air dry; or use paper plates and napkins.     Plan Ahead   Gather all the  supplies you need for a task or project before starting, so everything is in one place.   Work rest breaks into activities as often as possible. Take a break before you get tired.   Schedule enough time for activities - rushing takes more energy.   Try keeping a daily activity journal for a few weeks to identify times of day or certain tasks that result in more fatigue or pain.   Discuss these activities with your therapist so you can determine if there are better ways to perform these tasks.    Prioritize   Prioritize the most important activities that you need to finish.  Finish those activities first, and then if you have energy, perform some of the other activities on your list.   Eliminate or reduce tasks that arent that important to you.   Delegate tasks to friends or family members who offer help.    Exercise   Research demonstrates that exercise within healthy guidelines, can assist with reducing fatigue.   Research also demonstrates that exercise ( walking or riding bike) has other benefits such as assisting with controlling weight, sugars, blood pressure, depression, and improving sleep.   Walk, or ride a stationary bike 5-10 minutes daily.  If this is too difficult, go for 2 minute walks, rest and repeat.   Whatever you can do is great!   Use red lights and green lights to monitor your exercise.  A red light means stop.  Red lights are increased shortness of breath, increased fatigue after exercising, increased discomfort after walking.  Green lights mean go.  Green light examples are fatigue during walking that improves as you walk or stays the same, mild shortness of breath but you can still talk, no increase in pain.       Sleeping on Back        Place pillow under knees. A pillow with cervical support and a roll around waist are also helpful.      Copyright © I. All rights reserved.        Sleeping on Side        Place pillow between knees. Use cervical support under neck and a roll  around waist as needed.      Copyright © I. All rights reserved.        Posture - Standing        Good posture is important. Avoid slouching and forward head thrust. Maintain curve in low back and align ears over shoul- ders, hips over ankles.      Copyright © I. All rights reserved.        Posture - Sitting        Sit upright, head facing forward. Try using a roll to support lower back. Keep shoulders relaxed, and avoid rounded back. Keep hips level with knees. Avoid crossing legs for long periods.      Copyright © I. All rights reserved.        Reading   MAKE SURE TO REST ARMS AND BOOK ON PILLOWS                    Work Positioning        Position self close to work, whether standing or sitting. Avoid straining forward at neck or waist.      Copyright © I. All rights reserved.       Work Height and Reach        Ideal work height is no more than 2 to 4 inches below elbow level when standing, and at elbow level when sitting. Reaching should be limited to arm's length, with elbows slightly bent.      Copyright © [employer]. All rights reserved.

## 2022-01-20 NOTE — TELEPHONE ENCOUNTER
----- Message from Mee Sorto sent at 1/20/2022  2:18 PM CST -----  Contact: Patient [phone]  Type: Patient Call Back    Who called: Patient     What is the request in detail: Would like to speak to nurse or Dr Khan. Didn't want to go into detail. Please call.     Would the patient rather a call back or a response via [facility]? Call back    Best call back number: [phone]          [facility] paperwork needs more information. Informed patient to email me the requests
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
## 2022-01-25 ENCOUNTER — CLINICAL SUPPORT (OUTPATIENT)
Dept: REHABILITATION | Facility: OTHER | Age: 37
End: 2022-01-25
Payer: COMMERCIAL

## 2022-01-25 DIAGNOSIS — G89.29 CHRONIC MIDLINE POSTERIOR NECK PAIN: ICD-10-CM

## 2022-01-25 DIAGNOSIS — G44.209 TENSION HEADACHE: ICD-10-CM

## 2022-01-25 DIAGNOSIS — M54.2 CHRONIC MIDLINE POSTERIOR NECK PAIN: ICD-10-CM

## 2022-01-25 DIAGNOSIS — R29.3 POSTURAL IMBALANCE: ICD-10-CM

## 2022-01-25 PROCEDURE — 97110 THERAPEUTIC EXERCISES: CPT | Mod: PN,CQ

## 2022-01-25 NOTE — PROGRESS NOTES
Physical Therapy Daily Treatment Note     Name: Jossy Leslie  Clinic Number: [id number]    Therapy Diagnosis:   Encounter Diagnoses   Name Primary?    Chronic midline posterior neck pain     Tension headache     Postural imbalance      Physician: Cornelio De La Cruz NP    Visit Date: 1/25/2022  Physician Orders: PT Eval and Treat   Medical Diagnosis from Referral:   M79.18 (ICD-10-CM) - Myofascial pain   M54.2 (ICD-10-CM) - Neck pain         Evaluation Date: 1/12/2022  Authorization Period Expiration: 1/10/2023  Plan of Care Expiration: 4/12/2022  Progress Note Due: 2/12/2022  Visit # / Visits authorized: 2 1/ 1   FOTO: 2/5 on 1/12/2022     Precautions: Standard      Time In: 1731  Time Out: 1615  Total Appointment Time (timed & untimed codes): 44 minutes       Subjective    pt reports headaches have decreased with new medication but cont to have cervcial pn associated with a burning sensation.    Pt reports: she was somewhat compliant with home exercise program given last session.   Response to previous treatment:no adverse effects  Functional change: no change     Pain: 3/10  Location: bilateral neck      Objective     Jossy received therapeutic exercises to develop strength, endurance, ROM, flexibility, posture and core stabilization for 44 minutes including:  Seated cervical retraction   Slouch corrections  UT stretch x 20 sec ea   LS stretch x 20 sec ea   IR/ ER step out otb 2 x 10 ea B   Scap retractions 3 x 10 3 sec hold   supine chin tucks over stillpoint inducer 3 x 10  Supine chin tuck with cervical flexion3 x 10     Not performed today:   SL headlift with chin tuck       Home Exercises Provided and Patient Education Provided     Education provided:   - Pt edu on proper exercise technique.      Written Home Exercises Provided: Patient instructed to cont prior HEP. Exercises were reviewed and Jossy was able to demonstrate them prior to the end of the session.  Jossy  demonstrated good  understanding of the education provided. See EMR under Patient Instructions for exercises provided during therapy sessions.      Assessment     Pt travis tx well.  Pn level decreased slightly after tx session.  Pt cont to lack some cervical mobility and scap strength.  Pt demonstrated increased strength and postural awareness during tx.    Jossy Is progressing well towards her goals.   Pt prognosis is Fair.     Pt will continue to benefit from skilled outpatient physical therapy to address the deficits listed in the problem list box on initial evaluation, provide pt/family education and to maximize pt's level of independence in the home and community environment.     Pt's spiritual, cultural and educational needs considered and pt agreeable to plan of care and goals.    Anticipated barriers to physical therapy: none    Goals: Goals:  Short Term Goals (6 Weeks):   1. Pt will report 20% reduction in pain of the cervical spine and LUE for ease with ADL's (progressing, not met)   2. PT will demonstrate 1/3 MMT improvement in periscapular strength for ease with upright posture (progressing, not met)   3. Pt will demonstrate improved cervical spine ROM in all directions by 5 degrees for ease with driving to MD appointments (progressing, not met)   4. Reduce frequency and intensity of headaches by 75% per week. (progressing, not met)   Long Term Goals (12 Weeks):   1. Pt will report being independent with HEP for maintenance of improvements gained during therapy sessions (progressing, not met)   2. PT will report 50% reduction of pain of the neck and LUE for ease with donning upper body clothing  (progressing, not met)   3. Pt will demonstrate full LUE and periscapular strength without the provocation of pain for ease with household chores (progressing, not met)   4. Pt will demonstrate appropriate upright posture without external cueing for ease with work related activities.  (progressing, not met)   5.  Reduce frequency and intensity of headaches by 75% per week. (progressing, not met)          Plan     Cont to progress towards goals set by PT.  Work to improve posture, core stability, scap control and cervical ROM next visit.     Amrit Beard, PTA

## 2022-01-26 ENCOUNTER — TELEPHONE (OUTPATIENT)
Dept: PULMONOLOGY | Facility: CLINIC | Age: 37
End: 2022-01-26
Payer: COMMERCIAL

## 2022-01-26 NOTE — TELEPHONE ENCOUNTER
I called Mrs Leslie back and let her know I can cancel the 2-1-22 appointments and move them to 3-16-22 and I will mail the appointment slip to her home. She agreed. Dorina Rush LPN

## 2022-01-26 NOTE — TELEPHONE ENCOUNTER
----- Message from Tash Clark sent at 1/26/2022  4:05 PM CST -----  Regarding: Appointment R/s  Pt called about r/s test and appt from 2/1 to 3/16th before lunch.     Pts call back number:[phone]

## 2022-01-27 ENCOUNTER — TELEPHONE (OUTPATIENT)
Dept: NEUROLOGY | Facility: CLINIC | Age: 37
End: 2022-01-27
Payer: COMMERCIAL

## 2022-01-27 NOTE — TELEPHONE ENCOUNTER
----- Message from Laura Jacob sent at 1/27/2022  8:52 AM CST -----  Regarding: self  .Type: Patient Call Back    Who called: self     What is the request in detail:patient states she needs to speak to the nurse regarding an e-mail     Can the clinic reply by [facility]? No     Would the patient rather a call back or a response via [facility]? call     Best call back number: .[phone]    Informed patient I received email and will take care of it

## 2022-01-31 ENCOUNTER — TELEPHONE (OUTPATIENT)
Dept: NEUROLOGY | Facility: CLINIC | Age: 37
End: 2022-01-31
Payer: COMMERCIAL

## 2022-01-31 NOTE — TELEPHONE ENCOUNTER
----- Message from Ptaricia Aguilar sent at 1/31/2022  1:15 PM CST -----  Type: Patient Call Back    Who called:pt    What is the request in detail:following up on paperwork. She wouldn't say which kind. Call pt    Can the clinic reply by [facility]?    Would the patient rather a call back or a response via [facility]? call    Best call back number:[phone] (home)       Additional Information:      Letter written and faxed

## 2022-02-01 ENCOUNTER — CLINICAL SUPPORT (OUTPATIENT)
Dept: REHABILITATION | Facility: OTHER | Age: 37
End: 2022-02-01
Payer: COMMERCIAL

## 2022-02-01 DIAGNOSIS — M54.2 CHRONIC MIDLINE POSTERIOR NECK PAIN: ICD-10-CM

## 2022-02-01 DIAGNOSIS — R29.3 POSTURAL IMBALANCE: ICD-10-CM

## 2022-02-01 DIAGNOSIS — G44.209 TENSION HEADACHE: ICD-10-CM

## 2022-02-01 DIAGNOSIS — G89.29 CHRONIC MIDLINE POSTERIOR NECK PAIN: ICD-10-CM

## 2022-02-01 PROCEDURE — 97110 THERAPEUTIC EXERCISES: CPT | Mod: PN

## 2022-02-01 PROCEDURE — 97112 NEUROMUSCULAR REEDUCATION: CPT | Mod: PN

## 2022-02-01 NOTE — PROGRESS NOTES
"                            Physical Therapy Daily Treatment Note     Name: Jossy Leslie  Clinic Number: [id number]    Therapy Diagnosis:   Encounter Diagnoses   Name Primary?    Chronic midline posterior neck pain     Tension headache     Postural imbalance      Physician: Cornelio De La Cruz NP    Visit Date: 2/1/2022  Physician Orders: PT Eval and Treat   Medical Diagnosis from Referral:   M79.18 (ICD-10-CM) - Myofascial pain   M54.2 (ICD-10-CM) - Neck pain         Evaluation Date: 1/12/2022  Authorization Period Expiration: 1/10/2023  Plan of Care Expiration: 4/12/2022  Progress Note Due: 2/12/2022  Visit # / Visits authorized: 3 1/ 1   FOTO: 2/5 on 1/12/2022     Precautions: Standard      Time In: 5:15  Time Out: 6:30  Total Appointment Time (timed & untimed codes): 45 minutes       Subjective     Pt reports: she did a lot of sitting at work today but says that she did a lot of stretching which she felt helped.     Pt reports: she was somewhat compliant with home exercise program given last session.   Response to previous treatment:no adverse effects  Functional change: no change     Pain: 1/10  Location: bilateral neck      Objective     Jossy received therapeutic exercises to develop strength, endurance, ROM, flexibility, posture and core stabilization for 37 minutes including:    UBE: 2' fwd/2' bkwd (collected history, assessed pt complaints, education on causes of neck pain/HAs; importance of exercise)    1/2 foam series:   pec stretch x 2'   Swimmers x 10   wes OH flex x 10   abd <> bear hug x 10   SA punches x 10  +open books x 10 WES    Seated cervical retraction 20 x 3" holds  Slouch corrections  UT stretch x 20 sec ea   LS stretch x 20 sec ea   IR/ ER step out otb 2 x 10 ea B   Scap retractions 3 x 10 3 sec hold   supine chin tucks over stillpoint inducer 3 x 10  Supine chin tuck with cervical flexion3 x 10     Not performed today:   SL headlift with chin tuck     neuromuscular re-education " activities to improve: Balance, Coordination, Kinesthetic, Sense, Proprioception and Posture for 8 minutes. The following activities were included:    +seated cervical iso ext x 20 x YTB  +seated cervical iso SB x 20 x YTB  +seated scap retraction with Stephan ER x 20 x GTB  +seated stephan Abd x 20 x GTB      None today...Add next visit for postural strength / endurance / scapular motor control: prone Is, Ts, Ys; SL scapular series, row, robberies, SALPD, ER walkouts    Home Exercises Provided and Patient Education Provided     Education provided:   - Pt edu on proper exercise technique.      Written Home Exercises Provided: Patient instructed to cont prior HEP. Exercises were reviewed and Jossy was able to demonstrate them prior to the end of the session.  Jossy demonstrated good  understanding of the education provided. See EMR under Patient Instructions for exercises provided during therapy sessions.      Assessment     New exercises added to promote cervical stabilization with chin tuck. Good tolerance with appropriate training effect noted.      Jossy Is progressing well towards her goals.   Pt prognosis is Fair.     Pt will continue to benefit from skilled outpatient physical therapy to address the deficits listed in the problem list box on initial evaluation, provide pt/family education and to maximize pt's level of independence in the home and community environment.     Pt's spiritual, cultural and educational needs considered and pt agreeable to plan of care and goals.    Anticipated barriers to physical therapy: none    Goals: Goals:  Short Term Goals (6 Weeks):   1. Pt will report 20% reduction in pain of the cervical spine and LUE for ease with ADL's (progressing, not met)   2. PT will demonstrate 1/3 MMT improvement in periscapular strength for ease with upright posture (progressing, not met)   3. Pt will demonstrate improved cervical spine ROM in all directions by 5 degrees for ease with driving to MD  appointments (progressing, not met)   4. Reduce frequency and intensity of headaches by 75% per week. (progressing, not met)   Long Term Goals (12 Weeks):   1. Pt will report being independent with HEP for maintenance of improvements gained during therapy sessions (progressing, not met)   2. PT will report 50% reduction of pain of the neck and LUE for ease with donning upper body clothing  (progressing, not met)   3. Pt will demonstrate full LUE and periscapular strength without the provocation of pain for ease with household chores (progressing, not met)   4. Pt will demonstrate appropriate upright posture without external cueing for ease with work related activities.  (progressing, not met)   5. Reduce frequency and intensity of headaches by 75% per week. (progressing, not met)          Plan     Cont to progress towards goals set by PT.  Work to improve posture, core stability, scap control and cervical ROM next visit.     Serina Cox, PT

## 2022-02-15 ENCOUNTER — CLINICAL SUPPORT (OUTPATIENT)
Dept: REHABILITATION | Facility: OTHER | Age: 37
End: 2022-02-15
Payer: COMMERCIAL

## 2022-02-15 ENCOUNTER — PATIENT MESSAGE (OUTPATIENT)
Dept: NEUROLOGY | Facility: CLINIC | Age: 37
End: 2022-02-15
Payer: COMMERCIAL

## 2022-02-15 DIAGNOSIS — G43.709 CHRONIC MIGRAINE WITHOUT AURA WITHOUT STATUS MIGRAINOSUS, NOT INTRACTABLE: ICD-10-CM

## 2022-02-15 DIAGNOSIS — G89.29 CHRONIC MIDLINE POSTERIOR NECK PAIN: ICD-10-CM

## 2022-02-15 DIAGNOSIS — M54.2 CHRONIC MIDLINE POSTERIOR NECK PAIN: ICD-10-CM

## 2022-02-15 DIAGNOSIS — R29.3 POSTURAL IMBALANCE: ICD-10-CM

## 2022-02-15 DIAGNOSIS — G44.209 TENSION HEADACHE: ICD-10-CM

## 2022-02-15 PROCEDURE — 97110 THERAPEUTIC EXERCISES: CPT | Mod: PN,CQ

## 2022-02-15 RX ORDER — AMITRIPTYLINE HYDROCHLORIDE 10 MG/1
10 TABLET, FILM COATED ORAL NIGHTLY
Qty: 30 TABLET | Refills: 11 | Status: SHIPPED | OUTPATIENT
Start: 2022-02-15 | End: 2022-04-08 | Stop reason: SDUPTHER

## 2022-02-15 NOTE — PROGRESS NOTES
"                            Physical Therapy Daily Treatment Note     Name: Jossy Leslie  Clinic Number: [id number]    Therapy Diagnosis:   Encounter Diagnoses   Name Primary?    Chronic midline posterior neck pain     Tension headache     Postural imbalance      Physician: Cornelio De La Cruz NP    Visit Date: 2/15/2022  Physician Orders: PT Eval and Treat   Medical Diagnosis from Referral:   M79.18 (ICD-10-CM) - Myofascial pain   M54.2 (ICD-10-CM) - Neck pain         Evaluation Date: 1/12/2022  Authorization Period Expiration: 1/10/2023  Plan of Care Expiration: 4/12/2022  Progress Note Due: 2/12/2022  Visit # / Visits authorized: 4 1/ 1   FOTO: 4/5 on 1/12/2022 needs FOTO next visit     Precautions: Standard      Time In: 1730  Time Out: 1815  Total Appointment Time (timed & untimed codes): 45 minutes       Subjective     Pt states feeling better w/ no c/o pn in neck currently.    Pt reports: she was somewhat compliant with home exercise program given last session.   Response to previous treatment:no adverse effects  Functional change: no change     Pain: 1/10  Location: bilateral neck      Objective     Jossy received therapeutic exercises to develop strength, endurance, ROM, flexibility, posture and core stabilization for 40 minutes including:    UBE: 3' fwd/3' bkwd (collected history, assessed pt complaints, education on causes of neck pain/HAs; importance of exercise)    1/2 foam series:   pec stretch x 2'   Swimmers x 10   wes OH flex x 10   abd <> bear hug x 10   SA punches x 10  +open books x 10 WES    Seated cervical retraction 20 x 3" holds  Slouch corrections 3 x 30 sec   UT stretch x 20 sec ea   LS stretch x 20 sec ea   IR/ ER step out otb 2 x 10 ea B   Scap retractions 30 x 3 sec hold   Shld rows otb 2 x 10   Shld ext otb 2 x 10   supine chin tucks over stillpoint inducer 3 x 10  Supine chin tuck with cervical flexion3 x 10     Not performed today:   SL headlift with chin tuck     neuromuscular " re-education activities to improve: Balance, Coordination, Kinesthetic, Sense, Proprioception and Posture for 5 minutes. The following activities were included:    +seated cervical iso ext x 30 x YTB  +seated cervical iso SB x 30 x YTB  +seated scap retraction with Stephan ER x 20 x GTB  +seated stephan Abd x 20 x GTB      None today...Add next visit for postural strength / endurance / scapular motor control: prone Is, Ts, Ys; SL scapular series,  robberies    Home Exercises Provided and Patient Education Provided     Education provided:   - Pt edu on proper exercise technique.      Written Home Exercises Provided: Patient instructed to cont prior HEP. Exercises were reviewed and Jossy was able to demonstrate them prior to the end of the session.  Jossy demonstrated good  understanding of the education provided. See EMR under Patient Instructions for exercises provided during therapy sessions.      Assessment   Pt travis tx well.  Pn level remained same prior to and after tx session.  Pt required VCs to avoid scap elevation.  Pt cont to lack some scap control.  Pt showed increased strength and endurance during therex.        Jossy Is progressing well towards her goals.   Pt prognosis is Fair.     Pt will continue to benefit from skilled outpatient physical therapy to address the deficits listed in the problem list box on initial evaluation, provide pt/family education and to maximize pt's level of independence in the home and community environment.     Pt's spiritual, cultural and educational needs considered and pt agreeable to plan of care and goals.    Anticipated barriers to physical therapy: none    Goals: Goals:  Short Term Goals (6 Weeks):   1. Pt will report 20% reduction in pain of the cervical spine and LUE for ease with ADL's (progressing, not met)   2. PT will demonstrate 1/3 MMT improvement in periscapular strength for ease with upright posture (progressing, not met)   3. Pt will demonstrate improved cervical  spine ROM in all directions by 5 degrees for ease with driving to MD appointments (progressing, not met)   4. Reduce frequency and intensity of headaches by 75% per week. (progressing, not met)   Long Term Goals (12 Weeks):   1. Pt will report being independent with HEP for maintenance of improvements gained during therapy sessions (progressing, not met)   2. PT will report 50% reduction of pain of the neck and LUE for ease with donning upper body clothing  (progressing, not met)   3. Pt will demonstrate full LUE and periscapular strength without the provocation of pain for ease with household chores (progressing, not met)   4. Pt will demonstrate appropriate upright posture without external cueing for ease with work related activities.  (progressing, not met)   5. Reduce frequency and intensity of headaches by 75% per week. (progressing, not met)          Plan     Cont to progress towards goals set by PT.  Work to improve posture, core stability, scap control and cervical ROM next visit.     Armit Beard, PTA

## 2022-02-17 ENCOUNTER — CLINICAL SUPPORT (OUTPATIENT)
Dept: REHABILITATION | Facility: OTHER | Age: 37
End: 2022-02-17
Payer: COMMERCIAL

## 2022-02-17 DIAGNOSIS — M54.2 CHRONIC MIDLINE POSTERIOR NECK PAIN: Primary | ICD-10-CM

## 2022-02-17 DIAGNOSIS — G89.29 CHRONIC MIDLINE POSTERIOR NECK PAIN: Primary | ICD-10-CM

## 2022-02-17 DIAGNOSIS — R29.3 POSTURAL IMBALANCE: ICD-10-CM

## 2022-02-17 DIAGNOSIS — G44.209 TENSION HEADACHE: ICD-10-CM

## 2022-02-17 PROCEDURE — 97110 THERAPEUTIC EXERCISES: CPT | Mod: PN | Performed by: PHYSICAL THERAPIST

## 2022-02-17 PROCEDURE — 97140 MANUAL THERAPY 1/> REGIONS: CPT | Mod: PN | Performed by: PHYSICAL THERAPIST

## 2022-02-17 NOTE — PROGRESS NOTES
"                            Physical Therapy Daily Treatment Note     Name: Jossy Leslie  Clinic Number: [id number]    Therapy Diagnosis:   Encounter Diagnoses   Name Primary?    Chronic midline posterior neck pain Yes    Tension headache     Postural imbalance      Physician: Cornelio De La Cruz NP    Visit Date: 2/17/2022  Physician Orders: PT Eval and Treat   Medical Diagnosis from Referral:   M79.18 (ICD-10-CM) - Myofascial pain   M54.2 (ICD-10-CM) - Neck pain         Evaluation Date: 1/12/2022  Authorization Period Expiration: 1/10/2023  Plan of Care Expiration: 4/12/2022  Progress Note Due: 2/12/2022  Visit # / Visits authorized: 5/ 21   FOTO: 1/12/2022, 2/17/2022     Precautions: Standard      Time In: 1725 (late arrival)  Time Out: 1800  Total Appointment Time (timed & untimed codes): 35 minutes       Subjective     Pt states min pain today because she was sitting still at work. Overall she's feeling a lot better, no recent headaches. She says she still has some pain with looking up and feels like she needs more strength to maintain posture.      Pt reports: she was somewhat compliant with home exercise program given last session.   Response to previous treatment:no adverse effects  Functional change: no change     Pain: 1/10  Location: bilateral neck      Objective     Reassessment 2/17/2022     CERVICAL SPINE AROM:   Flexion: Mod bear, chin 2" to chest, pulling in posterior neck   Extension: WFL, pinch pain in posterior neck   Left Sidebend: Mild bear, pulling in opposite neck   Right Sidebend: Mild bear, pulling in opposite neck   Left Rotation: <60 deg, pulling in neck, observed subcranial SB   Right Rotation: <60 deg, pulling in neck     UPPER EXTREMITY STRENGTH:    Left Right   Shoulder Flexion 4+/5 4+/5   Shoulder Abduction 4+/5 4+/5   Shoulder Internal Rotation 4+/5 4+/5   Shoulder External Rotation 4/5 - noted winging with OP 4+/5 - noted winging with OP      Scapular Strength:  Upper Trap: " "5/5  Mid Trap: 3+/5  Low Trap: 3+/5  Rhomboids: 3+/5  Serratus Anterior: 4/5          CMS Impairment/Limitation/Restriction for FOTO Neck Survey    Therapist reviewed FOTO scores for Jossy Leslie on 2/17/2022   FOTO documents entered into [facility] - see Media section.    Evaluation: 30%    Current : 26%    Goal: 27%           Jossy received therapeutic exercises to develop strength, endurance, ROM, flexibility, posture and core stabilization for 20 minutes including:    Reassessment   UBE: 3' fwd/3' bkwd (collected history, assessed pt complaints, education on causes of neck pain/HAs; importance of exercise)    1/2 foam series:   pec stretch x 2'   Swimmers x 10   wes OH flex x 10   abd <> bear hug x 10   SA punches x 10  +open books x 10 WES    Seated cervical retraction 20 x 3" holds  Slouch corrections 3 x 30 sec   UT stretch x 20 sec ea   LS stretch x 20 sec ea   IR/ ER step out otb 2 x 10 ea B   Scap retractions 30 x 3 sec hold   Shld rows otb 2 x 10   Shld ext otb 2 x 10   supine chin tucks over stillpoint inducer 3 x 10  Supine chin tuck with cervical flexion3 x 10     Not performed today:   SL headlift with chin tuck     neuromuscular re-education activities to improve: Balance, Coordination, Kinesthetic, Sense, Proprioception and Posture for 0 minutes. The following activities were included:    +seated cervical iso ext x 30 x YTB  +seated cervical iso SB x 30 x YTB  +seated scap retraction with Wes ER x 20 x GTB  +seated wes Abd x 20 x GTB      None today...Add next visit for postural strength / endurance / scapular motor control: prone Is, Ts, Ys; SL scapular series,  robberies      Manual therapy including:  15 min x Application of TDN: Pt educated on benefits and potential side effects of dry needling. Educated pt on benefits, precautions, side effects following TDN. Educated pt to use heat following treatment sessions if pt is experiencing pain or soreness. Pt verbalized good understanding of education. "  Pt signed written consent to dry needling. Pt gave verbal consent for DN    Pt received dry needling to the below listed muscles using 30 mm needles.  B UT  B LS  B C6  B middle scalene  B suboccipitals (GB20, GV16)  Winding performed every 5 minutes during treatment.        Home Exercises Provided and Patient Education Provided     Education provided:   - Pt edu on proper exercise technique.      Written Home Exercises Provided: Patient instructed to cont prior HEP. Exercises were reviewed and Jossy was able to demonstrate them prior to the end of the session.  Jossy demonstrated good  understanding of the education provided. See EMR under Patient Instructions for exercises provided during therapy sessions.      Assessment     Overall Jossy is progressing well with therapy. She is reporting significant reduction in HA frequency and decrease in pain. She continues with FHP with skin crease noted at C6, with mild limitations in cervical ROM, as well as weakness noted to periscapular mm and deep neck flexors. She will benefit from continued skilled intervention at this time.  Dry needling resumed today to address soft tissue restrictions. Good soft tissue response to dry needling evident by increased grasp with unilateral winding at all insertion points. Winding performed every 5 minutes during treatment. No adverse effects following treatment.        Jossy Is progressing well towards her goals.   Pt prognosis is Fair.     Pt will continue to benefit from skilled outpatient physical therapy to address the deficits listed in the problem list box on initial evaluation, provide pt/family education and to maximize pt's level of independence in the home and community environment.     Pt's spiritual, cultural and educational needs considered and pt agreeable to plan of care and goals.    Anticipated barriers to physical therapy: none    Goals: Goals:  Short Term Goals (6 Weeks):   1. Pt will report 20% reduction in pain of  the cervical spine and LUE for ease with ADL's (met 2/17/2022)   2. PT will demonstrate 1/3 MMT improvement in periscapular strength for ease with upright posture (met 2/17/2022)   3. Pt will demonstrate improved cervical spine ROM in all directions by 5 degrees for ease with driving to MD appointments (met 2/17/2022)   4. Reduce frequency and intensity of headaches by 75% per week. (met 2/17/2022)       Long Term Goals (12 Weeks):   1. Pt will report being independent with HEP for maintenance of improvements gained during therapy sessions (progressing, not met)   2. PT will report 50% reduction of pain of the neck and LUE for ease with donning upper body clothing  (progressing, not met)   3. Pt will demonstrate full LUE and periscapular strength without the provocation of pain for ease with household chores (progressing, not met)   4. Pt will demonstrate appropriate upright posture without external cueing for ease with work related activities.  (progressing, not met)   5. Reduce frequency and intensity of headaches by 75% per week. (progressing, not met)          Plan     Cont to progress towards goals set by PT.  Work to improve posture, core stability, scap control and cervical ROM next visit.     Sarah Azul, PT

## 2022-02-18 ENCOUNTER — OFFICE VISIT (OUTPATIENT)
Dept: OTOLARYNGOLOGY | Facility: CLINIC | Age: 37
End: 2022-02-18
Payer: COMMERCIAL

## 2022-02-18 DIAGNOSIS — G43.709 CHRONIC MIGRAINE WITHOUT AURA WITHOUT STATUS MIGRAINOSUS, NOT INTRACTABLE: Primary | ICD-10-CM

## 2022-02-18 DIAGNOSIS — J32.9 RECURRENT SINUSITIS: ICD-10-CM

## 2022-02-18 DIAGNOSIS — Z86.16 HISTORY OF COVID-19: ICD-10-CM

## 2022-02-18 PROCEDURE — 1159F PR MEDICATION LIST DOCUMENTED IN MEDICAL RECORD: ICD-10-PCS | Mod: CPTII,S$GLB,, | Performed by: OTOLARYNGOLOGY

## 2022-02-18 PROCEDURE — 99214 PR OFFICE/OUTPT VISIT, EST, LEVL IV, 30-39 MIN: ICD-10-PCS | Mod: S$GLB,,, | Performed by: OTOLARYNGOLOGY

## 2022-02-18 PROCEDURE — 1159F MED LIST DOCD IN RCRD: CPT | Mod: CPTII,S$GLB,, | Performed by: OTOLARYNGOLOGY

## 2022-02-18 PROCEDURE — 99214 OFFICE O/P EST MOD 30 MIN: CPT | Mod: S$GLB,,, | Performed by: OTOLARYNGOLOGY

## 2022-02-18 NOTE — PROGRESS NOTES
Subjective:     Chief Complaint:   Chief Complaint   Patient presents with    Follow-up       Jossy Leslie is a 36 y.o. female who was referred to me by Dr. Jerry Lockwood in consultation for facial pressure.    Patient reports onset of symptoms occurred 4 weeks ago after kierra Covid-19.  Symptoms include Facial pain/pressure, Nasal congestion, dysosmia.  She reports pain/pressure over the ethmoid and frontal sinus region.  This is a pressure sensation.  Symptoms have been slightly improving. Patient also reports constant feeling of imbalance that occurred over this period. This Worse with exertion or bending over.  She denies vertigo, change in hearing, otalgia.  She does have a history of migraine headaches.  She has had this imbalance sensation previously.  She has had an MRI for workup of her imbalance. She attempted intranasal steroid spray but felt worsening of her dysosmia.  Reports her smell has improved some.  She is able to smell certain since.  She denies history of recurrent sinus infections or chronic sinusitis.    Current sinonasal medications include Oral antihistamine: Zyrtec.  She reports mild improvement but still persistent symptoms with these medications. The patient does not regularly use nasal decongestant sprays.      There is not a prior history of sinonasal surgery.  She is not an active smoker.     SNOT Questionnaire Total Score 12/28/2021   SNOT-22 score: 38      %    Today (2/18/22): Patient returns today for follow up. She was seen by her dentist and was considering a root canal for right upper molar pain.  Panorex revealed sinus disease in the maxillary sinuses bilaterally.  She was treated with Zithromax.  X-ray performed after the antibiotic revealed persistent sinus disease.  She reports symptoms are relatively unchanged.  She does not have significant pressure or pain in the cheek region.  She does have forehead pressure and headaches.    Past Medical History  She has a  past medical history of Asthma, Duodenal ulcer, Eosinophilic esophagitis, Hiatal hernia, Hypertension, Hypothyroidism due to Hashimoto's thyroiditis, and Thyroid disease.    Past Surgical History  She has a past surgical history that includes Tonsillectomy; Esophagogastroduodenoscopy; Esophagogastroduodenoscopy (N/A, 4/18/2019); Laparoscopic surgical removal of cyst of ovary (Right, 5/17/2019); and Hysteroscopy (N/A, 5/17/2019).    Family History  Her family history includes Asbestos in her paternal grandmother; Breast cancer (age of onset: 50) in her paternal aunt; Colon cancer (age of onset: 70) in her maternal grandfather; Diabetes in her paternal grandmother; Heart attack (age of onset: 70) in her paternal grandfather; Heart disease in her maternal grandmother; Hyperlipidemia in her father; Hypertension in her father, mother, other, and sister; No Known Problems in her maternal aunt, maternal uncle, paternal aunt, and paternal uncle.    Social History  She reports that she has never smoked. She has never used smokeless tobacco. She reports current alcohol use. She reports that she does not use drugs.    Allergies  She is allergic to pumpkin, corn containing products, shellfish containing products, soy, and wheat containing prod.    Medications  She has a current medication list which includes the following prescription(s): albuterol, amitriptyline, amlodipine, aspirin-acetaminophen-caffeine 250-250-65 mg, cetirizine, ferrous sulfate, levalbuterol, meclizine, meloxicam, methylprednisolone, ondansetron, pantoprazole, sumatriptan, synthroid, and tizanidine, and the following Facility-Administered Medications: acetaminophen, albuterol, diphenhydramine, methylprednisolone sodium succinate, ondansetron, sodium chloride 0.9% 500 mL flush bag, and sodium chloride 0.9%.    Review of Systems     Constitutional: Positive for fatigue.      HENT: Positive for sinus infection, sinus pressure and dental problems.      Eyes:   Positive for photophobia.     Respiratory:  Positive for shortness of breath.      Cardiovascular:  Positive for chest pain and irregular heartbeat.     Gastrointestinal:  Negative for abdominal pain, acid reflux, constipation, diarrhea, heartburn and vomiting.     Genitourinary: Negative for difficulty urinating, sexual problems and frequent urination.     Musc: Positive for neck pain.     Skin: Negative for rash.     Allergy: Positive for food allergies and seasonal allergies.     Endocrine: Negative for cold intolerance and heat intolerance.      Neurological: Positive for dizziness, headaches, light-headedness and tremors.     Hematologic: Negative for bruises/bleeds easily and swollen glands.      Psychiatric: Positive for nervous/anxious.              Objective:     There were no vitals taken for this visit.     Constitutional:   Vital signs are normal. She appears well-developed and well-nourished. Normal speech.      Head:  Normocephalic and atraumatic.     Ears:  Right ear hearing normal to normal and whispered voice; external ear normal without scars, lesions, or masses; ear canal, tympanic membrane, and middle ear normal. and left ear hearing normal to normal and whispered voice; external ear normal without scars, lesions, or masses; ear canal, tympanic membrane, and middle ear normal..   Right Ear: No drainage, swelling or tenderness. No middle ear effusion. No decreased hearing is noted.   Left Ear: No drainage, swelling or tenderness.  No middle ear effusion. No decreased hearing is noted.     Nose:  No mucosal edema, rhinorrhea, septal deviation, nasal septal hematoma or polyps. Turbinate hypertrophy.  Right sinus exhibits no maxillary sinus tenderness and no frontal sinus tenderness. Left sinus exhibits no maxillary sinus tenderness and no frontal sinus tenderness.     Mouth/Throat  Oropharynx not clear and moist, abnormal uvula midline, lips, teeth, and gums normal and oropharynx normal. Normal  dentition. No uvula swelling. No oropharyngeal exudate, posterior oropharyngeal edema or posterior oropharyngeal erythema.     Neck:  Neck normal without thyromegaly masses, asymmetry, normal tracheal structure, crepitus, and tenderness, thyroid normal, trachea normal, phonation normal and no adenopathy.     Pulmonary/Chest:   Effort normal. No apnea, no tachypnea and no bradypnea. No respiratory distress.     Psychiatric:   She has a normal mood and affect. Her speech is normal and behavior is normal.     Data Reviewed    WBC (K/uL)   Date Value   12/25/2021 7.40     Eosinophil % (%)   Date Value   12/25/2021 3.8     Eos # (K/uL)   Date Value   12/25/2021 0.3     Platelets (K/uL)   Date Value   12/25/2021 270     Glucose (mg/dL)   Date Value   12/25/2021 117 (H)     No results found for: IGE    MRI May 2021  Slight thickening around the maxillary sinus walls bilaterally     CT sinus Jan 2022  Minimal thicken mucosa floor of maxillary sinuses R>L    Panorex Feb 2022  More significant maxillary sinus opacification bilaterally     Assessment:     1. Chronic migraine without aura without status migrainosus, not intractable    2. History of COVID-19    3. Recurrent sinusitis          Plan:     I had a long discussion with the patient regarding her condition and the further workup and management options.  More recent Panorex shows more significant sinus disease that her previous CT scan.  She has been treated with oral antibiotics.  Discussed options for management of her recurrent inflammation in the sinuses.  Her CT scan does reveal patent osteomeatal complexes bilaterally.  Discussed option endoscopic sinus surgery versus continued medical management.  She would likely benefit from compound sinus irrigation with the addition of budesonide.  Discussed that this is off-label use.  Discussed trial of this for twice a day for at least 4 weeks.  She will plan to follow-up with her dentist in about 4 weeks.  We did discuss  possibility of requiring a repeat CT scan of the sinuses.  She we also discussed possible need for endoscopic sinus surgery should sinus symptoms continue to recur or fail to improve. All questions answered and patient was encouraged call with any questions or concerns.

## 2022-02-22 ENCOUNTER — CLINICAL SUPPORT (OUTPATIENT)
Dept: REHABILITATION | Facility: OTHER | Age: 37
End: 2022-02-22
Payer: COMMERCIAL

## 2022-02-22 DIAGNOSIS — R29.3 POSTURAL IMBALANCE: ICD-10-CM

## 2022-02-22 DIAGNOSIS — G89.29 CHRONIC MIDLINE POSTERIOR NECK PAIN: Primary | ICD-10-CM

## 2022-02-22 DIAGNOSIS — G44.209 TENSION HEADACHE: ICD-10-CM

## 2022-02-22 DIAGNOSIS — M54.2 CHRONIC MIDLINE POSTERIOR NECK PAIN: Primary | ICD-10-CM

## 2022-02-22 PROCEDURE — 97110 THERAPEUTIC EXERCISES: CPT | Mod: PN,CQ

## 2022-02-22 NOTE — PROGRESS NOTES
Physical Therapy Daily Treatment Note     Name: Jossy Leslie  Clinic Number: [id number]    Therapy Diagnosis:   Encounter Diagnoses   Name Primary?    Chronic midline posterior neck pain Yes    Tension headache     Postural imbalance      Physician: Cornelio De La Cruz NP    Visit Date: 2/22/2022  Physician Orders: PT Eval and Treat   Medical Diagnosis from Referral:   M79.18 (ICD-10-CM) - Myofascial pain   M54.2 (ICD-10-CM) - Neck pain         Evaluation Date: 1/12/2022  Authorization Period Expiration: 1/10/2023  Plan of Care Expiration: 4/12/2022  Progress Note Due: 2/12/2022  Visit # / Visits authorized: 6/ 21   FOTO: 1/12/2022, 2/17/2022     Precautions: Standard      Time In: 1730  Time Out: 1610  Total Appointment Time (timed & untimed codes): 40 minutes       Subjective     Pt reports feeling better w/ less neck pn ad headaches.    Pt reports: she was somewhat compliant with home exercise program given last session.   Response to previous treatment:no adverse effects  Functional change: no change     Pain: 1/10  Location: bilateral neck      Objective     Jossy received therapeutic exercises to develop strength, endurance, ROM, flexibility, posture and core stabilization for 40 minutes including:    Reassessment   UBE: 3' fwd/3' bkwd (collected history, assessed pt complaints, education on causes of neck pain/HAs; importance of exercise)    1/2 foam series:   pec stretch x 3'   Swimmers x 10   wes OH flex x 10   abd <> bear hug x 10   SA punches x 20  +open books x 10 WES  Seated cervical retraction 2 x 30 sec   Slouch corrections 3 x 30 sec   IR/ ER step out otb 2 x 15 ea B   Scap retractions 30 x 3 sec hold   Shld rows otb 2 x 15   Shld ext otb 2 x 15   supine chin tucks over stillpoint inducer 3 x 10    UT stretch x 20 sec ea   LS stretch x 20 sec ea     Supine chin tuck with cervical flexion3 x 10     Not performed today:   SL headlift with chin tuck     neuromuscular  re-education activities to improve: Balance, Coordination, Kinesthetic, Sense, Proprioception and Posture for 0 minutes. The following activities were included:    +seated cervical iso ext x 30 x YTB  +seated cervical iso SB x 30 x YTB  +seated scap retraction with Stephan ER x 20 x GTB  +seated stephan Abd x 20 x GTB      None today...Add next visit for postural strength / endurance / scapular motor control: prone Is, Ts, Ys; SL scapular series,  robberies      Manual therapy includin min x Application of TDN: Pt educated on benefits and potential side effects of dry needling. Educated pt on benefits, precautions, side effects following TDN. Educated pt to use heat following treatment sessions if pt is experiencing pain or soreness. Pt verbalized good understanding of education.  Pt signed written consent to dry needling. Pt gave verbal consent for DN    Pt received dry needling to the below listed muscles using 30 mm needles.  B UT  B LS  B C6  B middle scalene  B suboccipitals (GB20, GV16)  Winding performed every 5 minutes during treatment.        Home Exercises Provided and Patient Education Provided     Education provided:   - Pt edu on proper exercise technique.      Written Home Exercises Provided: Patient instructed to cont prior HEP. Exercises were reviewed and Jossy was able to demonstrate them prior to the end of the session.  Jossy demonstrated good  understanding of the education provided. See EMR under Patient Instructions for exercises provided during therapy sessions.      Assessment     Pt had good travis to tx today.  Pt showed increased muscular endurance during therex.  Pt cont to require some VCs for scap control.  Pt would cont to benefit from skilled care to improve posture, cervical ROM and scap stability.          Jossy Is progressing well towards her goals.   Pt prognosis is Fair.     Pt will continue to benefit from skilled outpatient physical therapy to address the deficits listed in the  problem list box on initial evaluation, provide pt/family education and to maximize pt's level of independence in the home and community environment.     Pt's spiritual, cultural and educational needs considered and pt agreeable to plan of care and goals.    Anticipated barriers to physical therapy: none    Goals: Goals:  Short Term Goals (6 Weeks):   1. Pt will report 20% reduction in pain of the cervical spine and LUE for ease with ADL's (met 2/17/2022)   2. PT will demonstrate 1/3 MMT improvement in periscapular strength for ease with upright posture (met 2/17/2022)   3. Pt will demonstrate improved cervical spine ROM in all directions by 5 degrees for ease with driving to MD appointments (met 2/17/2022)   4. Reduce frequency and intensity of headaches by 75% per week. (met 2/17/2022)       Long Term Goals (12 Weeks):   1. Pt will report being independent with HEP for maintenance of improvements gained during therapy sessions (progressing, not met)   2. PT will report 50% reduction of pain of the neck and LUE for ease with donning upper body clothing  (progressing, not met)   3. Pt will demonstrate full LUE and periscapular strength without the provocation of pain for ease with household chores (progressing, not met)   4. Pt will demonstrate appropriate upright posture without external cueing for ease with work related activities.  (progressing, not met)   5. Reduce frequency and intensity of headaches by 75% per week. (progressing, not met)          Plan     Cont to progress towards goals set by PT.  Work to improve posture, core stability, scap control and cervical ROM next visit.     Amrit Beard, PTA

## 2022-02-23 DIAGNOSIS — D84.9 IMMUNOSUPPRESSED STATUS: ICD-10-CM

## 2022-03-08 ENCOUNTER — OFFICE VISIT (OUTPATIENT)
Dept: FAMILY MEDICINE | Facility: CLINIC | Age: 37
End: 2022-03-08
Payer: COMMERCIAL

## 2022-03-08 ENCOUNTER — CLINICAL SUPPORT (OUTPATIENT)
Dept: REHABILITATION | Facility: OTHER | Age: 37
End: 2022-03-08
Payer: COMMERCIAL

## 2022-03-08 DIAGNOSIS — R29.3 POSTURAL IMBALANCE: ICD-10-CM

## 2022-03-08 DIAGNOSIS — E03.8 HYPOTHYROIDISM DUE TO HASHIMOTO'S THYROIDITIS: ICD-10-CM

## 2022-03-08 DIAGNOSIS — E06.3 HYPOTHYROIDISM DUE TO HASHIMOTO'S THYROIDITIS: ICD-10-CM

## 2022-03-08 DIAGNOSIS — M54.2 CHRONIC MIDLINE POSTERIOR NECK PAIN: Primary | ICD-10-CM

## 2022-03-08 DIAGNOSIS — G44.209 TENSION HEADACHE: ICD-10-CM

## 2022-03-08 DIAGNOSIS — F43.23 ADJUSTMENT REACTION WITH ANXIETY AND DEPRESSION: Primary | ICD-10-CM

## 2022-03-08 DIAGNOSIS — G89.29 CHRONIC MIDLINE POSTERIOR NECK PAIN: Primary | ICD-10-CM

## 2022-03-08 PROCEDURE — 97112 NEUROMUSCULAR REEDUCATION: CPT | Mod: PN

## 2022-03-08 PROCEDURE — 99213 PR OFFICE/OUTPT VISIT, EST, LEVL III, 20-29 MIN: ICD-10-PCS | Mod: 95,,, | Performed by: FAMILY MEDICINE

## 2022-03-08 PROCEDURE — 1160F RVW MEDS BY RX/DR IN RCRD: CPT | Mod: CPTII,95,, | Performed by: FAMILY MEDICINE

## 2022-03-08 PROCEDURE — 1159F MED LIST DOCD IN RCRD: CPT | Mod: CPTII,95,, | Performed by: FAMILY MEDICINE

## 2022-03-08 PROCEDURE — 97110 THERAPEUTIC EXERCISES: CPT | Mod: PN

## 2022-03-08 PROCEDURE — 1160F PR REVIEW ALL MEDS BY PRESCRIBER/CLIN PHARMACIST DOCUMENTED: ICD-10-PCS | Mod: CPTII,95,, | Performed by: FAMILY MEDICINE

## 2022-03-08 PROCEDURE — 99213 OFFICE O/P EST LOW 20 MIN: CPT | Mod: 95,,, | Performed by: FAMILY MEDICINE

## 2022-03-08 PROCEDURE — 1159F PR MEDICATION LIST DOCUMENTED IN MEDICAL RECORD: ICD-10-PCS | Mod: CPTII,95,, | Performed by: FAMILY MEDICINE

## 2022-03-08 NOTE — PROGRESS NOTES
"                            Physical Therapy Daily Treatment Note     Name: Jossy Leslie  Clinic Number: [id number]    Therapy Diagnosis:   Encounter Diagnoses   Name Primary?    Chronic midline posterior neck pain Yes    Tension headache     Postural imbalance      Physician: Cornelio De La Cruz NP    Visit Date: 3/8/2022  Physician Orders: PT Eval and Treat   Medical Diagnosis from Referral:   M79.18 (ICD-10-CM) - Myofascial pain   M54.2 (ICD-10-CM) - Neck pain         Evaluation Date: 1/12/2022  Authorization Period Expiration: 1/10/2023  Plan of Care Expiration: 4/12/2022  Progress Note Due: 2/12/2022  Visit # / Visits authorized: 7/ 21   FOTO: 1/12/2022, 2/17/2022     Precautions: Standard      Time In: 5:30  Time Out: 6:25  Total Appointment Time (timed & untimed codes): 55 minutes       Subjective     Pt reports: "I have a migraine today and I would like to keep my exercises easy today."    she was somewhat compliant with home exercise program given last session.   Response to previous treatment:no adverse effects  Functional change: no change     Pain: 1/10  Location: bilateral neck      Objective     Updated on 2/17/2022    Objective -- TREATMENT     Jossy received therapeutic exercises to develop strength, endurance, ROM, flexibility, posture and core stabilization for 25 minutes including:    UBE: 3' fwd/3' bkwd (collected history, assessed pt complaints, education on causes of neck pain/HAs; importance of exercise)    1/2 foam series:   pec stretch x 3'   Swimmers x 10   wes OH flex x 10   abd <> bear hug x 10   SA punches x 20  +open books x 10 WES  supine chin tucks over stillpoint inducer 3 x 10    UT stretch x 20 sec ea   LS stretch x 20 sec ea     Supine chin tuck with cervical flexion3 x 10     Not performed today:   SL headlift with chin tuck   Seated cervical retraction 2 x 30 sec   Slouch corrections 3 x 30 sec   IR/ ER step out otb 2 x 15 ea B   Scap retractions 30 x 3 sec hold   Shld rows " "otb 2 x 15   Shld ext otb 2 x 15       neuromuscular re-education activities to improve: Balance, Coordination, Kinesthetic, Sense, Proprioception and Posture for 30 minutes. The following activities were included:    +SL flex 1 x 10 x 0#, 1 x 10 x #1, 1 x 10 x 2#  +SL gator chomp (abd) 1 x 10 x 0#, 2 x 10 x #1   +prone scap retractions 15 x 5"  +prone Is 15 x 5" holds  +prone Ts 15 x 5"  +prone Ys - NV    seated cervical iso ext x 30 x YTB  seated cervical iso SB x 30 x YTB  seated scap retraction with Stephan ER x 20 x GTB  seated stephan Abd x 20 x GTB      None today...Add next visit for postural strength / endurance / scapular motor control: robberies      Manual therapy includin min x Application of TDN: Pt educated on benefits and potential side effects of dry needling. Educated pt on benefits, precautions, side effects following TDN. Educated pt to use heat following treatment sessions if pt is experiencing pain or soreness. Pt verbalized good understanding of education.  Pt signed written consent to dry needling. Pt gave verbal consent for DN    Pt received dry needling to the below listed muscles using 30 mm needles.  B UT  B LS  B C6  B middle scalene  B suboccipitals (GB20, GV16)  Winding performed every 5 minutes during treatment.        Home Exercises Provided and Patient Education Provided     Education provided:   - Pt edu on proper exercise technique.      Written Home Exercises Provided: Patient instructed to cont prior HEP. Exercises were reviewed and Jossy was able to demonstrate them prior to the end of the session.  Jossy demonstrated good  understanding of the education provided. See EMR under Patient Instructions for exercises provided during therapy sessions.      Assessment     Modified program today to supine, SL, or prone exercises due to pt request for "easy" exercises due to migraine flare up today. sidelying and prone series added to promote scapular activation, motor " control/coordination, and postural strength. Good tolerance with overall exercises today with intermittent difficulty maintaining scapular positioning during SL without TC.        Jossy Is progressing well towards her goals.   Pt prognosis is Fair.     Pt will continue to benefit from skilled outpatient physical therapy to address the deficits listed in the problem list box on initial evaluation, provide pt/family education and to maximize pt's level of independence in the home and community environment.     Pt's spiritual, cultural and educational needs considered and pt agreeable to plan of care and goals.    Anticipated barriers to physical therapy: none    Goals: Goals:  Short Term Goals (6 Weeks):   1. Pt will report 20% reduction in pain of the cervical spine and LUE for ease with ADL's (met 2/17/2022)   2. PT will demonstrate 1/3 MMT improvement in periscapular strength for ease with upright posture (met 2/17/2022)   3. Pt will demonstrate improved cervical spine ROM in all directions by 5 degrees for ease with driving to MD appointments (met 2/17/2022)   4. Reduce frequency and intensity of headaches by 75% per week. (met 2/17/2022)       Long Term Goals (12 Weeks):   1. Pt will report being independent with HEP for maintenance of improvements gained during therapy sessions (progressing, not met)   2. PT will report 50% reduction of pain of the neck and LUE for ease with donning upper body clothing  (progressing, not met)   3. Pt will demonstrate full LUE and periscapular strength without the provocation of pain for ease with household chores (progressing, not met)   4. Pt will demonstrate appropriate upright posture without external cueing for ease with work related activities.  (progressing, not met)   5. Reduce frequency and intensity of headaches by 75% per week. (progressing, not met)          Plan     Cont to progress towards goals set by PT.  Work to improve posture, core stability, scap control and  cervical ROM next visit.     Serina Cox, PT

## 2022-03-08 NOTE — PROGRESS NOTES
Assessment & Plan  Adjustment reaction with anxiety and depression    Hypothyroidism due to Hashimoto's thyroiditis      Patient was encouraged to follow up with either her PCP or Endocrinologist to check her thyroid studies. If these are abnormal, perhaps adjusting her Synthroid dose may help with her mood changes. If they are normal, then I recommend discussing either adjusting her Elavil or adding a daytime medication to help with depression and anxiety.     The patient location is:  Patient Home   The chief complaint leading to consultation is: noted below  Visit type: Virtual visit with synchronous audio and video  Total time spent with patient: 15 minutes  Each patient to whom he or she provides medical services by telemedicine is:  (1) informed of the relationship between the physician and patient and the respective role of any other health care provider with respect to management of the patient; and (2) notified that he or she may decline to receive medical services by telemedicine and may withdraw from such care at any time.    Follow-up: Follow up if symptoms worsen or fail to improve.    ______________________________________________________________________    Chief Complaint  Chief Complaint   Patient presents with    Depression    Anxiety     HPI  Jossy Leslie is a 36 y.o. female with multiple medical diagnoses as listed in the medical history and problem list that presents for depression and anxiety via virtual visit. Patient tested positive for COVID in December and states that she began to feel more depressed and anxious about 1-2 weeks later. She recently broke up with her s/o about 2 weeks ago due to the depression. 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 She states that she develops tremors when she feels pressured to do something at work. She takes Elavil for migraines which is working well for her. She started seeing a counselor through  [employer], which is working well for her. She also has a history of Hashimoto's disease and admits to skipping doses of Synthroid. Her TSH was abnormal at 6.621 in December.       PAST MEDICAL HISTORY:  Past Medical History:   Diagnosis Date    Asthma     Duodenal ulcer     [facility] 2015    Eosinophilic esophagitis     2019    Hiatal hernia     Hypertension 2/19/2021    Hypothyroidism due to Hashimoto's thyroiditis     Thyroid disease        PAST SURGICAL HISTORY:  Past Surgical History:   Procedure Laterality Date    ESOPHAGOGASTRODUODENOSCOPY      ESOPHAGOGASTRODUODENOSCOPY N/A 4/18/2019    Procedure: ESOPHAGOGASTRODUODENOSCOPY (EGD);  Surgeon: Karmen Marquez MD;  Location: [facility] ENDO;  Service: Endoscopy;  Laterality: N/A;    HYSTEROSCOPY N/A 5/17/2019    Procedure: HYSTEROSCOPY;  Surgeon: Eli Blevins MD;  Location: [facility] OR;  Service: OB/GYN;  Laterality: N/A;    LAPAROSCOPIC SURGICAL REMOVAL OF CYST OF OVARY Right 5/17/2019    Procedure: EXCISION, CYST, OVARY, LAPAROSCOPIC;  Surgeon: Eli Blevins MD;  Location: [facility] OR;  Service: OB/GYN;  Laterality: Right;  video    TONSILLECTOMY         SOCIAL HISTORY:  Social History     Socioeconomic History    Marital status: Single   Occupational History    Occupation: nurse   Tobacco Use    Smoking status: Never Smoker    Smokeless tobacco: Never Used   Substance and Sexual Activity    Alcohol use: Yes     Alcohol/week: 0.0 standard drinks     Comment: socially, minimal    Drug use: No    Sexual activity: Not Currently     Partners: Male     Birth control/protection: Condom       FAMILY HISTORY:  Family History   Problem Relation Age of Onset    Hypertension Mother     Hypertension Father     Hyperlipidemia Father     Heart disease Maternal Grandmother     Colon cancer Maternal Grandfather 70    Diabetes Paternal Grandmother     Asbestos Paternal Grandmother     Heart attack Paternal Grandfather 70    Hypertension Sister     No Known  Problems Maternal Aunt     No Known Problems Maternal Uncle     No Known Problems Paternal Aunt     No Known Problems Paternal Uncle     Hypertension Other     Breast cancer Paternal Aunt 50    Amblyopia Neg Hx     Blindness Neg Hx     Cataracts Neg Hx     Glaucoma Neg Hx     Macular degeneration Neg Hx     Retinal detachment Neg Hx     Strabismus Neg Hx     Stroke Neg Hx     Thyroid disease Neg Hx     Ovarian cancer Neg Hx        ALLERGIES AND MEDICATIONS: updated and reviewed.  Review of patient's allergies indicates:   Allergen Reactions    Pumpkin Anaphylaxis    Corn containing products     Shellfish containing products Itching    Soy     Wheat containing prod      Current Outpatient Medications   Medication Sig Dispense Refill    albuterol (PROVENTIL/VENTOLIN HFA) 90 mcg/actuation inhaler Inhale 1-2 puffs into the lungs every 6 (six) hours as needed for Wheezing or Shortness of Breath. Rescue 1 Inhaler 0    amitriptyline (ELAVIL) 10 MG tablet Take 1 tablet (10 mg total) by mouth every evening. 30 tablet 11    amLODIPine (NORVASC) 5 MG tablet Take 1 tablet (5 mg total) by mouth once daily. 30 tablet 11    aspirin-acetaminophen-caffeine 250-250-65 mg (EXCEDRIN MIGRAINE) 250-250-65 mg per tablet Excedrin Migraine           cetirizine (ZYRTEC) 10 MG tablet Take 10 mg by mouth once daily.      ferrous sulfate (FEOSOL) 325 mg (65 mg iron) Tab tablet Take 1 tablet (325 mg total) by mouth once daily. 30 tablet 2    levalbuterol (XOPENEX HFA) 45 mcg/actuation inhaler Inhale 1-2 puffs into the lungs every 4 (four) hours as needed for Wheezing. Rescue 15 g 0    meclizine (ANTIVERT) 25 mg tablet Take 1 tablet (25 mg total) by mouth 3 (three) times daily as needed for Dizziness or Nausea. 15 tablet 0    meloxicam (MOBIC) 15 MG tablet Take 15 mg by mouth once daily.      methylPREDNISolone (MEDROL DOSEPACK) 4 mg tablet use as directed 1 each 1    ondansetron (ZOFRAN-ODT) 4 MG TbDL Take 1 tablet  (4 mg total) by mouth 2 (two) times daily. 30 tablet 2    pantoprazole (PROTONIX) 40 MG tablet Take 1 tablet by mouth once daily 30 tablet 0    sumatriptan (IMITREX) 100 MG tablet Take 1 tablet (100 mg total) by mouth as needed for Migraine. Take at the onset of headache, may take 1 more in 1 hour if needed. 9 tablet 5    SYNTHROID 88 mcg tablet Take 1 tablet (88 mcg total) by mouth before breakfast. 30 tablet 11    tiZANidine (ZANAFLEX) 4 MG tablet Take 4 mg by mouth 3 (three) times daily.       Current Facility-Administered Medications   Medication Dose Route Frequency Provider Last Rate Last Admin    acetaminophen tablet 650 mg  650 mg Oral Once PRN Don Miller MD        albuterol inhaler 2 puff  2 puff Inhalation Q20 Min PRN Don Miller MD        diphenhydrAMINE injection 25 mg  25 mg Intravenous Once PRN Don Miller MD        methylPREDNISolone sodium succinate injection 40 mg  40 mg Intravenous Once PRN Don Miller MD        ondansetron disintegrating tablet 4 mg  4 mg Oral Once PRN Don Miller MD        sodium chloride 0.9% 500 mL flush bag   Intravenous PRN Don Miller MD        sodium chloride 0.9% flush 10 mL  10 mL Intravenous PRN Don Miller MD             ROS  Review of Systems   Constitutional: Negative for activity change and unexpected weight change.   HENT: Negative for hearing loss, rhinorrhea and trouble swallowing.    Eyes: Negative for discharge and visual disturbance.   Respiratory: Negative for chest tightness and wheezing.    Cardiovascular: Positive for palpitations. Negative for chest pain.   Gastrointestinal: Positive for constipation. Negative for blood in stool, diarrhea and vomiting.   Endocrine: Negative for polydipsia and polyuria.   Genitourinary: Negative for difficulty urinating, dysuria, hematuria and menstrual problem.   Musculoskeletal: Positive for neck pain. Negative for arthralgias and joint swelling.   Neurological:  Positive for tremors. Negative for weakness and headaches.   Psychiatric/Behavioral: Positive for dysphoric mood. Negative for confusion and decreased concentration. The patient is nervous/anxious.            Physical Exam  Physical Exam  Constitutional:       General: She is not in acute distress.  HENT:      Head: Normocephalic and atraumatic.   Neurological:      Mental Status: She is alert. Mental status is at baseline.   Psychiatric:         Mood and Affect: Mood normal.         Behavior: Behavior normal.         *Physical exam limited by virtual visit.    Health Maintenance       Date Due Completion Date    COVID-19 Vaccine (3 - Booster for Pfizer series) 07/17/2021 2/17/2021    Cervical Cancer Screening 12/02/2024 12/2/2021    TETANUS VACCINE 11/05/2025 11/5/2015

## 2022-03-16 ENCOUNTER — HOSPITAL ENCOUNTER (OUTPATIENT)
Dept: CARDIOLOGY | Facility: HOSPITAL | Age: 37
Discharge: HOME | End: 2022-03-16
Attending: INTERNAL MEDICINE
Payer: COMMERCIAL

## 2022-03-16 ENCOUNTER — OFFICE VISIT (OUTPATIENT)
Dept: PULMONOLOGY | Facility: CLINIC | Age: 37
End: 2022-03-16
Payer: COMMERCIAL

## 2022-03-16 ENCOUNTER — PATIENT MESSAGE (OUTPATIENT)
Dept: ENDOCRINOLOGY | Facility: CLINIC | Age: 37
End: 2022-03-16
Payer: COMMERCIAL

## 2022-03-16 VITALS
HEIGHT: 62 IN | DIASTOLIC BLOOD PRESSURE: 66 MMHG | BODY MASS INDEX: 30.39 KG/M2 | OXYGEN SATURATION: 99 % | WEIGHT: 165.13 LBS | SYSTOLIC BLOOD PRESSURE: 106 MMHG | HEART RATE: 76 BPM

## 2022-03-16 VITALS
HEART RATE: 87 BPM | SYSTOLIC BLOOD PRESSURE: 102 MMHG | DIASTOLIC BLOOD PRESSURE: 57 MMHG | WEIGHT: 160 LBS | HEIGHT: 63 IN | BODY MASS INDEX: 28.35 KG/M2

## 2022-03-16 DIAGNOSIS — R06.00 DYSPNEA, UNSPECIFIED TYPE: Primary | ICD-10-CM

## 2022-03-16 DIAGNOSIS — E06.3 HYPOTHYROIDISM DUE TO HASHIMOTO'S THYROIDITIS: Primary | ICD-10-CM

## 2022-03-16 DIAGNOSIS — E03.8 HYPOTHYROIDISM DUE TO HASHIMOTO'S THYROIDITIS: Primary | ICD-10-CM

## 2022-03-16 DIAGNOSIS — R06.09 DYSPNEA ON EXERTION: ICD-10-CM

## 2022-03-16 LAB
ASCENDING AORTA: 2.73 CM
AV INDEX (PROSTH): 0.74
AV MEAN GRADIENT: 4 MMHG
AV PEAK GRADIENT: 8 MMHG
AV VALVE AREA: 2.35 CM2
AV VELOCITY RATIO: 0.71
BSA FOR ECHO PROCEDURE: 1.8 M2
CV ECHO LV RWT: 0.28 CM
DOP CALC AO PEAK VEL: 1.4 M/S
DOP CALC AO VTI: 26.16 CM
DOP CALC LVOT AREA: 3.2 CM2
DOP CALC LVOT DIAMETER: 2.01 CM
DOP CALC LVOT PEAK VEL: 0.99 M/S
DOP CALC LVOT STROKE VOLUME: 61.37 CM3
DOP CALCLVOT PEAK VEL VTI: 19.35 CM
E WAVE DECELERATION TIME: 141.35 MSEC
E/A RATIO: 1.07
E/E' RATIO: 8.33 M/S
ECHO LV POSTERIOR WALL: 0.7 CM (ref 0.6–1.1)
EJECTION FRACTION: 60 %
FRACTIONAL SHORTENING: 42 % (ref 28–44)
INTERVENTRICULAR SEPTUM: 0.78 CM (ref 0.6–1.1)
IVRT: 74.22 MSEC
LA MAJOR: 4.73 CM
LA MINOR: 4.43 CM
LA WIDTH: 2.97 CM
LEFT ATRIUM SIZE: 3.38 CM
LEFT ATRIUM VOLUME INDEX: 22.2 ML/M2
LEFT ATRIUM VOLUME: 39.04 CM3
LEFT INTERNAL DIMENSION IN SYSTOLE: 2.92 CM (ref 2.1–4)
LEFT VENTRICLE DIASTOLIC VOLUME INDEX: 44.93 ML/M2
LEFT VENTRICLE DIASTOLIC VOLUME: 79.08 ML
LEFT VENTRICLE MASS INDEX: 70 G/M2
LEFT VENTRICLE SYSTOLIC VOLUME INDEX: 18.6 ML/M2
LEFT VENTRICLE SYSTOLIC VOLUME: 32.77 ML
LEFT VENTRICULAR INTERNAL DIMENSION IN DIASTOLE: 5 CM (ref 3.5–6)
LEFT VENTRICULAR MASS: 122.99 G
LV LATERAL E/E' RATIO: 6.82 M/S
LV SEPTAL E/E' RATIO: 10.71 M/S
MV A" WAVE DURATION": 6.57 MSEC
MV PEAK A VEL: 0.7 M/S
MV PEAK E VEL: 0.75 M/S
MV STENOSIS PRESSURE HALF TIME: 40.99 MS
MV VALVE AREA P 1/2 METHOD: 5.37 CM2
PISA TR MAX VEL: 2.24 M/S
PULM VEIN S/D RATIO: 1.13
PV PEAK D VEL: 0.45 M/S
PV PEAK S VEL: 0.51 M/S
RA MAJOR: 4.96 CM
RA PRESSURE: 3 MMHG
RA WIDTH: 2.44 CM
RIGHT VENTRICULAR END-DIASTOLIC DIMENSION: 2.9 CM
RV TISSUE DOPPLER FREE WALL SYSTOLIC VELOCITY 1 (APICAL 4 CHAMBER VIEW): 10.98 CM/S
SINUS: 2.91 CM
STJ: 2.45 CM
TDI LATERAL: 0.11 M/S
TDI SEPTAL: 0.07 M/S
TDI: 0.09 M/S
TR MAX PG: 20 MMHG
TRICUSPID ANNULAR PLANE SYSTOLIC EXCURSION: 2.45 CM
TV REST PULMONARY ARTERY PRESSURE: 23 MMHG

## 2022-03-16 PROCEDURE — 1159F PR MEDICATION LIST DOCUMENTED IN MEDICAL RECORD: ICD-10-PCS | Mod: CPTII,S$GLB,, | Performed by: INTERNAL MEDICINE

## 2022-03-16 PROCEDURE — 3008F BODY MASS INDEX DOCD: CPT | Mod: CPTII,S$GLB,, | Performed by: INTERNAL MEDICINE

## 2022-03-16 PROCEDURE — 3078F PR MOST RECENT DIASTOLIC BLOOD PRESSURE < 80 MM HG: ICD-10-PCS | Mod: CPTII,S$GLB,, | Performed by: INTERNAL MEDICINE

## 2022-03-16 PROCEDURE — 99999 PR PBB SHADOW E&M-EST. PATIENT-LVL IV: ICD-10-PCS | Mod: PBBFAC,,, | Performed by: INTERNAL MEDICINE

## 2022-03-16 PROCEDURE — 3074F PR MOST RECENT SYSTOLIC BLOOD PRESSURE < 130 MM HG: ICD-10-PCS | Mod: CPTII,S$GLB,, | Performed by: INTERNAL MEDICINE

## 2022-03-16 PROCEDURE — 3074F SYST BP LT 130 MM HG: CPT | Mod: CPTII,S$GLB,, | Performed by: INTERNAL MEDICINE

## 2022-03-16 PROCEDURE — 93306 TTE W/DOPPLER COMPLETE: CPT | Mod: 26,,, | Performed by: INTERNAL MEDICINE

## 2022-03-16 PROCEDURE — 3008F PR BODY MASS INDEX (BMI) DOCUMENTED: ICD-10-PCS | Mod: CPTII,S$GLB,, | Performed by: INTERNAL MEDICINE

## 2022-03-16 PROCEDURE — 1159F MED LIST DOCD IN RCRD: CPT | Mod: CPTII,S$GLB,, | Performed by: INTERNAL MEDICINE

## 2022-03-16 PROCEDURE — 99214 PR OFFICE/OUTPT VISIT, EST, LEVL IV, 30-39 MIN: ICD-10-PCS | Mod: S$GLB,,, | Performed by: INTERNAL MEDICINE

## 2022-03-16 PROCEDURE — 99999 PR PBB SHADOW E&M-EST. PATIENT-LVL IV: CPT | Mod: PBBFAC,,, | Performed by: INTERNAL MEDICINE

## 2022-03-16 PROCEDURE — 99214 OFFICE O/P EST MOD 30 MIN: CPT | Mod: S$GLB,,, | Performed by: INTERNAL MEDICINE

## 2022-03-16 PROCEDURE — 3078F DIAST BP <80 MM HG: CPT | Mod: CPTII,S$GLB,, | Performed by: INTERNAL MEDICINE

## 2022-03-16 PROCEDURE — 93306 ECHO (CUPID ONLY): ICD-10-PCS | Mod: 26,,, | Performed by: INTERNAL MEDICINE

## 2022-03-16 PROCEDURE — 93306 TTE W/DOPPLER COMPLETE: CPT

## 2022-03-16 NOTE — PROGRESS NOTES
Subjective:      Patient ID: Jossy Leslie is a 36 y.o. female.    Chief Complaint: Shortness of Breath    HPI  37 yo female works in the [employer] [facility] [place] comes for follow up. I saw her several weeks ago and her 2-D cardiac Echo showed evidence of CHF  CVP:15 and PAP: 41. She had no clinical evidence of CHF at the time of her  Initial visit with me. She had come for assessment of dyspnea, Her PFT;s were normal in all respects and normal DLCO.    I repeat her 2-D Echo today: CVP: 3, PAP: 23 and EF 60% this is more in keeping with what I expected that she should have had. She had had a mild case of Covid in late December, did she had a mild case of CHF secondary to her  covid infection.  Regardless, she has normal heart function today and I encouraged her to do more exercise daily   Chest x-ray done 12/25 was normal.      No flowsheet data found.  Review of Systems   Constitutional: Negative.    HENT: Negative.    Eyes: Negative.    Respiratory: Negative.    Cardiovascular: Negative.         Normal 2-D Echo today.   Genitourinary: Negative.    Musculoskeletal: Negative.    Skin: Negative.    Gastrointestinal: Negative.    Neurological: Negative.    Psychiatric/Behavioral: Negative.      Objective:     Physical Exam  Vitals and nursing note reviewed.   Constitutional:       General: She is not in acute distress.     Appearance: She is well-developed.   HENT:      Head: Normocephalic and atraumatic.      Right Ear: External ear normal.      Left Ear: External ear normal.      Nose: Nose normal.   Eyes:      Conjunctiva/sclera: Conjunctivae normal.      Pupils: Pupils are equal, round, and reactive to light.   Neck:      Thyroid: No thyromegaly.      Vascular: No JVD.   Cardiovascular:      Rate and Rhythm: Normal rate and regular rhythm.      Heart sounds: Normal heart sounds. No murmur heard.    No gallop.   Pulmonary:      Effort: No respiratory distress.      Breath sounds: Normal breath sounds. No  stridor. No wheezing or rales.   Chest:      Chest wall: No tenderness.   Abdominal:      General: Bowel sounds are normal. There is no distension.      Palpations: Abdomen is soft. There is no mass.      Tenderness: There is no abdominal tenderness. There is no guarding or rebound.   Musculoskeletal:         General: Normal range of motion.      Cervical back: Normal range of motion and neck supple.   Lymphadenopathy:      Cervical: No cervical adenopathy.   Skin:     General: Skin is warm and dry.      Findings: No rash.   Neurological:      Mental Status: She is alert and oriented to person, place, and time.      Cranial Nerves: No cranial nerve deficit.      Deep Tendon Reflexes: Reflexes are normal and symmetric. Reflexes normal.   Psychiatric:         Behavior: Behavior normal.         Thought Content: Thought content normal.         Judgment: Judgment normal.         Assessment:     1. Dyspnea, unspecified type      Outpatient Encounter Medications as of 3/16/2022   Medication Sig Dispense Refill    albuterol (PROVENTIL/VENTOLIN HFA) 90 mcg/actuation inhaler Inhale 1-2 puffs into the lungs every 6 (six) hours as needed for Wheezing or Shortness of Breath. Rescue 1 Inhaler 0    amitriptyline (ELAVIL) 10 MG tablet Take 1 tablet (10 mg total) by mouth every evening. 30 tablet 11    amLODIPine (NORVASC) 5 MG tablet Take 1 tablet (5 mg total) by mouth once daily. 30 tablet 11    aspirin-acetaminophen-caffeine 250-250-65 mg (EXCEDRIN MIGRAINE) 250-250-65 mg per tablet Excedrin Migraine           cetirizine (ZYRTEC) 10 MG tablet Take 10 mg by mouth once daily.      ferrous sulfate (FEOSOL) 325 mg (65 mg iron) Tab tablet Take 1 tablet (325 mg total) by mouth once daily. 30 tablet 2    levalbuterol (XOPENEX HFA) 45 mcg/actuation inhaler Inhale 1-2 puffs into the lungs every 4 (four) hours as needed for Wheezing. Rescue 15 g 0    meclizine (ANTIVERT) 25 mg tablet Take 1 tablet (25 mg total) by mouth 3 (three)  times daily as needed for Dizziness or Nausea. 15 tablet 0    meloxicam (MOBIC) 15 MG tablet Take 15 mg by mouth once daily.      methylPREDNISolone (MEDROL DOSEPACK) 4 mg tablet use as directed 1 each 1    ondansetron (ZOFRAN-ODT) 4 MG TbDL Take 1 tablet (4 mg total) by mouth 2 (two) times daily. 30 tablet 2    pantoprazole (PROTONIX) 40 MG tablet Take 1 tablet by mouth once daily 30 tablet 0    sumatriptan (IMITREX) 100 MG tablet Take 1 tablet (100 mg total) by mouth as needed for Migraine. Take at the onset of headache, may take 1 more in 1 hour if needed. 9 tablet 5    SYNTHROID 88 mcg tablet Take 1 tablet (88 mcg total) by mouth before breakfast. 30 tablet 11    tiZANidine (ZANAFLEX) 4 MG tablet Take 4 mg by mouth 3 (three) times daily.       Facility-Administered Encounter Medications as of 3/16/2022   Medication Dose Route Frequency Provider Last Rate Last Admin    acetaminophen tablet 650 mg  650 mg Oral Once PRN Don Miller MD        albuterol inhaler 2 puff  2 puff Inhalation Q20 Min PRN Don Miller MD        diphenhydrAMINE injection 25 mg  25 mg Intravenous Once PRN Don Miller MD        methylPREDNISolone sodium succinate injection 40 mg  40 mg Intravenous Once PRN Don Miller MD        ondansetron disintegrating tablet 4 mg  4 mg Oral Once PRN Don Miller MD        sodium chloride 0.9% 500 mL flush bag   Intravenous PRN Don Miller MD        sodium chloride 0.9% flush 10 mL  10 mL Intravenous PRN Don Miller MD           Plan:     Problem List Items Addressed This Visit     Dyspnea - Primary        Normal 2-D Echo today in all respects.

## 2022-03-19 ENCOUNTER — LAB VISIT (OUTPATIENT)
Dept: LAB | Facility: HOSPITAL | Age: 37
End: 2022-03-19
Attending: INTERNAL MEDICINE
Payer: COMMERCIAL

## 2022-03-19 DIAGNOSIS — E06.3 HYPOTHYROIDISM DUE TO HASHIMOTO'S THYROIDITIS: ICD-10-CM

## 2022-03-19 DIAGNOSIS — E03.8 HYPOTHYROIDISM DUE TO HASHIMOTO'S THYROIDITIS: ICD-10-CM

## 2022-03-19 LAB — TSH SERPL DL<=0.005 MIU/L-ACNC: 3.56 UIU/ML (ref 0.4–4)

## 2022-03-19 PROCEDURE — 36415 COLL VENOUS BLD VENIPUNCTURE: CPT | Performed by: INTERNAL MEDICINE

## 2022-03-19 PROCEDURE — 84443 ASSAY THYROID STIM HORMONE: CPT | Performed by: INTERNAL MEDICINE

## 2022-04-08 ENCOUNTER — PATIENT MESSAGE (OUTPATIENT)
Dept: NEUROLOGY | Facility: CLINIC | Age: 37
End: 2022-04-08
Payer: COMMERCIAL

## 2022-04-08 DIAGNOSIS — G43.709 CHRONIC MIGRAINE WITHOUT AURA WITHOUT STATUS MIGRAINOSUS, NOT INTRACTABLE: ICD-10-CM

## 2022-04-08 RX ORDER — AMITRIPTYLINE HYDROCHLORIDE 25 MG/1
25 TABLET, FILM COATED ORAL NIGHTLY
Qty: 30 TABLET | Refills: 11 | Status: SHIPPED | OUTPATIENT
Start: 2022-04-08 | End: 2023-05-03 | Stop reason: SDUPTHER

## 2022-04-13 ENCOUNTER — PATIENT MESSAGE (OUTPATIENT)
Dept: ENDOCRINOLOGY | Facility: CLINIC | Age: 37
End: 2022-04-13
Payer: COMMERCIAL

## 2022-04-13 DIAGNOSIS — E03.8 HYPOTHYROIDISM DUE TO HASHIMOTO'S THYROIDITIS: ICD-10-CM

## 2022-04-13 DIAGNOSIS — E06.3 HYPOTHYROIDISM DUE TO HASHIMOTO'S THYROIDITIS: ICD-10-CM

## 2022-04-13 RX ORDER — LEVOTHYROXINE SODIUM 88 UG/1
88 TABLET ORAL
Qty: 30 TABLET | Refills: 3 | Status: SHIPPED | OUTPATIENT
Start: 2022-04-13 | End: 2022-06-09 | Stop reason: SDUPTHER

## 2022-05-12 ENCOUNTER — IMMUNIZATION (OUTPATIENT)
Dept: PHARMACY | Facility: CLINIC | Age: 37
End: 2022-05-12
Payer: COMMERCIAL

## 2022-05-12 DIAGNOSIS — Z23 NEED FOR VACCINATION: Primary | ICD-10-CM

## 2022-06-01 DIAGNOSIS — I10 ESSENTIAL HYPERTENSION: ICD-10-CM

## 2022-06-01 NOTE — TELEPHONE ENCOUNTER
No new care gaps identified.  [facility] Embedded Care Gaps. Reference number: [id number]. 6/01/2022   1:56:31 PM CDT

## 2022-06-07 RX ORDER — AMLODIPINE BESYLATE 5 MG/1
5 TABLET ORAL DAILY
Qty: 30 TABLET | Refills: 11 | Status: SHIPPED | OUTPATIENT
Start: 2022-06-07 | End: 2023-01-13 | Stop reason: SDUPTHER

## 2022-06-09 ENCOUNTER — OFFICE VISIT (OUTPATIENT)
Dept: ENDOCRINOLOGY | Facility: CLINIC | Age: 37
End: 2022-06-09
Payer: COMMERCIAL

## 2022-06-09 ENCOUNTER — PATIENT MESSAGE (OUTPATIENT)
Dept: FAMILY MEDICINE | Facility: CLINIC | Age: 37
End: 2022-06-09
Payer: COMMERCIAL

## 2022-06-09 DIAGNOSIS — E03.8 HYPOTHYROIDISM DUE TO HASHIMOTO'S THYROIDITIS: ICD-10-CM

## 2022-06-09 DIAGNOSIS — E06.3 HYPOTHYROIDISM DUE TO HASHIMOTO'S THYROIDITIS: ICD-10-CM

## 2022-06-09 DIAGNOSIS — E04.1 THYROID NODULE: ICD-10-CM

## 2022-06-09 PROCEDURE — 99213 OFFICE O/P EST LOW 20 MIN: CPT | Mod: 95,,, | Performed by: INTERNAL MEDICINE

## 2022-06-09 PROCEDURE — 99213 PR OFFICE/OUTPT VISIT, EST, LEVL III, 20-29 MIN: ICD-10-PCS | Mod: 95,,, | Performed by: INTERNAL MEDICINE

## 2022-06-09 PROCEDURE — 3044F HG A1C LEVEL LT 7.0%: CPT | Mod: CPTII,95,, | Performed by: INTERNAL MEDICINE

## 2022-06-09 PROCEDURE — 3044F PR MOST RECENT HEMOGLOBIN A1C LEVEL <7.0%: ICD-10-PCS | Mod: CPTII,95,, | Performed by: INTERNAL MEDICINE

## 2022-06-09 RX ORDER — LEVOTHYROXINE SODIUM 88 UG/1
88 TABLET ORAL
Qty: 30 TABLET | Refills: 11 | Status: SHIPPED | OUTPATIENT
Start: 2022-06-09 | End: 2023-08-02

## 2022-06-09 NOTE — PROGRESS NOTES
Subjective:      Patient ID: Jossy Leslie is a 36 y.o. female.    Chief Complaint:  No chief complaint on file.      History of Present Illness    Here for a follow up visit for hypothyroidism due to hashimoto's thyroiditis.      Hypothyroidism  Diagnosed   Currently taking synthroid 88 mcg daily  Takes appropriately without food first thing in the morning.     Weight: denies recent changes  Fatigue: mild increase  Taking elavil and zoloft for post covid stuff.     Cold intolerance: denies  Bowel movements: mild constipation    Tremor: denies  Palpitations: denies  Dry skin: denies  Hair loss: denies    Menses:  Regular, heavy but attributes to endometriosis    Plans for pregnancy: denies   Peripartum thyroid disorder: denies     Reports mild sore throat  Denies difficulty swallowing. Has EOE with trouble swallowing solid foods.     Review of Systems  Denies recent illness     Objective:   Physical Exam  There were no vitals filed for this visit.    BP Readings from Last 3 Encounters:   03/16/22 (!) 102/57   03/16/22 106/66   01/04/22 108/70     Wt Readings from Last 1 Encounters:   03/16/22 1032 72.6 kg (160 lb)         There is no height or weight on file to calculate BMI.    Lab Review:   Lab Results   Component Value Date    HGBA1C 5.0 03/19/2022     Lab Results   Component Value Date    CHOL 143 05/29/2021    HDL 41 05/29/2021    LDLCALC 89.0 05/29/2021    TRIG 65 05/29/2021    CHOLHDL 28.7 05/29/2021     Lab Results   Component Value Date     03/19/2022    K 3.6 03/19/2022     03/19/2022    CO2 26 03/19/2022    GLU 79 03/19/2022    BUN 8 03/19/2022    CREATININE 0.7 03/19/2022    CALCIUM 9.5 03/19/2022    PROT 7.4 12/25/2021    ALBUMIN 3.9 12/25/2021    BILITOT 0.3 12/25/2021    ALKPHOS 48 (L) 12/25/2021    AST 28 12/25/2021    ALT 41 12/25/2021    ANIONGAP 10 03/19/2022    ESTGFRAFRICA >60.0 03/19/2022    EGFRNONAA >60.0 03/19/2022    TSH 3.555 03/19/2022         Assessment and Plan      Hypothyroidism due to Hashimoto's thyroiditis  Biochemically and clinically euthyroid   Continue synthroid 88 mcg daily   Repeat TSH in three [facility]    The patient location is: [place]  The chief complaint leading to consultation is: hypothyroidism    Visit type: audiovisual    Face to Face time with patient: 20minutes of total time spent on the encounter, which includes face to face time and non-face to face time preparing to see the patient (eg, review of tests), Obtaining and/or reviewing separately obtained history, Documenting clinical information in the electronic or other health record, Independently interpreting results (not separately reported) and communicating results to the patient/family/caregiver, or Care coordination (not separately reported).         Each patient to whom he or she provides medical services by telemedicine is:  (1) informed of the relationship between the physician and patient and the respective role of any other health care provider with respect to management of the patient; and (2) notified that he or she may decline to receive medical services by telemedicine and may withdraw from such care at any time.    Notes:

## 2022-06-09 NOTE — ASSESSMENT & PLAN NOTE
Biochemically and clinically euthyroid   Continue synthroid 88 mcg daily   Repeat TSH in three monts  
Sub-centimeter nodules  Last US 2018   OK to repeat in 5 years.     
Detail Level: Simple
Initiate Treatment: Metronidazole 0.75 % topical gel Apply a pea size amount to cheeks, nose and any other affected area on face two times daily\\n

## 2022-10-29 ENCOUNTER — IMMUNIZATION (OUTPATIENT)
Dept: PRIMARY CARE CLINIC | Facility: CLINIC | Age: 37
End: 2022-10-29
Payer: COMMERCIAL

## 2022-10-29 DIAGNOSIS — Z23 NEED FOR VACCINATION: Primary | ICD-10-CM

## 2022-10-29 PROCEDURE — 0134A COVID-19, MRNA, LNP-S, BIVALENT BOOSTER, PF, 50 MCG/0.5 ML: CPT | Mod: CV19,PBBFAC | Performed by: INTERNAL MEDICINE

## 2022-10-29 PROCEDURE — 90471 IMMUNIZATION ADMIN: CPT | Mod: S$GLB,,, | Performed by: INTERNAL MEDICINE

## 2022-10-29 PROCEDURE — 90686 FLU VACCINE (QUAD) GREATER THAN OR EQUAL TO 3YO PRESERVATIVE FREE IM: ICD-10-PCS | Mod: S$GLB,,, | Performed by: INTERNAL MEDICINE

## 2022-10-29 PROCEDURE — 90686 IIV4 VACC NO PRSV 0.5 ML IM: CPT | Mod: S$GLB,,, | Performed by: INTERNAL MEDICINE

## 2022-10-29 PROCEDURE — 91313 COVID-19, MRNA, LNP-S, BIVALENT BOOSTER, PF, 50 MCG/0.5 ML: CPT | Mod: PBBFAC | Performed by: INTERNAL MEDICINE

## 2022-10-29 PROCEDURE — 90471 FLU VACCINE (QUAD) GREATER THAN OR EQUAL TO 3YO PRESERVATIVE FREE IM: ICD-10-PCS | Mod: S$GLB,,, | Performed by: INTERNAL MEDICINE

## 2022-11-01 ENCOUNTER — OFFICE VISIT (OUTPATIENT)
Dept: FAMILY MEDICINE | Facility: CLINIC | Age: 37
End: 2022-11-01
Payer: COMMERCIAL

## 2022-11-01 ENCOUNTER — PATIENT MESSAGE (OUTPATIENT)
Dept: FAMILY MEDICINE | Facility: CLINIC | Age: 37
End: 2022-11-01

## 2022-11-01 DIAGNOSIS — D64.9 ANEMIA, UNSPECIFIED TYPE: ICD-10-CM

## 2022-11-01 DIAGNOSIS — Z00.00 HEALTHCARE MAINTENANCE: ICD-10-CM

## 2022-11-01 DIAGNOSIS — I10 ESSENTIAL HYPERTENSION: ICD-10-CM

## 2022-11-01 DIAGNOSIS — L65.9 HAIR LOSS: Primary | ICD-10-CM

## 2022-11-01 DIAGNOSIS — M25.559 HIP PAIN: ICD-10-CM

## 2022-11-01 PROCEDURE — 3044F PR MOST RECENT HEMOGLOBIN A1C LEVEL <7.0%: ICD-10-PCS | Mod: CPTII,95,, | Performed by: INTERNAL MEDICINE

## 2022-11-01 PROCEDURE — 3044F HG A1C LEVEL LT 7.0%: CPT | Mod: CPTII,95,, | Performed by: INTERNAL MEDICINE

## 2022-11-01 PROCEDURE — 99213 OFFICE O/P EST LOW 20 MIN: CPT | Mod: 95,,, | Performed by: INTERNAL MEDICINE

## 2022-11-01 PROCEDURE — 1160F RVW MEDS BY RX/DR IN RCRD: CPT | Mod: CPTII,95,, | Performed by: INTERNAL MEDICINE

## 2022-11-01 PROCEDURE — 1159F PR MEDICATION LIST DOCUMENTED IN MEDICAL RECORD: ICD-10-PCS | Mod: CPTII,95,, | Performed by: INTERNAL MEDICINE

## 2022-11-01 PROCEDURE — 1160F PR REVIEW ALL MEDS BY PRESCRIBER/CLIN PHARMACIST DOCUMENTED: ICD-10-PCS | Mod: CPTII,95,, | Performed by: INTERNAL MEDICINE

## 2022-11-01 PROCEDURE — 1159F MED LIST DOCD IN RCRD: CPT | Mod: CPTII,95,, | Performed by: INTERNAL MEDICINE

## 2022-11-01 PROCEDURE — 99213 PR OFFICE/OUTPT VISIT, EST, LEVL III, 20-29 MIN: ICD-10-PCS | Mod: 95,,, | Performed by: INTERNAL MEDICINE

## 2022-11-01 RX ORDER — SERTRALINE HYDROCHLORIDE 100 MG/1
100 TABLET, FILM COATED ORAL DAILY
COMMUNITY
End: 2023-01-13

## 2022-11-01 NOTE — PROGRESS NOTES
HISTORY OF PRESENT ILLNESS:  Jossy Leslie is a 37 y.o. female who presents to the clinic today for post COVID symptoms, blood pressure mgmt and hair loss    Last seen by me 12/13/21.    The patient location is: [place]  The chief complaint leading to consultation is: management of blood pressure and hair loss    Visit type: audiovisual    Face to Face time with patient: 10 minutes  15 minutes of total time spent on the encounter, which includes face to face time and non-face to face time preparing to see the patient (eg, review of tests), Obtaining and/or reviewing separately obtained history, Documenting clinical information in the electronic or other health record, Independently interpreting results (not separately reported) and communicating results to the patient/family/caregiver, or Care coordination (not separately reported).     Each patient to whom he or she provides medical services by telemedicine is:  (1) informed of the relationship between the physician and patient and the respective role of any other health care provider with respect to management of the patient; and (2) notified that he or she may decline to receive medical services by telemedicine and may withdraw from such care at any time.    Notes:    Notes concern about hair loss.  Takes a multivitamin.    Notes occasional right pelvic pain and right hip pain.  Like a dull pain.  At times feels like a cramp.  Due for follow up with gyn.    Hypothyroidism  Seen by Dr. Zuñiga 6/2022.  On Synthroid.    Headaches  Seen by neurology.    Anxiety/depression  Started Zoloft over the summer and feels it is helping.    PAST MEDICAL HISTORY:  Past Medical History:   Diagnosis Date    Asthma     Duodenal ulcer     [facility] 2015    Eosinophilic esophagitis     2019    Hiatal hernia     Hypertension 2/19/2021    Hypothyroidism due to Hashimoto's thyroiditis     Thyroid disease        PAST SURGICAL HISTORY:  Past Surgical History:   Procedure Laterality  Date    ESOPHAGOGASTRODUODENOSCOPY      ESOPHAGOGASTRODUODENOSCOPY N/A 4/18/2019    Procedure: ESOPHAGOGASTRODUODENOSCOPY (EGD);  Surgeon: Karmen Marquez MD;  Location: [facility] ENDO;  Service: Endoscopy;  Laterality: N/A;    HYSTEROSCOPY N/A 5/17/2019    Procedure: HYSTEROSCOPY;  Surgeon: Eli Blevins MD;  Location: [facility] OR;  Service: OB/GYN;  Laterality: N/A;    LAPAROSCOPIC SURGICAL REMOVAL OF CYST OF OVARY Right 5/17/2019    Procedure: EXCISION, CYST, OVARY, LAPAROSCOPIC;  Surgeon: Eli Blevins MD;  Location: [facility] OR;  Service: OB/GYN;  Laterality: Right;  video    TONSILLECTOMY         SOCIAL HISTORY:  Social History     Socioeconomic History    Marital status: Single   Occupational History    Occupation: nurse   Tobacco Use    Smoking status: Never    Smokeless tobacco: Never   Substance and Sexual Activity    Alcohol use: Yes     Alcohol/week: 0.0 standard drinks     Comment: socially, minimal    Drug use: No    Sexual activity: Not Currently     Partners: Male     Birth control/protection: Condom       FAMILY HISTORY:  Family History   Problem Relation Age of Onset    Hypertension Mother     Hypertension Father     Hyperlipidemia Father     Heart disease Maternal Grandmother     Colon cancer Maternal Grandfather 70    Diabetes Paternal Grandmother     Asbestos Paternal Grandmother     Heart attack Paternal Grandfather 70    Hypertension Sister     No Known Problems Maternal Aunt     No Known Problems Maternal Uncle     No Known Problems Paternal Aunt     No Known Problems Paternal Uncle     Hypertension Other     Breast cancer Paternal Aunt 50    Amblyopia Neg Hx     Blindness Neg Hx     Cataracts Neg Hx     Glaucoma Neg Hx     Macular degeneration Neg Hx     Retinal detachment Neg Hx     Strabismus Neg Hx     Stroke Neg Hx     Thyroid disease Neg Hx     Ovarian cancer Neg Hx        ALLERGIES AND MEDICATIONS: updated and reviewed.  Review of patient's allergies indicates:   Allergen Reactions    Pumpkin  Anaphylaxis    Corn containing products     Shellfish containing products Itching    Soy     Wheat containing prod      Medication List with Changes/Refills   Current Medications    ALBUTEROL (PROVENTIL/VENTOLIN HFA) 90 MCG/ACTUATION INHALER    Inhale 1-2 puffs into the lungs every 6 (six) hours as needed for Wheezing or Shortness of Breath. Rescue    AMITRIPTYLINE (ELAVIL) 25 MG TABLET    Take 1 tablet (25 mg total) by mouth every evening.    AMLODIPINE (NORVASC) 5 MG TABLET    Take 1 tablet (5 mg total) by mouth once daily.    ASPIRIN-ACETAMINOPHEN-CAFFEINE 250-250-65 MG (EXCEDRIN MIGRAINE) 250-250-65 MG PER TABLET    Excedrin Migraine         CETIRIZINE (ZYRTEC) 10 MG TABLET    Take 10 mg by mouth once daily.    FERROUS SULFATE (FEOSOL) 325 MG (65 MG IRON) TAB TABLET    Take 1 tablet (325 mg total) by mouth once daily.    LEVALBUTEROL (XOPENEX HFA) 45 MCG/ACTUATION INHALER    Inhale 1-2 puffs into the lungs every 4 (four) hours as needed for Wheezing. Rescue    MECLIZINE (ANTIVERT) 25 MG TABLET    Take 1 tablet (25 mg total) by mouth 3 (three) times daily as needed for Dizziness or Nausea.    MELOXICAM (MOBIC) 15 MG TABLET    Take 1 tablet by mouth once daily    METHYLPREDNISOLONE (MEDROL DOSEPACK) 4 MG TABLET    use as directed    ONDANSETRON (ZOFRAN-ODT) 4 MG TBDL    Take 1 tablet (4 mg total) by mouth 2 (two) times daily.    PANTOPRAZOLE (PROTONIX) 40 MG TABLET    Take 1 tablet by mouth once daily    SUMATRIPTAN (IMITREX) 100 MG TABLET    Take 1 tablet (100 mg total) by mouth as needed for Migraine. Take at the onset of headache, may take 1 more in 1 hour if needed.    SYNTHROID 88 MCG TABLET    Take 1 tablet (88 mcg total) by mouth before breakfast.    TIZANIDINE (ZANAFLEX) 4 MG TABLET    TAKE 1 TABLET BY MOUTH EVERY 8 HOURS AS NEEDED FOR MUSCLE SPASM CAN CAUSE SEDATION          CARE TEAM:  Patient Care Team:  Jerry Lockwood MD as PCP - General (Internal Medicine)  Daniela Guzman MA as Care Coordinator          REVIEW OF SYSTEMS:  Review of Systems   Constitutional:  Negative for activity change and unexpected weight change.   HENT:  Negative for hearing loss, rhinorrhea and trouble swallowing.    Eyes:  Negative for discharge and visual disturbance.   Respiratory:  Negative for chest tightness and wheezing.    Cardiovascular:  Negative for chest pain and palpitations.   Gastrointestinal:  Negative for blood in stool, constipation, diarrhea and vomiting.   Endocrine: Negative for polydipsia and polyuria.   Genitourinary:  Positive for menstrual problem. Negative for difficulty urinating, dysuria and hematuria.   Musculoskeletal:  Positive for arthralgias. Negative for joint swelling and neck pain.   Neurological:  Negative for weakness and headaches.   Psychiatric/Behavioral:  Negative for confusion and dysphoric mood.        PHYSICAL EXAM:      General appearance - alert, well appearing, and in no distress      ASSESSMENT AND PLAN:  Hair loss  -     Ambulatory referral/consult to Dermatology; Future; Expected date: 11/08/2022  -     Vitamin B12; Future; Expected date: 11/01/2022  -     ZINC; Future; Expected date: 11/01/2022    Hip pain        - Advised for conservative management (stretching, PRN Mobic, heat/ice as needed).  Advised to make in person visit for further evaluation.    Anemia, unspecified type  -     Iron and TIBC; Future; Expected date: 11/01/2022  -     Ferritin; Future; Expected date: 11/01/2022  -     Vitamin B12; Future; Expected date: 11/01/2022  - Advised for gyn annual well woman given note of heavy menstrual period.    Healthcare maintenance  -     Hemoglobin A1C; Future; Expected date: 11/01/2022  -     Comprehensive Metabolic Panel; Future; Expected date: 11/01/2022  -     Lipid Panel; Future; Expected date: 11/01/2022  -     CBC Auto Differential; Future; Expected date: 11/01/2022  -     TSH; Future; Expected date: 11/01/2022  -     Vitamin D; Future; Expected date: 11/01/2022  -     T4,  Free; Future; Expected date: 11/01/2022  -     Iron and TIBC; Future; Expected date: 11/01/2022  -     Ferritin; Future; Expected date: 11/01/2022  -     Vitamin B12; Future; Expected date: 11/01/2022  -     ZINC; Future; Expected date: 11/01/2022    Essential hypertension       - Notes reading are in controlled range.      Follow up for in person visit for annual exam or sooner as needed.

## 2022-11-08 ENCOUNTER — PATIENT MESSAGE (OUTPATIENT)
Dept: DERMATOLOGY | Facility: CLINIC | Age: 37
End: 2022-11-08
Payer: COMMERCIAL

## 2022-11-17 ENCOUNTER — OFFICE VISIT (OUTPATIENT)
Dept: OBSTETRICS AND GYNECOLOGY | Facility: CLINIC | Age: 37
End: 2022-11-17
Attending: OBSTETRICS & GYNECOLOGY
Payer: COMMERCIAL

## 2022-11-17 VITALS
WEIGHT: 166 LBS | SYSTOLIC BLOOD PRESSURE: 124 MMHG | HEIGHT: 62 IN | BODY MASS INDEX: 30.55 KG/M2 | DIASTOLIC BLOOD PRESSURE: 74 MMHG

## 2022-11-17 DIAGNOSIS — Z01.419 WELL WOMAN EXAM: Primary | ICD-10-CM

## 2022-11-17 DIAGNOSIS — Z87.898 HISTORY OF ABDOMINAL PAIN: ICD-10-CM

## 2022-11-17 DIAGNOSIS — N80.9 ENDOMETRIOSIS: ICD-10-CM

## 2022-11-17 DIAGNOSIS — Z11.3 SCREEN FOR STD (SEXUALLY TRANSMITTED DISEASE): ICD-10-CM

## 2022-11-17 PROCEDURE — 3044F PR MOST RECENT HEMOGLOBIN A1C LEVEL <7.0%: ICD-10-PCS | Mod: CPTII,S$GLB,, | Performed by: OBSTETRICS & GYNECOLOGY

## 2022-11-17 PROCEDURE — 81514 NFCT DS BV&VAGINITIS DNA ALG: CPT | Performed by: OBSTETRICS & GYNECOLOGY

## 2022-11-17 PROCEDURE — 1160F RVW MEDS BY RX/DR IN RCRD: CPT | Mod: CPTII,S$GLB,, | Performed by: OBSTETRICS & GYNECOLOGY

## 2022-11-17 PROCEDURE — 99999 PR PBB SHADOW E&M-EST. PATIENT-LVL III: CPT | Mod: PBBFAC,,, | Performed by: OBSTETRICS & GYNECOLOGY

## 2022-11-17 PROCEDURE — 3078F PR MOST RECENT DIASTOLIC BLOOD PRESSURE < 80 MM HG: ICD-10-PCS | Mod: CPTII,S$GLB,, | Performed by: OBSTETRICS & GYNECOLOGY

## 2022-11-17 PROCEDURE — 1159F PR MEDICATION LIST DOCUMENTED IN MEDICAL RECORD: ICD-10-PCS | Mod: CPTII,S$GLB,, | Performed by: OBSTETRICS & GYNECOLOGY

## 2022-11-17 PROCEDURE — 1160F PR REVIEW ALL MEDS BY PRESCRIBER/CLIN PHARMACIST DOCUMENTED: ICD-10-PCS | Mod: CPTII,S$GLB,, | Performed by: OBSTETRICS & GYNECOLOGY

## 2022-11-17 PROCEDURE — 99999 PR PBB SHADOW E&M-EST. PATIENT-LVL III: ICD-10-PCS | Mod: PBBFAC,,, | Performed by: OBSTETRICS & GYNECOLOGY

## 2022-11-17 PROCEDURE — 87591 N.GONORRHOEAE DNA AMP PROB: CPT | Performed by: OBSTETRICS & GYNECOLOGY

## 2022-11-17 PROCEDURE — 99395 PR PREVENTIVE VISIT,EST,18-39: ICD-10-PCS | Mod: S$GLB,,, | Performed by: OBSTETRICS & GYNECOLOGY

## 2022-11-17 PROCEDURE — 3008F PR BODY MASS INDEX (BMI) DOCUMENTED: ICD-10-PCS | Mod: CPTII,S$GLB,, | Performed by: OBSTETRICS & GYNECOLOGY

## 2022-11-17 PROCEDURE — 1159F MED LIST DOCD IN RCRD: CPT | Mod: CPTII,S$GLB,, | Performed by: OBSTETRICS & GYNECOLOGY

## 2022-11-17 PROCEDURE — 3008F BODY MASS INDEX DOCD: CPT | Mod: CPTII,S$GLB,, | Performed by: OBSTETRICS & GYNECOLOGY

## 2022-11-17 PROCEDURE — 3078F DIAST BP <80 MM HG: CPT | Mod: CPTII,S$GLB,, | Performed by: OBSTETRICS & GYNECOLOGY

## 2022-11-17 PROCEDURE — 87491 CHLMYD TRACH DNA AMP PROBE: CPT | Performed by: OBSTETRICS & GYNECOLOGY

## 2022-11-17 PROCEDURE — 99395 PREV VISIT EST AGE 18-39: CPT | Mod: S$GLB,,, | Performed by: OBSTETRICS & GYNECOLOGY

## 2022-11-17 PROCEDURE — 3074F SYST BP LT 130 MM HG: CPT | Mod: CPTII,S$GLB,, | Performed by: OBSTETRICS & GYNECOLOGY

## 2022-11-17 PROCEDURE — 3074F PR MOST RECENT SYSTOLIC BLOOD PRESSURE < 130 MM HG: ICD-10-PCS | Mod: CPTII,S$GLB,, | Performed by: OBSTETRICS & GYNECOLOGY

## 2022-11-17 PROCEDURE — 3044F HG A1C LEVEL LT 7.0%: CPT | Mod: CPTII,S$GLB,, | Performed by: OBSTETRICS & GYNECOLOGY

## 2022-11-17 NOTE — PROGRESS NOTES
Jossy Leslie is a 37 y.o. female  who presents for annual exam.  Menses occur monthly, lasting 4-5 days in duration, without intermenstrual bleeding.  Contraception is not an issue at this time.  Requests STD screening.  Denies abnormal discharge.  S/P excision of right endometrioma .  Since this surgery, she describes having an intermittent dull discomfort in her RLQ which is not related to menses or ovulation.  Work-up with pelvic ultrasound in  was negative.  She has not noticed any changes in this discomfort over the past several years.  Today, she is not experiencing any pain.  Denies bowel / bladder issues.  Pap last year was normal.  Patient's last menstrual period was 10/23/2022 (exact date).    Pap: Negative, HPV: Negative    10/15/20 Pelvic sono:  FINDINGSUterus:  Size: 8.0 x 4.7 x 5.7 cm  Masses: None  Endometrium: Normal in this pre menopausal patient, measuring 5 mm.  Right ovary:  Size: 1.5 x 1.1 x 2.2 cm  Appearance: Normal  Vascular flow: Normal.  Left ovary:  Size: 2.9 x 1.8 x 1.6 cm  Appearance: Normal  Vascular Flow: Normal.  Free Fluid:  None.  Impression:  No sonographic abnormality.      Past Medical History:   Diagnosis Date    Asthma     Duodenal ulcer     [facility] 2015    Eosinophilic esophagitis     2019    Hiatal hernia     Hypertension 2021    Hypothyroidism due to Hashimoto's thyroiditis     Thyroid disease        Past Surgical History:   Procedure Laterality Date    ESOPHAGOGASTRODUODENOSCOPY      ESOPHAGOGASTRODUODENOSCOPY N/A 2019    Procedure: ESOPHAGOGASTRODUODENOSCOPY (EGD);  Surgeon: Karmen Marquez MD;  Location: [facility] ENDO;  Service: Endoscopy;  Laterality: N/A;    HYSTEROSCOPY N/A 2019    Procedure: HYSTEROSCOPY;  Surgeon: Eli Blevins MD;  Location: [facility] OR;  Service: OB/GYN;  Laterality: N/A;    LAPAROSCOPIC SURGICAL REMOVAL OF CYST OF OVARY Right 2019    Procedure: EXCISION, CYST, OVARY, LAPAROSCOPIC;  Surgeon: Eli Blevins MD;   Location: [facility] OR;  Service: OB/GYN;  Laterality: Right;  video    TONSILLECTOMY         OB History          1    Para   1    Term   1            AB        Living   1         SAB        IAB        Ectopic        Multiple        Live Births   1                 ROS:  GENERAL: Feeling well overall.   SKIN: Denies rash or lesions.   HEAD: Denies head injury or headache.   NODES: Denies enlarged lymph nodes.   CHEST: Denies chest pain or shortness of breath.   CARDIOVASCULAR: Denies palpitations or left sided chest pain.   ABDOMEN:  Intermittent right lower quadrant dull ache-unchanged for 4 years  URINARY: No dysuria or hematuria.  REPRODUCTIVE: See HPI.   BREASTS: Denies pain, lumps, or nipple discharge.   HEMATOLOGIC: No easy bruisability or excessive bleeding.   MUSCULOSKELETAL: Denies joint pain or swelling.   NEUROLOGIC: Denies syncope or weakness.   PSYCHIATRIC: Denies depression.    PE:   (chaperone present during entire exam)  APPEARANCE: Well nourished, well developed, in no acute distress.  BREASTS: Symmetrical, no skin changes or visible lesions. No palpable masses, nipple discharge or adenopathy bilaterally.  ABDOMEN: Soft. No tenderness or masses. No guarding.  No rebound.  No CVA tenderness.  VULVA: No lesions. Normal female genitalia.  URETHRAL MEATUS: Normal size and location, no lesions, no prolapse.  URETHRA: No masses, tenderness, prolapse or scarring.  VAGINA: Moist and well rugated, no abnormal discharge, no significant cystocele or rectocele.  CERVIX: No lesions and discharge.   UTERUS: Normal size, regular shape, mobile, non-tender, bladder base nontender.  ADNEXA: No masses, tenderness or CDS nodularity.  ANUS PERINEUM: Normal.      Diagnosis:  1. Well woman exam    2. Screen for STD (sexually transmitted disease)    3. History of abdominal pain    4. Endometriosis          PLAN:    Orders Placed This Encounter    Vaginosis Screen by DNA Probe    C. trachomatis/N. gonorrhoeae by AMP  DNA       Patient was counseled today on the need for annual gyn exams.  We reviewed Pap interval recommendations and will contact her with results of her STD screening.  We also discussed her intermittent dull right lower quadrant discomfort which has been present for 3 years.  Since this discomfort began shortly after her ovarian cystectomy for an endometrioma, we discussed possible postoperative adhesions versus endometriosis and pelvic scarring.  On exam today, she is completely asymptomatic.  Prior ultrasound ordered for evaluation of this discomfort was negative.  We discussed possible medical therapy of OCPs, [place].  At the present time she is not interested in starting medication but will monitor her symptoms and let us know if discomfort increases.  She may want to check AMH in the future.       Follow-up in 1 year.

## 2022-11-19 ENCOUNTER — PATIENT MESSAGE (OUTPATIENT)
Dept: OBSTETRICS AND GYNECOLOGY | Facility: CLINIC | Age: 37
End: 2022-11-19
Payer: COMMERCIAL

## 2022-11-19 LAB
BACTERIAL VAGINOSIS DNA: NEGATIVE
C TRACH DNA SPEC QL NAA+PROBE: NOT DETECTED
CANDIDA GLABRATA DNA: NEGATIVE
CANDIDA KRUSEI DNA: NEGATIVE
CANDIDA RRNA VAG QL PROBE: NEGATIVE
N GONORRHOEA DNA SPEC QL NAA+PROBE: NOT DETECTED
T VAGINALIS RRNA GENITAL QL PROBE: NEGATIVE

## 2022-12-01 ENCOUNTER — PATIENT MESSAGE (OUTPATIENT)
Dept: OBSTETRICS AND GYNECOLOGY | Facility: CLINIC | Age: 37
End: 2022-12-01
Payer: COMMERCIAL

## 2022-12-01 ENCOUNTER — TELEPHONE (OUTPATIENT)
Dept: OBSTETRICS AND GYNECOLOGY | Facility: CLINIC | Age: 37
End: 2022-12-01
Payer: COMMERCIAL

## 2022-12-01 DIAGNOSIS — Z31.69 ENCOUNTER FOR PRECONCEPTION CONSULTATION: Primary | ICD-10-CM

## 2022-12-01 NOTE — TELEPHONE ENCOUNTER
Jossy LEE Staff (supporting Oneal Garcia MD) 4 hours ago (9:29 AM)     Hi Dr Garcia,  Im having some labs drawn on Saturday. Could you link the [facility] lab to this appointment?      Thank you   Oneal Worrell MD Jenna Lyn Gaspar 12 days ago     Hi-     Your recent screenings for gonorrhea, chlamydia, trichomonas, bacterial imbalance (BV), and yeast were all negative.     Please let us know if you if you have any issues or concerns.

## 2022-12-02 ENCOUNTER — PATIENT MESSAGE (OUTPATIENT)
Dept: OBSTETRICS AND GYNECOLOGY | Facility: CLINIC | Age: 37
End: 2022-12-02
Payer: COMMERCIAL

## 2022-12-09 ENCOUNTER — LAB VISIT (OUTPATIENT)
Dept: LAB | Facility: HOSPITAL | Age: 37
End: 2022-12-09
Payer: COMMERCIAL

## 2022-12-09 ENCOUNTER — PATIENT MESSAGE (OUTPATIENT)
Dept: FAMILY MEDICINE | Facility: CLINIC | Age: 37
End: 2022-12-09
Payer: COMMERCIAL

## 2022-12-09 DIAGNOSIS — Z00.00 HEALTHCARE MAINTENANCE: ICD-10-CM

## 2022-12-09 DIAGNOSIS — D64.9 ANEMIA, UNSPECIFIED TYPE: ICD-10-CM

## 2022-12-09 DIAGNOSIS — Z31.69 ENCOUNTER FOR PRECONCEPTION CONSULTATION: ICD-10-CM

## 2022-12-09 DIAGNOSIS — L65.9 HAIR LOSS: ICD-10-CM

## 2022-12-09 PROCEDURE — 82728 ASSAY OF FERRITIN: CPT | Performed by: INTERNAL MEDICINE

## 2022-12-09 PROCEDURE — 80061 LIPID PANEL: CPT | Performed by: INTERNAL MEDICINE

## 2022-12-09 PROCEDURE — 82306 VITAMIN D 25 HYDROXY: CPT | Performed by: INTERNAL MEDICINE

## 2022-12-09 PROCEDURE — 84630 ASSAY OF ZINC: CPT | Performed by: INTERNAL MEDICINE

## 2022-12-09 PROCEDURE — 84466 ASSAY OF TRANSFERRIN: CPT | Performed by: INTERNAL MEDICINE

## 2022-12-09 PROCEDURE — 83036 HEMOGLOBIN GLYCOSYLATED A1C: CPT | Performed by: INTERNAL MEDICINE

## 2022-12-09 PROCEDURE — 82607 VITAMIN B-12: CPT | Performed by: INTERNAL MEDICINE

## 2022-12-09 PROCEDURE — 84443 ASSAY THYROID STIM HORMONE: CPT | Performed by: INTERNAL MEDICINE

## 2022-12-09 PROCEDURE — 83520 IMMUNOASSAY QUANT NOS NONAB: CPT | Performed by: OBSTETRICS & GYNECOLOGY

## 2022-12-09 PROCEDURE — 80053 COMPREHEN METABOLIC PANEL: CPT | Performed by: INTERNAL MEDICINE

## 2022-12-09 PROCEDURE — 85025 COMPLETE CBC W/AUTO DIFF WBC: CPT | Performed by: INTERNAL MEDICINE

## 2022-12-09 PROCEDURE — 84439 ASSAY OF FREE THYROXINE: CPT | Performed by: INTERNAL MEDICINE

## 2022-12-10 LAB
25(OH)D3+25(OH)D2 SERPL-MCNC: 53 NG/ML (ref 30–96)
ALBUMIN SERPL BCP-MCNC: 4.3 G/DL (ref 3.5–5.2)
ALP SERPL-CCNC: 47 U/L (ref 55–135)
ALT SERPL W/O P-5'-P-CCNC: 41 U/L (ref 10–44)
ANION GAP SERPL CALC-SCNC: 10 MMOL/L (ref 8–16)
AST SERPL-CCNC: 28 U/L (ref 10–40)
BASOPHILS # BLD AUTO: 0.02 K/UL (ref 0–0.2)
BASOPHILS NFR BLD: 0.3 % (ref 0–1.9)
BILIRUB SERPL-MCNC: 0.2 MG/DL (ref 0.1–1)
BUN SERPL-MCNC: 12 MG/DL (ref 6–20)
CALCIUM SERPL-MCNC: 9.6 MG/DL (ref 8.7–10.5)
CHLORIDE SERPL-SCNC: 106 MMOL/L (ref 95–110)
CHOLEST SERPL-MCNC: 175 MG/DL (ref 120–199)
CHOLEST/HDLC SERPL: 4.3 {RATIO} (ref 2–5)
CO2 SERPL-SCNC: 24 MMOL/L (ref 23–29)
CREAT SERPL-MCNC: 0.9 MG/DL (ref 0.5–1.4)
DIFFERENTIAL METHOD: ABNORMAL
EOSINOPHIL # BLD AUTO: 0.1 K/UL (ref 0–0.5)
EOSINOPHIL NFR BLD: 1.1 % (ref 0–8)
ERYTHROCYTE [DISTWIDTH] IN BLOOD BY AUTOMATED COUNT: 15.3 % (ref 11.5–14.5)
EST. GFR  (NO RACE VARIABLE): >60 ML/MIN/1.73 M^2
ESTIMATED AVG GLUCOSE: 108 MG/DL (ref 68–131)
FERRITIN SERPL-MCNC: 6 NG/ML (ref 20–300)
GLUCOSE SERPL-MCNC: 90 MG/DL (ref 70–110)
HBA1C MFR BLD: 5.4 % (ref 4–5.6)
HCT VFR BLD AUTO: 38.8 % (ref 37–48.5)
HDLC SERPL-MCNC: 41 MG/DL (ref 40–75)
HDLC SERPL: 23.4 % (ref 20–50)
HGB BLD-MCNC: 11.6 G/DL (ref 12–16)
IMM GRANULOCYTES # BLD AUTO: 0.02 K/UL (ref 0–0.04)
IMM GRANULOCYTES NFR BLD AUTO: 0.3 % (ref 0–0.5)
IRON SERPL-MCNC: 25 UG/DL (ref 30–160)
LDLC SERPL CALC-MCNC: 72 MG/DL (ref 63–159)
LYMPHOCYTES # BLD AUTO: 2 K/UL (ref 1–4.8)
LYMPHOCYTES NFR BLD: 25.6 % (ref 18–48)
MCH RBC QN AUTO: 24.4 PG (ref 27–31)
MCHC RBC AUTO-ENTMCNC: 29.9 G/DL (ref 32–36)
MCV RBC AUTO: 82 FL (ref 82–98)
MONOCYTES # BLD AUTO: 0.5 K/UL (ref 0.3–1)
MONOCYTES NFR BLD: 6.3 % (ref 4–15)
NEUTROPHILS # BLD AUTO: 5.3 K/UL (ref 1.8–7.7)
NEUTROPHILS NFR BLD: 66.4 % (ref 38–73)
NONHDLC SERPL-MCNC: 134 MG/DL
NRBC BLD-RTO: 0 /100 WBC
PLATELET # BLD AUTO: 403 K/UL (ref 150–450)
PMV BLD AUTO: 11.8 FL (ref 9.2–12.9)
POTASSIUM SERPL-SCNC: 3.6 MMOL/L (ref 3.5–5.1)
PROT SERPL-MCNC: 8.1 G/DL (ref 6–8.4)
RBC # BLD AUTO: 4.76 M/UL (ref 4–5.4)
SATURATED IRON: 5 % (ref 20–50)
SODIUM SERPL-SCNC: 140 MMOL/L (ref 136–145)
T4 FREE SERPL-MCNC: 0.9 NG/DL (ref 0.71–1.51)
TOTAL IRON BINDING CAPACITY: 463 UG/DL (ref 250–450)
TRANSFERRIN SERPL-MCNC: 313 MG/DL (ref 200–375)
TRIGL SERPL-MCNC: 310 MG/DL (ref 30–150)
TSH SERPL DL<=0.005 MIU/L-ACNC: 3.73 UIU/ML (ref 0.4–4)
VIT B12 SERPL-MCNC: 996 PG/ML (ref 210–950)
WBC # BLD AUTO: 7.92 K/UL (ref 3.9–12.7)

## 2022-12-12 LAB
MIS SERPL-MCNC: 2 NG/ML (ref 0.15–7.5)
ZINC SERPL-MCNC: 73 UG/DL (ref 60–130)

## 2022-12-14 ENCOUNTER — PATIENT MESSAGE (OUTPATIENT)
Dept: OBSTETRICS AND GYNECOLOGY | Facility: CLINIC | Age: 37
End: 2022-12-14
Payer: COMMERCIAL

## 2022-12-14 NOTE — TELEPHONE ENCOUNTER
Called patient:    Discussed results of AMH: 2.0    Reviewed interpretation / implication of these AMH results.

## 2023-01-06 ENCOUNTER — PATIENT MESSAGE (OUTPATIENT)
Dept: PRIMARY CARE CLINIC | Facility: CLINIC | Age: 38
End: 2023-01-06
Payer: COMMERCIAL

## 2023-01-12 ENCOUNTER — HOSPITAL ENCOUNTER (EMERGENCY)
Facility: HOSPITAL | Age: 38
Discharge: HOME | End: 2023-01-12
Attending: EMERGENCY MEDICINE
Payer: COMMERCIAL

## 2023-01-12 VITALS
DIASTOLIC BLOOD PRESSURE: 86 MMHG | HEART RATE: 87 BPM | OXYGEN SATURATION: 99 % | BODY MASS INDEX: 29.44 KG/M2 | RESPIRATION RATE: 22 BRPM | WEIGHT: 160 LBS | TEMPERATURE: 98 F | SYSTOLIC BLOOD PRESSURE: 123 MMHG | HEIGHT: 62 IN

## 2023-01-12 DIAGNOSIS — R10.9 ABDOMINAL PAIN: ICD-10-CM

## 2023-01-12 DIAGNOSIS — R55 SYNCOPE: ICD-10-CM

## 2023-01-12 DIAGNOSIS — N83.202 CYST OF LEFT OVARY: Primary | ICD-10-CM

## 2023-01-12 LAB
ALBUMIN SERPL BCP-MCNC: 4.1 G/DL (ref 3.5–5.2)
ALP SERPL-CCNC: 44 U/L (ref 55–135)
ALT SERPL W/O P-5'-P-CCNC: 28 U/L (ref 10–44)
ANION GAP SERPL CALC-SCNC: 8 MMOL/L (ref 8–16)
AST SERPL-CCNC: 29 U/L (ref 10–40)
B-HCG UR QL: NEGATIVE
BASOPHILS # BLD AUTO: 0.06 K/UL (ref 0–0.2)
BASOPHILS NFR BLD: 0.5 % (ref 0–1.9)
BILIRUB SERPL-MCNC: 0.2 MG/DL (ref 0.1–1)
BILIRUB UR QL STRIP: NEGATIVE
BUN SERPL-MCNC: 11 MG/DL (ref 6–20)
CALCIUM SERPL-MCNC: 9.1 MG/DL (ref 8.7–10.5)
CHLORIDE SERPL-SCNC: 108 MMOL/L (ref 95–110)
CLARITY UR: CLEAR
CO2 SERPL-SCNC: 22 MMOL/L (ref 23–29)
COLOR UR: YELLOW
CREAT SERPL-MCNC: 0.9 MG/DL (ref 0.5–1.4)
CTP QC/QA: YES
DIFFERENTIAL METHOD: ABNORMAL
EOSINOPHIL # BLD AUTO: 0.1 K/UL (ref 0–0.5)
EOSINOPHIL NFR BLD: 1.1 % (ref 0–8)
ERYTHROCYTE [DISTWIDTH] IN BLOOD BY AUTOMATED COUNT: 15.9 % (ref 11.5–14.5)
EST. GFR  (NO RACE VARIABLE): >60 ML/MIN/1.73 M^2
GLUCOSE SERPL-MCNC: 108 MG/DL (ref 70–110)
GLUCOSE UR QL STRIP: NEGATIVE
HCT VFR BLD AUTO: 35.2 % (ref 37–48.5)
HGB BLD-MCNC: 11.1 G/DL (ref 12–16)
HGB UR QL STRIP: NEGATIVE
IMM GRANULOCYTES # BLD AUTO: 0.03 K/UL (ref 0–0.04)
IMM GRANULOCYTES NFR BLD AUTO: 0.2 % (ref 0–0.5)
KETONES UR QL STRIP: NEGATIVE
LEUKOCYTE ESTERASE UR QL STRIP: NEGATIVE
LIPASE SERPL-CCNC: 30 U/L (ref 4–60)
LYMPHOCYTES # BLD AUTO: 1.2 K/UL (ref 1–4.8)
LYMPHOCYTES NFR BLD: 10.1 % (ref 18–48)
MCH RBC QN AUTO: 23.9 PG (ref 27–31)
MCHC RBC AUTO-ENTMCNC: 31.5 G/DL (ref 32–36)
MCV RBC AUTO: 76 FL (ref 82–98)
MONOCYTES # BLD AUTO: 0.8 K/UL (ref 0.3–1)
MONOCYTES NFR BLD: 6.9 % (ref 4–15)
NEUTROPHILS # BLD AUTO: 9.9 K/UL (ref 1.8–7.7)
NEUTROPHILS NFR BLD: 81.2 % (ref 38–73)
NITRITE UR QL STRIP: NEGATIVE
NRBC BLD-RTO: 0 /100 WBC
PH UR STRIP: 7 [PH] (ref 5–8)
PLATELET # BLD AUTO: 305 K/UL (ref 150–450)
PLATELET BLD QL SMEAR: ABNORMAL
PMV BLD AUTO: 10.7 FL (ref 9.2–12.9)
POTASSIUM SERPL-SCNC: 3.9 MMOL/L (ref 3.5–5.1)
PROT SERPL-MCNC: 7.5 G/DL (ref 6–8.4)
PROT UR QL STRIP: ABNORMAL
RBC # BLD AUTO: 4.65 M/UL (ref 4–5.4)
SODIUM SERPL-SCNC: 138 MMOL/L (ref 136–145)
SP GR UR STRIP: 1.02 (ref 1–1.03)
T4 FREE SERPL-MCNC: 0.88 NG/DL (ref 0.71–1.51)
TROPONIN I SERPL DL<=0.01 NG/ML-MCNC: <0.006 NG/ML (ref 0–0.03)
TSH SERPL DL<=0.005 MIU/L-ACNC: 7.48 UIU/ML (ref 0.4–4)
URN SPEC COLLECT METH UR: ABNORMAL
UROBILINOGEN UR STRIP-ACNC: NEGATIVE EU/DL
WBC # BLD AUTO: 12.13 K/UL (ref 3.9–12.7)

## 2023-01-12 PROCEDURE — 93010 EKG 12-LEAD: ICD-10-PCS | Mod: ,,, | Performed by: INTERNAL MEDICINE

## 2023-01-12 PROCEDURE — 84439 ASSAY OF FREE THYROXINE: CPT | Performed by: EMERGENCY MEDICINE

## 2023-01-12 PROCEDURE — 81025 URINE PREGNANCY TEST: CPT | Performed by: EMERGENCY MEDICINE

## 2023-01-12 PROCEDURE — 81003 URINALYSIS AUTO W/O SCOPE: CPT | Performed by: EMERGENCY MEDICINE

## 2023-01-12 PROCEDURE — 80053 COMPREHEN METABOLIC PANEL: CPT | Performed by: EMERGENCY MEDICINE

## 2023-01-12 PROCEDURE — 93005 ELECTROCARDIOGRAM TRACING: CPT

## 2023-01-12 PROCEDURE — 93010 ELECTROCARDIOGRAM REPORT: CPT | Mod: ,,, | Performed by: INTERNAL MEDICINE

## 2023-01-12 PROCEDURE — 96360 HYDRATION IV INFUSION INIT: CPT

## 2023-01-12 PROCEDURE — 83690 ASSAY OF LIPASE: CPT | Performed by: EMERGENCY MEDICINE

## 2023-01-12 PROCEDURE — 84443 ASSAY THYROID STIM HORMONE: CPT | Performed by: EMERGENCY MEDICINE

## 2023-01-12 PROCEDURE — 85025 COMPLETE CBC W/AUTO DIFF WBC: CPT | Performed by: EMERGENCY MEDICINE

## 2023-01-12 PROCEDURE — 99285 EMERGENCY DEPT VISIT HI MDM: CPT | Mod: 25

## 2023-01-12 PROCEDURE — 84484 ASSAY OF TROPONIN QUANT: CPT | Performed by: EMERGENCY MEDICINE

## 2023-01-12 PROCEDURE — 25000003 PHARM REV CODE 250: Performed by: EMERGENCY MEDICINE

## 2023-01-12 RX ADMIN — SODIUM CHLORIDE 1000 ML: 9 INJECTION, SOLUTION INTRAVENOUS at 08:01

## 2023-01-13 ENCOUNTER — PATIENT MESSAGE (OUTPATIENT)
Dept: OBSTETRICS AND GYNECOLOGY | Facility: CLINIC | Age: 38
End: 2023-01-13
Payer: COMMERCIAL

## 2023-01-13 ENCOUNTER — OFFICE VISIT (OUTPATIENT)
Dept: PRIMARY CARE CLINIC | Facility: CLINIC | Age: 38
End: 2023-01-13
Payer: COMMERCIAL

## 2023-01-13 VITALS
HEIGHT: 62 IN | OXYGEN SATURATION: 98 % | SYSTOLIC BLOOD PRESSURE: 116 MMHG | HEART RATE: 82 BPM | DIASTOLIC BLOOD PRESSURE: 84 MMHG | WEIGHT: 168.13 LBS | BODY MASS INDEX: 30.94 KG/M2

## 2023-01-13 DIAGNOSIS — Z00.00 GENERAL MEDICAL EXAM: Primary | ICD-10-CM

## 2023-01-13 DIAGNOSIS — E03.8 HYPOTHYROIDISM DUE TO HASHIMOTO'S THYROIDITIS: ICD-10-CM

## 2023-01-13 DIAGNOSIS — E06.3 HYPOTHYROIDISM DUE TO HASHIMOTO'S THYROIDITIS: ICD-10-CM

## 2023-01-13 DIAGNOSIS — I10 ESSENTIAL HYPERTENSION: ICD-10-CM

## 2023-01-13 DIAGNOSIS — D50.0 IRON DEFICIENCY ANEMIA DUE TO CHRONIC BLOOD LOSS: ICD-10-CM

## 2023-01-13 PROCEDURE — 3074F PR MOST RECENT SYSTOLIC BLOOD PRESSURE < 130 MM HG: ICD-10-PCS | Mod: CPTII,S$GLB,, | Performed by: FAMILY MEDICINE

## 2023-01-13 PROCEDURE — 3079F DIAST BP 80-89 MM HG: CPT | Mod: CPTII,S$GLB,, | Performed by: FAMILY MEDICINE

## 2023-01-13 PROCEDURE — 99395 PR PREVENTIVE VISIT,EST,18-39: ICD-10-PCS | Mod: S$GLB,,, | Performed by: FAMILY MEDICINE

## 2023-01-13 PROCEDURE — 3008F BODY MASS INDEX DOCD: CPT | Mod: CPTII,S$GLB,, | Performed by: FAMILY MEDICINE

## 2023-01-13 PROCEDURE — 99999 PR PBB SHADOW E&M-EST. PATIENT-LVL V: CPT | Mod: PBBFAC,,, | Performed by: FAMILY MEDICINE

## 2023-01-13 PROCEDURE — 99395 PREV VISIT EST AGE 18-39: CPT | Mod: S$GLB,,, | Performed by: FAMILY MEDICINE

## 2023-01-13 PROCEDURE — 3008F PR BODY MASS INDEX (BMI) DOCUMENTED: ICD-10-PCS | Mod: CPTII,S$GLB,, | Performed by: FAMILY MEDICINE

## 2023-01-13 PROCEDURE — 3079F PR MOST RECENT DIASTOLIC BLOOD PRESSURE 80-89 MM HG: ICD-10-PCS | Mod: CPTII,S$GLB,, | Performed by: FAMILY MEDICINE

## 2023-01-13 PROCEDURE — 3074F SYST BP LT 130 MM HG: CPT | Mod: CPTII,S$GLB,, | Performed by: FAMILY MEDICINE

## 2023-01-13 PROCEDURE — 99999 PR PBB SHADOW E&M-EST. PATIENT-LVL V: ICD-10-PCS | Mod: PBBFAC,,, | Performed by: FAMILY MEDICINE

## 2023-01-13 RX ORDER — SERTRALINE HYDROCHLORIDE 100 MG/1
TABLET, FILM COATED ORAL
COMMUNITY
Start: 2022-07-12

## 2023-01-13 RX ORDER — FERROUS SULFATE 325(65) MG
325 TABLET, DELAYED RELEASE (ENTERIC COATED) ORAL EVERY OTHER DAY
Qty: 45 TABLET | Refills: 0 | Status: SHIPPED | OUTPATIENT
Start: 2023-01-13 | End: 2023-07-17 | Stop reason: ALTCHOICE

## 2023-01-13 RX ORDER — UBROGEPANT 100 MG/1
TABLET ORAL
COMMUNITY
Start: 2021-12-20 | End: 2023-12-05

## 2023-01-13 RX ORDER — AMLODIPINE BESYLATE 2.5 MG/1
2.5 TABLET ORAL DAILY
Qty: 90 TABLET | Refills: 0 | Status: SHIPPED | OUTPATIENT
Start: 2023-01-13 | End: 2023-05-03 | Stop reason: SDUPTHER

## 2023-01-13 NOTE — ED PROVIDER NOTES
Encounter Date: 1/12/2023    SCRIBE #1 NOTE: I, Kala Crandall, am scribing for, and in the presence of,  Matthew Gibbs MD. I have scribed the following portions of the note - Other sections scribed: HPI, ROS.     History     Chief Complaint   Patient presents with    Loss of Consciousness     Passed out at daughter's school function, with abd pain, no trauma, laid on the ground     Jossy Leslie is a 37 y.o. female, with a PMHx of Duodenal ulcer and Hiatal hernia, who presents to the ED with LOC while patient was at her daughter's school event today. Patient reports prior to the syncope she had used the bathroom before leaving work, but noticed it was painful. Patient notes while at her daughter's event she started have left lower abdominal pain and pelvic pain. Patient reports the pain is similar to when she had an ovarian cyst rupture. Patient notes she then felt like her heart was racing and she knew then she was going to faint. Patient reports losing consciousness in the bathroom and was awoken by a nun. Patient notes she had syncope episodes before most notably when she was a teenager and when becomes dehydrated while dealing with a virus. Patient's last menstrual cycle was 12/25/22. No other exacerbating or alleviating factors. Patient denies nausea, vomiting, diarrhea, cough, congestion, or other associated symptoms.     The history is provided by the patient. No  was used.   Review of patient's allergies indicates:   Allergen Reactions    Pumpkin Anaphylaxis    Corn containing products     Shellfish containing products Itching    Soy     Wheat containing prod      Past Medical History:   Diagnosis Date    Asthma     Duodenal ulcer     [facility] 2015    Eosinophilic esophagitis     2019    Hiatal hernia     Hypertension 2/19/2021    Hypothyroidism due to Hashimoto's thyroiditis     Thyroid disease      Past Surgical History:   Procedure Laterality Date     ESOPHAGOGASTRODUODENOSCOPY      ESOPHAGOGASTRODUODENOSCOPY N/A 04/18/2019    Procedure: ESOPHAGOGASTRODUODENOSCOPY (EGD);  Surgeon: Karmen Marquez MD;  Location: [facility] ENDO;  Service: Endoscopy;  Laterality: N/A;    HYSTEROSCOPY N/A 05/17/2019    Procedure: HYSTEROSCOPY;  Surgeon: Eli Blevins MD;  Location: [facility] OR;  Service: OB/GYN;  Laterality: N/A;    LAPAROSCOPIC SURGICAL REMOVAL OF CYST OF OVARY Right 05/17/2019    Procedure: EXCISION, CYST, OVARY, LAPAROSCOPIC;  Surgeon: Eli Blevins MD;  Location: [facility] OR;  Service: OB/GYN;  Laterality: Right;  video    TONSILLECTOMY       Family History   Problem Relation Age of Onset    Hypertension Mother     Depression Mother     Hypertension Father     Hyperlipidemia Father     Heart disease Maternal Grandmother     Colon cancer Maternal Grandfather 70    Diabetes Paternal Grandmother     Asbestos Paternal Grandmother     Heart attack Paternal Grandfather 70    Hypertension Sister     No Known Problems Maternal Aunt     No Known Problems Maternal Uncle     No Known Problems Paternal Aunt     No Known Problems Paternal Uncle     Hypertension Other     Breast cancer Paternal Aunt 50    Amblyopia Neg Hx     Blindness Neg Hx     Cataracts Neg Hx     Glaucoma Neg Hx     Macular degeneration Neg Hx     Retinal detachment Neg Hx     Strabismus Neg Hx     Stroke Neg Hx     Thyroid disease Neg Hx     Ovarian cancer Neg Hx      Social History     Tobacco Use    Smoking status: Never     Passive exposure: Never    Smokeless tobacco: Never   Substance Use Topics    Alcohol use: Yes     Alcohol/week: 0.0 standard drinks     Comment: socially, minimal    Drug use: No     Review of Systems   Constitutional: Negative.    HENT: Negative.  Negative for congestion.    Eyes: Negative.    Respiratory: Negative.  Negative for cough.    Cardiovascular: Negative.    Gastrointestinal:  Positive for abdominal pain (lower left). Negative for diarrhea, nausea and vomiting.   Genitourinary:   Positive for dysuria and pelvic pain.   Musculoskeletal: Negative.    Skin: Negative.    Neurological:  Positive for syncope.     Physical Exam     Initial Vitals [01/12/23 1917]   BP Pulse Resp Temp SpO2   129/83 80 16 98 °F (36.7 °C) 100 %      MAP       --         Physical Exam    Nursing note and vitals reviewed.  Constitutional: She appears well-developed and well-nourished. She is not diaphoretic. No distress.   HENT:   Head: Normocephalic and atraumatic.   Nose: Nose normal.   Eyes: EOM are normal. Pupils are equal, round, and reactive to light.   Neck: Neck supple. No JVD present.   Normal range of motion.  Cardiovascular:  Normal rate, regular rhythm, normal heart sounds and intact distal pulses.           Pulmonary/Chest: Breath sounds normal. No stridor. No respiratory distress. She has no wheezes. She has no rales.   Abdominal: Abdomen is soft. Bowel sounds are normal. She exhibits no distension. There is abdominal tenderness (mild LLQ ttp). There is no rebound and no guarding.   Musculoskeletal:         General: No tenderness or edema. Normal range of motion.      Cervical back: Normal range of motion and neck supple.     Neurological: She is alert and oriented to person, place, and time. She has normal strength.   Skin: Skin is warm and dry. Capillary refill takes less than 2 seconds. No rash noted. No erythema.       ED Course   Procedures  Labs Reviewed   URINALYSIS, REFLEX TO URINE CULTURE - Abnormal; Notable for the following components:       Result Value    Protein, UA Trace (*)     All other components within normal limits    Narrative:     Specimen Source->Urine   CBC W/ AUTO DIFFERENTIAL - Abnormal; Notable for the following components:    Hemoglobin 11.1 (*)     Hematocrit 35.2 (*)     MCV 76 (*)     MCH 23.9 (*)     MCHC 31.5 (*)     RDW 15.9 (*)     Gran # (ANC) 9.9 (*)     Gran % 81.2 (*)     Lymph % 10.1 (*)     All other components within normal limits   COMPREHENSIVE METABOLIC PANEL -  Abnormal; Notable for the following components:    CO2 22 (*)     Alkaline Phosphatase 44 (*)     All other components within normal limits   TSH - Abnormal; Notable for the following components:    TSH 7.484 (*)     All other components within normal limits   LIPASE   TROPONIN I   T4, FREE   POCT URINE PREGNANCY        ECG Results              EKG 12-lead (Final result)  Result time 01/13/23 14:19:16      Final result by Interface, Lab In [facility] (01/13/23 14:19:16)                   Narrative:    Test Reason : R55,    Vent. Rate : 085 BPM     Atrial Rate : 085 BPM     P-R Int : 152 ms          QRS Dur : 076 ms      QT Int : 376 ms       P-R-T Axes : 052 050 054 degrees     QTc Int : 447 ms    Normal sinus rhythm  Cannot rule out Anterior infarct ,age undetermined  Abnormal ECG  When compared with ECG of 29-DEC-2021 15:14,  No significant change was found  Confirmed by Malik Ham MD ([id number]) on 1/13/2023 2:19:13 PM    Referred By: [email or web address]   SELF           Confirmed By:Malik Ham MD                                  Imaging Results              US Pelvis Comp with Transvag NON-OB (xpd) (Final result)  Result time 01/12/23 21:46:24   Procedure changed from US Pelvis Complete Non OB     Final result by Soledad Self MD (01/12/23 21:46:24)                   Impression:      1. Left ovarian 4 cm cyst versus hemorrhagic cyst with increased surrounding peripheral vascularity.  If symptomatic, pelvic ultrasound follow-up may be obtained in 6 weeks to ensure resolution.  2. Endometrial thickening measuring 18 mm.  Correlate with current menstruation status.  Potential endometrial hyperplasia or other endometrial pathology not excluded.  Future Ob gyn referral and follow-up may be indicated.      Electronically signed by: Soledad Self MD  Date:    01/12/2023  Time:    21:46               Narrative:    EXAMINATION:  US PELVIS COMP WITH TRANSVAG NON-OB (XPD)    CLINICAL HISTORY:  LLQ abdominal  pain;    TECHNIQUE:  Transabdominal sonography of the pelvis was performed, followed by transvaginal sonography to better evaluate the uterus and ovaries.    COMPARISON:  Pelvic ultrasound from October 2020.    FINDINGS:  The uterus measures 9 x 5 x 5 cm. Uterine parenchyma is somewhat heterogenous in echotexture without evidence for masses. The endometrial echo complex is thickened and measures 18 mm.    The right ovary measures 3 x 1 x 2 cm. The left ovary measures 5 x 5 x 6 cm. Arterial and venous flow are preserved bilaterally.  Two adjacent left ovarian cysts are seen, the larger of which measures 4 cm.  Surrounding peripheral increased vascularity is seen.  Small volume free fluid is seen within the pelvis, likely physiologic.                                       X-Ray Abdomen AP 1 View (KUB) (Final result)  Result time 01/12/23 20:28:30      Final result by Soledad Self MD (01/12/23 20:28:30)                   Impression:      Nonobstructive bowel gas pattern.  Prominent stool seen within the colon.  Correlate for possible constipation.      Electronically signed by: Soledad Self MD  Date:    01/12/2023  Time:    20:28               Narrative:    EXAMINATION:  XR ABDOMEN AP 1 VIEW    CLINICAL HISTORY:  Unspecified abdominal pain    TECHNIQUE:  AP View(s) of the abdomen was performed.    COMPARISON:  None    FINDINGS:  Nonspecific bowel gas pattern.  No evidence to suggest obstruction.  No free air seen.  Prominent stool is seen throughout the colon, more pronounced within the right colon.  Partially visualized lung bases are clear.                                       Medications   sodium chloride 0.9% bolus 1,000 mL 1,000 mL (0 mLs Intravenous Stopped 1/12/23 2118)     Medical Decision Making:   History:   Old Medical Records: I decided to obtain old medical records.  Clinical Tests:   Lab Tests: Ordered and Reviewed       MDM:    37-year-old female with past medical history as noted above  presenting after syncopal event earlier today with reported abdominal pain.  Physical exam as noted above.  ED workup notable for lipase 30, hemoglobin 11.1, CMP baseline troponin negative, TSH 7.48, free T4 0.88, pregnancy test negative, urinalysis unremarkable, KUB showing some possible constipation, EKG showing normal sinus rhythm rate of 85 beats per minute, nonspecific ST and T-wave changes noted, similar to prior EKG, no STEMI,  MS, ultrasound of pelvis showing left ovarian 4 cm cyst versus hemorrhagic cyst and endometrial thickening of 18 mm.  Patient presentation consistent with syncope likely secondary to pain response verses mild dehydration with symptoms significantly improved upon reassessment.  Will have patient follow-up with her gyn for ongoing evaluation and management of her ovarian cyst and endometrial thickening.  At this point time based on physical exam evaluation not suspect hemorrhagic shock, sepsis, arrhythmia, acute mi/ACS, PE, pneumothorax intracranial hemorrhage, or any further surgical or medical emergency.  Discussed diagnosis and further treatment with patient, including f/u.  Return precautions given and all questions answered.  Patient in understanding of plan.  Pt discharged to home improved and stable.       Scribe Attestation:   Scribe #1: I performed the above scribed service and the documentation accurately describes the services I performed. I attest to the accuracy of the note.                   Clinical Impression:   Final diagnoses:  [R55] Syncope  [R10.9] Abdominal pain  [N83.202] Cyst of left ovary (Primary)        ED Disposition Condition    Discharge Stable        I, Matthew Gibbs M.D., personally performed the services described in this documentation. All medical record entries made by the scribe were at my direction and in my presence. I have reviewed the chart and agree that the record reflects my personal performance and is accurate and complete.   ED  Prescriptions    None       Follow-up Information       Follow up With Specialties Details Why Contact Info    [facility] Emergency Medicine Go to  If symptoms worsen [address] [facility] [id number] [phone]    Jerry Lockwood MD Internal Medicine Go in 1 week As needed [address] [facility] [address]  [phone]               Matthew Gibbs MD  01/17/23 114

## 2023-01-13 NOTE — ED NOTES
Pt resting in bed and denies any new c/o discomfort at this time; awaiting test results and disposition; care ongoing

## 2023-01-13 NOTE — PATIENT INSTRUCTIONS
For brand name Synthroid there is the [employer]  https://[email or web address]/    Let's restart the amlodipine at 2.5 mg in the evening    For the iron let's due it every other day and recheck the level in 8 weeks

## 2023-01-17 ENCOUNTER — TELEPHONE (OUTPATIENT)
Dept: OBSTETRICS AND GYNECOLOGY | Facility: CLINIC | Age: 38
End: 2023-01-17
Payer: COMMERCIAL

## 2023-01-17 NOTE — TELEPHONE ENCOUNTER
Ok, we will see you 1/19/2023 at 9:30 for EP/ER follow up. Amanda  ===View-only below this line===      ----- Message -----       From:Jossy Leslie       Sent:1/14/2023  4:22 PM CST         To:Patient Medical Advice Request Message List    Subject:Recent pelvic ultrasound     This works if that is still possible.    -MARILOU      ----- Message -----       From:Med Assistant Patel       Sent:1/13/2023  4:19 PM CST         To:Jossy Leslie    Subject:Recent pelvic ultrasound     I can get you in Thursday morning for 9:30 at [facility] next week. Will this work?      ----- Message -----       From:Jossy Leslie       Sent:1/13/2023 12:57 PM CST         To:Oneal Garcia MD    Subject:Recent pelvic ultrasound     Hi Dr Garcia,   I ended up going to the ER last night bc I had sudden left abdominal pain and passed out at my daughters school function. They did some labs, and X-ray, and ultrasound. I have a large left ovary cyst. When do you recommend making a follow up with you?     -MARILOU

## 2023-01-19 ENCOUNTER — TELEPHONE (OUTPATIENT)
Dept: OBSTETRICS AND GYNECOLOGY | Facility: CLINIC | Age: 38
End: 2023-01-19

## 2023-01-19 ENCOUNTER — OFFICE VISIT (OUTPATIENT)
Dept: OBSTETRICS AND GYNECOLOGY | Facility: CLINIC | Age: 38
End: 2023-01-19
Attending: OBSTETRICS & GYNECOLOGY
Payer: COMMERCIAL

## 2023-01-19 VITALS
DIASTOLIC BLOOD PRESSURE: 84 MMHG | WEIGHT: 168.88 LBS | HEIGHT: 62 IN | BODY MASS INDEX: 31.08 KG/M2 | SYSTOLIC BLOOD PRESSURE: 128 MMHG

## 2023-01-19 DIAGNOSIS — N83.202 LEFT OVARIAN CYST: ICD-10-CM

## 2023-01-19 DIAGNOSIS — Z87.42: ICD-10-CM

## 2023-01-19 DIAGNOSIS — R10.2 PELVIC PAIN: Primary | ICD-10-CM

## 2023-01-19 PROCEDURE — 1160F PR REVIEW ALL MEDS BY PRESCRIBER/CLIN PHARMACIST DOCUMENTED: ICD-10-PCS | Mod: CPTII,S$GLB,, | Performed by: OBSTETRICS & GYNECOLOGY

## 2023-01-19 PROCEDURE — 1159F PR MEDICATION LIST DOCUMENTED IN MEDICAL RECORD: ICD-10-PCS | Mod: CPTII,S$GLB,, | Performed by: OBSTETRICS & GYNECOLOGY

## 2023-01-19 PROCEDURE — 99214 PR OFFICE/OUTPT VISIT, EST, LEVL IV, 30-39 MIN: ICD-10-PCS | Mod: S$GLB,,, | Performed by: OBSTETRICS & GYNECOLOGY

## 2023-01-19 PROCEDURE — 3079F PR MOST RECENT DIASTOLIC BLOOD PRESSURE 80-89 MM HG: ICD-10-PCS | Mod: CPTII,S$GLB,, | Performed by: OBSTETRICS & GYNECOLOGY

## 2023-01-19 PROCEDURE — 3079F DIAST BP 80-89 MM HG: CPT | Mod: CPTII,S$GLB,, | Performed by: OBSTETRICS & GYNECOLOGY

## 2023-01-19 PROCEDURE — 3008F BODY MASS INDEX DOCD: CPT | Mod: CPTII,S$GLB,, | Performed by: OBSTETRICS & GYNECOLOGY

## 2023-01-19 PROCEDURE — 3008F PR BODY MASS INDEX (BMI) DOCUMENTED: ICD-10-PCS | Mod: CPTII,S$GLB,, | Performed by: OBSTETRICS & GYNECOLOGY

## 2023-01-19 PROCEDURE — 1159F MED LIST DOCD IN RCRD: CPT | Mod: CPTII,S$GLB,, | Performed by: OBSTETRICS & GYNECOLOGY

## 2023-01-19 PROCEDURE — 99999 PR PBB SHADOW E&M-EST. PATIENT-LVL III: CPT | Mod: PBBFAC,,, | Performed by: OBSTETRICS & GYNECOLOGY

## 2023-01-19 PROCEDURE — 1160F RVW MEDS BY RX/DR IN RCRD: CPT | Mod: CPTII,S$GLB,, | Performed by: OBSTETRICS & GYNECOLOGY

## 2023-01-19 PROCEDURE — 99999 PR PBB SHADOW E&M-EST. PATIENT-LVL III: ICD-10-PCS | Mod: PBBFAC,,, | Performed by: OBSTETRICS & GYNECOLOGY

## 2023-01-19 PROCEDURE — 3074F PR MOST RECENT SYSTOLIC BLOOD PRESSURE < 130 MM HG: ICD-10-PCS | Mod: CPTII,S$GLB,, | Performed by: OBSTETRICS & GYNECOLOGY

## 2023-01-19 PROCEDURE — 3074F SYST BP LT 130 MM HG: CPT | Mod: CPTII,S$GLB,, | Performed by: OBSTETRICS & GYNECOLOGY

## 2023-01-19 PROCEDURE — 99214 OFFICE O/P EST MOD 30 MIN: CPT | Mod: S$GLB,,, | Performed by: OBSTETRICS & GYNECOLOGY

## 2023-01-19 NOTE — TELEPHONE ENCOUNTER
She will need a follow-up ultrasound in about 8 weeks - needs to be in the week after, not her next period, but the one following that (in week after 2nd period)  Thanks.

## 2023-01-19 NOTE — PROGRESS NOTES
Chief Complaint   Patient presents with    Follow-up       HPI:  Jossy Leslie is a 37 y.o. female patient  who presents today for follow-up after an ER visit on 23.  While sitting at her child's school event, she describes the acute onset LLQ pain that was so intense that she lost consciousness and was taken to the emergency room for evaluation.  Ultrasound showed a 4 cm left ovarian cyst, probably hemorrhagic.  Over the next 24 hours, she reports that the pain significantly improved.  Now, she is essentially pain-free.  She has a history of endometriosis, S/P excision of right endometrioma .  Since this surgery, she describes having infrequent dull discomfort in her RLQ (not LLQ) which is not related to menses or ovulation.  Denies having issues with diarrhea or constipation.  No bladder complaints.  She was recently seen by her PCP.  Patient's last menstrual period was 2022 (exact date).    ER record reviewed in Epic    23 Pelvic sono:  FINDINGS:  The uterus measures 9 x 5 x 5 cm. Uterine parenchyma is somewhat heterogenous in echotexture without evidence for masses. The endometrial echo complex is thickened and measures 18 mm.  The right ovary measures 3 x 1 x 2 cm. The left ovary measures 5 x 5 x 6 cm. Arterial and venous flow are preserved bilaterally.  Two adjacent left ovarian cysts are seen, the larger of which measures 4 cm.  Surrounding peripheral increased vascularity is seen.  Small volume free fluid is seen within the pelvis, likely physiologic.  Impression:  1. Left ovarian 4 cm cyst versus hemorrhagic cyst with increased surrounding peripheral vascularity.  If symptomatic, pelvic ultrasound follow-up may be obtained in 6 weeks to ensure resolution.  2. Endometrial thickening measuring 18 mm.  Correlate with current menstruation status.  Potential endometrial hyperplasia or other endometrial pathology not excluded.  Future Ob gyn referral and follow-up may be indicated.        1/12/23 H/H 11.1/35.2,   TSH 7.484    12/2/21 Pap: Negative, HPV: Negative    Past Medical History:   Diagnosis Date    Asthma     Duodenal ulcer     [facility] 2015    Eosinophilic esophagitis     2019    Hiatal hernia     Hypertension 02/19/2021    Hypothyroidism due to Hashimoto's thyroiditis     Ovarian cyst     Thyroid disease        Past Surgical History:   Procedure Laterality Date    ESOPHAGOGASTRODUODENOSCOPY      ESOPHAGOGASTRODUODENOSCOPY N/A 04/18/2019    Procedure: ESOPHAGOGASTRODUODENOSCOPY (EGD);  Surgeon: Karmen Marquez MD;  Location: [facility] ENDO;  Service: Endoscopy;  Laterality: N/A;    HYSTEROSCOPY N/A 05/17/2019    Procedure: HYSTEROSCOPY;  Surgeon: Eli Blevins MD;  Location: [facility] OR;  Service: OB/GYN;  Laterality: N/A;    LAPAROSCOPIC SURGICAL REMOVAL OF CYST OF OVARY Right 05/17/2019    Procedure: EXCISION, CYST, OVARY, LAPAROSCOPIC;  Surgeon: Eli Blevins MD;  Location: [facility] OR;  Service: OB/GYN;  Laterality: Right;  video    TONSILLECTOMY           ROS:  GENERAL: Feeling well overall.   SKIN: Denies rash or lesions.   HEAD: Denies head injury or headache.   NODES: Denies enlarged lymph nodes.   CHEST: Denies chest pain or shortness of breath.   CARDIOVASCULAR: Denies palpitations or left sided chest pain.   ABDOMEN:  Reports severe LLQ pain which has now essentially resolved.   URINARY: No dysuria or hematuria.  REPRODUCTIVE: See HPI.   BREASTS: Denies pain, lumps, or nipple discharge.   HEMATOLOGIC: No easy bruisability or excessive bleeding.   MUSCULOSKELETAL: Denies joint pain or swelling.   NEUROLOGIC: Denies syncope or weakness.   PSYCHIATRIC: Denies depression.    PE:   (chaperone present during entire exam)  APPEARANCE: Well nourished, well developed, in no acute distress.  ABDOMEN: Soft. No tenderness or masses. No guarding.  No rebound.  VULVA: No lesions. Normal female genitalia.  URETHRAL MEATUS: Normal size and location, no lesions, no prolapse.  URETHRA: No masses, tenderness,  prolapse or scarring.  VAGINA: Moist and well rugated, no abnormal discharge, no significant cystocele or rectocele.  CERVIX: No lesions and discharge.  No CMT.  UTERUS: Normal size, regular shape, mobile, non-tender, bladder base nontender.  ADNEXA: No masses, tenderness or CDS nodularity.  ANUS PERINEUM: Normal.    Diagnosis:  1. Pelvic pain    2. Left ovarian cyst    3. History of acute endometritis          PLAN:    Orders Placed This Encounter    US Pelvis Comp with Transvag NON-OB (xpd       Patient was counseled today on her recent episode of severe left lower quadrant pain which now essentially resolved.  On ultrasound, a 4 cm probable hemorrhagic cyst was visualized.  We also discussed the possibility of an endometrioma.  She will have a follow-up ultrasound performed in about 8 weeks to confirm resolution.  Specifically, she will have this ultrasound performed after her next period for re-evaluation of the endometrium.  If her endometrium still seems significantly thickened, we discussed possible EMBX to rule out endometrial pathology.  She understands the need to contact us for return of pelvic pain.    Follow-up after ultrasound.    I spent a total of 35 minutes on the day of the visit.This includes face to face time and non-face to face time preparing to see the patient (eg, review of tests), Obtaining and/or reviewing separately obtained history, Documenting clinical information in the electronic or other health record, Independently interpreting resultsand communicating results to the patient/family/caregiver, or Care coordination.

## 2023-01-23 NOTE — PROGRESS NOTES
Subjective:       Patient ID: Jossy Leslie is a 37 y.o. female.    Chief Complaint: Annual Exam    37 year old female comes in for annual exam.     Review of Systems   Constitutional:  Negative for unexpected weight change.   HENT:  Negative for ear pain and sore throat.    Eyes:  Negative for visual disturbance.   Respiratory:  Negative for shortness of breath.    Cardiovascular:  Negative for chest pain.   Gastrointestinal:  Negative for abdominal pain and blood in stool.   Genitourinary:  Negative for dysuria and frequency.   Integumentary:  Negative for rash.   Neurological:  Negative for weakness, numbness and headaches.   Hematological:  Negative for adenopathy.   Psychiatric/Behavioral:  Negative for suicidal ideas.        Objective:      Physical Exam  Vitals reviewed.   Constitutional:       General: She is not in acute distress.  HENT:      Head: Normocephalic and atraumatic.      Right Ear: Ear canal and external ear normal.      Left Ear: Ear canal and external ear normal.      Nose: Nose normal.      Mouth/Throat:      Mouth: Mucous membranes are moist.      Pharynx: No oropharyngeal exudate or posterior oropharyngeal erythema.   Eyes:      Extraocular Movements: Extraocular movements intact.      Conjunctiva/sclera: Conjunctivae normal.      Pupils: Pupils are equal, round, and reactive to light.   Cardiovascular:      Rate and Rhythm: Normal rate and regular rhythm.      Pulses: Normal pulses.      Heart sounds: Normal heart sounds.   Pulmonary:      Effort: Pulmonary effort is normal. No respiratory distress.      Breath sounds: No wheezing or rales.   Abdominal:      General: Abdomen is flat. Bowel sounds are normal. There is no distension.      Palpations: Abdomen is soft.      Tenderness: There is no abdominal tenderness. There is no guarding.   Musculoskeletal:      Cervical back: Normal range of motion. No rigidity or tenderness.   Lymphadenopathy:      Cervical: No cervical adenopathy.    Skin:     General: Skin is warm.      Capillary Refill: Capillary refill takes less than 2 seconds.   Neurological:      Mental Status: She is alert and oriented to person, place, and time.      Cranial Nerves: No cranial nerve deficit.      Sensory: No sensory deficit.   Psychiatric:         Mood and Affect: Mood normal.         Behavior: Behavior normal.       Admission on 01/12/2023, Discharged on 01/12/2023   Component Date Value Ref Range Status    POC Preg Test, Ur 01/12/2023 Negative  Negative Final     Acceptable 01/12/2023 Yes   Final    Specimen UA 01/12/2023 Urine, Clean Catch   Final    Color, UA 01/12/2023 Yellow  Yellow, Straw, Fe Final    Appearance, UA 01/12/2023 Clear  Clear Final    pH, UA 01/12/2023 7.0  5.0 - 8.0 Final    Specific Gravity, UA 01/12/2023 1.020  1.005 - 1.030 Final    Protein, UA 01/12/2023 Trace (A)  Negative Final    Comment: Recommend a 24 hour urine protein or a urine   protein/creatinine ratio if globulin induced proteinuria is  clinically suspected.      Glucose, UA 01/12/2023 Negative  Negative Final    Ketones, UA 01/12/2023 Negative  Negative Final    Bilirubin (UA) 01/12/2023 Negative  Negative Final    Occult Blood UA 01/12/2023 Negative  Negative Final    Nitrite, UA 01/12/2023 Negative  Negative Final    Urobilinogen, UA 01/12/2023 Negative  <2.0 EU/dL Final    Leukocytes, UA 01/12/2023 Negative  Negative Final    Lipase 01/12/2023 30  4 - 60 U/L Final    WBC 01/12/2023 12.13  3.90 - 12.70 K/uL Final    RBC 01/12/2023 4.65  4.00 - 5.40 M/uL Final    Hemoglobin 01/12/2023 11.1 (L)  12.0 - 16.0 g/dL Final    Hematocrit 01/12/2023 35.2 (L)  37.0 - 48.5 % Final    MCV 01/12/2023 76 (L)  82 - 98 fL Final    MCH 01/12/2023 23.9 (L)  27.0 - 31.0 pg Final    MCHC 01/12/2023 31.5 (L)  32.0 - 36.0 g/dL Final    RDW 01/12/2023 15.9 (H)  11.5 - 14.5 % Final    Platelets 01/12/2023 305  150 - 450 K/uL Final    MPV 01/12/2023 10.7  9.2 - 12.9 fL Final    Immature  Granulocytes 01/12/2023 0.2  0.0 - 0.5 % Final    Gran # (ANC) 01/12/2023 9.9 (H)  1.8 - 7.7 K/uL Final    Immature Grans (Abs) 01/12/2023 0.03  0.00 - 0.04 K/uL Final    Comment: Mild elevation in immature granulocytes is non specific and   can be seen in a variety of conditions including stress response,   acute inflammation, trauma and pregnancy. Correlation with other   laboratory and clinical findings is essential.      Lymph # 01/12/2023 1.2  1.0 - 4.8 K/uL Final    Mono # 01/12/2023 0.8  0.3 - 1.0 K/uL Final    Eos # 01/12/2023 0.1  0.0 - 0.5 K/uL Final    Baso # 01/12/2023 0.06  0.00 - 0.20 K/uL Final    nRBC 01/12/2023 0  0 /100 WBC Final    Gran % 01/12/2023 81.2 (H)  38.0 - 73.0 % Final    Lymph % 01/12/2023 10.1 (L)  18.0 - 48.0 % Final    Mono % 01/12/2023 6.9  4.0 - 15.0 % Final    Eosinophil % 01/12/2023 1.1  0.0 - 8.0 % Final    Basophil % 01/12/2023 0.5  0.0 - 1.9 % Final    Platelet Estimate 01/12/2023 Appears normal   Final    Differential Method 01/12/2023 Automated   Final    Sodium 01/12/2023 138  136 - 145 mmol/L Final    Potassium 01/12/2023 3.9  3.5 - 5.1 mmol/L Final    Specimen slightly hemolyzed    Chloride 01/12/2023 108  95 - 110 mmol/L Final    CO2 01/12/2023 22 (L)  23 - 29 mmol/L Final    Glucose 01/12/2023 108  70 - 110 mg/dL Final    BUN 01/12/2023 11  6 - 20 mg/dL Final    Creatinine 01/12/2023 0.9  0.5 - 1.4 mg/dL Final    Calcium 01/12/2023 9.1  8.7 - 10.5 mg/dL Final    Total Protein 01/12/2023 7.5  6.0 - 8.4 g/dL Final    Albumin 01/12/2023 4.1  3.5 - 5.2 g/dL Final    Total Bilirubin 01/12/2023 0.2  0.1 - 1.0 mg/dL Final    Comment: For infants and newborns, interpretation of results should be based  on gestational age, weight and in agreement with clinical  observations.    Premature Infant recommended reference ranges:  Up to 24 hours.............<8.0 mg/dL  Up to 48 hours............<12.0 mg/dL  3-5 days..................<15.0 mg/dL  6-29 days.................<15.0 mg/dL       Alkaline Phosphatase 01/12/2023 44 (L)  55 - 135 U/L Final    AST 01/12/2023 29  10 - 40 U/L Final    ALT 01/12/2023 28  10 - 44 U/L Final    Anion Gap 01/12/2023 8  8 - 16 mmol/L Final    eGFR 01/12/2023 >60  >60 mL/min/1.73 m^2 Final    Troponin I 01/12/2023 <0.006  0.000 - 0.026 ng/mL Final    Comment: The reference interval for Troponin I represents the 99th percentile   cutoff   for our facility and is consistent with 3rd generation assay   performance.      TSH 01/12/2023 7.484 (H)  0.400 - 4.000 uIU/mL Final    Free T4 01/12/2023 0.88  0.71 - 1.51 ng/dL Final       Assessment:       Problem List Items Addressed This Visit          Cardiac/Vascular    Essential hypertension    Relevant Medications    amLODIPine (NORVASC) 2.5 MG tablet       Oncology    Iron deficiency anemia due to chronic blood loss    Relevant Medications    ferrous sulfate 325 (65 FE) MG EC tablet    Other Relevant Orders    CBC Auto Differential    IRON AND TIBC    Ferritin       Endocrine    Hypothyroidism due to Hashimoto's thyroiditis     Other Visit Diagnoses       General medical exam    -  Primary              Plan:       Jossy was seen today for annual exam.    Diagnoses and all orders for this visit:    General medical exam  Age appropriate recommendations reviewed    Essential hypertension  -     amLODIPine (NORVASC) 2.5 MG tablet; Take 1 tablet (2.5 mg total) by mouth once daily.  Continue on amlodipine 2.5 mg daily    Hypothyroidism due to Hashimoto's thyroiditis  TSH lab scheduled    Iron deficiency anemia due to chronic blood loss  -     ferrous sulfate 325 (65 FE) MG EC tablet; Take 1 tablet (325 mg total) by mouth every other day.  -     CBC Auto Differential; Future  -     IRON AND TIBC; Future  -     Ferritin; Future  Continue on Iron  Check anemia panel in 6-12 weeks.

## 2023-04-01 ENCOUNTER — HOSPITAL ENCOUNTER (OUTPATIENT)
Dept: RADIOLOGY | Facility: OTHER | Age: 38
Discharge: HOME | End: 2023-04-01
Attending: OBSTETRICS & GYNECOLOGY
Payer: COMMERCIAL

## 2023-04-01 DIAGNOSIS — N83.202 LEFT OVARIAN CYST: ICD-10-CM

## 2023-04-01 PROCEDURE — 76856 US EXAM PELVIC COMPLETE: CPT | Mod: TC

## 2023-04-01 PROCEDURE — 76830 TRANSVAGINAL US NON-OB: CPT | Mod: 26,,, | Performed by: RADIOLOGY

## 2023-04-01 PROCEDURE — 76830 US PELVIS COMP WITH TRANSVAG NON-OB (XPD): ICD-10-PCS | Mod: 26,,, | Performed by: RADIOLOGY

## 2023-04-01 PROCEDURE — 76856 US PELVIS COMP WITH TRANSVAG NON-OB (XPD): ICD-10-PCS | Mod: 26,,, | Performed by: RADIOLOGY

## 2023-04-01 PROCEDURE — 76856 US EXAM PELVIC COMPLETE: CPT | Mod: 26,,, | Performed by: RADIOLOGY

## 2023-04-11 ENCOUNTER — TELEPHONE (OUTPATIENT)
Dept: OBSTETRICS AND GYNECOLOGY | Facility: CLINIC | Age: 38
End: 2023-04-11
Payer: COMMERCIAL

## 2023-04-11 DIAGNOSIS — N83.202 LEFT OVARIAN CYST: Primary | ICD-10-CM

## 2023-04-11 NOTE — TELEPHONE ENCOUNTER
Called patient:    Discussed results of follow-up sono:    FINDINGS:  Uterus: Measures 8.8 x 4.3 x 5.6 cm.  Endometrial echo complex measures 5 mm, unremarkable.  No uterine masses.  Right ovary: Measures 2.2 x 1.4 x 2.0 cm.  Appearance is unremarkable.  Blood flow is present.  Left ovary: Measures 3.4 x 3.4 x 3.5 cm.  Hypoechoic mass measuring 2.3 cm, perhaps endometrioma.  Additional 3.5 cm simple cyst.  Blood flow is present.  Miscellaneous: No free fluid.  Impression:  1. Hypoechoic left ovarian mass, perhaps endometrioma.  Additional left ovarian cyst.  2. Interval resolution of endometrial thickening.    Discussed left ovary findings.  I would expect simple cyst to spontaneously resolve.  Will repeat sono in 4-6 months for evaluation of 2.3 cm.

## 2023-04-28 ENCOUNTER — TELEPHONE (OUTPATIENT)
Dept: OBSTETRICS AND GYNECOLOGY | Facility: CLINIC | Age: 38
End: 2023-04-28
Payer: COMMERCIAL

## 2023-04-28 NOTE — TELEPHONE ENCOUNTER
----- Message from Amanda Salinas MA sent at 4/13/2023  4:29 PM CDT -----  MD Jose Zurita Staff 2 days ago      She will need pelvic sono in 4-6 months.

## 2023-05-03 DIAGNOSIS — G43.709 CHRONIC MIGRAINE WITHOUT AURA WITHOUT STATUS MIGRAINOSUS, NOT INTRACTABLE: ICD-10-CM

## 2023-05-15 ENCOUNTER — TELEPHONE (OUTPATIENT)
Dept: NEUROLOGY | Facility: CLINIC | Age: 38
End: 2023-05-15
Payer: COMMERCIAL

## 2023-05-19 RX ORDER — AMITRIPTYLINE HYDROCHLORIDE 25 MG/1
25 TABLET, FILM COATED ORAL NIGHTLY
Qty: 30 TABLET | Refills: 0 | Status: SHIPPED | OUTPATIENT
Start: 2023-05-19 | End: 2023-07-17 | Stop reason: SDUPTHER

## 2023-06-04 NOTE — PROGRESS NOTES
"Chief Complaint   Patient presents with    Vaginal Bleeding       HPI:  Jossy Leslie is a 37 y.o. female patient  who presents today for evaluation of irregular bleeding and pelvic discomfort.  Previously, menses had been occurring monthly, lasting 4-5 days in duration, without intermenstrual bleeding.  However, for the past month, she describes having episodes of intermittent bleeding / spotting.  She describes having unprotected IC several weeks prior to development of this intermenstrual bleeding.  Denies abnormal discharge.  For the past week, she reports having a dull ache in her right lower quadrant.  No nausea.  No fever.  No bladder issues.      Blood pressure 102/78, height 5' 2" (1.575 m), weight 81.1 kg (178 lb 12.7 oz), last menstrual period 2023.    UPT today: Negative    23 Pelvic sono:  FINDINGS:  Uterus: Measures 8.8 x 4.3 x 5.6 cm.  Endometrial echo complex measures 5 mm, unremarkable.  No uterine masses.  Right ovary: Measures 2.2 x 1.4 x 2.0 cm.  Appearance is unremarkable.  Blood flow is present.  Left ovary: Measures 3.4 x 3.4 x 3.5 cm.  Hypoechoic mass measuring 2.3 cm, perhaps endometrioma.  Additional 3.5 cm simple cyst.  Blood flow is present.  Miscellaneous: No free fluid.  Impression:  1. Hypoechoic left ovarian mass, perhaps endometrioma.  Additional left ovarian cyst.  2. Interval resolution of endometrial thickening.    21 GC/CT: Negative    21 Pap: Negative, HPV: Negative      Past Medical History:   Diagnosis Date    Asthma     Duodenal ulcer     [facility] 2015    Eosinophilic esophagitis     2019    Hiatal hernia     Hypertension 2021    Hypothyroidism due to Hashimoto's thyroiditis     Ovarian cyst     Thyroid disease        Past Surgical History:   Procedure Laterality Date    ESOPHAGOGASTRODUODENOSCOPY      ESOPHAGOGASTRODUODENOSCOPY N/A 2019    Procedure: ESOPHAGOGASTRODUODENOSCOPY (EGD);  Surgeon: Karmen Marquez MD;  Location: [facility] " ENDO;  Service: Endoscopy;  Laterality: N/A;    HYSTEROSCOPY N/A 05/17/2019    Procedure: HYSTEROSCOPY;  Surgeon: Eli Blevins MD;  Location: [facility] OR;  Service: OB/GYN;  Laterality: N/A;    LAPAROSCOPIC SURGICAL REMOVAL OF CYST OF OVARY Right 05/17/2019    Procedure: EXCISION, CYST, OVARY, LAPAROSCOPIC;  Surgeon: Eli Blevins MD;  Location: [facility] OR;  Service: OB/GYN;  Laterality: Right;  video    TONSILLECTOMY           ROS:  GENERAL: Feeling well overall.   SKIN: Denies rash or lesions.   HEAD: Denies head injury or headache.   NODES: Denies enlarged lymph nodes.   CHEST: Denies chest pain or shortness of breath.   CARDIOVASCULAR: Denies palpitations or left sided chest pain.   ABDOMEN: Reports dull RLQ abdominal discomfort.  No nausea, vomiting or rectal bleeding.   URINARY: No dysuria or hematuria.  REPRODUCTIVE: See HPI.   BREASTS: Denies pain, lumps, or nipple discharge.   HEMATOLOGIC: Reports irregular bleeding.   MUSCULOSKELETAL: Denies joint pain or swelling.   NEUROLOGIC: Denies syncope or weakness.   PSYCHIATRIC: Denies depression.    PE:   (chaperone present during entire exam)  APPEARANCE: Well nourished, well developed, in no acute distress.  ABDOMEN: Soft. Mild tenderness RLQ.  No guarding.  No rebound.   VULVA: No lesions. Normal female genitalia.  URETHRAL MEATUS: Normal size and location, no lesions, no prolapse.  URETHRA: No masses, tenderness, prolapse or scarring.  VAGINA: Moist and well rugated, minimal brownish blood in vault.  CERVIX: No lesions and discharge. No CMT.  UTERUS: Normal size, regular shape, mobile, non-tender, bladder base nontender.  ADNEXA: Slight tenderness on right but no mass felt.     ANUS PERINEUM: Normal.    Diagnosis:  1. Irregular bleeding    2. Pelvic pain          PLAN:    Orders Placed This Encounter    C. trachomatis/N. gonorrhoeae by AMP DNA    Urine culture    US Pelvis Comp with Transvag NON-OB (xpd    POCT urine pregnancy       Patient was counseled today on her  irregular bleeding and pelvic pain.  UPT today was negative.  GC/CT was performed to exclude an infectious etiology.  She will have a pelvic ultrasound performed for re-evaluation of her adnexa.  She understands the need to contact us or go to ER for worsening abdominal pain.    Follow-up after testing    I spent a total of 24 minutes on the day of the visit.This includes face to face time and non-face to face time preparing to see the patient (eg, review of tests), Obtaining and/or reviewing separately obtained history, Documenting clinical information in the electronic or other health record, Independently interpreting resultsand communicating results to the patient/family/caregiver, or Care coordination.      This note was created with voice recognition software.  Grammatical, syntax and spelling errors may be inevitable.

## 2023-06-05 ENCOUNTER — PATIENT MESSAGE (OUTPATIENT)
Dept: OBSTETRICS AND GYNECOLOGY | Facility: CLINIC | Age: 38
End: 2023-06-05

## 2023-06-05 ENCOUNTER — OFFICE VISIT (OUTPATIENT)
Dept: OBSTETRICS AND GYNECOLOGY | Facility: CLINIC | Age: 38
End: 2023-06-05
Payer: COMMERCIAL

## 2023-06-05 VITALS
HEIGHT: 62 IN | SYSTOLIC BLOOD PRESSURE: 102 MMHG | BODY MASS INDEX: 32.91 KG/M2 | WEIGHT: 178.81 LBS | DIASTOLIC BLOOD PRESSURE: 78 MMHG

## 2023-06-05 DIAGNOSIS — N92.6 IRREGULAR BLEEDING: Primary | ICD-10-CM

## 2023-06-05 DIAGNOSIS — R10.2 PELVIC PAIN: ICD-10-CM

## 2023-06-05 LAB
B-HCG UR QL: NEGATIVE
CTP QC/QA: YES

## 2023-06-05 PROCEDURE — 3078F PR MOST RECENT DIASTOLIC BLOOD PRESSURE < 80 MM HG: ICD-10-PCS | Mod: CPTII,S$GLB,, | Performed by: OBSTETRICS & GYNECOLOGY

## 2023-06-05 PROCEDURE — 1159F PR MEDICATION LIST DOCUMENTED IN MEDICAL RECORD: ICD-10-PCS | Mod: CPTII,S$GLB,, | Performed by: OBSTETRICS & GYNECOLOGY

## 2023-06-05 PROCEDURE — 99213 OFFICE O/P EST LOW 20 MIN: CPT | Mod: S$GLB,,, | Performed by: OBSTETRICS & GYNECOLOGY

## 2023-06-05 PROCEDURE — 99213 PR OFFICE/OUTPT VISIT, EST, LEVL III, 20-29 MIN: ICD-10-PCS | Mod: S$GLB,,, | Performed by: OBSTETRICS & GYNECOLOGY

## 2023-06-05 PROCEDURE — 99999 PR PBB SHADOW E&M-EST. PATIENT-LVL III: CPT | Mod: PBBFAC,,, | Performed by: OBSTETRICS & GYNECOLOGY

## 2023-06-05 PROCEDURE — 1160F PR REVIEW ALL MEDS BY PRESCRIBER/CLIN PHARMACIST DOCUMENTED: ICD-10-PCS | Mod: CPTII,S$GLB,, | Performed by: OBSTETRICS & GYNECOLOGY

## 2023-06-05 PROCEDURE — 3008F PR BODY MASS INDEX (BMI) DOCUMENTED: ICD-10-PCS | Mod: CPTII,S$GLB,, | Performed by: OBSTETRICS & GYNECOLOGY

## 2023-06-05 PROCEDURE — 81025 URINE PREGNANCY TEST: CPT | Mod: S$GLB,,, | Performed by: OBSTETRICS & GYNECOLOGY

## 2023-06-05 PROCEDURE — 81025 POCT URINE PREGNANCY: ICD-10-PCS | Mod: S$GLB,,, | Performed by: OBSTETRICS & GYNECOLOGY

## 2023-06-05 PROCEDURE — 1160F RVW MEDS BY RX/DR IN RCRD: CPT | Mod: CPTII,S$GLB,, | Performed by: OBSTETRICS & GYNECOLOGY

## 2023-06-05 PROCEDURE — 3074F PR MOST RECENT SYSTOLIC BLOOD PRESSURE < 130 MM HG: ICD-10-PCS | Mod: CPTII,S$GLB,, | Performed by: OBSTETRICS & GYNECOLOGY

## 2023-06-05 PROCEDURE — 3074F SYST BP LT 130 MM HG: CPT | Mod: CPTII,S$GLB,, | Performed by: OBSTETRICS & GYNECOLOGY

## 2023-06-05 PROCEDURE — 3078F DIAST BP <80 MM HG: CPT | Mod: CPTII,S$GLB,, | Performed by: OBSTETRICS & GYNECOLOGY

## 2023-06-05 PROCEDURE — 3008F BODY MASS INDEX DOCD: CPT | Mod: CPTII,S$GLB,, | Performed by: OBSTETRICS & GYNECOLOGY

## 2023-06-05 PROCEDURE — 87591 N.GONORRHOEAE DNA AMP PROB: CPT | Performed by: OBSTETRICS & GYNECOLOGY

## 2023-06-05 PROCEDURE — 99999 PR PBB SHADOW E&M-EST. PATIENT-LVL III: ICD-10-PCS | Mod: PBBFAC,,, | Performed by: OBSTETRICS & GYNECOLOGY

## 2023-06-05 PROCEDURE — 1159F MED LIST DOCD IN RCRD: CPT | Mod: CPTII,S$GLB,, | Performed by: OBSTETRICS & GYNECOLOGY

## 2023-06-06 ENCOUNTER — PATIENT MESSAGE (OUTPATIENT)
Dept: OBSTETRICS AND GYNECOLOGY | Facility: CLINIC | Age: 38
End: 2023-06-06
Payer: COMMERCIAL

## 2023-06-06 ENCOUNTER — TELEPHONE (OUTPATIENT)
Dept: OBSTETRICS AND GYNECOLOGY | Facility: CLINIC | Age: 38
End: 2023-06-06
Payer: COMMERCIAL

## 2023-06-06 DIAGNOSIS — R10.2 PELVIC PAIN: Primary | ICD-10-CM

## 2023-06-06 LAB
C TRACH DNA SPEC QL NAA+PROBE: NOT DETECTED
N GONORRHOEA DNA SPEC QL NAA+PROBE: NOT DETECTED

## 2023-06-06 NOTE — TELEPHONE ENCOUNTER
Anuj  to DANIEL LEE Staff (supporting Oneal Garcia MD)         5:26 PM  Hi,   Its . Karmen Marquez, at [facility] in [place]. Yes, a referral is needed at his new office.      [facility]  [address] [place] [address]  Megha Devries MA  to Jossy Leslie         4:17 PM  What department does he work in? We can place a referral for the department - not specific to pelvic pain.     Can you check with the provider office to determine if a referral is needed.     Megha       Last read by Jossy Leslie at  5:21 PM on 2023.  Jossy Leslie  to DANIEL LEE Staff (supporting Oneal Garcia MD)         4:07 PM  Hi,   I just had an appointment with him today.  Megha Devries MA  to Jossy Leslie    CB     4:04 PM  Ms Leslie,     I can schedule a virtual visit with Dr Garcia to discuss referral. Are you okay with a virtual appointment.     Megha    Last read by Jossy Leslie at  5:21 PM on 2023.  Jossy Leslie  to DANIEL LEE Staff (supporting Oneal Garcia MD)         4:00 PM  Dr. Garcia,  Id like to see my favorite GI MD, Dr Karmen Marquez. I used to work with him at [facility].  Since mary ann moved to [facility] ([facility]), I now need a referral to see him. Can I get a referral for Dr. Marquez for this pelvic pain to cover all bases?      -Jossy Leslie  This encounter is not signed. The conversation may still be ongoing.  Additional Documentation    Vitals:  LMP 2023 (Exact Date)   Encounter Info:  Billing Info,  History,  Detailed Report,  Education,  Care Plan,  Allergies       Communications    View Encounter Conversation Summary  Patient Demographics    Patient Name   Jossy Leslie Legal Sex   Female    1985 SSN   [id number] Address   [address] [place] [address] [address] [address] Phone   [phone] (Home)   [phone] (Mobile) *Preferred*     Orders Placed    None  Medication  Renewals and Changes      None     Medication List     Visit Diagnoses      None     Problem List

## 2023-07-08 DIAGNOSIS — G43.709 CHRONIC MIGRAINE WITHOUT AURA WITHOUT STATUS MIGRAINOSUS, NOT INTRACTABLE: ICD-10-CM

## 2023-07-12 RX ORDER — AMITRIPTYLINE HYDROCHLORIDE 25 MG/1
25 TABLET, FILM COATED ORAL NIGHTLY
Qty: 30 TABLET | Refills: 0 | OUTPATIENT
Start: 2023-07-12 | End: 2024-07-11

## 2023-07-13 ENCOUNTER — PATIENT MESSAGE (OUTPATIENT)
Dept: NEUROLOGY | Facility: CLINIC | Age: 38
End: 2023-07-13
Payer: COMMERCIAL

## 2023-07-13 ENCOUNTER — PATIENT MESSAGE (OUTPATIENT)
Dept: FAMILY MEDICINE | Facility: CLINIC | Age: 38
End: 2023-07-13
Payer: COMMERCIAL

## 2023-07-14 ENCOUNTER — PATIENT MESSAGE (OUTPATIENT)
Dept: FAMILY MEDICINE | Facility: CLINIC | Age: 38
End: 2023-07-14
Payer: COMMERCIAL

## 2023-07-14 DIAGNOSIS — G43.709 CHRONIC MIGRAINE WITHOUT AURA WITHOUT STATUS MIGRAINOSUS, NOT INTRACTABLE: ICD-10-CM

## 2023-07-17 ENCOUNTER — OFFICE VISIT (OUTPATIENT)
Dept: FAMILY MEDICINE | Facility: CLINIC | Age: 38
End: 2023-07-17
Payer: COMMERCIAL

## 2023-07-17 DIAGNOSIS — E03.8 HYPOTHYROIDISM DUE TO HASHIMOTO'S THYROIDITIS: Chronic | ICD-10-CM

## 2023-07-17 DIAGNOSIS — G43.709 CHRONIC MIGRAINE WITHOUT AURA WITHOUT STATUS MIGRAINOSUS, NOT INTRACTABLE: Chronic | ICD-10-CM

## 2023-07-17 DIAGNOSIS — E06.3 HYPOTHYROIDISM DUE TO HASHIMOTO'S THYROIDITIS: Chronic | ICD-10-CM

## 2023-07-17 DIAGNOSIS — E04.1 THYROID NODULE: Chronic | ICD-10-CM

## 2023-07-17 DIAGNOSIS — I10 ESSENTIAL HYPERTENSION: Chronic | ICD-10-CM

## 2023-07-17 PROCEDURE — 1160F RVW MEDS BY RX/DR IN RCRD: CPT | Mod: CPTII,95,, | Performed by: FAMILY MEDICINE

## 2023-07-17 PROCEDURE — 99214 PR OFFICE/OUTPT VISIT, EST, LEVL IV, 30-39 MIN: ICD-10-PCS | Mod: 95,,, | Performed by: FAMILY MEDICINE

## 2023-07-17 PROCEDURE — 99214 OFFICE O/P EST MOD 30 MIN: CPT | Mod: 95,,, | Performed by: FAMILY MEDICINE

## 2023-07-17 PROCEDURE — 1159F MED LIST DOCD IN RCRD: CPT | Mod: CPTII,95,, | Performed by: FAMILY MEDICINE

## 2023-07-17 PROCEDURE — 1159F PR MEDICATION LIST DOCUMENTED IN MEDICAL RECORD: ICD-10-PCS | Mod: CPTII,95,, | Performed by: FAMILY MEDICINE

## 2023-07-17 PROCEDURE — 1160F PR REVIEW ALL MEDS BY PRESCRIBER/CLIN PHARMACIST DOCUMENTED: ICD-10-PCS | Mod: CPTII,95,, | Performed by: FAMILY MEDICINE

## 2023-07-17 RX ORDER — AMITRIPTYLINE HYDROCHLORIDE 25 MG/1
25 TABLET, FILM COATED ORAL NIGHTLY
Qty: 90 TABLET | Refills: 1 | Status: SHIPPED | OUTPATIENT
Start: 2023-07-17 | End: 2024-02-09

## 2023-07-18 RX ORDER — SUMATRIPTAN SUCCINATE 100 MG/1
100 TABLET ORAL
Qty: 9 TABLET | Refills: 5 | Status: SHIPPED | OUTPATIENT
Start: 2023-07-18 | End: 2024-01-02

## 2023-07-22 ENCOUNTER — PATIENT MESSAGE (OUTPATIENT)
Dept: FAMILY MEDICINE | Facility: CLINIC | Age: 38
End: 2023-07-22
Payer: COMMERCIAL

## 2023-07-22 DIAGNOSIS — E03.8 HYPOTHYROIDISM DUE TO HASHIMOTO'S THYROIDITIS: Primary | Chronic | ICD-10-CM

## 2023-07-22 DIAGNOSIS — E04.1 THYROID NODULE: Chronic | ICD-10-CM

## 2023-07-22 DIAGNOSIS — E78.5 DYSLIPIDEMIA: ICD-10-CM

## 2023-07-22 DIAGNOSIS — I10 ESSENTIAL HYPERTENSION: Chronic | ICD-10-CM

## 2023-07-22 DIAGNOSIS — D50.0 IRON DEFICIENCY ANEMIA DUE TO CHRONIC BLOOD LOSS: ICD-10-CM

## 2023-07-22 DIAGNOSIS — E06.3 HYPOTHYROIDISM DUE TO HASHIMOTO'S THYROIDITIS: Primary | Chronic | ICD-10-CM

## 2023-07-22 PROBLEM — G43.709 CHRONIC MIGRAINE WITHOUT AURA WITHOUT STATUS MIGRAINOSUS, NOT INTRACTABLE: Chronic | Status: ACTIVE | Noted: 2018-07-25

## 2023-07-22 PROBLEM — G44.219 EPISODIC TENSION-TYPE HEADACHE, NOT INTRACTABLE: Status: RESOLVED | Noted: 2018-07-25 | Resolved: 2023-07-22

## 2023-07-22 PROBLEM — G44.209 TENSION HEADACHE: Status: RESOLVED | Noted: 2022-01-20 | Resolved: 2023-07-22

## 2023-07-22 PROBLEM — Z98.890 STATUS POST HYSTEROSCOPY: Status: RESOLVED | Noted: 2019-05-17 | Resolved: 2023-07-22

## 2023-07-22 PROBLEM — M79.10 MYALGIA: Status: RESOLVED | Noted: 2017-01-16 | Resolved: 2023-07-22

## 2023-07-22 PROBLEM — Z98.890 STATUS POST OVARIAN CYSTECTOMY: Status: RESOLVED | Noted: 2019-05-17 | Resolved: 2023-07-22

## 2023-07-22 PROBLEM — Z87.42 STATUS POST OVARIAN CYSTECTOMY: Status: RESOLVED | Noted: 2019-05-17 | Resolved: 2023-07-22

## 2023-07-22 RX ORDER — AMLODIPINE BESYLATE 2.5 MG/1
2.5 TABLET ORAL DAILY
Qty: 90 TABLET | Refills: 3 | Status: SHIPPED | OUTPATIENT
Start: 2023-07-22 | End: 2024-03-22 | Stop reason: SDUPTHER

## 2023-07-22 NOTE — ASSESSMENT & PLAN NOTE
Continue current blood pressure regimen.  Will send in refill to have on file at pharmacy so she does not run out.

## 2023-07-22 NOTE — PROGRESS NOTES
Patient Name: Jossy Leslie    : 1985  MRN: [id number]    The patient location is: [place], [place]  The chief complaint leading to consultation is: Mediation Refill    Visit type: audiovisual    Face to Face time with patient: 12 minutes  15 minutes of total time spent on the encounter, which includes face to face time and non-face to face time preparing to see the patient (eg, review of tests), Obtaining and/or reviewing separately obtained history, Documenting clinical information in the electronic or other health record, Independently interpreting results (not separately reported) and communicating results to the patient/family/caregiver, or Care coordination (not separately reported).         Each patient to whom he or she provides medical services by telemedicine is:  (1) informed of the relationship between the physician and patient and the respective role of any other health care provider with respect to management of the patient; and (2) notified that he or she may decline to receive medical services by telemedicine and may withdraw from such care at any time.    Notes:     Subjective:     Patient ID: Jossy is a 37 y.o. female    Chief Complaint:  Medication Refill    29-year-old female previously diagnosed with chronic migraines by Neurology makes a virtual visit as she would like a visit on her amitriptyline which had been prescribed by the neurologist.  I have not prescribed this to her myself in the past.  Her neurologist is no longer at [facility].  She states that she will be establishing care with a new neurologist she had seen previously however it is not for a few months as she is booked she reports that the amitriptyline does help as a preventative.  She reports that her migraines have flaired up since running out of the medication.    Medication Refill  Associated symptoms include headaches and neck pain. Pertinent negatives include no arthralgias, chest pain, joint swelling, vomiting or  weakness.  On her     Review of Systems   Constitutional:  Positive for activity change and unexpected weight change.   HENT:  Negative for hearing loss, rhinorrhea and trouble swallowing.    Eyes:  Negative for discharge and visual disturbance.   Respiratory:  Negative for chest tightness and wheezing.    Cardiovascular:  Positive for palpitations. Negative for chest pain.   Gastrointestinal:  Negative for blood in stool, constipation, diarrhea and vomiting.   Endocrine: Negative for polydipsia and polyuria.   Genitourinary:  Negative for difficulty urinating, dysuria, hematuria and menstrual problem.   Musculoskeletal:  Positive for neck pain. Negative for arthralgias and joint swelling.   Neurological:  Positive for headaches. Negative for weakness.   Psychiatric/Behavioral:  Negative for confusion and dysphoric mood.       Objective:   There were no vitals taken for this visit.    Physical Exam  Constitutional:       General: She is not in acute distress.     Appearance: She is not ill-appearing.   Pulmonary:      Effort: Pulmonary effort is normal.   Neurological:      Mental Status: She is alert.   Psychiatric:         Mood and Affect: Mood normal.         Behavior: Behavior normal.      Assessment        ICD-10-CM ICD-9-CM   1. Chronic migraine without aura without status migrainosus, not intractable  G43.709 346.70   2. Essential hypertension  I10 401.9   3. Hypothyroidism due to Hashimoto's thyroiditis  E03.8 244.8    E06.3 245.2   4. Thyroid nodule  E04.1 241.0         Plan:     1. Chronic migraine without aura without status migrainosus, not intractable  Overview:  Patient did not respond well to extended release Topamax to control her headaches, so at this time she will be treated only with abortive medications as needed.  Discussion has been started as to which medication would be the best for prevention in the future.  The patient seems to think that injectable medication such as a CGRP receptor  blocker would be her best option    Assessment & Plan:  Requested refill sent.    Orders:  -     amitriptyline (ELAVIL) 25 MG tablet; Take 1 tablet (25 mg total) by mouth every evening.  Dispense: 90 tablet; Refill: 1    2. Essential hypertension  Overview:  BP Readings from Last 3 Encounters:   06/05/23 102/78   01/19/23 128/84   01/13/23 116/84       Assessment & Plan:  Continue current blood pressure regimen.  Will send in refill to have on file at pharmacy so she does not run out.    Orders:  -     amLODIPine (NORVASC) 2.5 MG tablet; Take 1 tablet (2.5 mg total) by mouth once daily.  Dispense: 90 tablet; Refill: 3    3. Hypothyroidism due to Hashimoto's thyroiditis  Overview:  Lab Results   Component Value Date    TSH 7.484 (H) 01/12/2023    TSH 3.733 12/09/2022    TSH 3.555 03/19/2022    FREET4 0.88 01/12/2023    FREET4 0.90 12/09/2022    FREET4 1.00 12/25/2021      Lab Results   Component Value Date    THYROPEROXID 1229.2 (H) 10/19/2018        Assessment & Plan:  Patient on Synthroid.      4. Thyroid nodule  Overview:  11-  US Thyroid  IMPRESSION:   Heterogeneity throughout the thyroid parenchyma with mild increased vascularity, suggesting thyroiditis. Subcentimeter right thyroid nodule, which does not meet criteria for FNA.    Endocrinologist recommended repeat ultrasound in five years (11/2023)    Assessment & Plan:  Will need ultrasound done for November 2023             -Brent Marcelino Jr., MD, AAHIVS      This office note has been dictated.  This dictation has been generated using M-Modal Fluency Direct dictation; some phonetic errors may occur.         There are no Patient Instructions on file for this visit.          No follow-ups on file.

## 2023-07-27 ENCOUNTER — TELEPHONE (OUTPATIENT)
Dept: FAMILY MEDICINE | Facility: CLINIC | Age: 38
End: 2023-07-27
Payer: COMMERCIAL

## 2023-08-01 ENCOUNTER — HOSPITAL ENCOUNTER (OUTPATIENT)
Dept: RADIOLOGY | Facility: OTHER | Age: 38
Discharge: HOME | End: 2023-08-01
Attending: INTERNAL MEDICINE
Payer: COMMERCIAL

## 2023-08-01 DIAGNOSIS — R10.31 RLQ ABDOMINAL PAIN: ICD-10-CM

## 2023-08-01 PROCEDURE — 74177 CT ABD & PELVIS W/CONTRAST: CPT | Mod: 26,,, | Performed by: RADIOLOGY

## 2023-08-01 PROCEDURE — 74177 CT ABD & PELVIS W/CONTRAST: CPT | Mod: TC

## 2023-08-01 PROCEDURE — 74177 CT ABDOMEN PELVIS WITH CONTRAST: ICD-10-PCS | Mod: 26,,, | Performed by: RADIOLOGY

## 2023-08-01 PROCEDURE — 25500020 PHARM REV CODE 255: Performed by: INTERNAL MEDICINE

## 2023-08-01 RX ADMIN — IOHEXOL 75 ML: 350 INJECTION, SOLUTION INTRAVENOUS at 05:08

## 2023-08-02 ENCOUNTER — PATIENT MESSAGE (OUTPATIENT)
Dept: ENDOCRINOLOGY | Facility: CLINIC | Age: 38
End: 2023-08-02
Payer: COMMERCIAL

## 2023-08-02 DIAGNOSIS — E06.3 HYPOTHYROIDISM DUE TO HASHIMOTO'S THYROIDITIS: Primary | Chronic | ICD-10-CM

## 2023-08-02 DIAGNOSIS — E03.8 HYPOTHYROIDISM DUE TO HASHIMOTO'S THYROIDITIS: Primary | Chronic | ICD-10-CM

## 2023-08-02 RX ORDER — LEVOTHYROXINE SODIUM 100 UG/1
100 TABLET ORAL
Qty: 30 TABLET | Refills: 4 | Status: SHIPPED | OUTPATIENT
Start: 2023-08-02 | End: 2023-12-05 | Stop reason: SDUPTHER

## 2023-10-06 ENCOUNTER — OFFICE VISIT (OUTPATIENT)
Dept: DERMATOLOGY | Facility: CLINIC | Age: 38
End: 2023-10-06
Payer: COMMERCIAL

## 2023-10-06 DIAGNOSIS — L81.1 MELASMA: Primary | ICD-10-CM

## 2023-10-06 DIAGNOSIS — L81.4 LENTIGINES: ICD-10-CM

## 2023-10-06 DIAGNOSIS — L82.1 SEBORRHEIC KERATOSIS: ICD-10-CM

## 2023-10-06 DIAGNOSIS — Z12.83 SKIN CANCER SCREENING: ICD-10-CM

## 2023-10-06 DIAGNOSIS — D22.9 MULTIPLE BENIGN NEVI: ICD-10-CM

## 2023-10-06 DIAGNOSIS — D18.01 ANGIOMA OF SKIN: ICD-10-CM

## 2023-10-06 PROCEDURE — 1160F RVW MEDS BY RX/DR IN RCRD: CPT | Mod: CPTII,S$GLB,, | Performed by: DERMATOLOGY

## 2023-10-06 PROCEDURE — 99214 PR OFFICE/OUTPT VISIT, EST, LEVL IV, 30-39 MIN: ICD-10-PCS | Mod: S$GLB,,, | Performed by: DERMATOLOGY

## 2023-10-06 PROCEDURE — 1160F PR REVIEW ALL MEDS BY PRESCRIBER/CLIN PHARMACIST DOCUMENTED: ICD-10-PCS | Mod: CPTII,S$GLB,, | Performed by: DERMATOLOGY

## 2023-10-06 PROCEDURE — 99214 OFFICE O/P EST MOD 30 MIN: CPT | Mod: S$GLB,,, | Performed by: DERMATOLOGY

## 2023-10-06 PROCEDURE — 1159F PR MEDICATION LIST DOCUMENTED IN MEDICAL RECORD: ICD-10-PCS | Mod: CPTII,S$GLB,, | Performed by: DERMATOLOGY

## 2023-10-06 PROCEDURE — 1159F MED LIST DOCD IN RCRD: CPT | Mod: CPTII,S$GLB,, | Performed by: DERMATOLOGY

## 2023-10-06 RX ORDER — TRETINOIN 0.25 MG/G
CREAM TOPICAL
Qty: 30 G | Refills: 5 | Status: SHIPPED | OUTPATIENT
Start: 2023-10-06

## 2023-10-06 NOTE — PATIENT INSTRUCTIONS
Your prescription has been sent to the compounding pharmacy [facility].  They are located in [place] at [address] just before the [address].  If you have any questions, please call the pharmacy at [phone].  Website: [email or web address]       RETINOIDS   Your Doctor has prescribed a topical retinoid for your skin.  A retinoid is a vitamin A-derived product used to treat a variety of skin conditions including acne, actinic keratoses (pre-skin cancers), uneven pigmentation from sun damage, fine lines and wrinkles, and enlarged pores.      How do they work?   Retinoids increase skin cell turn over from the normal 30 days to five or six days, minimizing clogged pores--the major factor in acne.  Retinoids can also repair the DNA in cells damaged by the sun, helping to even out skin pigmentation and clear pre-skin cancers.  They stimulate collagen remodeling and repair, reversing signs of maturing skin.   They can shrink oil glands and minimize the appearance of large pores. These effects can not be appreciated unless the medication is used on a consistent basis!    How do I use a retinoid?   After washing with a mild cleanser (CeraVe, Cetaphil, Neutrogena, Purpose), the skin should be moisturized with a non-retinol containing moisturizer such as Cerave cream or PM lotion. Then, a thin layer of medication is applied to the forehead, nose, cheeks, and chin (and around eyes if treating fine wrinkles) at night.  The amount of medication needed to cover the entire face should be no more than the size of a green pea. Irritation around the eyes can be treated with Vaseline at night.     What if my skin appears dry, red, and is peeling?   Retinoids do not cause dry skin but rather they cause the top layer of the skin to shed, giving an appearance of dry skin.  In fact, new healthy skin cells are replacing the older, damaged cells on the surface. This usually occurs the first 2-4 weeks as the skin is  adjusting to the medication.  It is reasonable to use the medication every other night or even every three nights until your skin adjusts.  You can use a MILD exfoliant to remove the peeling skin (Aveeno daily clarifying pads, Aqua glycolic face cream) and can apply a moisturizer throughout the day as needed. Retinoids come in a variety of strengths and vehicles, and your doctor can find one best for you.  If you cannot tolerate prescription strength retinoids, over the counter products with retinol may be beneficial (Olay ProX wrinkle cream, DOREEN deep wrinkle cream, Green Cream at [employer])    Will my skin be more sensitive in the sun?   You will need to use a sunscreen with SPF 30 daily.  Retinoids will cause the outermost layer of the skin to be thinner and thus more sensitive to ultraviolet rays.  However, remember that over time, retinoids actually make the skin thicker by enhancing collagen deposition which protects the skin from sun damage.     When will I see results?   If you are using a retinoid for acne, you should see some improvement in 6-8 weeks.  Do not be alarmed if you find that your acne gets WORSE before it gets better- KEEP USING THE MEDICATION- this is a normal response and your acne will improve if you can stick with it.     If you are using the medication for anti-aging and skin dyspigmentation, you may see results in 3 months, but most effects are not visible until 6 months.  Retinoids are clinically proven to reverse signs of aging, but only if used on a CONSISTENT BASIS!   *Remember that retinoids should not be used if you are pregnant.  *Discontinue use 1 week prior to waxing, as skin is more likely to tear.

## 2023-10-06 NOTE — PROGRESS NOTES
"  Patient Information  Name: Jossy Leslie  : 1985  MRN: [id number]     Referring Physician:  No ref. provider found   Primary Care Physician:  Brent Marcelino Jr., MD   Date of Visit: 10/06/2023      Subjective:     History of Present lllness:    Jossy Leslie is a 37 y.o. female who presents with a chief complaint of moles.  Patient is here today for a "mole" check.     Pt has a history of moderate sun exposure in the past.   Pt recalls several blistering sunburns in the past: yes  Pt has history of tanning bed use: yes  Pt has had moles removed in the past: yes-benign  Pt has personal history of skin cancer: no  Pt has family history of melanoma in first degree relatives: no    Today, patient has no additional complaints. Denies any new, changing, or symptomatic lesions on the skin.    Clinical documentation obtained by nursing staff reviewed.    Review of Systems   Skin:  Positive for daily sunscreen use and activity-related sunscreen use.       Objective:   Physical Exam   Constitutional: She appears well-developed and well-nourished. No distress.   Neurological: She is alert and oriented to person, place, and time. She is not disoriented.   Psychiatric: She has a normal mood and affect.   Skin:   Areas Examined (abnormalities noted in diagram):   Scalp / Hair Palpated and Inspected  Head / Face Inspection Performed  Neck Inspection Performed  Chest / Axilla Inspection Performed  Abdomen Inspection Performed  Genitals / Buttocks / Groin Inspection Performed  Back Inspection Performed  RUE Inspected  LUE Inspection Performed  RLE Inspected  LLE Inspection Performed  Nails and Digits Inspection Performed                     Diagram Legend     Erythematous scaling macule/papule c/w actinic keratosis       Vascular papule c/w angioma      Pigmented verrucoid papule/plaque c/w seborrheic keratosis      Yellow umbilicated papule c/w sebaceous hyperplasia      Irregularly shaped tan macule c/w lentigo     " 1-2 mm smooth white papules consistent with Milia      Movable subcutaneous cyst with punctum c/w epidermal inclusion cyst      Subcutaneous movable cyst c/w pilar cyst      Firm pink to brown papule c/w dermatofibroma      Pedunculated fleshy papule(s) c/w skin tag(s)      Evenly pigmented macule c/w junctional nevus     Mildly variegated pigmented, slightly irregular-bordered macule c/w mildly atypical nevus      Flesh colored to evenly pigmented papule c/w intradermal nevus       Pink pearly papule/plaque c/w basal cell carcinoma      Erythematous hyperkeratotic cursted plaque c/w SCC      Surgical scar with no sign of skin cancer recurrence      Open and closed comedones      Inflammatory papules and pustules      Verrucoid papule consistent consistent with wart     Erythematous eczematous patches and plaques     Dystrophic onycholytic nail with subungual debris c/w onychomycosis     Umbilicated papule    Erythematous-base heme-crusted tan verrucoid plaque consistent with inflamed seborrheic keratosis     Erythematous Silvery Scaling Plaque c/w Psoriasis     See annotation    No images are attached to the encounter or orders placed in the encounter.      [] Data reviewed  [] Prior external notes reviewed  [] Independent review of test  [] Management discussed with another provider  [] Independent historian    Assessment / Plan:        Melasma  - chronic problem, not at treatment goal  Discussed hormonal, solar, and genetic etiology. Emphasized difficulty in treatment and maintaining improvement.   Discussed importance of diligent daily sun protection with a tinted, broad-spectrum sunscreen with SPF 50+.  -     tretinoin (RETIN-A) 0.025 % cream; Compound tretinoin 0.025% / azelaic acid 8% / niacinamide 2% cream. Apply a pea-sized amount to entire face qhs.  Dispense: 30 g; Refill: 5    Multiple benign nevi  Multiple benign-appearing nevi present on exam today. Reassurance provided. Counseled patient to  periodically examine moles and return to clinic if any changes in size, shape, or color are noted or if it becomes symptomatic (bleeding, itching, pain, etc).  Recommend using a broad-spectrum, water-resistant sunscreen with SPF of 30 or higher--reapply every 2 hours. Seek shade, wear sun-protective clothing, and perform regular skin self-exams.    Lentigines  These are benign sun spots which should be monitored for changes. Daily sun protection will reduce the number of new lesions.   Recommend using a broad-spectrum, water-resistant sunscreen with SPF of 30 or higher--reapply every 2 hours. Seek shade, wear sun-protective clothing, and perform regular skin self-exams.    Seborrheic keratosis  These are benign, inherited growths without a malignant potential. Reassurance given to patient. No treatment is necessary.    Angioma of skin  These are benign vascular lesions that are inherited. Treatment is not necessary.    Skin cancer screening  Total body skin examination performed today as noted in physical exam. No lesions suspicious for malignancy were seen.  Recommend using a broad-spectrum, water-resistant sunscreen with SPF of 30 or higher--reapply every 2 hours. Seek shade, wear sun-protective clothing, and perform regular skin self-exams.      Follow up in about 1 year (around 10/6/2024) for follow up, or sooner if symptoms worsening or not improving.      Eliane Monreal MD, FAAD  [facility]

## 2023-10-23 ENCOUNTER — TELEPHONE (OUTPATIENT)
Dept: FAMILY MEDICINE | Facility: CLINIC | Age: 38
End: 2023-10-23
Payer: COMMERCIAL

## 2023-11-03 ENCOUNTER — HOSPITAL ENCOUNTER (OUTPATIENT)
Dept: RADIOLOGY | Facility: OTHER | Age: 38
Discharge: HOME | End: 2023-11-03
Attending: FAMILY MEDICINE
Payer: COMMERCIAL

## 2023-11-03 DIAGNOSIS — E04.1 THYROID NODULE: ICD-10-CM

## 2023-11-03 PROCEDURE — 76536 US EXAM OF HEAD AND NECK: CPT | Mod: 26,,, | Performed by: RADIOLOGY

## 2023-11-03 PROCEDURE — 76536 US THYROID: ICD-10-PCS | Mod: 26,,, | Performed by: RADIOLOGY

## 2023-11-03 PROCEDURE — 76536 US EXAM OF HEAD AND NECK: CPT | Mod: TC

## 2023-11-11 ENCOUNTER — LAB VISIT (OUTPATIENT)
Dept: LAB | Facility: HOSPITAL | Age: 38
End: 2023-11-11
Attending: FAMILY MEDICINE
Payer: COMMERCIAL

## 2023-11-11 DIAGNOSIS — E03.8 HYPOTHYROIDISM DUE TO HASHIMOTO'S THYROIDITIS: Chronic | ICD-10-CM

## 2023-11-11 DIAGNOSIS — I10 ESSENTIAL HYPERTENSION: Chronic | ICD-10-CM

## 2023-11-11 DIAGNOSIS — E06.3 HYPOTHYROIDISM DUE TO HASHIMOTO'S THYROIDITIS: Chronic | ICD-10-CM

## 2023-11-11 DIAGNOSIS — D50.0 IRON DEFICIENCY ANEMIA DUE TO CHRONIC BLOOD LOSS: ICD-10-CM

## 2023-11-11 DIAGNOSIS — E78.5 DYSLIPIDEMIA: ICD-10-CM

## 2023-11-11 LAB
ALBUMIN SERPL BCP-MCNC: 3.8 G/DL (ref 3.5–5.2)
ALP SERPL-CCNC: 52 U/L (ref 55–135)
ALT SERPL W/O P-5'-P-CCNC: 26 U/L (ref 10–44)
ANION GAP SERPL CALC-SCNC: 9 MMOL/L (ref 8–16)
AST SERPL-CCNC: 20 U/L (ref 10–40)
BASOPHILS # BLD AUTO: 0.07 K/UL (ref 0–0.2)
BASOPHILS NFR BLD: 1 % (ref 0–1.9)
BILIRUB SERPL-MCNC: 0.2 MG/DL (ref 0.1–1)
BUN SERPL-MCNC: 10 MG/DL (ref 6–20)
CALCIUM SERPL-MCNC: 9.1 MG/DL (ref 8.7–10.5)
CHLORIDE SERPL-SCNC: 109 MMOL/L (ref 95–110)
CHOLEST SERPL-MCNC: 170 MG/DL (ref 120–199)
CHOLEST/HDLC SERPL: 4.7 {RATIO} (ref 2–5)
CO2 SERPL-SCNC: 20 MMOL/L (ref 23–29)
CREAT SERPL-MCNC: 0.8 MG/DL (ref 0.5–1.4)
DIFFERENTIAL METHOD: ABNORMAL
EOSINOPHIL # BLD AUTO: 0.5 K/UL (ref 0–0.5)
EOSINOPHIL NFR BLD: 7.5 % (ref 0–8)
ERYTHROCYTE [DISTWIDTH] IN BLOOD BY AUTOMATED COUNT: 22.5 % (ref 11.5–14.5)
EST. GFR  (NO RACE VARIABLE): >60 ML/MIN/1.73 M^2
FERRITIN SERPL-MCNC: 8 NG/ML (ref 20–300)
GLUCOSE SERPL-MCNC: 93 MG/DL (ref 70–110)
HCT VFR BLD AUTO: 36.2 % (ref 37–48.5)
HDLC SERPL-MCNC: 36 MG/DL (ref 40–75)
HDLC SERPL: 21.2 % (ref 20–50)
HGB BLD-MCNC: 11.1 G/DL (ref 12–16)
IMM GRANULOCYTES # BLD AUTO: 0.02 K/UL (ref 0–0.04)
IMM GRANULOCYTES NFR BLD AUTO: 0.3 % (ref 0–0.5)
IRON SERPL-MCNC: 54 UG/DL (ref 30–160)
LDLC SERPL CALC-MCNC: 112.8 MG/DL (ref 63–159)
LYMPHOCYTES # BLD AUTO: 1.8 K/UL (ref 1–4.8)
LYMPHOCYTES NFR BLD: 26.1 % (ref 18–48)
MCH RBC QN AUTO: 22.7 PG (ref 27–31)
MCHC RBC AUTO-ENTMCNC: 30.7 G/DL (ref 32–36)
MCV RBC AUTO: 74 FL (ref 82–98)
MONOCYTES # BLD AUTO: 0.6 K/UL (ref 0.3–1)
MONOCYTES NFR BLD: 9.1 % (ref 4–15)
NEUTROPHILS # BLD AUTO: 3.8 K/UL (ref 1.8–7.7)
NEUTROPHILS NFR BLD: 56 % (ref 38–73)
NONHDLC SERPL-MCNC: 134 MG/DL
NRBC BLD-RTO: 0 /100 WBC
PLATELET # BLD AUTO: 359 K/UL (ref 150–450)
PMV BLD AUTO: 10.6 FL (ref 9.2–12.9)
POTASSIUM SERPL-SCNC: 4.1 MMOL/L (ref 3.5–5.1)
PROT SERPL-MCNC: 7.1 G/DL (ref 6–8.4)
RBC # BLD AUTO: 4.9 M/UL (ref 4–5.4)
SATURATED IRON: 11 % (ref 20–50)
SODIUM SERPL-SCNC: 138 MMOL/L (ref 136–145)
T4 FREE SERPL-MCNC: 0.81 NG/DL (ref 0.71–1.51)
TOTAL IRON BINDING CAPACITY: 477 UG/DL (ref 250–450)
TRANSFERRIN SERPL-MCNC: 322 MG/DL (ref 200–375)
TRIGL SERPL-MCNC: 106 MG/DL (ref 30–150)
TSH SERPL DL<=0.005 MIU/L-ACNC: 1.24 UIU/ML (ref 0.4–4)
WBC # BLD AUTO: 6.83 K/UL (ref 3.9–12.7)

## 2023-11-11 PROCEDURE — 84439 ASSAY OF FREE THYROXINE: CPT | Performed by: FAMILY MEDICINE

## 2023-11-11 PROCEDURE — 80061 LIPID PANEL: CPT | Performed by: FAMILY MEDICINE

## 2023-11-11 PROCEDURE — 80053 COMPREHEN METABOLIC PANEL: CPT | Performed by: FAMILY MEDICINE

## 2023-11-11 PROCEDURE — 85025 COMPLETE CBC W/AUTO DIFF WBC: CPT | Performed by: FAMILY MEDICINE

## 2023-11-11 PROCEDURE — 36415 COLL VENOUS BLD VENIPUNCTURE: CPT | Mod: PO | Performed by: FAMILY MEDICINE

## 2023-11-11 PROCEDURE — 84466 ASSAY OF TRANSFERRIN: CPT | Performed by: FAMILY MEDICINE

## 2023-11-11 PROCEDURE — 83540 ASSAY OF IRON: CPT | Performed by: FAMILY MEDICINE

## 2023-11-11 PROCEDURE — 82728 ASSAY OF FERRITIN: CPT | Performed by: FAMILY MEDICINE

## 2023-11-11 PROCEDURE — 84443 ASSAY THYROID STIM HORMONE: CPT | Performed by: FAMILY MEDICINE

## 2023-11-29 ENCOUNTER — PATIENT MESSAGE (OUTPATIENT)
Dept: OBSTETRICS AND GYNECOLOGY | Facility: CLINIC | Age: 38
End: 2023-11-29
Payer: COMMERCIAL

## 2023-12-05 ENCOUNTER — OFFICE VISIT (OUTPATIENT)
Dept: ENDOCRINOLOGY | Facility: CLINIC | Age: 38
End: 2023-12-05
Payer: COMMERCIAL

## 2023-12-05 DIAGNOSIS — E03.8 HYPOTHYROIDISM DUE TO HASHIMOTO'S THYROIDITIS: Primary | Chronic | ICD-10-CM

## 2023-12-05 DIAGNOSIS — E06.3 HYPOTHYROIDISM DUE TO HASHIMOTO'S THYROIDITIS: Primary | Chronic | ICD-10-CM

## 2023-12-05 DIAGNOSIS — E04.1 THYROID NODULE: Chronic | ICD-10-CM

## 2023-12-05 PROCEDURE — 99214 OFFICE O/P EST MOD 30 MIN: CPT | Mod: 95,,, | Performed by: INTERNAL MEDICINE

## 2023-12-05 PROCEDURE — 99214 PR OFFICE/OUTPT VISIT, EST, LEVL IV, 30-39 MIN: ICD-10-PCS | Mod: 95,,, | Performed by: INTERNAL MEDICINE

## 2023-12-05 RX ORDER — LEVOTHYROXINE SODIUM 100 UG/1
100 TABLET ORAL
Qty: 30 TABLET | Refills: 11 | Status: SHIPPED | OUTPATIENT
Start: 2023-12-05 | End: 2023-12-12 | Stop reason: SDUPTHER

## 2023-12-05 NOTE — ASSESSMENT & PLAN NOTE
Appears clinically and biochemically euthyroid.     Discussed importance of taking thyroid hormone before breakfast and without any other pills or vitamins. Can take thyroid hormone at night if abstains from food for two hours prior to bedtime.   Discussed we may need to change dosage of thyroid hormone if she starts new medications (such as SSRIs, estrogens) or becomes pregnant. If she becomes pregnant must call the office so we can increase her thyroid hormone medication and arrange for blood work.    Continue current dose and follow up with primary care physician for labs and annual follow up.

## 2023-12-05 NOTE — PROGRESS NOTES
Subjective:      Patient ID: Jossy Leslie is a 38 y.o. female.    Chief Complaint:  No chief complaint on file.      History of Present Illness    Hypothyroidism diagnosed 2018, secondary to Hashimoto's.  Reports improved compliance with medication regimen. Moved other medications to evening.   Currently feeling well. Denies any current symptoms including weight gain, inability to lose weight, cold intolerance, depressed mood, fatigue or constipation.   Denies any symptoms of over-treatment including, difficulty getting to sleep, restlessness, anxiety or irritability.     Denies neck enlargement, hoarseness, dysphagia    Menstrual cycles occur every 28 - 32 days   Plans for pregnancy: denies .    New medications: dupixent one month ago   Supplements: selenium but does not take daily   Takes MVI, fish oil, vitamin D and B  Biotin: sometimes     Denies amiodarone, lithium use or contrast exposure in the past six weeks.   Had CT with contrast 8/2023  Current medication is levothyroxine 100 mcg daily since 8/2023  Takes it properly without food first thing in the morning with water. Waits 30 - 45 minutes before breakfast or other pills.    Review of Systems  Denies recent illness     Objective:   Physical Exam  There were no vitals filed for this visit.    BP Readings from Last 3 Encounters:   06/05/23 102/78   01/19/23 128/84   01/13/23 116/84     Wt Readings from Last 1 Encounters:   06/05/23 1112 81.1 kg (178 lb 12.7 oz)         There is no height or weight on file to calculate BMI.    Lab Review:   Lab Results   Component Value Date    HGBA1C 5.4 12/09/2022     Lab Results   Component Value Date    CHOL 170 11/11/2023    HDL 36 (L) 11/11/2023    LDLCALC 112.8 11/11/2023    TRIG 106 11/11/2023    CHOLHDL 21.2 11/11/2023     Lab Results   Component Value Date     11/11/2023    K 4.1 11/11/2023     11/11/2023    CO2 20 (L) 11/11/2023    GLU 93 11/11/2023    BUN 10 11/11/2023    CREATININE 0.8 11/11/2023     CALCIUM 9.1 11/11/2023    PROT 7.1 11/11/2023    ALBUMIN 3.8 11/11/2023    BILITOT 0.2 11/11/2023    ALKPHOS 52 (L) 11/11/2023    AST 20 11/11/2023    ALT 26 11/11/2023    ANIONGAP 9 11/11/2023    ESTGFRAFRICA >60.0 03/19/2022    EGFRNONAA >60.0 03/19/2022    TSH 1.242 11/11/2023         Assessment and Plan     Hypothyroidism due to Hashimoto's thyroiditis  Appears clinically and biochemically euthyroid.     Discussed importance of taking thyroid hormone before breakfast and without any other pills or vitamins. Can take thyroid hormone at night if abstains from food for two hours prior to bedtime.   Discussed we may need to change dosage of thyroid hormone if she starts new medications (such as SSRIs, estrogens) or becomes pregnant. If she becomes pregnant must call the office so we can increase her thyroid hormone medication and arrange for blood work.    Continue current dose and follow up with primary care physician for labs and annual follow up.       Thyroid nodule  Small heterogeneous thyroid gland, slightly decreased in size from the 11/26/2018 exam.  No discrete/ concerning nodule identified.  Does not need US of thyroid in the near future    The patient location is: work  The chief complaint leading to consultation is: hypo    Visit type: audiovisual    Face to Face time with patient: 10 minutes of total time spent on the encounter, which includes face to face time and non-face to face time preparing to see the patient (eg, review of tests), Obtaining and/or reviewing separately obtained history, Documenting clinical information in the electronic or other health record, Independently interpreting results (not separately reported) and communicating results to the patient/family/caregiver, or Care coordination (not separately reported).         Each patient to whom he or she provides medical services by telemedicine is:  (1) informed of the relationship between the physician and patient and the respective  role of any other health care provider with respect to management of the patient; and (2) notified that he or she may decline to receive medical services by telemedicine and may withdraw from such care at any time.    Notes:

## 2023-12-05 NOTE — ASSESSMENT & PLAN NOTE
Small heterogeneous thyroid gland, slightly decreased in size from the 11/26/2018 exam.  No discrete/ concerning nodule identified.  Does not need US of thyroid in the near future

## 2023-12-12 ENCOUNTER — PATIENT MESSAGE (OUTPATIENT)
Dept: ENDOCRINOLOGY | Facility: CLINIC | Age: 38
End: 2023-12-12
Payer: COMMERCIAL

## 2023-12-12 DIAGNOSIS — E03.8 HYPOTHYROIDISM DUE TO HASHIMOTO'S THYROIDITIS: Chronic | ICD-10-CM

## 2023-12-12 DIAGNOSIS — E06.3 HYPOTHYROIDISM DUE TO HASHIMOTO'S THYROIDITIS: Chronic | ICD-10-CM

## 2023-12-12 RX ORDER — LEVOTHYROXINE SODIUM 100 UG/1
100 TABLET ORAL
Qty: 90 TABLET | Refills: 3 | Status: SHIPPED | OUTPATIENT
Start: 2023-12-12 | End: 2024-12-11

## 2023-12-19 ENCOUNTER — OFFICE VISIT (OUTPATIENT)
Dept: FAMILY MEDICINE | Facility: CLINIC | Age: 38
End: 2023-12-19
Payer: COMMERCIAL

## 2023-12-19 ENCOUNTER — LAB VISIT (OUTPATIENT)
Dept: LAB | Facility: HOSPITAL | Age: 38
End: 2023-12-19
Attending: FAMILY MEDICINE
Payer: COMMERCIAL

## 2023-12-19 VITALS
TEMPERATURE: 98 F | WEIGHT: 184.94 LBS | BODY MASS INDEX: 34.03 KG/M2 | HEART RATE: 96 BPM | HEIGHT: 62 IN | OXYGEN SATURATION: 96 % | SYSTOLIC BLOOD PRESSURE: 126 MMHG | DIASTOLIC BLOOD PRESSURE: 86 MMHG

## 2023-12-19 DIAGNOSIS — D64.9 NORMOCYTIC ANEMIA: Chronic | ICD-10-CM

## 2023-12-19 DIAGNOSIS — I73.00 RAYNAUD'S DISEASE WITHOUT GANGRENE: ICD-10-CM

## 2023-12-19 DIAGNOSIS — D64.9 NORMOCYTIC ANEMIA: ICD-10-CM

## 2023-12-19 DIAGNOSIS — I10 ESSENTIAL HYPERTENSION: Primary | Chronic | ICD-10-CM

## 2023-12-19 DIAGNOSIS — N80.9 ENDOMETRIOSIS: Chronic | ICD-10-CM

## 2023-12-19 LAB
BASOPHILS # BLD AUTO: 0.04 K/UL (ref 0–0.2)
BASOPHILS NFR BLD: 0.7 % (ref 0–1.9)
DIFFERENTIAL METHOD: ABNORMAL
EOSINOPHIL # BLD AUTO: 0.2 K/UL (ref 0–0.5)
EOSINOPHIL NFR BLD: 3 % (ref 0–8)
ERYTHROCYTE [DISTWIDTH] IN BLOOD BY AUTOMATED COUNT: 18.1 % (ref 11.5–14.5)
FERRITIN SERPL-MCNC: 4 NG/ML (ref 20–300)
HCT VFR BLD AUTO: 35.2 % (ref 37–48.5)
HGB BLD-MCNC: 10.6 G/DL (ref 12–16)
IMM GRANULOCYTES # BLD AUTO: 0.01 K/UL (ref 0–0.04)
IMM GRANULOCYTES NFR BLD AUTO: 0.2 % (ref 0–0.5)
IRON SERPL-MCNC: 18 UG/DL (ref 30–160)
LYMPHOCYTES # BLD AUTO: 1.8 K/UL (ref 1–4.8)
LYMPHOCYTES NFR BLD: 31.8 % (ref 18–48)
MCH RBC QN AUTO: 22.5 PG (ref 27–31)
MCHC RBC AUTO-ENTMCNC: 30.1 G/DL (ref 32–36)
MCV RBC AUTO: 75 FL (ref 82–98)
MONOCYTES # BLD AUTO: 0.4 K/UL (ref 0.3–1)
MONOCYTES NFR BLD: 7.9 % (ref 4–15)
NEUTROPHILS # BLD AUTO: 3.2 K/UL (ref 1.8–7.7)
NEUTROPHILS NFR BLD: 56.4 % (ref 38–73)
NRBC BLD-RTO: 0 /100 WBC
PLATELET # BLD AUTO: 367 K/UL (ref 150–450)
PMV BLD AUTO: 10.9 FL (ref 9.2–12.9)
RBC # BLD AUTO: 4.72 M/UL (ref 4–5.4)
SATURATED IRON: 4 % (ref 20–50)
TOTAL IRON BINDING CAPACITY: 453 UG/DL (ref 250–450)
TRANSFERRIN SERPL-MCNC: 306 MG/DL (ref 200–375)
WBC # BLD AUTO: 5.6 K/UL (ref 3.9–12.7)

## 2023-12-19 PROCEDURE — 1160F RVW MEDS BY RX/DR IN RCRD: CPT | Mod: CPTII,S$GLB,, | Performed by: FAMILY MEDICINE

## 2023-12-19 PROCEDURE — 1159F MED LIST DOCD IN RCRD: CPT | Mod: CPTII,S$GLB,, | Performed by: FAMILY MEDICINE

## 2023-12-19 PROCEDURE — 3074F SYST BP LT 130 MM HG: CPT | Mod: CPTII,S$GLB,, | Performed by: FAMILY MEDICINE

## 2023-12-19 PROCEDURE — 36415 COLL VENOUS BLD VENIPUNCTURE: CPT | Mod: PN | Performed by: FAMILY MEDICINE

## 2023-12-19 PROCEDURE — 85025 COMPLETE CBC W/AUTO DIFF WBC: CPT | Performed by: FAMILY MEDICINE

## 2023-12-19 PROCEDURE — 99214 OFFICE O/P EST MOD 30 MIN: CPT | Mod: S$GLB,,, | Performed by: FAMILY MEDICINE

## 2023-12-19 PROCEDURE — 3079F DIAST BP 80-89 MM HG: CPT | Mod: CPTII,S$GLB,, | Performed by: FAMILY MEDICINE

## 2023-12-19 PROCEDURE — 82728 ASSAY OF FERRITIN: CPT | Performed by: FAMILY MEDICINE

## 2023-12-19 PROCEDURE — 84466 ASSAY OF TRANSFERRIN: CPT | Performed by: FAMILY MEDICINE

## 2023-12-19 PROCEDURE — 3008F BODY MASS INDEX DOCD: CPT | Mod: CPTII,S$GLB,, | Performed by: FAMILY MEDICINE

## 2023-12-19 PROCEDURE — 83540 ASSAY OF IRON: CPT | Performed by: FAMILY MEDICINE

## 2023-12-19 PROCEDURE — 99999 PR PBB SHADOW E&M-EST. PATIENT-LVL V: CPT | Mod: PBBFAC,,, | Performed by: FAMILY MEDICINE

## 2023-12-19 NOTE — PROGRESS NOTES
"  Patient Name: Jossy Leslie    : 1985  MRN: [id number]      Subjective:     Patient ID: Jossy is a 38 y.o. female    Chief Complaint:  Hypertension    38-year-old female presents for follow-up on hypertension.  He is taking amlodipine.  She does report some swelling with the amlodipine.    Has been on Dupoxent for a little over a month and feels shortness of breath is a bit better.          Review of Systems   Constitutional:  Negative for unexpected weight change.   HENT:  Negative for ear pain and sore throat.    Eyes:  Negative for visual disturbance.   Respiratory:  Negative for shortness of breath.    Cardiovascular:  Positive for leg swelling. Negative for chest pain.   Gastrointestinal:  Negative for abdominal pain and blood in stool.   Genitourinary:  Negative for dysuria and frequency.   Integumentary:  Negative for rash.   Neurological:  Negative for weakness, numbness and headaches.   Hematological:  Negative for adenopathy.   Psychiatric/Behavioral:  Negative for suicidal ideas.         Objective:   /86 (BP Location: Left arm, Patient Position: Sitting, BP Method: Medium (Manual))   Pulse 96   Temp 98.3 °F (36.8 °C) (Oral)   Ht 5' 2" (1.575 m)   Wt 83.9 kg (184 lb 15.5 oz)   LMP 12/15/2023 (Exact Date)   SpO2 96%   BMI 33.83 kg/m²     Physical Exam  Vitals reviewed.   Constitutional:       General: She is not in acute distress.     Appearance: She is well-developed. She is not ill-appearing.   HENT:      Head: Normocephalic and atraumatic.      Right Ear: External ear normal.      Left Ear: External ear normal.      Nose: Nose normal.      Mouth/Throat:      Pharynx: No oropharyngeal exudate.   Eyes:      General:         Right eye: No discharge.         Left eye: No discharge.      Conjunctiva/sclera: Conjunctivae normal.      Pupils: Pupils are equal, round, and reactive to light.   Neck:      Trachea: No tracheal deviation.   Cardiovascular:      Rate and Rhythm: Normal rate and " regular rhythm.      Heart sounds: Normal heart sounds. No murmur heard.  Pulmonary:      Effort: Pulmonary effort is normal.      Breath sounds: Normal breath sounds. No wheezing or rales.   Abdominal:      General: Bowel sounds are normal.      Palpations: Abdomen is soft. Abdomen is not rigid. There is no mass.      Tenderness: There is no abdominal tenderness. There is no guarding.   Musculoskeletal:      Cervical back: Normal range of motion and neck supple.   Lymphadenopathy:      Cervical: No cervical adenopathy.   Neurological:      Mental Status: She is alert and oriented to person, place, and time.      Gait: Gait normal.   Psychiatric:         Mood and Affect: Mood normal.         Behavior: Behavior normal.        Assessment        ICD-10-CM ICD-9-CM   1. Essential hypertension  I10 401.9   2. Normocytic anemia  D64.9 285.9   3. Endometriosis  N80.9 617.9   4. Raynaud's disease without gangrene  I73.00 443.0         Plan:     1. Essential hypertension  Overview:  BP Readings from Last 3 Encounters:   01/02/24 124/88   12/19/23 126/86   06/05/23 102/78       Assessment & Plan:  Systolic pressure controlled.  However diastolic slightly above goal.  Discussed potentially adjusting her amlodipine to an alternative medication, as she is having swelling with amlodipine.      2. Normocytic anemia  -     CBC Auto Differential; Future; Expected date: 12/19/2023  -     Iron and TIBC; Future; Expected date: 12/19/2023  -     Ferritin; Future; Expected date: 12/19/2023  Check iron studies.    3. Endometriosis    4. Raynaud's disease without gangrene  Currently on a calcium channel blocker however may need adjustment given swelling in lower extremities             -Brent Marcelino Jr., MD, AAHIVS      This office note has been dictated.  This dictation has been generated using [employer] Fluency Direct dictation; some phonetic errors may occur.         Patient Instructions   Try doing a low dose iron daily        Follow  Up  No follow-ups on file.    Future Appointments   Date Time Provider Department Center   1/30/2024 10:30 AM CHAIR 05 WB [facility]

## 2023-12-20 ENCOUNTER — PATIENT MESSAGE (OUTPATIENT)
Dept: FAMILY MEDICINE | Facility: CLINIC | Age: 38
End: 2023-12-20
Payer: COMMERCIAL

## 2023-12-27 RX ORDER — EPINEPHRINE 0.3 MG/.3ML
0.3 INJECTION SUBCUTANEOUS ONCE AS NEEDED
Status: CANCELLED | OUTPATIENT
Start: 2023-12-27

## 2023-12-27 RX ORDER — SODIUM CHLORIDE 0.9 % (FLUSH) 0.9 %
10 SYRINGE (ML) INJECTION
Status: CANCELLED | OUTPATIENT
Start: 2023-12-27

## 2023-12-27 RX ORDER — DIPHENHYDRAMINE HYDROCHLORIDE 50 MG/ML
50 INJECTION INTRAMUSCULAR; INTRAVENOUS ONCE AS NEEDED
Status: CANCELLED | OUTPATIENT
Start: 2023-12-27

## 2023-12-27 RX ORDER — HEPARIN 100 UNIT/ML
500 SYRINGE INTRAVENOUS
Status: CANCELLED | OUTPATIENT
Start: 2023-12-27

## 2023-12-27 RX ORDER — NIFEDIPINE 60 MG/1
60 TABLET, EXTENDED RELEASE ORAL DAILY
Qty: 30 TABLET | Refills: 0 | Status: SHIPPED | OUTPATIENT
Start: 2023-12-27

## 2024-01-02 ENCOUNTER — HOSPITAL ENCOUNTER (EMERGENCY)
Facility: HOSPITAL | Age: 39
Discharge: HOME | End: 2024-01-02
Attending: INTERNAL MEDICINE
Payer: COMMERCIAL

## 2024-01-02 VITALS
TEMPERATURE: 98 F | HEART RATE: 86 BPM | HEIGHT: 62 IN | DIASTOLIC BLOOD PRESSURE: 88 MMHG | RESPIRATION RATE: 18 BRPM | BODY MASS INDEX: 34.78 KG/M2 | OXYGEN SATURATION: 97 % | WEIGHT: 189 LBS | SYSTOLIC BLOOD PRESSURE: 124 MMHG

## 2024-01-02 DIAGNOSIS — G43.709 CHRONIC MIGRAINE WITHOUT AURA WITHOUT STATUS MIGRAINOSUS, NOT INTRACTABLE: ICD-10-CM

## 2024-01-02 DIAGNOSIS — G43.909 MIGRAINE WITHOUT STATUS MIGRAINOSUS, NOT INTRACTABLE, UNSPECIFIED MIGRAINE TYPE: Primary | ICD-10-CM

## 2024-01-02 LAB
ALBUMIN SERPL-MCNC: 3.7 G/DL (ref 3.3–5.5)
ALP SERPL-CCNC: 48 U/L (ref 42–141)
B-HCG UR QL: NEGATIVE
BILIRUB SERPL-MCNC: 0.5 MG/DL (ref 0.2–1.6)
BUN SERPL-MCNC: 10 MG/DL (ref 7–22)
CALCIUM SERPL-MCNC: 9.3 MG/DL (ref 8–10.3)
CHLORIDE SERPL-SCNC: 104 MMOL/L (ref 98–108)
CREAT SERPL-MCNC: 0.7 MG/DL (ref 0.6–1.2)
CTP QC/QA: YES
GLUCOSE SERPL-MCNC: 130 MG/DL (ref 73–118)
HCT, POC: NORMAL
HGB, POC: NORMAL (ref 14–18)
MCH, POC: NORMAL
MCHC, POC: NORMAL
MCV, POC: NORMAL
MPV, POC: NORMAL
POC ALT (SGPT): 36 U/L (ref 10–47)
POC AST (SGOT): 30 U/L (ref 11–38)
POC PLATELET COUNT: NORMAL
POC TCO2: 28 MMOL/L (ref 18–33)
POTASSIUM BLD-SCNC: 4.3 MMOL/L (ref 3.6–5.1)
PROTEIN, POC: 7.1 G/DL (ref 6.4–8.1)
RBC, POC: NORMAL
RDW, POC: NORMAL
SODIUM BLD-SCNC: 137 MMOL/L (ref 128–145)
WBC, POC: NORMAL

## 2024-01-02 PROCEDURE — 25000003 PHARM REV CODE 250: Mod: ER | Performed by: INTERNAL MEDICINE

## 2024-01-02 PROCEDURE — 96360 HYDRATION IV INFUSION INIT: CPT | Mod: ER

## 2024-01-02 PROCEDURE — 80053 COMPREHEN METABOLIC PANEL: CPT | Mod: ER

## 2024-01-02 PROCEDURE — 96372 THER/PROPH/DIAG INJ SC/IM: CPT | Performed by: INTERNAL MEDICINE

## 2024-01-02 PROCEDURE — 81025 URINE PREGNANCY TEST: CPT | Mod: ER | Performed by: INTERNAL MEDICINE

## 2024-01-02 PROCEDURE — 81025 URINE PREGNANCY TEST: CPT | Mod: ER

## 2024-01-02 PROCEDURE — 63600175 PHARM REV CODE 636 W HCPCS: Mod: ER | Performed by: INTERNAL MEDICINE

## 2024-01-02 PROCEDURE — 99284 EMERGENCY DEPT VISIT MOD MDM: CPT | Mod: 25,ER

## 2024-01-02 RX ORDER — ONDANSETRON 2 MG/ML
4 INJECTION INTRAMUSCULAR; INTRAVENOUS
Status: DISCONTINUED | OUTPATIENT
Start: 2024-01-02 | End: 2024-01-02

## 2024-01-02 RX ORDER — ONDANSETRON 4 MG/1
4 TABLET, ORALLY DISINTEGRATING ORAL EVERY 8 HOURS PRN
Qty: 10 TABLET | Refills: 0 | Status: SHIPPED | OUTPATIENT
Start: 2024-01-02

## 2024-01-02 RX ORDER — SUMATRIPTAN SUCCINATE 100 MG/1
100 TABLET ORAL
Qty: 9 TABLET | Refills: 5 | Status: SHIPPED | OUTPATIENT
Start: 2024-01-02

## 2024-01-02 RX ORDER — SUMATRIPTAN 6 MG/.5ML
6 INJECTION, SOLUTION SUBCUTANEOUS
Status: COMPLETED | OUTPATIENT
Start: 2024-01-02 | End: 2024-01-02

## 2024-01-02 RX ORDER — PROMETHAZINE HYDROCHLORIDE 25 MG/1
25 SUPPOSITORY RECTAL EVERY 6 HOURS PRN
Qty: 10 SUPPOSITORY | Refills: 0 | Status: SHIPPED | OUTPATIENT
Start: 2024-01-02

## 2024-01-02 RX ORDER — PROMETHAZINE HYDROCHLORIDE 25 MG/ML
25 INJECTION, SOLUTION INTRAMUSCULAR; INTRAVENOUS
Status: COMPLETED | OUTPATIENT
Start: 2024-01-02 | End: 2024-01-02

## 2024-01-02 RX ADMIN — SUMATRIPTAN 6 MG: 6 INJECTION, SOLUTION SUBCUTANEOUS at 06:01

## 2024-01-02 RX ADMIN — PROMETHAZINE HYDROCHLORIDE 25 MG: 25 INJECTION INTRAMUSCULAR; INTRAVENOUS at 06:01

## 2024-01-02 RX ADMIN — SODIUM CHLORIDE 2000 ML: 9 INJECTION, SOLUTION INTRAVENOUS at 06:01

## 2024-01-02 NOTE — ED PROVIDER NOTES
Encounter Date: 1/2/2024       History     Chief Complaint   Patient presents with    Headache     Reports persistent headache w/ nausea/vomiting since last Wednesday. Reports positive home covid test at that time also.      The history is provided by the patient. No  was used.   Headache   This is a recurrent problem. The current episode started in the past 7 days. The problem occurs intermittently. The problem has been unchanged. The pain is located in the Frontal region. The pain does not radiate. The pain quality is similar to prior headaches. The quality of the pain is described as aching. The pain is at a severity of 5/10. Associated symptoms include nausea and vomiting. Pertinent negatives include no abdominal pain, fever, muscle aches, numbness, seizures, sore throat, visual change or weakness. The symptoms are aggravated by bright light and noise. She has tried NSAIDs for the symptoms. The treatment provided mild relief. Her past medical history is significant for migraine headaches.     Review of patient's allergies indicates:   Allergen Reactions    Pumpkin Anaphylaxis    Corn containing products     Shellfish containing products Itching    Soy     Wheat containing prod      Past Medical History:   Diagnosis Date    Asthma     Duodenal ulcer     [facility] 2015    Eosinophilic esophagitis     2019    Hiatal hernia     Hypertension 02/19/2021    Hypothyroidism due to Hashimoto's thyroiditis     Ovarian cyst     Thyroid disease      Past Surgical History:   Procedure Laterality Date    ESOPHAGOGASTRODUODENOSCOPY      ESOPHAGOGASTRODUODENOSCOPY N/A 04/18/2019    Procedure: ESOPHAGOGASTRODUODENOSCOPY (EGD);  Surgeon: Karmen Marquez MD;  Location: [facility] ENDO;  Service: Endoscopy;  Laterality: N/A;    HYSTEROSCOPY N/A 05/17/2019    Procedure: HYSTEROSCOPY;  Surgeon: Eli Blevins MD;  Location: [facility] OR;  Service: OB/GYN;  Laterality: N/A;    LAPAROSCOPIC SURGICAL REMOVAL OF CYST OF OVARY Right  05/17/2019    Procedure: EXCISION, CYST, OVARY, LAPAROSCOPIC;  Surgeon: Eli Blevins MD;  Location: [facility];  Service: OB/GYN;  Laterality: Right;  video    TONSILLECTOMY       Family History   Problem Relation Age of Onset    Hypertension Mother     Depression Mother     Hypertension Father     Hyperlipidemia Father     Heart disease Maternal Grandmother     Colon cancer Maternal Grandfather 70    Diabetes Paternal Grandmother     Asbestos Paternal Grandmother     Heart attack Paternal Grandfather 70    Hypertension Sister     No Known Problems Maternal Aunt     No Known Problems Maternal Uncle     No Known Problems Paternal Aunt     No Known Problems Paternal Uncle     Hypertension Other     Breast cancer Paternal Aunt 50    Amblyopia Neg Hx     Blindness Neg Hx     Cataracts Neg Hx     Glaucoma Neg Hx     Macular degeneration Neg Hx     Retinal detachment Neg Hx     Strabismus Neg Hx     Stroke Neg Hx     Thyroid disease Neg Hx     Ovarian cancer Neg Hx      Social History     Tobacco Use    Smoking status: Never     Passive exposure: Never    Smokeless tobacco: Never   Substance Use Topics    Alcohol use: Yes     Comment: socially, minimal    Drug use: No     Review of Systems   Constitutional:  Negative for fever.   HENT:  Negative for sore throat.    Gastrointestinal:  Positive for nausea and vomiting. Negative for abdominal pain.   Neurological:  Positive for headaches. Negative for seizures, weakness and numbness.       Physical Exam     Initial Vitals [01/02/24 0538]   BP Pulse Resp Temp SpO2   132/89 91 18 98.3 °F (36.8 °C) 97 %      MAP       --         Physical Exam    Nursing note and vitals reviewed.  Constitutional: She appears well-developed and well-nourished.   HENT:   Head: Normocephalic and atraumatic.   Right Ear: External ear normal.   Left Ear: External ear normal.   Eyes: Conjunctivae and EOM are normal. Pupils are equal, round, and reactive to light.   Neck:   Normal range of  motion.  Cardiovascular:  Normal rate, regular rhythm and normal heart sounds.           Pulmonary/Chest: Breath sounds normal. No respiratory distress. She has no wheezes.   Abdominal: Abdomen is soft. Bowel sounds are normal. There is no abdominal tenderness.   Musculoskeletal:         General: Normal range of motion.      Cervical back: Normal range of motion.     Neurological: She is alert and oriented to person, place, and time. She has normal strength. No sensory deficit. GCS score is 15. GCS eye subscore is 4. GCS verbal subscore is 5. GCS motor subscore is 6.   Skin: Skin is warm. Capillary refill takes less than 2 seconds.   Psychiatric: She has a normal mood and affect. Thought content normal.         ED Course   Procedures  Labs Reviewed   POCT CMP - Abnormal; Notable for the following components:       Result Value    POC Glucose 130 (*)     All other components within normal limits   POCT URINE PREGNANCY   POCT CBC   POCT CMP          Imaging Results    None          Medications   sodium chloride 0.9% bolus 2,000 mL 2,000 mL (2,000 mLs Intravenous New Bag 1/2/24 0610)   promethazine injection 25 mg (25 mg Intramuscular Given 1/2/24 0603)   SUMAtriptan succinate injection 6 mg (6 mg Subcutaneous Given 1/2/24 0625)     Medical Decision Making  Course of ED stay:   Patient received normal saline bolus, Toradol, Phenergan and sumatriptan in the emergency department.  She states as much improved requests prescription for sumatriptan.  Prescriptions for sumatriptan and Zofran were given as well as instructions for migraine.  Patient was advised to follow-up with her primary care physician within the next week for re-evaluation/return to the emergency department if condition worsens.    Amount and/or Complexity of Data Reviewed  Labs: ordered.    Risk  Prescription drug management.                                      Clinical Impression:  Final diagnoses:  [G43.909] Migraine without status migrainosus, not  intractable, unspecified migraine type (Primary)          ED Disposition Condition    Discharge Stable          ED Prescriptions       Medication Sig Dispense Start Date End Date Auth. Provider    sumatriptan (IMITREX) 100 MG tablet Take 1 tablet (100 mg total) by mouth as needed for Migraine. Take at the onset of headache, may take 1 more in 1 hour if needed. 9 tablet 1/2/2024 -- Rom Nayak MD    ondansetron (ZOFRAN-ODT) 4 MG TbDL Take 1 tablet (4 mg total) by mouth every 8 (eight) hours as needed (Nausea). 10 tablet 1/2/2024 -- Rom Nayak MD    promethazine (PHENERGAN) 25 MG suppository Place 1 suppository (25 mg total) rectally every 6 (six) hours as needed for Nausea. 10 suppository 1/2/2024 -- Rom Nayak MD          Follow-up Information       Follow up With Specialties Details Why Contact Info    Brent Marcelino Jr., MD Family Medicine Schedule an appointment as soon as possible for a visit in 1 week For reevaluation [address] [facility] [address]  [phone]               Rom Nayak MD  01/02/24 1827

## 2024-01-16 NOTE — ASSESSMENT & PLAN NOTE
Systolic pressure controlled.  However diastolic slightly above goal.  Discussed potentially adjusting her amlodipine to an alternative medication, as she is having swelling with amlodipine.

## 2024-01-30 ENCOUNTER — INFUSION (OUTPATIENT)
Dept: INFUSION THERAPY | Facility: HOSPITAL | Age: 39
End: 2024-01-30
Attending: STUDENT IN AN ORGANIZED HEALTH CARE EDUCATION/TRAINING PROGRAM
Payer: COMMERCIAL

## 2024-01-30 ENCOUNTER — OFFICE VISIT (OUTPATIENT)
Dept: OBSTETRICS AND GYNECOLOGY | Facility: CLINIC | Age: 39
End: 2024-01-30
Attending: OBSTETRICS & GYNECOLOGY
Payer: COMMERCIAL

## 2024-01-30 VITALS
DIASTOLIC BLOOD PRESSURE: 90 MMHG | BODY MASS INDEX: 34.72 KG/M2 | SYSTOLIC BLOOD PRESSURE: 130 MMHG | WEIGHT: 188.69 LBS | HEIGHT: 62 IN

## 2024-01-30 VITALS
SYSTOLIC BLOOD PRESSURE: 136 MMHG | RESPIRATION RATE: 16 BRPM | TEMPERATURE: 98 F | DIASTOLIC BLOOD PRESSURE: 87 MMHG | OXYGEN SATURATION: 96 % | HEART RATE: 92 BPM

## 2024-01-30 DIAGNOSIS — N83.202 LEFT OVARIAN CYST: ICD-10-CM

## 2024-01-30 DIAGNOSIS — D50.0 IRON DEFICIENCY ANEMIA DUE TO CHRONIC BLOOD LOSS: Primary | ICD-10-CM

## 2024-01-30 DIAGNOSIS — R31.9 HEMATURIA, UNSPECIFIED TYPE: ICD-10-CM

## 2024-01-30 DIAGNOSIS — N76.0 ACUTE VAGINITIS: ICD-10-CM

## 2024-01-30 DIAGNOSIS — N92.6 IRREGULAR BLEEDING: Primary | ICD-10-CM

## 2024-01-30 PROCEDURE — 1160F RVW MEDS BY RX/DR IN RCRD: CPT | Mod: CPTII,S$GLB,, | Performed by: OBSTETRICS & GYNECOLOGY

## 2024-01-30 PROCEDURE — 1159F MED LIST DOCD IN RCRD: CPT | Mod: CPTII,S$GLB,, | Performed by: OBSTETRICS & GYNECOLOGY

## 2024-01-30 PROCEDURE — 81003 URINALYSIS AUTO W/O SCOPE: CPT | Performed by: OBSTETRICS & GYNECOLOGY

## 2024-01-30 PROCEDURE — 3008F BODY MASS INDEX DOCD: CPT | Mod: CPTII,S$GLB,, | Performed by: OBSTETRICS & GYNECOLOGY

## 2024-01-30 PROCEDURE — 99214 OFFICE O/P EST MOD 30 MIN: CPT | Mod: S$GLB,,, | Performed by: OBSTETRICS & GYNECOLOGY

## 2024-01-30 PROCEDURE — 87491 CHLMYD TRACH DNA AMP PROBE: CPT | Performed by: OBSTETRICS & GYNECOLOGY

## 2024-01-30 PROCEDURE — 3075F SYST BP GE 130 - 139MM HG: CPT | Mod: CPTII,S$GLB,, | Performed by: OBSTETRICS & GYNECOLOGY

## 2024-01-30 PROCEDURE — 96374 THER/PROPH/DIAG INJ IV PUSH: CPT

## 2024-01-30 PROCEDURE — 81514 NFCT DS BV&VAGINITIS DNA ALG: CPT | Performed by: OBSTETRICS & GYNECOLOGY

## 2024-01-30 PROCEDURE — 63600175 PHARM REV CODE 636 W HCPCS: Mod: JZ,JG | Performed by: FAMILY MEDICINE

## 2024-01-30 PROCEDURE — 99999 PR PBB SHADOW E&M-EST. PATIENT-LVL III: CPT | Mod: PBBFAC,,, | Performed by: OBSTETRICS & GYNECOLOGY

## 2024-01-30 PROCEDURE — 3080F DIAST BP >= 90 MM HG: CPT | Mod: CPTII,S$GLB,, | Performed by: OBSTETRICS & GYNECOLOGY

## 2024-01-30 PROCEDURE — 25000003 PHARM REV CODE 250: Performed by: FAMILY MEDICINE

## 2024-01-30 RX ORDER — EPINEPHRINE 0.3 MG/.3ML
0.3 INJECTION SUBCUTANEOUS ONCE AS NEEDED
OUTPATIENT
Start: 2024-01-30

## 2024-01-30 RX ORDER — DIPHENHYDRAMINE HYDROCHLORIDE 50 MG/ML
50 INJECTION INTRAMUSCULAR; INTRAVENOUS ONCE AS NEEDED
OUTPATIENT
Start: 2024-01-30

## 2024-01-30 RX ORDER — SODIUM CHLORIDE 0.9 % (FLUSH) 0.9 %
10 SYRINGE (ML) INJECTION
OUTPATIENT
Start: 2024-01-30

## 2024-01-30 RX ORDER — HEPARIN 100 UNIT/ML
500 SYRINGE INTRAVENOUS
OUTPATIENT
Start: 2024-01-30

## 2024-01-30 RX ADMIN — FERUMOXYTOL 510 MG: 510 INJECTION INTRAVENOUS at 10:01

## 2024-01-30 NOTE — PLAN OF CARE
Patient presented to unit for Feraheme infusion (1/2). VSS. No complaints voiced. Feraheme infused over 15 minutes. Tolerated well. Patient monitored for adverse reaction post infusion, none noted. Patient will call to schedule next appointment. Ambulatory and in NAD at time of discharge.    Problem: Anemia  Goal: Anemia Symptom Improvement  Outcome: Ongoing, Progressing

## 2024-01-31 ENCOUNTER — PATIENT MESSAGE (OUTPATIENT)
Dept: OBSTETRICS AND GYNECOLOGY | Facility: CLINIC | Age: 39
End: 2024-01-31
Payer: COMMERCIAL

## 2024-01-31 LAB
BILIRUB UR QL STRIP: NEGATIVE
C TRACH DNA SPEC QL NAA+PROBE: NOT DETECTED
CLARITY UR REFRACT.AUTO: CLEAR
COLOR UR AUTO: YELLOW
GLUCOSE UR QL STRIP: NEGATIVE
HGB UR QL STRIP: NEGATIVE
KETONES UR QL STRIP: NEGATIVE
LEUKOCYTE ESTERASE UR QL STRIP: NEGATIVE
N GONORRHOEA DNA SPEC QL NAA+PROBE: NOT DETECTED
NITRITE UR QL STRIP: NEGATIVE
PH UR STRIP: 6 [PH] (ref 5–8)
PROT UR QL STRIP: NEGATIVE
SP GR UR STRIP: 1.02 (ref 1–1.03)
URN SPEC COLLECT METH UR: NORMAL

## 2024-01-31 NOTE — PROGRESS NOTES
"Chief Complaint   Patient presents with    Vaginal Bleeding       HPI:  Jossy Leslie is a 38 y.o. female patient  who presents today for re-evaluation of her intermenstrual bleeding.  She had been seen for similar issues 23.  Evaluation with pelvic ultrasound 23 showed a normal endometrium and a 2.3 cm left ovarian cyst, possible endometrioma.  For the past six months, she has had some degree of mid-cycle bleeding, sometimes short in duration, other times lasting for a week, except for two months- 2023 or 2023.  However, this current month, she again experienced mid-cycle bleeding for several days.  Also, she notes some vaginal irritation as well as change in discharge.  She wonders whether the bloody discharge that she notes might actually be urine.  Denies urine urgency / frequency.  She reports recently being sexually active with a former partner- requests STD screening.  No pelvic pain or fever.    Patient's last menstrual period was 2024 (approximate).    Blood pressure (!) 130/90, height 5' 2" (1.575 m), weight 85.6 kg (188 lb 11.4 oz), last menstrual period 2024.    UPT today: Negative     23 Pelvic sono:  FINDINGS:  Uterus: Measures 8.8 x 4.3 x 5.6 cm.  Endometrial echo complex measures 5 mm, unremarkable.  No uterine masses.  Right ovary: Measures 2.2 x 1.4 x 2.0 cm.  Appearance is unremarkable.  Blood flow is present.  Left ovary: Measures 3.4 x 3.4 x 3.5 cm.  Hypoechoic mass measuring 2.3 cm, perhaps endometrioma.  Additional 3.5 cm simple cyst.  Blood flow is present.  Miscellaneous: No free fluid.  Impression:  1. Hypoechoic left ovarian mass, perhaps endometrioma.  Additional left ovarian cyst.  2. Interval resolution of endometrial thickening.    23 TSH 1.242     21 Pap: Negative, HPV: Negative     Past Medical History:   Diagnosis Date    Asthma     Duodenal ulcer     [facility] 2015    Eosinophilic esophagitis     2019    Hiatal hernia     " Hypertension 02/19/2021    Hypothyroidism due to Hashimoto's thyroiditis     Ovarian cyst     Thyroid disease        Past Surgical History:   Procedure Laterality Date    ESOPHAGOGASTRODUODENOSCOPY      ESOPHAGOGASTRODUODENOSCOPY N/A 04/18/2019    Procedure: ESOPHAGOGASTRODUODENOSCOPY (EGD);  Surgeon: Karmen Marquez MD;  Location: [facility] ENDO;  Service: Endoscopy;  Laterality: N/A;    HYSTEROSCOPY N/A 05/17/2019    Procedure: HYSTEROSCOPY;  Surgeon: Eli Blevins MD;  Location: [facility] OR;  Service: OB/GYN;  Laterality: N/A;    LAPAROSCOPIC SURGICAL REMOVAL OF CYST OF OVARY Right 05/17/2019    Procedure: EXCISION, CYST, OVARY, LAPAROSCOPIC;  Surgeon: Eli Blevins MD;  Location: [facility] OR;  Service: OB/GYN;  Laterality: Right;  video    TONSILLECTOMY           ROS:  GENERAL: Feeling well overall.   SKIN: Denies rash or lesions.   HEAD: Denies head injury or headache.   NODES: Denies enlarged lymph nodes.   CHEST: Denies chest pain or shortness of breath.   CARDIOVASCULAR: Denies palpitations or left sided chest pain.   ABDOMEN: No abdominal pain, nausea, vomiting or rectal bleeding.   URINARY: Reports ?hematuria  REPRODUCTIVE: See HPI.   BREASTS: Denies pain, lumps, or nipple discharge.   HEMATOLOGIC: Reports mid-cycle bleeding.  MUSCULOSKELETAL: Denies joint pain or swelling.   NEUROLOGIC: Denies syncope or weakness.   PSYCHIATRIC: Denies depression.    PE:   (chaperone present during entire exam)  APPEARANCE: Well nourished, well developed, in no acute distress.  ABDOMEN: Soft. No tenderness or masses. No CVA tenderness.  VULVA: No lesions. Normal female genitalia.  URETHRAL MEATUS: Normal size and location, no lesions, no prolapse.  URETHRA: No masses, tenderness, prolapse or scarring.  VAGINA: Moist and well rugated, no abnormal discharge, no significant cystocele or rectocele.  CERVIX: No lesions and discharge.   UTERUS: Normal size, regular shape, mobile, non-tender, bladder base nontender.  ADNEXA: No masses,  tenderness or CDS nodularity.  ANUS PERINEUM: Normal.    Diagnosis:  1. Irregular bleeding    2. Acute vaginitis    3. Hematuria, unspecified type    4. Left ovarian cyst          PLAN:    Orders Placed This Encounter    C. trachomatis/N. gonorrhoeae by AMP DNA    VAGINOSIS SCREEN BY DNA PROBE    Urinalysis       Patient was counseled today on her midcycle bleeding and the various etiologies.  UPT today was negative.  Affirm and GC/CT were performed secondary to her change in discharge / irritation.  Urine will be sent for urinalysis to exclude hematuria a the source for the blood.  She will have follow-up ultrasound for re-evaluation of her endometrium as well as evaluation of her adnexa.  We discussed the option of hormonal regulation with progesterone only pills to try to eliminate her mid-cycle bleeding - she will consider and let us know.    Follow-up after labs.    I spent a total of 32 minutes on the day of the visit.This includes face to face time and non-face to face time preparing to see the patient (eg, review of tests), Obtaining and/or reviewing separately obtained history, Documenting clinical information in the electronic or other health record, Independently interpreting resultsand communicating results to the patient/family/caregiver, or Care coordination.    This note was created with voice recognition software.  Grammatical, syntax and spelling errors may be inevitable.

## 2024-02-01 LAB
BACTERIAL VAGINOSIS DNA: NEGATIVE
CANDIDA GLABRATA DNA: NEGATIVE
CANDIDA KRUSEI DNA: NEGATIVE
CANDIDA RRNA VAG QL PROBE: NEGATIVE
T VAGINALIS RRNA GENITAL QL PROBE: NEGATIVE

## 2024-02-02 ENCOUNTER — TELEPHONE (OUTPATIENT)
Dept: OBSTETRICS AND GYNECOLOGY | Facility: CLINIC | Age: 39
End: 2024-02-02
Payer: COMMERCIAL

## 2024-02-02 RX ORDER — DROSPIRENONE 4 MG/1
4 TABLET, FILM COATED ORAL DAILY
Qty: 84 TABLET | Refills: 3 | Status: SHIPPED | OUTPATIENT
Start: 2024-02-02

## 2024-02-02 NOTE — TELEPHONE ENCOUNTER
Called patient:    Discussed results of Affirm and GC/CT: both negative.  Aware of negative urinalysis.    She would like to begin Slynd to help regulate her bleeding.  We reviewed Slynd: r / b / correct usage.    She will let us know her progress.

## 2024-02-06 DIAGNOSIS — G43.709 CHRONIC MIGRAINE WITHOUT AURA WITHOUT STATUS MIGRAINOSUS, NOT INTRACTABLE: Chronic | ICD-10-CM

## 2024-02-07 NOTE — TELEPHONE ENCOUNTER
No care due was identified.  [facility] Embedded Care Due Messages. Reference number: [id number].   2/06/2024 6:32:53 PM CST

## 2024-02-09 RX ORDER — AMITRIPTYLINE HYDROCHLORIDE 25 MG/1
25 TABLET, FILM COATED ORAL NIGHTLY
Qty: 90 TABLET | Refills: 0 | Status: SHIPPED | OUTPATIENT
Start: 2024-02-09 | End: 2024-05-26

## 2024-03-22 ENCOUNTER — OFFICE VISIT (OUTPATIENT)
Dept: FAMILY MEDICINE | Facility: CLINIC | Age: 39
End: 2024-03-22
Payer: COMMERCIAL

## 2024-03-22 ENCOUNTER — TELEPHONE (OUTPATIENT)
Dept: FAMILY MEDICINE | Facility: CLINIC | Age: 39
End: 2024-03-22
Payer: COMMERCIAL

## 2024-03-22 DIAGNOSIS — I10 ESSENTIAL HYPERTENSION: Primary | Chronic | ICD-10-CM

## 2024-03-22 DIAGNOSIS — E03.8 HYPOTHYROIDISM DUE TO HASHIMOTO'S THYROIDITIS: Chronic | ICD-10-CM

## 2024-03-22 DIAGNOSIS — E06.3 HYPOTHYROIDISM DUE TO HASHIMOTO'S THYROIDITIS: Chronic | ICD-10-CM

## 2024-03-22 PROCEDURE — 1160F RVW MEDS BY RX/DR IN RCRD: CPT | Mod: CPTII,95,, | Performed by: FAMILY MEDICINE

## 2024-03-22 PROCEDURE — 1159F MED LIST DOCD IN RCRD: CPT | Mod: CPTII,95,, | Performed by: FAMILY MEDICINE

## 2024-03-22 PROCEDURE — 4010F ACE/ARB THERAPY RXD/TAKEN: CPT | Mod: CPTII,95,, | Performed by: FAMILY MEDICINE

## 2024-03-22 PROCEDURE — 99214 OFFICE O/P EST MOD 30 MIN: CPT | Mod: 95,,, | Performed by: FAMILY MEDICINE

## 2024-03-22 PROCEDURE — 3075F SYST BP GE 130 - 139MM HG: CPT | Mod: CPTII,95,, | Performed by: FAMILY MEDICINE

## 2024-03-22 PROCEDURE — 3079F DIAST BP 80-89 MM HG: CPT | Mod: CPTII,95,, | Performed by: FAMILY MEDICINE

## 2024-03-22 RX ORDER — AMLODIPINE BESYLATE 2.5 MG/1
2.5 TABLET ORAL DAILY
Qty: 90 TABLET | Refills: 3 | Status: SHIPPED | OUTPATIENT
Start: 2024-03-22

## 2024-03-22 RX ORDER — VALSARTAN 80 MG/1
80 TABLET ORAL DAILY
Qty: 30 TABLET | Refills: 0 | Status: SHIPPED | OUTPATIENT
Start: 2024-03-22 | End: 2024-04-25

## 2024-03-22 NOTE — TELEPHONE ENCOUNTER
----- Message from Meredith Hart sent at 3/22/2024  3:41 PM CDT -----  Type: General Call Back         Name of Caller:pt  Would the patient rather a call back or a response via [facility]? call  Best Call Back Number:[phone]  Additional Information: Pt would like a call back regarding her appt.

## 2024-03-22 NOTE — PROGRESS NOTES
Patient Name: Jossy Leslie    : 1985  MRN: [id number]    The patient location is:  [facility]  The chief complaint leading to consultation is: HTN    Visit type: audiovisual    Face to Face time with patient: 10 minutes  13 minutes of total time spent on the encounter, which includes face to face time and non-face to face time preparing to see the patient (eg, review of tests), Obtaining and/or reviewing separately obtained history, Documenting clinical information in the electronic or other health record, Independently interpreting results (not separately reported) and communicating results to the patient/family/caregiver, or Care coordination (not separately reported).         Each patient to whom he or she provides medical services by telemedicine is:  (1) informed of the relationship between the physician and patient and the respective role of any other health care provider with respect to management of the patient; and (2) notified that he or she may decline to receive medical services by telemedicine and may withdraw from such care at any time.    Notes:     Subjective:     Patient ID: Jossy is a 38 y.o. female    Chief Complaint:  Hypertension    38-year-old female makes virtual visit to follow-up on hypertension.  Taking medication as prescribed.  Systolic pressure remaining in the 130s and diastolic in the 80s to 90s.  She does have swelling in her legs with amlodipine.    Hypertension  Associated symptoms include peripheral edema (With amlodipine.). Pertinent negatives include no chest pain, palpitations or shortness of breath.        Review of Systems   Constitutional:  Negative for diaphoresis and unexpected weight change.   Respiratory:  Negative for shortness of breath and wheezing.    Cardiovascular:  Positive for leg swelling. Negative for chest pain and palpitations.        Objective:   /88     Physical Exam  Pulmonary:      Effort: Pulmonary effort is normal.    Neurological:      Mental Status: She is alert.        Assessment        ICD-10-CM ICD-9-CM   1. Essential hypertension  I10 401.9   2. Hypothyroidism due to Hashimoto's thyroiditis  E03.8 244.8    E06.3 245.2         Plan:     1. Essential hypertension  Assessment & Plan:  Pressure not at goal.  Will reduce amlodipine to 2.5 milligrams daily and add valsartan 80 milligrams.  Recheck blood pressure in the next two weeks.    Orders:  -     Discontinue: valsartan (DIOVAN) 80 MG tablet; Take 1 tablet (80 mg total) by mouth once daily.  Dispense: 30 tablet; Refill: 0  -     Basic Metabolic Panel; Future; Expected date: 03/22/2024  -     amLODIPine (NORVASC) 2.5 MG tablet; Take 1 tablet (2.5 mg total) by mouth once daily.  Dispense: 90 tablet; Refill: 3    2. Hypothyroidism due to Hashimoto's thyroiditis  Overview:  Lab Results   Component Value Date    TSH 1.242 11/11/2023    TSH 5.403 (H) 08/01/2023    TSH 7.484 (H) 01/12/2023    FREET4 0.81 11/11/2023    FREET4 0.93 08/01/2023    FREET4 0.88 01/12/2023      Lab Results   Component Value Date    THYROPEROXID 1229.2 (H) 10/19/2018        Assessment & Plan:  Continue levothyroxine             -Brent Marcelino Jr., MD, AAHIVS      This office note has been dictated.  This dictation has been generated using M-Modal Fluency Direct dictation; some phonetic errors may occur.         There are no Patient Instructions on file for this visit.      No follow-ups on file.   No future appointments.

## 2024-04-22 DIAGNOSIS — I10 ESSENTIAL HYPERTENSION: ICD-10-CM

## 2024-04-22 NOTE — TELEPHONE ENCOUNTER
No care due was identified.  [facility] Embedded Care Due Messages. Reference number: [id number].   4/22/2024 9:41:48 AM CDT

## 2024-04-23 NOTE — TELEPHONE ENCOUNTER
Refill Routing Note   Medication(s) are not appropriate for processing by [facility] for the following reason(s):        Required vitals abnormal    [facility] action(s):  Defer               Appointments  past 12m or future 3m with PCP    Date Provider   Last Visit   3/22/2024 Brent Marcelino Jr., MD   Next Visit   Visit date not found Brent Marcelino Jr., MD   ED visits in past 90 days: 0        Note composed:9:51 AM 04/23/2024

## 2024-04-25 RX ORDER — VALSARTAN 80 MG/1
80 TABLET ORAL
Qty: 90 TABLET | Refills: 0 | Status: SHIPPED | OUTPATIENT
Start: 2024-04-25

## 2024-05-05 VITALS — DIASTOLIC BLOOD PRESSURE: 88 MMHG | SYSTOLIC BLOOD PRESSURE: 136 MMHG

## 2024-05-06 NOTE — ASSESSMENT & PLAN NOTE
Pressure not at goal.  Will reduce amlodipine to 2.5 milligrams daily and add valsartan 80 milligrams.  Recheck blood pressure in the next two weeks.

## 2024-05-15 ENCOUNTER — OFFICE VISIT (OUTPATIENT)
Dept: FAMILY MEDICINE | Facility: CLINIC | Age: 39
End: 2024-05-15
Payer: COMMERCIAL

## 2024-05-15 VITALS — HEIGHT: 62 IN | WEIGHT: 193 LBS | BODY MASS INDEX: 35.51 KG/M2

## 2024-05-15 DIAGNOSIS — E06.3 HYPOTHYROIDISM DUE TO HASHIMOTO'S THYROIDITIS: Chronic | ICD-10-CM

## 2024-05-15 DIAGNOSIS — D50.0 IRON DEFICIENCY ANEMIA DUE TO CHRONIC BLOOD LOSS: ICD-10-CM

## 2024-05-15 DIAGNOSIS — R53.83 FATIGUE, UNSPECIFIED TYPE: Primary | ICD-10-CM

## 2024-05-15 DIAGNOSIS — E03.8 HYPOTHYROIDISM DUE TO HASHIMOTO'S THYROIDITIS: Chronic | ICD-10-CM

## 2024-05-15 DIAGNOSIS — E66.01 CLASS 2 SEVERE OBESITY DUE TO EXCESS CALORIES WITH SERIOUS COMORBIDITY AND BODY MASS INDEX (BMI) OF 35.0 TO 35.9 IN ADULT: Chronic | ICD-10-CM

## 2024-05-15 PROBLEM — E66.812 CLASS 2 SEVERE OBESITY DUE TO EXCESS CALORIES WITH SERIOUS COMORBIDITY AND BODY MASS INDEX (BMI) OF 35.0 TO 35.9 IN ADULT: Chronic | Status: ACTIVE | Noted: 2021-03-08

## 2024-05-15 PROCEDURE — 4010F ACE/ARB THERAPY RXD/TAKEN: CPT | Mod: CPTII,95,, | Performed by: FAMILY MEDICINE

## 2024-05-15 PROCEDURE — 1159F MED LIST DOCD IN RCRD: CPT | Mod: CPTII,95,, | Performed by: FAMILY MEDICINE

## 2024-05-15 PROCEDURE — 3008F BODY MASS INDEX DOCD: CPT | Mod: CPTII,95,, | Performed by: FAMILY MEDICINE

## 2024-05-15 PROCEDURE — 99214 OFFICE O/P EST MOD 30 MIN: CPT | Mod: 95,,, | Performed by: FAMILY MEDICINE

## 2024-05-15 PROCEDURE — 1160F RVW MEDS BY RX/DR IN RCRD: CPT | Mod: CPTII,95,, | Performed by: FAMILY MEDICINE

## 2024-05-15 NOTE — PROGRESS NOTES
Patient Name: Jossy Leslie    : 1985  MRN: [id number]    The patient location is: [place], [place]  The chief complaint leading to consultation is: Fatigue    Visit type: audiovisual    Face to Face time with patient: 17 minutes  19 minutes of total time spent on the encounter, which includes face to face time and non-face to face time preparing to see the patient (eg, review of tests), Obtaining and/or reviewing separately obtained history, Documenting clinical information in the electronic or other health record, Independently interpreting results (not separately reported) and communicating results to the patient/family/caregiver, or Care coordination (not separately reported).         Each patient to whom he or she provides medical services by telemedicine is:  (1) informed of the relationship between the physician and patient and the respective role of any other health care provider with respect to management of the patient; and (2) notified that he or she may decline to receive medical services by telemedicine and may withdraw from such care at any time.    Notes:     Subjective:     Patient ID: Jossy is a 38 y.o. female    Chief Complaint:  No chief complaint on file.    History of Present Illness  The patient presents via virtual visit for evaluation of fatigue.    The patient reports persistent fatigue, particularly during mild exertion. She reports having to take naps for an hour during her lunch break at work. She maintains a consistent sleep schedule, averaging 7 hours daily, with 8 to 9 hours on weekends. She denies experiencing daytime sleepiness while driving, but admits to feeling sleepy/tired after work for example while watching television at home. She has a history of migraines. She has gained approximately 30 pounds over the past year, with her most recent weight being 193 pounds and height being 5 feet 2.5 inches. She initiated a progesterone-only birth control two months ago, which  "resulted in a light menstrual period and spotting over the past month. She denies memory changes, acid reflux, syncope, hematuria, hematochezia, mood changes, concentration changes, chest pain, nocturnal coughing. She experienced palpitations and mild dyspnea a few weeks ago, but denies chest pain. She reports feeling flushed this week and frequently feels warmer.. She takes her thyroid medication at 7 a.m. with water before brushing her teeth and does not consume coffee or food until 8 p.m. She has recently started using energy drinks. She has not undergone a sleep evaluation. She has observed a significant variation of oxygen levels during sleep.      Review of Systems   Constitutional:  Positive for fatigue and unexpected weight change. Negative for fever.   Respiratory:  Positive for shortness of breath. Negative for wheezing.    Cardiovascular:  Positive for palpitations. Negative for chest pain.   Gastrointestinal:  Negative for blood in stool.   Endocrine: Positive for heat intolerance.   Genitourinary:  Negative for hematuria and menstrual irregularity.   Neurological:  Positive for headaches (but has migraines and not new). Negative for syncope and memory loss.   Psychiatric/Behavioral:  Negative for decreased concentration and dysphoric mood.         Objective:   Ht 5' 2" (1.575 m)   Wt 87.5 kg (193 lb)   BMI 35.30 kg/m²     Physical Exam    Physical Exam  Constitutional:       General: She is not in acute distress.     Appearance: She is not toxic-appearing.   Pulmonary:      Effort: Pulmonary effort is normal.   Neurological:      Mental Status: She is alert.   Psychiatric:         Behavior: Behavior normal.         Thought Content: Thought content normal.            Assessment        ICD-10-CM ICD-9-CM   1. Fatigue, unspecified type  R53.83 780.79   2. Iron deficiency anemia due to chronic blood loss  D50.0 280.0   3. Hypothyroidism due to Hashimoto's thyroiditis  E03.8 244.8    E06.3 245.2   4. Class " 2 severe obesity due to excess calories with serious comorbidity and body mass index (BMI) of 35.0 to 35.9 in adult  E66.01 278.01    Z68.35 V85.35         Plan:     Assessment & Plan  1. Fatigue/Anemia/Thyroid  Given her history of iron-deficiency anemia and hypothyroidism, will check iron studies as well as thyroid labs.  I also discussed with her concern for sleep apnea.  Recommended having evaluation with a sleep medicine specialist.    2. Obesity  The patient's BMI has been recorded in the chart.  Discussed with patient how obesity can be linked to sleep apnea and how having sleep apnea, if she does have this, can make weight loss difficult.     ORDERS  1. Fatigue, unspecified type  -     CBC Auto Differential; Future; Expected date: 05/15/2024  -     Comprehensive Metabolic Panel; Future; Expected date: 05/15/2024  -     VITAMIN B12; Future; Expected date: 05/15/2024  -     Folate; Future; Expected date: 05/15/2024  -     Ambulatory referral/consult to Sleep Disorders; Future; Expected date: 05/22/2024    2. Iron deficiency anemia due to chronic blood loss  -     CBC Auto Differential; Future; Expected date: 05/15/2024  -     Iron and TIBC; Future; Expected date: 05/15/2024  -     Ferritin; Future; Expected date: 05/15/2024  -     Comprehensive Metabolic Panel; Future; Expected date: 05/15/2024  -     VITAMIN B12; Future; Expected date: 05/15/2024  -     Folate; Future; Expected date: 05/15/2024  -     Hemoglobin Electrophoresis,Hgb A2 Gerald.; Future; Expected date: 05/15/2024    3. Hypothyroidism due to Hashimoto's thyroiditis  Overview:  Lab Results   Component Value Date    TSH 1.242 11/11/2023    TSH 5.403 (H) 08/01/2023    TSH 7.484 (H) 01/12/2023    FREET4 0.81 11/11/2023    FREET4 0.93 08/01/2023    FREET4 0.88 01/12/2023      Lab Results   Component Value Date    THYROPEROXID 1229.2 (H) 10/19/2018        Orders:  -     TSH; Future; Expected date: 05/15/2024  -     T4, Free; Future; Expected date:  05/15/2024  -     Comprehensive Metabolic Panel; Future; Expected date: 05/15/2024    4. Class 2 severe obesity due to excess calories with serious comorbidity and body mass index (BMI) of 35.0 to 35.9 in adult             -Brent Marcelino Jr., MD, AAHIVS      This note was generated with the assistance of ambient listening technology. Verbal consent was obtained by the patient and accompanying visitor(s) for the recording of patient appointment to facilitate this note. I attest to having reviewed and edited the generated note for accuracy, though some syntax or spelling errors may persist. Please contact the author of this note for any clarification.          There are no Patient Instructions on file for this visit.    Follow Up  No follow-ups on file.    Future Appointments   Date Time Provider Department Center   5/16/2024  7:30 AM LAB, APPOINTMENT [facility]

## 2024-05-17 ENCOUNTER — LAB VISIT (OUTPATIENT)
Dept: LAB | Facility: HOSPITAL | Age: 39
End: 2024-05-17
Attending: FAMILY MEDICINE
Payer: COMMERCIAL

## 2024-05-17 DIAGNOSIS — D50.0 IRON DEFICIENCY ANEMIA DUE TO CHRONIC BLOOD LOSS: ICD-10-CM

## 2024-05-17 DIAGNOSIS — E06.3 HYPOTHYROIDISM DUE TO HASHIMOTO'S THYROIDITIS: Chronic | ICD-10-CM

## 2024-05-17 DIAGNOSIS — R53.83 FATIGUE, UNSPECIFIED TYPE: ICD-10-CM

## 2024-05-17 DIAGNOSIS — E03.8 HYPOTHYROIDISM DUE TO HASHIMOTO'S THYROIDITIS: Chronic | ICD-10-CM

## 2024-05-17 LAB
ALBUMIN SERPL BCP-MCNC: 3.9 G/DL (ref 3.5–5.2)
ALP SERPL-CCNC: 49 U/L (ref 55–135)
ALT SERPL W/O P-5'-P-CCNC: 30 U/L (ref 10–44)
ANION GAP SERPL CALC-SCNC: 9 MMOL/L (ref 8–16)
AST SERPL-CCNC: 23 U/L (ref 10–40)
BASOPHILS # BLD AUTO: 0.05 K/UL (ref 0–0.2)
BASOPHILS NFR BLD: 0.7 % (ref 0–1.9)
BILIRUB SERPL-MCNC: 0.4 MG/DL (ref 0.1–1)
BUN SERPL-MCNC: 10 MG/DL (ref 6–20)
CALCIUM SERPL-MCNC: 9.8 MG/DL (ref 8.7–10.5)
CHLORIDE SERPL-SCNC: 108 MMOL/L (ref 95–110)
CO2 SERPL-SCNC: 21 MMOL/L (ref 23–29)
CREAT SERPL-MCNC: 1 MG/DL (ref 0.5–1.4)
DIFFERENTIAL METHOD BLD: ABNORMAL
EOSINOPHIL # BLD AUTO: 0.2 K/UL (ref 0–0.5)
EOSINOPHIL NFR BLD: 2.9 % (ref 0–8)
ERYTHROCYTE [DISTWIDTH] IN BLOOD BY AUTOMATED COUNT: 15.5 % (ref 11.5–14.5)
EST. GFR  (NO RACE VARIABLE): >60 ML/MIN/1.73 M^2
FERRITIN SERPL-MCNC: 6 NG/ML (ref 20–300)
FOLATE SERPL-MCNC: 10.2 NG/ML (ref 4–24)
GLUCOSE SERPL-MCNC: 102 MG/DL (ref 70–110)
HCT VFR BLD AUTO: 41.2 % (ref 37–48.5)
HGB BLD-MCNC: 13.4 G/DL (ref 12–16)
IMM GRANULOCYTES # BLD AUTO: 0.02 K/UL (ref 0–0.04)
IMM GRANULOCYTES NFR BLD AUTO: 0.3 % (ref 0–0.5)
IRON SERPL-MCNC: 57 UG/DL (ref 30–160)
LYMPHOCYTES # BLD AUTO: 1.8 K/UL (ref 1–4.8)
LYMPHOCYTES NFR BLD: 26.3 % (ref 18–48)
MCH RBC QN AUTO: 26.7 PG (ref 27–31)
MCHC RBC AUTO-ENTMCNC: 32.5 G/DL (ref 32–36)
MCV RBC AUTO: 82 FL (ref 82–98)
MONOCYTES # BLD AUTO: 0.5 K/UL (ref 0.3–1)
MONOCYTES NFR BLD: 7 % (ref 4–15)
NEUTROPHILS # BLD AUTO: 4.4 K/UL (ref 1.8–7.7)
NEUTROPHILS NFR BLD: 62.8 % (ref 38–73)
NRBC BLD-RTO: 0 /100 WBC
PLATELET # BLD AUTO: 329 K/UL (ref 150–450)
PMV BLD AUTO: 11.3 FL (ref 9.2–12.9)
POTASSIUM SERPL-SCNC: 4.2 MMOL/L (ref 3.5–5.1)
PROT SERPL-MCNC: 7.3 G/DL (ref 6–8.4)
RBC # BLD AUTO: 5.02 M/UL (ref 4–5.4)
SATURATED IRON: 12 % (ref 20–50)
SODIUM SERPL-SCNC: 138 MMOL/L (ref 136–145)
T4 FREE SERPL-MCNC: 0.96 NG/DL (ref 0.71–1.51)
TOTAL IRON BINDING CAPACITY: 494 UG/DL (ref 250–450)
TRANSFERRIN SERPL-MCNC: 334 MG/DL (ref 200–375)
TSH SERPL DL<=0.005 MIU/L-ACNC: 4.43 UIU/ML (ref 0.4–4)
VIT B12 SERPL-MCNC: 749 PG/ML (ref 210–950)
WBC # BLD AUTO: 6.97 K/UL (ref 3.9–12.7)

## 2024-05-17 PROCEDURE — 82728 ASSAY OF FERRITIN: CPT | Performed by: FAMILY MEDICINE

## 2024-05-17 PROCEDURE — 84443 ASSAY THYROID STIM HORMONE: CPT | Performed by: FAMILY MEDICINE

## 2024-05-17 PROCEDURE — 82746 ASSAY OF FOLIC ACID SERUM: CPT | Performed by: FAMILY MEDICINE

## 2024-05-17 PROCEDURE — 82607 VITAMIN B-12: CPT | Performed by: FAMILY MEDICINE

## 2024-05-17 PROCEDURE — 83020 HEMOGLOBIN ELECTROPHORESIS: CPT | Performed by: FAMILY MEDICINE

## 2024-05-17 PROCEDURE — 36415 COLL VENOUS BLD VENIPUNCTURE: CPT | Mod: PO | Performed by: FAMILY MEDICINE

## 2024-05-17 PROCEDURE — 83540 ASSAY OF IRON: CPT | Performed by: FAMILY MEDICINE

## 2024-05-17 PROCEDURE — 84439 ASSAY OF FREE THYROXINE: CPT | Performed by: FAMILY MEDICINE

## 2024-05-17 PROCEDURE — 80053 COMPREHEN METABOLIC PANEL: CPT | Performed by: FAMILY MEDICINE

## 2024-05-22 LAB
HGB A2 MFR BLD HPLC: 2.4 % (ref 2.2–3.2)
HGB FRACT BLD ELPH-IMP: NORMAL
HGB FRACT BLD ELPH-IMP: NORMAL

## 2024-05-25 DIAGNOSIS — G43.709 CHRONIC MIGRAINE WITHOUT AURA WITHOUT STATUS MIGRAINOSUS, NOT INTRACTABLE: Chronic | ICD-10-CM

## 2024-05-25 NOTE — TELEPHONE ENCOUNTER
No care due was identified.  [facility] Embedded Care Due Messages. Reference number: [id number].   5/25/2024 9:17:57 AM CDT

## 2024-05-26 RX ORDER — AMITRIPTYLINE HYDROCHLORIDE 25 MG/1
25 TABLET, FILM COATED ORAL NIGHTLY
Qty: 90 TABLET | Refills: 3 | Status: SHIPPED | OUTPATIENT
Start: 2024-05-26

## 2024-05-26 NOTE — TELEPHONE ENCOUNTER
Refill Routing Note   Medication(s) are not appropriate for processing by [facility] for the following reason(s):        Allergy or intolerance: Allergy/Contraindication: amitriptyline     [facility] action(s):  Defer             Appointments  past 12m or future 3m with PCP    Date Provider   Last Visit   5/15/2024 Brent Marcelino Jr., MD   Next Visit   Visit date not found Brent Marcelino Jr., MD   ED visits in past 90 days: 0        Note composed:4:23 AM 05/26/2024

## 2024-06-06 NOTE — PROGRESS NOTES
Chief Complaint   Patient presents with    Well Woman       HISTORY OF PRESENT ILLNESS:   Jossy Leslie is a 33 y.o. female   who presents for well woman exam. She was recently diagnosed with Hashimotos Thyroiditis.  Patient's last menstrual period was 10/24/2018 (approximate)..  She has complains of for the past 3 months her cycles have been heavier and she has had more anxiety at the time of her cycle.   Cycles are regular. Desires STD testing. Is considering OCPs for birth control.      Past Medical History:   Diagnosis Date    Asthma     Eosinophilic esophagitis     Eosinophilic esophagitis     Thyroid disease           Past Surgical History:   Procedure Laterality Date    ESOPHAGOGASTRODUODENOSCOPY      TONSILLECTOMY           Social History     Socioeconomic History    Marital status: Single     Spouse name: Not on file    Number of children: Not on file    Years of education: Not on file    Highest education level: Not on file   Social Needs    Financial resource strain: Not on file    Food insecurity - worry: Not on file    Food insecurity - inability: Not on file    Transportation needs - medical: Not on file    Transportation needs - non-medical: Not on file   Occupational History    Not on file   Tobacco Use    Smoking status: Never Smoker    Smokeless tobacco: Never Used   Substance and Sexual Activity    Alcohol use: No     Alcohol/week: 0.0 oz    Drug use: No    Sexual activity: Not Currently     Partners: Male     Birth control/protection: Condom   Other Topics Concern    Not on file   Social History Narrative    Not on file       Family History   Problem Relation Age of Onset    Hypertension Mother     Hypertension Father     Heart disease Maternal Grandmother     Cancer Maternal Grandfather     Colon cancer Maternal Grandfather     Diabetes Paternal Grandmother     Cancer Paternal Grandmother     No Known Problems Sister     No Known Problems Brother     No  "Known Problems Maternal Aunt     No Known Problems Maternal Uncle     No Known Problems Paternal Aunt     No Known Problems Paternal Uncle     Amblyopia Neg Hx     Blindness Neg Hx     Cataracts Neg Hx     Glaucoma Neg Hx     Macular degeneration Neg Hx     Retinal detachment Neg Hx     Strabismus Neg Hx     Stroke Neg Hx     Thyroid disease Neg Hx     Breast cancer Neg Hx     Ovarian cancer Neg Hx          OB History    Para Term  AB Living   1 1 1     1   SAB TAB Ectopic Multiple Live Births           1      # Outcome Date GA Lbr Laurent/2nd Weight Sex Delivery Anes PTL Lv   1 Term 07 40w0d  3.629 kg (8 lb) F Vag-Spont  N CORINA            COMPREHENSIVE GYN HISTORY:  PAP History: Denies abnormal Paps,  NILM  Infection History: Denies STDs. Denies PID.  Benign History:Denies uterine fibroids. Denies ovarian cysts. Denies endometriosis Denies other conditions.  Cancer History: Denies cervical cancer. Denies uterine cancer or hyperplasia. Denies ovarian cancer. Denies vulvar cancer or pre-cancer. Denies vaginal cancer or pre-cancer. Denies breast cancer. Denies colon cancer.  Cycle: 10/mon/7d    ROS:  GENERAL: Denies weight gain or weight loss. Feeling well overall.   SKIN: Denies rash or lesions.   HEAD: Denies headache.   NODES: Denies enlarged lymph nodes.   CHEST: Denies shortness of breath.   ABDOMEN: No abdominal pain, constipation, diarrhea, nausea, vomiting or rectal bleeding.   URINARY: No frequency, dysuria, hematuria, or burning on urination.  REPRODUCTIVE: See HPI.   BREASTS: The patient denies pain, lumps, or nipple discharge.       /66   Ht 5' 2.5" (1.588 m)   Wt 77.2 kg (170 lb 3.1 oz)   LMP 10/24/2018 (Approximate)   BMI 30.63 kg/m²     APPEARANCE: Well nourished, well developed, in no acute distress.  NECK: Neck symmetric without  thyromegaly.  NODES: No inguinal, cervical lymph node enlargement.  CHEST: Lungs clear to auscultation.  HEART: Regular rate and " rhythm, no murmurs, rubs or gallops.  ABDOMEN: Soft. No tenderness or masses. No hernias. No hepatosplenomegaly.  BREASTS: Symmetrical, no skin changes or visible lesions. No palpable masses, nipple discharge or adenopathy bilaterally.  PELVIC:   VULVA: No lesions. Normal female genitalia.  URETHRAL MEATUS: Normal size and location, no lesions, no prolapse.  URETHRA: No masses, tenderness, prolapse or scarring.  VAGINA: Moist and well rugated, no discharge, no significant cystocele or rectocele.  CERVIX: No lesions and discharge.  UTERUS: Normal size, regular shape, mobile, non-tender, bladder base nontender.  ADNEXA: No masses or tenderness.      1. Encounter for annual routine gynecological examination    2. Pap smear for cervical cancer screening    3. Screening examination for STD (sexually transmitted disease)    4. Abnormal uterine bleeding (AUB)        Plan:  Routine gyn s/p normal breast exam. Pap With HPV cotesting ordered . STD testing: GC/CT/trich, syphilis, HBV/HCV and HIV ordered.   2. AUB: could be 2/2 thyroid issues so will give that 2-3 months on treatment to see if improved but if not then will do TVUS and she is considering starting ocps to help with that and moods.       F/u in 1 yr or PRN         For information on Fall & Injury Prevention, visit: [email or web address] OR  [email or web address] OR  https://www.cdc.gov/steadi/patient.html

## 2024-07-19 ENCOUNTER — OFFICE VISIT (OUTPATIENT)
Dept: FAMILY MEDICINE | Facility: CLINIC | Age: 39
End: 2024-07-19
Payer: COMMERCIAL

## 2024-07-19 ENCOUNTER — TELEPHONE (OUTPATIENT)
Dept: BARIATRICS | Facility: CLINIC | Age: 39
End: 2024-07-19
Payer: COMMERCIAL

## 2024-07-19 ENCOUNTER — LAB VISIT (OUTPATIENT)
Dept: LAB | Facility: HOSPITAL | Age: 39
End: 2024-07-19
Attending: FAMILY MEDICINE
Payer: COMMERCIAL

## 2024-07-19 VITALS
RESPIRATION RATE: 18 BRPM | WEIGHT: 196.88 LBS | BODY MASS INDEX: 36.23 KG/M2 | SYSTOLIC BLOOD PRESSURE: 124 MMHG | HEIGHT: 62 IN | HEART RATE: 102 BPM | DIASTOLIC BLOOD PRESSURE: 94 MMHG | OXYGEN SATURATION: 97 %

## 2024-07-19 DIAGNOSIS — D50.0 IRON DEFICIENCY ANEMIA DUE TO CHRONIC BLOOD LOSS: ICD-10-CM

## 2024-07-19 DIAGNOSIS — I10 ESSENTIAL HYPERTENSION: ICD-10-CM

## 2024-07-19 DIAGNOSIS — E06.3 HYPOTHYROIDISM DUE TO HASHIMOTO'S THYROIDITIS: Primary | Chronic | ICD-10-CM

## 2024-07-19 DIAGNOSIS — E03.8 HYPOTHYROIDISM DUE TO HASHIMOTO'S THYROIDITIS: Primary | Chronic | ICD-10-CM

## 2024-07-19 DIAGNOSIS — R53.83 FATIGUE, UNSPECIFIED TYPE: ICD-10-CM

## 2024-07-19 DIAGNOSIS — E06.3 HYPOTHYROIDISM DUE TO HASHIMOTO'S THYROIDITIS: Chronic | ICD-10-CM

## 2024-07-19 DIAGNOSIS — E03.8 HYPOTHYROIDISM DUE TO HASHIMOTO'S THYROIDITIS: Chronic | ICD-10-CM

## 2024-07-19 DIAGNOSIS — E66.01 CLASS 2 SEVERE OBESITY DUE TO EXCESS CALORIES WITH SERIOUS COMORBIDITY AND BODY MASS INDEX (BMI) OF 36.0 TO 36.9 IN ADULT: Chronic | ICD-10-CM

## 2024-07-19 DIAGNOSIS — I10 ESSENTIAL HYPERTENSION: Chronic | ICD-10-CM

## 2024-07-19 LAB
ALBUMIN SERPL BCP-MCNC: 3.9 G/DL (ref 3.5–5.2)
ALP SERPL-CCNC: 56 U/L (ref 55–135)
ALT SERPL W/O P-5'-P-CCNC: 31 U/L (ref 10–44)
ANION GAP SERPL CALC-SCNC: 11 MMOL/L (ref 8–16)
AST SERPL-CCNC: 26 U/L (ref 10–40)
BASOPHILS # BLD AUTO: 0.06 K/UL (ref 0–0.2)
BASOPHILS NFR BLD: 0.8 % (ref 0–1.9)
BILIRUB SERPL-MCNC: 0.3 MG/DL (ref 0.1–1)
BUN SERPL-MCNC: 10 MG/DL (ref 6–20)
CALCIUM SERPL-MCNC: 9.5 MG/DL (ref 8.7–10.5)
CHLORIDE SERPL-SCNC: 108 MMOL/L (ref 95–110)
CO2 SERPL-SCNC: 20 MMOL/L (ref 23–29)
CREAT SERPL-MCNC: 1 MG/DL (ref 0.5–1.4)
DIFFERENTIAL METHOD BLD: ABNORMAL
EOSINOPHIL # BLD AUTO: 0.6 K/UL (ref 0–0.5)
EOSINOPHIL NFR BLD: 7.2 % (ref 0–8)
ERYTHROCYTE [DISTWIDTH] IN BLOOD BY AUTOMATED COUNT: 16 % (ref 11.5–14.5)
EST. GFR  (NO RACE VARIABLE): >60 ML/MIN/1.73 M^2
FERRITIN SERPL-MCNC: 15 NG/ML (ref 20–300)
GLUCOSE SERPL-MCNC: 83 MG/DL (ref 70–110)
HCT VFR BLD AUTO: 40.7 % (ref 37–48.5)
HGB BLD-MCNC: 13.1 G/DL (ref 12–16)
IMM GRANULOCYTES # BLD AUTO: 0.03 K/UL (ref 0–0.04)
IMM GRANULOCYTES NFR BLD AUTO: 0.4 % (ref 0–0.5)
IRON SERPL-MCNC: 46 UG/DL (ref 30–160)
LYMPHOCYTES # BLD AUTO: 2.3 K/UL (ref 1–4.8)
LYMPHOCYTES NFR BLD: 29.4 % (ref 18–48)
MCH RBC QN AUTO: 27.6 PG (ref 27–31)
MCHC RBC AUTO-ENTMCNC: 32.2 G/DL (ref 32–36)
MCV RBC AUTO: 86 FL (ref 82–98)
MONOCYTES # BLD AUTO: 0.6 K/UL (ref 0.3–1)
MONOCYTES NFR BLD: 7.9 % (ref 4–15)
NEUTROPHILS # BLD AUTO: 4.3 K/UL (ref 1.8–7.7)
NEUTROPHILS NFR BLD: 54.3 % (ref 38–73)
NRBC BLD-RTO: 0 /100 WBC
PLATELET # BLD AUTO: 328 K/UL (ref 150–450)
PMV BLD AUTO: 11.1 FL (ref 9.2–12.9)
POTASSIUM SERPL-SCNC: 4.2 MMOL/L (ref 3.5–5.1)
PROT SERPL-MCNC: 7.3 G/DL (ref 6–8.4)
RBC # BLD AUTO: 4.74 M/UL (ref 4–5.4)
SATURATED IRON: 10 % (ref 20–50)
SODIUM SERPL-SCNC: 139 MMOL/L (ref 136–145)
T4 FREE SERPL-MCNC: 0.94 NG/DL (ref 0.71–1.51)
TOTAL IRON BINDING CAPACITY: 475 UG/DL (ref 250–450)
TRANSFERRIN SERPL-MCNC: 321 MG/DL (ref 200–375)
TSH SERPL DL<=0.005 MIU/L-ACNC: 8.03 UIU/ML (ref 0.4–4)
WBC # BLD AUTO: 7.88 K/UL (ref 3.9–12.7)

## 2024-07-19 PROCEDURE — 85025 COMPLETE CBC W/AUTO DIFF WBC: CPT | Performed by: FAMILY MEDICINE

## 2024-07-19 PROCEDURE — 80053 COMPREHEN METABOLIC PANEL: CPT | Performed by: FAMILY MEDICINE

## 2024-07-19 PROCEDURE — 1160F RVW MEDS BY RX/DR IN RCRD: CPT | Mod: CPTII,S$GLB,, | Performed by: FAMILY MEDICINE

## 2024-07-19 PROCEDURE — 82728 ASSAY OF FERRITIN: CPT | Performed by: FAMILY MEDICINE

## 2024-07-19 PROCEDURE — 1159F MED LIST DOCD IN RCRD: CPT | Mod: CPTII,S$GLB,, | Performed by: FAMILY MEDICINE

## 2024-07-19 PROCEDURE — 3074F SYST BP LT 130 MM HG: CPT | Mod: CPTII,S$GLB,, | Performed by: FAMILY MEDICINE

## 2024-07-19 PROCEDURE — 84466 ASSAY OF TRANSFERRIN: CPT | Performed by: FAMILY MEDICINE

## 2024-07-19 PROCEDURE — 83540 ASSAY OF IRON: CPT | Performed by: FAMILY MEDICINE

## 2024-07-19 PROCEDURE — 4010F ACE/ARB THERAPY RXD/TAKEN: CPT | Mod: CPTII,S$GLB,, | Performed by: FAMILY MEDICINE

## 2024-07-19 PROCEDURE — 99999 PR PBB SHADOW E&M-EST. PATIENT-LVL IV: CPT | Mod: PBBFAC,,, | Performed by: FAMILY MEDICINE

## 2024-07-19 PROCEDURE — 3080F DIAST BP >= 90 MM HG: CPT | Mod: CPTII,S$GLB,, | Performed by: FAMILY MEDICINE

## 2024-07-19 PROCEDURE — 84439 ASSAY OF FREE THYROXINE: CPT | Performed by: FAMILY MEDICINE

## 2024-07-19 PROCEDURE — 3008F BODY MASS INDEX DOCD: CPT | Mod: CPTII,S$GLB,, | Performed by: FAMILY MEDICINE

## 2024-07-19 PROCEDURE — G2211 COMPLEX E/M VISIT ADD ON: HCPCS | Mod: S$GLB,,, | Performed by: FAMILY MEDICINE

## 2024-07-19 PROCEDURE — 36415 COLL VENOUS BLD VENIPUNCTURE: CPT | Mod: PN | Performed by: FAMILY MEDICINE

## 2024-07-19 PROCEDURE — 99214 OFFICE O/P EST MOD 30 MIN: CPT | Mod: S$GLB,,, | Performed by: FAMILY MEDICINE

## 2024-07-19 PROCEDURE — 84443 ASSAY THYROID STIM HORMONE: CPT | Performed by: FAMILY MEDICINE

## 2024-07-19 RX ORDER — AMLODIPINE BESYLATE 2.5 MG/1
2.5 TABLET ORAL DAILY
Qty: 90 TABLET | Refills: 3 | Status: SHIPPED | OUTPATIENT
Start: 2024-07-19

## 2024-07-19 RX ORDER — VALSARTAN 160 MG/1
160 TABLET ORAL DAILY
Qty: 90 TABLET | Refills: 1 | Status: SHIPPED | OUTPATIENT
Start: 2024-07-19

## 2024-07-19 NOTE — PATIENT INSTRUCTIONS
Sleep apnea can cause and is associated with multiple problems such as daytime tiredness, memory/concentration problems, morning headaches, acid reflux, mood swings or feelings of low mood, accidents when driving, sexual dysfunction, difficulty losing weight, elevated blood sugar/insulin resistance, elevated blood pressure/hypertension, increased urination at night, among other problems.  In addition I explained that if left untreated, sleep apnea can result in irregular heartbeat, including atrial fibrillation, heart failure, myocardial infarctions/heart attacks, stroke, and early death.

## 2024-07-19 NOTE — PROGRESS NOTES
"  Patient Name: Jossy Leslie    : 1985  MRN: [id number]      Subjective:     Patient ID: Jossy is a 38 y.o. female    Chief Complaint:  No chief complaint on file.    History of Present Illness  The patient is a 38-year-old female who presents for evaluation of multiple medical concerns.    The patient reports an escalation in her weight, currently weighing approximately 200 pounds, over the past year. She experiences dyspnea during physical exertion, even when ascending a flight of stairs at her workplace. Her heart rate has escalated to 170 to 180 beats per minute during ambulation. She denies nocturnal snoring but expresses a desire for a sleep study. She reports frequent nocturnal awakenings due to neck discomfort and occasional daytime napping. She also experiences drowsiness during extended drives. During a recent vacation, she experienced tachycardia during physical exertion. Her amlodipine dosage was reduced from 5 mg to 2.5 mg due to leg swelling. She monitors her blood pressure intermittently at work, which typically ranges in the 120s over high 80s. She is not considering surgical options at this time.    Supplemental Information  She started taking Slynd a few months ago, which is a progesterone only. Her periods are very minimal now.      Review of Systems   Constitutional:  Positive for fatigue and unexpected weight change. Negative for fever.   Respiratory:  Negative for wheezing.    Cardiovascular:  Positive for palpitations. Negative for chest pain.   Gastrointestinal:  Negative for abdominal pain and blood in stool.   Genitourinary:  Negative for hematuria and menstrual irregularity.   Neurological:  Positive for headaches (but has migraines and not new). Negative for syncope and memory loss.   Psychiatric/Behavioral:  Negative for decreased concentration and dysphoric mood.         Objective:   BP (!) 124/94   Pulse 102   Resp 18   Ht 5' 2" (1.575 m)   Wt 89.3 kg (196 lb 13.9 oz)   " SpO2 97%   BMI 36.01 kg/m²     Physical Exam  Vital Signs  Vitals show a blood pressure of 131/98 with a pulse of 86.  Physical Exam  Vitals reviewed.   Constitutional:       Appearance: She is well-developed. She is obese.   HENT:      Head: Normocephalic and atraumatic.      Right Ear: External ear normal.      Left Ear: External ear normal.      Nose: Nose normal.      Mouth/Throat:      Pharynx: No oropharyngeal exudate.   Eyes:      General:         Right eye: No discharge.         Left eye: No discharge.      Conjunctiva/sclera: Conjunctivae normal.      Pupils: Pupils are equal, round, and reactive to light.   Neck:      Trachea: No tracheal deviation.   Cardiovascular:      Rate and Rhythm: Normal rate and regular rhythm.      Heart sounds: Normal heart sounds. No murmur heard.  Pulmonary:      Effort: Pulmonary effort is normal.      Breath sounds: Normal breath sounds. No wheezing or rales.   Abdominal:      General: Bowel sounds are normal.      Palpations: Abdomen is soft. Abdomen is not rigid. There is no mass.      Tenderness: There is no abdominal tenderness. There is no guarding.   Musculoskeletal:      Cervical back: Normal range of motion and neck supple.   Lymphadenopathy:      Cervical: No cervical adenopathy.   Neurological:      Mental Status: She is alert and oriented to person, place, and time.      Gait: Gait normal.          Assessment        ICD-10-CM ICD-9-CM   1. Hypothyroidism due to Hashimoto's thyroiditis  E03.8 244.8    E06.3 245.2   2. Essential hypertension  I10 401.9   3. Iron deficiency anemia due to chronic blood loss  D50.0 280.0   4. Fatigue, unspecified type  R53.83 780.79   5. Class 2 severe obesity due to excess calories with serious comorbidity and body mass index (BMI) of 36.0 to 36.9 in adult  E66.01 278.01    Z68.36 V85.36         Plan:     1. Hypothyroidism due to Hashimoto's thyroiditis  Overview:  Lab Results   Component Value Date    TSH 1.242 11/11/2023    TSH  5.403 (H) 08/01/2023    TSH 7.484 (H) 01/12/2023    FREET4 0.81 11/11/2023    FREET4 0.93 08/01/2023    FREET4 0.88 01/12/2023      Lab Results   Component Value Date    THYROPEROXID 1229.2 (H) 10/19/2018        Orders:  -     TSH; Future; Expected date: 07/19/2024  -     T4, Free; Future; Expected date: 07/19/2024    2. Essential hypertension  Assessment & Plan:  Blood pressure not at goal yet.  Will continue amlodipine 2.5 milligrams.  Previously had peripheral edema with higher doses of amlodipine.  As such will increase valsartan to 116 milligrams and recheck blood pressure and basic metabolic panel in the next two weeks    Orders:  -     valsartan (DIOVAN) 160 MG tablet; Take 1 tablet (160 mg total) by mouth once daily.  Dispense: 90 tablet; Refill: 1  -     amLODIPine (NORVASC) 2.5 MG tablet; Take 1 tablet (2.5 mg total) by mouth once daily.  Dispense: 90 tablet; Refill: 3  -     Comprehensive Metabolic Panel; Future; Expected date: 07/19/2024  -     CBC Auto Differential; Future; Expected date: 07/19/2024  -     BASIC METABOLIC PANEL; Future; Expected date: 08/02/2024    3. Iron deficiency anemia due to chronic blood loss  Assessment & Plan:  Check anemia labs.    Orders:  -     CBC Auto Differential; Future; Expected date: 07/19/2024  -     Iron and TIBC; Future; Expected date: 07/19/2024  -     Ferritin; Future; Expected date: 07/19/2024    4. Fatigue, unspecified type  Assessment & Plan:  The patient has multiple symptoms of/ risk factors for sleep apnea. The patient was strongly encouraged to make an appointment with sleep medicine, for further evaluation/testing.  She has an appointment scheduled and she was encouraged to keep that appointment.    Discussed with patient that sleep apnea can cause and is associated with multiple problems such as daytime tiredness, memory/concentration problems, morning headaches, acid reflux, mood swings or feelings of low mood, accidents when driving, sexual dysfunction,  "difficulty losing weight, elevated blood sugar/insulin resistance, elevated blood pressure/hypertension, increased urination at night, among other problems.  In addition I explained that if left untreated, sleep apnea can result in irregular heartbeat, including atrial fibrillation, heart failure, myocardial infarctions/heart attacks, stroke, and early death.       5. Class 2 severe obesity due to excess calories with serious comorbidity and body mass index (BMI) of 36.0 to 36.9 in adult  Overview:  Wt Readings from Last 3 Encounters:   07/19/24 1101 89.3 kg (196 lb 13.9 oz)   05/15/24 0807 87.5 kg (193 lb)   01/30/24 1559 85.6 kg (188 lb 11.4 oz)      Estimated body mass index is 36.01 kg/m² as calculated from the following:    Height as of this encounter: 5' 2" (1.575 m).    Weight as of this encounter: 89.3 kg (196 lb 13.9 oz).  Ideal body weight: 50.1 kg (110 lb 7.2 oz)     Assessment & Plan:  Referral to Bariatric Medicine placed    Orders:  -     Ambulatory referral/consult to Bariatric/Obesity Medicine; Future; Expected date: 07/26/2024             -Brent Marcelino Jr., MD, AAHIVS      This note was generated with the assistance of ambient listening technology. Verbal consent was obtained by the patient and accompanying visitor(s) for the recording of patient appointment to facilitate this note. I attest to having reviewed and edited the generated note for accuracy, though some syntax or spelling errors may persist. Please contact the author of this note for any clarification.          Patient Instructions   Sleep apnea can cause and is associated with multiple problems such as daytime tiredness, memory/concentration problems, morning headaches, acid reflux, mood swings or feelings of low mood, accidents when driving, sexual dysfunction, difficulty losing weight, elevated blood sugar/insulin resistance, elevated blood pressure/hypertension, increased urination at night, among other problems.  In addition I " "explained that if left untreated, sleep apnea can result in irregular heartbeat, including atrial fibrillation, heart failure, myocardial infarctions/heart attacks, stroke, and early death.     Follow Up  No follow-ups on file.    Future Appointments   Date Time Provider Department Center   7/24/2024  1:00 PM BARIATRIC SURG REJI MAGDALENO, [facility]   8/2/2024  9:20 AM NURSE, [facility]   8/12/2024  8:00 AM Jodri Salcedo, [facility]   1/22/2025  9:20 AM Brent Marcelino Jr., [facility]              Clinical References      Patient Education       High Blood Pressure in Adults   The Basics   Written by the doctors and editors at [facility]   What is high blood pressure? -- High blood pressure is a condition that puts you at risk for heart attack, stroke, and kidney disease. It does not usually cause symptoms. But it can be serious.  When your doctor or nurse tells you your blood pressure, they will say 2 numbers. For instance, your doctor or nurse might say that your blood pressure is "130 over 80." The top number is the pressure inside your arteries when your heart is kierra. The bottom number is the pressure inside your arteries when your heart is relaxed.  "Elevated blood pressure" is a term doctors or nurses use as a warning. People with elevated blood pressure do not yet have high blood pressure. But their blood pressure is not as low as it should be for good health.  Many experts define high, elevated, and normal blood pressure as follows:  High - Top number of 130 or above and/or bottom number of 80 or above  Elevated - Top number between 120 and 129 and bottom number of 79 or below  Normal - Top number of 119 or below and bottom number of 79 or below  This information is also in the table (table 1).   How can I lower my blood pressure? -- If your doctor or nurse has prescribed blood pressure medicine, the most important " "thing you can do is to take it. If it causes side effects, do not just stop taking it. Instead, talk to your doctor or nurse about the problems it causes. They might be able to lower your dose or switch you to another medicine. If cost is a problem, mention that too. They might be able to put you on a less expensive medicine. Taking your blood pressure medicine can keep you from having a heart attack or stroke, and it can save your life!  Can I do anything on my own? -- You have a lot of control over your blood pressure. To lower it:  Lose weight (if you are overweight)  Choose a diet low in fat and rich in fruits, vegetables, and low-fat dairy products  Reduce the amount of salt you eat  Do something active for at least 30 minutes a day on most days of the week  Cut down on alcohol (if you drink more than 2 alcoholic drinks per day)  It's also a good idea to get a home blood pressure meter. People who check their own blood pressure at home do better at keeping it low and can sometimes even reduce the amount of medicine they take.  All topics are updated as new evidence becomes available and our peer review process is complete.  This topic retrieved from [employer] on: Sep 21, 2021.  Topic 38202 Version 15.0  Release: 29.4.2 - C29.263  © 2021 UpToDate, Inc. and/or its affiliates. All rights reserved.  table 1: Definition of normal and high blood pressure  Level  Top number  Bottom number    High 130 or above 80 or above   Elevated 120 to 129 79 or below   Normal 119 or below 79 or below   These definitions are from the American College of Cardiology/American Heart Association. Other expert groups might use slightly different definitions.  "Elevated blood pressure" is a term doctor or nurses use as a warning. It means you do not yet have high blood pressure, but your blood pressure is not as low as it should be for good health.  Graphic 08717 Version 6.0  Consumer Information Use and Disclaimer   This information is " not specific medical advice and does not replace information you receive from your health care provider. This is only a brief summary of general information. It does NOT include all information about conditions, illnesses, injuries, tests, procedures, treatments, therapies, discharge instructions or life-style choices that may apply to you. You must talk with your health care provider for complete information about your health and treatment options. This information should not be used to decide whether or not to accept your health care provider's advice, instructions or recommendations. Only your health care provider has the knowledge and training to provide advice that is right for you. The use of this information is governed by the [employer] End User License Agreement, available at https://[email or web address]/en/solutions/[employer]/about/harper.The use of [employer] content is governed by the [employer] Terms of Use. ©2021 UpToDate, Inc. All rights reserved.  Copyright   © 2021 UpToDate, Inc. and/or its affiliates. All rights reserved.

## 2024-07-21 PROBLEM — R53.83 FATIGUE: Status: ACTIVE | Noted: 2024-07-21

## 2024-07-22 NOTE — ASSESSMENT & PLAN NOTE
Blood pressure not at goal yet.  Will continue amlodipine 2.5 milligrams.  Previously had peripheral edema with higher doses of amlodipine.  As such will increase valsartan to 116 milligrams and recheck blood pressure and basic metabolic panel in the next two weeks

## 2024-07-22 NOTE — ASSESSMENT & PLAN NOTE
The patient has multiple symptoms of/ risk factors for sleep apnea. The patient was strongly encouraged to make an appointment with sleep medicine, for further evaluation/testing.  She has an appointment scheduled and she was encouraged to keep that appointment.    Discussed with patient that sleep apnea can cause and is associated with multiple problems such as daytime tiredness, memory/concentration problems, morning headaches, acid reflux, mood swings or feelings of low mood, accidents when driving, sexual dysfunction, difficulty losing weight, elevated blood sugar/insulin resistance, elevated blood pressure/hypertension, increased urination at night, among other problems.  In addition I explained that if left untreated, sleep apnea can result in irregular heartbeat, including atrial fibrillation, heart failure, myocardial infarctions/heart attacks, stroke, and early death.

## 2024-07-24 ENCOUNTER — TELEPHONE (OUTPATIENT)
Dept: BARIATRICS | Facility: CLINIC | Age: 39
End: 2024-07-24
Payer: COMMERCIAL

## 2024-08-12 ENCOUNTER — OFFICE VISIT (OUTPATIENT)
Dept: SLEEP MEDICINE | Facility: CLINIC | Age: 39
End: 2024-08-12
Payer: COMMERCIAL

## 2024-08-12 VITALS
HEART RATE: 105 BPM | DIASTOLIC BLOOD PRESSURE: 88 MMHG | HEIGHT: 62 IN | BODY MASS INDEX: 36.31 KG/M2 | WEIGHT: 197.31 LBS | SYSTOLIC BLOOD PRESSURE: 121 MMHG

## 2024-08-12 DIAGNOSIS — G47.10 HYPERSOMNOLENCE: Primary | ICD-10-CM

## 2024-08-12 DIAGNOSIS — R53.83 FATIGUE, UNSPECIFIED TYPE: ICD-10-CM

## 2024-08-12 PROCEDURE — 1159F MED LIST DOCD IN RCRD: CPT | Mod: CPTII,S$GLB,, | Performed by: PHYSICIAN ASSISTANT

## 2024-08-12 PROCEDURE — 3008F BODY MASS INDEX DOCD: CPT | Mod: CPTII,S$GLB,, | Performed by: PHYSICIAN ASSISTANT

## 2024-08-12 PROCEDURE — 3074F SYST BP LT 130 MM HG: CPT | Mod: CPTII,S$GLB,, | Performed by: PHYSICIAN ASSISTANT

## 2024-08-12 PROCEDURE — 3079F DIAST BP 80-89 MM HG: CPT | Mod: CPTII,S$GLB,, | Performed by: PHYSICIAN ASSISTANT

## 2024-08-12 PROCEDURE — 4010F ACE/ARB THERAPY RXD/TAKEN: CPT | Mod: CPTII,S$GLB,, | Performed by: PHYSICIAN ASSISTANT

## 2024-08-12 PROCEDURE — 99999 PR PBB SHADOW E&M-EST. PATIENT-LVL IV: CPT | Mod: PBBFAC,,, | Performed by: PHYSICIAN ASSISTANT

## 2024-08-12 PROCEDURE — 99204 OFFICE O/P NEW MOD 45 MIN: CPT | Mod: S$GLB,,, | Performed by: PHYSICIAN ASSISTANT

## 2024-08-12 PROCEDURE — 1160F RVW MEDS BY RX/DR IN RCRD: CPT | Mod: CPTII,S$GLB,, | Performed by: PHYSICIAN ASSISTANT

## 2024-08-12 NOTE — PROGRESS NOTES
"Referred by Brent Marcelino Jr., MD     NEW PATIENT VISIT    Jossy Leslie  is a pleasant 38 y.o. female  with PMH significant for HTN, Hashimoto's thyroiditis, endometriosis, Raynauds, migraines, RJ, BMI 35+  who presents for sleep evaluation referred by PCP      C/o poor un-refreshing sleep and excessive daytime sleepiness and fatigue. States she naps frequently and never feels refreshed. Does report occasional accidental dozing. Does endorse occasionally taking nap in her car during lunch break. Does drowsiness when driving. Unsure of she snores (no witness to sleep). Denies sleep walking/talking, dream enactment behaviors. States PCP recommended evaluation for BRUNO, which is why she presents today    SLEEP SCHEDULE   Environment    Bed Time 11PM   Sleep Latency Not long   Arousals 0 "that I know of"   Nocturia 0   Back to sleep Not long if wakes   Wake time 6:45AM   Naps Rare on week day (sometimes naps in car on lunch break)  1-2hours on weekends   Work          Past Medical History:   Diagnosis Date    Asthma     Duodenal ulcer     [facility] 2015    Eosinophilic esophagitis     2019    Hiatal hernia     Hypertension 02/19/2021    Hypothyroidism due to Hashimoto's thyroiditis     Ovarian cyst     Thyroid disease      Patient Active Problem List   Diagnosis    Mild intermittent asthma without complication    Chronic migraine without aura without status migrainosus, not intractable    Iron deficiency anemia due to chronic blood loss    Hypothyroidism due to Hashimoto's thyroiditis    Dysphagia    Ovarian cyst    Cervical polyp    Thyroid nodule    Essential hypertension    Class 2 severe obesity due to excess calories with serious comorbidity and body mass index (BMI) of 36.0 to 36.9 in adult    Memory loss    Chronic midline posterior neck pain    Postural imbalance    Migraine without status migrainosus, not intractable    Fatigue       Current Outpatient Medications:     amitriptyline (ELAVIL) 25 MG tablet, " "Take 1 tablet by mouth in the evening, Disp: 90 tablet, Rfl: 3    amLODIPine (NORVASC) 2.5 MG tablet, Take 1 tablet (2.5 mg total) by mouth once daily., Disp: 90 tablet, Rfl: 3    aspirin-acetaminophen-caffeine 250-250-65 mg (EXCEDRIN MIGRAINE) 250-250-65 mg per tablet, Excedrin Migraine     , Disp: , Rfl:     drospirenone, contraceptive, (SLYND) 4 mg (28) Tab, Take 1 tablet (4 mg total) by mouth Daily., Disp: 84 tablet, Rfl: 3    levothyroxine (SYNTHROID) 100 MCG tablet, Take 1 tablet (100 mcg total) by mouth before breakfast., Disp: 90 tablet, Rfl: 3    ondansetron (ZOFRAN-ODT) 4 MG TbDL, Take 1 tablet (4 mg total) by mouth every 8 (eight) hours as needed (Nausea)., Disp: 10 tablet, Rfl: 0    sertraline (ZOLOFT) 100 MG tablet, , Disp: , Rfl:     sumatriptan (IMITREX) 100 MG tablet, Take 1 tablet (100 mg total) by mouth as needed for Migraine. Take at the onset of headache, may take 1 more in 1 hour if needed., Disp: 9 tablet, Rfl: 5    tiZANidine (ZANAFLEX) 4 MG tablet, Take 1 tablet (4 mg total) by mouth every 8 (eight) hours as needed., Disp: 90 tablet, Rfl: 0    tretinoin (RETIN-A) 0.025 % cream, Compound tretinoin 0.025% / azelaic acid 8% / niacinamide 2% cream. Apply a pea-sized amount to entire face qhs., Disp: 30 g, Rfl: 5    valsartan (DIOVAN) 160 MG tablet, Take 1 tablet (160 mg total) by mouth once daily., Disp: 90 tablet, Rfl: 1       Vitals:    24 0812   BP: 121/88   BP Location: Right arm   Patient Position: Sitting   BP Method: Medium (Automatic)   Pulse: 105   Weight: 89.5 kg (197 lb 5 oz)   Height: 5' 2" (1.575 m)     Physical Exam:    GEN:   Well-appearing  Psych:  Appropriate affect, demonstrates insight  SKIN:  No rash on the face or bridge of the nose      LABS:   Lab Results   Component Value Date    HGB 13.1 2024    CO2 20 (L) 2024         RECORDS REVIEWED:    No previous sleep study    ASSESSMENT    [place] Sleepiness Scale:  Sitting and readin  Watching TV: "    3  Passenger in a car x 1 hr:  2  Sitting quietly after lunch:  0  Lying down to rest in PM:  3  Sitting, inactive in public:  0  Sitting+ talking to someone:  0  Stopped in traffic:   0  Total    10/24    PROBLEM DESCRIPTION/ Sx on Presentation  STATUS   Sx BRUNO   unsure snoring, denies witnessed apneas  + wakes feeling un-refreshed   New   Daytime Sx   + sleepiness when inactive   Naps on the weekends (1-2hours)  Occasionally naps in car during lunch break  ESS 10/24 on intake  New   Insomnia   Trouble falling asleep: no issues  Arousals:         0  Hard to get back to sleep?: no issues    Prior pertinent medications:  Current pertinent medications:   elavil 25mg  New   Nocturia   x 0 per sleep period  New   Other issues:       PLAN      -recommend sleep testing   -PSG ordered (pt preferred)  -discussed trial therapy if BRUNO present and the patient is open to a trial of CPAP therapy  -consider PSG/MSLT if BRUNO workup negative  -discussed BRUNO and PAP with patient in detail, including possible complications of untreated BRUNO like heart attack/stroke  -advised on strict driving precautions; advised never to drive drowsy     Advised on plan of care. Answered all patient questions. Patient verbalized understanding and voiced agreement with plan of care.     RTC if dx of BRUNO made and CPAP ordered, will need follow up 31-90 days after receiving machine for compliance         The patient was given open opportunity to ask questions and/or express concerns about treatment plan.   All questions/concerns were discussed.     Two patient identifiers used prior to evaluation.

## 2024-08-27 ENCOUNTER — TELEPHONE (OUTPATIENT)
Dept: SLEEP MEDICINE | Facility: HOSPITAL | Age: 39
End: 2024-08-27
Payer: COMMERCIAL

## 2024-08-29 ENCOUNTER — LAB VISIT (OUTPATIENT)
Dept: LAB | Facility: HOSPITAL | Age: 39
End: 2024-08-29
Attending: FAMILY MEDICINE
Payer: COMMERCIAL

## 2024-08-29 ENCOUNTER — CLINICAL SUPPORT (OUTPATIENT)
Dept: FAMILY MEDICINE | Facility: CLINIC | Age: 39
End: 2024-08-29
Payer: COMMERCIAL

## 2024-08-29 VITALS — DIASTOLIC BLOOD PRESSURE: 86 MMHG | SYSTOLIC BLOOD PRESSURE: 120 MMHG | HEART RATE: 95 BPM

## 2024-08-29 DIAGNOSIS — I10 ESSENTIAL HYPERTENSION: Chronic | ICD-10-CM

## 2024-08-29 DIAGNOSIS — I10 ESSENTIAL HYPERTENSION: Primary | ICD-10-CM

## 2024-08-29 LAB
ANION GAP SERPL CALC-SCNC: 9 MMOL/L (ref 8–16)
BUN SERPL-MCNC: 13 MG/DL (ref 6–20)
CALCIUM SERPL-MCNC: 9.1 MG/DL (ref 8.7–10.5)
CHLORIDE SERPL-SCNC: 110 MMOL/L (ref 95–110)
CO2 SERPL-SCNC: 20 MMOL/L (ref 23–29)
CREAT SERPL-MCNC: 0.8 MG/DL (ref 0.5–1.4)
EST. GFR  (NO RACE VARIABLE): >60 ML/MIN/1.73 M^2
GLUCOSE SERPL-MCNC: 79 MG/DL (ref 70–110)
POTASSIUM SERPL-SCNC: 4.4 MMOL/L (ref 3.5–5.1)
SODIUM SERPL-SCNC: 139 MMOL/L (ref 136–145)

## 2024-08-29 PROCEDURE — 99999 PR PBB SHADOW E&M-EST. PATIENT-LVL II: CPT | Mod: PBBFAC,,,

## 2024-08-29 PROCEDURE — 80048 BASIC METABOLIC PNL TOTAL CA: CPT | Performed by: FAMILY MEDICINE

## 2024-08-29 PROCEDURE — 36415 COLL VENOUS BLD VENIPUNCTURE: CPT | Mod: PN | Performed by: FAMILY MEDICINE

## 2024-08-29 PROCEDURE — 99499 UNLISTED E&M SERVICE: CPT | Mod: S$GLB,,, | Performed by: FAMILY MEDICINE

## 2024-08-29 NOTE — PROGRESS NOTES
Jossy Leslie 38 y.o. female is here today for Blood Pressure check.   History of HTN yes.    Review of patient's allergies indicates:   Allergen Reactions    Pumpkin Anaphylaxis    Corn containing products     Shellfish containing products Itching    Soy     Wheat containing prod      Creatinine   Date Value Ref Range Status   07/19/2024 1.0 0.5 - 1.4 mg/dL Final     Sodium   Date Value Ref Range Status   07/19/2024 139 136 - 145 mmol/L Final     Potassium   Date Value Ref Range Status   07/19/2024 4.2 3.5 - 5.1 mmol/L Final   ]  Patient verifies taking blood pressure medications on a regular basis at the same time of the day.     Current Outpatient Medications:     amitriptyline (ELAVIL) 25 MG tablet, Take 1 tablet by mouth in the evening, Disp: 90 tablet, Rfl: 3    amLODIPine (NORVASC) 2.5 MG tablet, Take 1 tablet (2.5 mg total) by mouth once daily., Disp: 90 tablet, Rfl: 3    aspirin-acetaminophen-caffeine 250-250-65 mg (EXCEDRIN MIGRAINE) 250-250-65 mg per tablet, Excedrin Migraine     , Disp: , Rfl:     drospirenone, contraceptive, (SLYND) 4 mg (28) Tab, Take 1 tablet (4 mg total) by mouth Daily., Disp: 84 tablet, Rfl: 3    levothyroxine (SYNTHROID) 100 MCG tablet, Take 1 tablet (100 mcg total) by mouth before breakfast., Disp: 90 tablet, Rfl: 3    ondansetron (ZOFRAN-ODT) 4 MG TbDL, Take 1 tablet (4 mg total) by mouth every 8 (eight) hours as needed (Nausea)., Disp: 10 tablet, Rfl: 0    sertraline (ZOLOFT) 100 MG tablet, , Disp: , Rfl:     sumatriptan (IMITREX) 100 MG tablet, Take 1 tablet (100 mg total) by mouth as needed for Migraine. Take at the onset of headache, may take 1 more in 1 hour if needed., Disp: 9 tablet, Rfl: 5    tiZANidine (ZANAFLEX) 4 MG tablet, Take 1 tablet (4 mg total) by mouth every 8 (eight) hours as needed., Disp: 90 tablet, Rfl: 0    tretinoin (RETIN-A) 0.025 % cream, Compound tretinoin 0.025% / azelaic acid 8% / niacinamide 2% cream. Apply a pea-sized amount to entire face qhs.,  Disp: 30 g, Rfl: 5    valsartan (DIOVAN) 160 MG tablet, Take 1 tablet (160 mg total) by mouth once daily., Disp: 90 tablet, Rfl: 1  Does patient have record of home blood pressure readings no. Readings have been averaging n/a.   Last dose of blood pressure medication was taken at 8/28/24 evening.  Patient is asymptomatic.   Complains of n/a.    BP: 120/86 , Pulse: 95 .    Dr. Marcelino will be notified.

## 2024-09-16 ENCOUNTER — TELEPHONE (OUTPATIENT)
Dept: SLEEP MEDICINE | Facility: HOSPITAL | Age: 39
End: 2024-09-16
Payer: COMMERCIAL

## 2024-10-02 DIAGNOSIS — E06.3 HYPOTHYROIDISM DUE TO HASHIMOTO'S THYROIDITIS: Chronic | ICD-10-CM

## 2024-10-02 RX ORDER — LEVOTHYROXINE SODIUM 100 UG/1
100 TABLET ORAL
Qty: 90 TABLET | Refills: 1 | Status: SHIPPED | OUTPATIENT
Start: 2024-10-02

## 2024-10-10 ENCOUNTER — OFFICE VISIT (OUTPATIENT)
Dept: URGENT CARE | Facility: CLINIC | Age: 39
End: 2024-10-10
Payer: COMMERCIAL

## 2024-10-10 ENCOUNTER — PATIENT MESSAGE (OUTPATIENT)
Dept: URGENT CARE | Facility: CLINIC | Age: 39
End: 2024-10-10
Payer: COMMERCIAL

## 2024-10-10 VITALS
HEART RATE: 89 BPM | BODY MASS INDEX: 36.25 KG/M2 | HEIGHT: 62 IN | SYSTOLIC BLOOD PRESSURE: 141 MMHG | RESPIRATION RATE: 20 BRPM | OXYGEN SATURATION: 99 % | TEMPERATURE: 98 F | DIASTOLIC BLOOD PRESSURE: 86 MMHG | WEIGHT: 197 LBS

## 2024-10-10 DIAGNOSIS — M25.521 PAIN AND SWELLING OF RIGHT ELBOW: ICD-10-CM

## 2024-10-10 DIAGNOSIS — M70.21 OLECRANON BURSITIS OF RIGHT ELBOW: Primary | ICD-10-CM

## 2024-10-10 DIAGNOSIS — M25.421 PAIN AND SWELLING OF RIGHT ELBOW: ICD-10-CM

## 2024-10-10 PROCEDURE — 96372 THER/PROPH/DIAG INJ SC/IM: CPT | Mod: S$GLB,,, | Performed by: FAMILY MEDICINE

## 2024-10-10 PROCEDURE — 73080 X-RAY EXAM OF ELBOW: CPT | Mod: RT,S$GLB,, | Performed by: RADIOLOGY

## 2024-10-10 RX ORDER — DUPILUMAB 300 MG/2ML
INJECTION, SOLUTION SUBCUTANEOUS
COMMUNITY
Start: 2024-10-02

## 2024-10-10 RX ORDER — KETOROLAC TROMETHAMINE 30 MG/ML
30 INJECTION, SOLUTION INTRAMUSCULAR; INTRAVENOUS
Status: COMPLETED | OUTPATIENT
Start: 2024-10-10 | End: 2024-10-10

## 2024-10-10 RX ORDER — KETOROLAC TROMETHAMINE 10 MG/1
10 TABLET, FILM COATED ORAL EVERY 6 HOURS PRN
Qty: 16 TABLET | Refills: 0 | Status: SHIPPED | OUTPATIENT
Start: 2024-10-11 | End: 2024-10-15

## 2024-10-10 RX ADMIN — KETOROLAC TROMETHAMINE 30 MG: 30 INJECTION, SOLUTION INTRAMUSCULAR; INTRAVENOUS at 01:10

## 2024-10-10 NOTE — PROGRESS NOTES
"Subjective:      Patient ID: Jossy Leslie is a 39 y.o. female.    Vitals:  height is 5' 2" (1.575 m) and weight is 89.4 kg (197 lb). Her temperature is 98.2 °F (36.8 °C). Her blood pressure is 141/86 (abnormal) and her pulse is 89. Her respiration is 20 and oxygen saturation is 99%.     Chief Complaint: Joint Swelling    Jossy Leslie is a 39 y.o. female who complains of  right elbow bruising/swelling since last night.  Pt states she hit her elbow on a door last ngihta nd states she has hx of injuries to the same elbow.  Pt states she would like x-ray to rule out any fracture or dislocation.  Pt states she can extend/flex with little discomfort but states when she applies pressure to the affected area she experiences pain.  Pain is 9/10 to 10/10 with pressure, 2/10 with elbow flexion, and 0/10 at rest. She has tried ice to area.      She is left handed.     Elbow Injury  This is a new problem. The current episode started yesterday. The problem occurs constantly. The problem has been unchanged. Associated symptoms include arthralgias, joint swelling and myalgias. Pertinent negatives include no abdominal pain, anorexia, change in bowel habit, chest pain, chills, congestion, coughing, diaphoresis, fatigue, fever, headaches, nausea, neck pain, numbness, rash, sore throat, swollen glands, urinary symptoms, vertigo, visual change, vomiting or weakness. The symptoms are aggravated by exertion and bending. She has tried nothing for the symptoms. The treatment provided no relief.       Constitution: Negative for chills, sweating, fatigue, fever and generalized weakness.   HENT:  Negative for congestion and sore throat.    Neck: Negative for neck pain and neck stiffness.   Cardiovascular:  Negative for chest pain, leg swelling, palpitations and sob on exertion.   Respiratory:  Negative for cough.    Gastrointestinal:  Negative for abdominal pain, nausea and vomiting.   Musculoskeletal:  Positive for pain, trauma, joint " pain, joint swelling and muscle ache. Negative for abnormal ROM of joint, arthritis, gout, back pain, muscle cramps and history of spine disorder.   Skin:  Positive for bruising. Negative for rash.   Neurological:  Negative for history of vertigo, headaches and numbness.   Psychiatric/Behavioral:  Negative for nervous/anxious. The patient is not nervous/anxious.             XR ELBOW COMPLETE 3 VIEW RIGHT    Result Date: 10/10/2024  EXAMINATION: XR ELBOW COMPLETE 3 VIEW RIGHT TECHNIQUE: Three views of the right elbow were obtained, with AP, lateral, and oblique projections submitted. COMPARISON: No relevant comparison examinations are currently available.  Clinical information of trauma on 10/09/2024. FINDINGS: Visualized osseous structures appear unremarkable, with no evidence of recent fracture or other significant abnormality identified.  No radiopaque soft tissue foreign body.  No elbow joint effusion.     No significant abnormality. Electronically signed by: Francisco Arevalo MD Date:    10/10/2024 Time:    13:31     Objective:     Physical Exam   Constitutional: She is oriented to person, place, and time. No distress.      Comments:Patient is awake and alert, sitting up in exam chair, speaking and answering in complete sentences   normal  HENT:   Head: Normocephalic and atraumatic.   Ears:   Right Ear: External ear normal.   Left Ear: External ear normal.   Nose: Nose normal.   Mouth/Throat: Mucous membranes are moist.   Neck: Neck supple.   Pulmonary/Chest: Effort normal.   Abdominal: Normal appearance.   Musculoskeletal: Normal range of motion.         General: Normal range of motion.      Right elbow: She exhibits swelling. She exhibits normal range of motion. Tenderness found. Olecranon process tenderness noted.      Right upper arm: Normal.      Right forearm: Normal.      Comments: Ecchymosis and soft tissue swelling noted to right olecranon  process; pain and tenderness elicited with palpation to elbow  "  Neurological: She is alert and oriented to person, place, and time.   Skin: Skin is warm. Capillary refill takes less than 2 seconds.   Psychiatric: Her behavior is normal. Mood, judgment and thought content normal.   Nursing note and vitals reviewed.        XR ELBOW COMPLETE 3 VIEW RIGHT    Result Date: 10/10/2024  EXAMINATION: XR ELBOW COMPLETE 3 VIEW RIGHT TECHNIQUE: Three views of the right elbow were obtained, with AP, lateral, and oblique projections submitted. COMPARISON: No relevant comparison examinations are currently available.  Clinical information of trauma on 10/09/2024. FINDINGS: Visualized osseous structures appear unremarkable, with no evidence of recent fracture or other significant abnormality identified.  No radiopaque soft tissue foreign body.  No elbow joint effusion.     No significant abnormality. Electronically signed by: Francisco Arevalo MD Date:    10/10/2024 Time:    13:31     Assessment:     1. Olecranon bursitis of right elbow    2. Pain and swelling of right elbow      Patient presents with clinical exam findings and history consistent with above.      On exam, patient is nontoxic appearing and vitals are stable.      Diagnostic testing results were reviewed and discussed with patient/guardian.   Tests ordered in clinic:   XR ELBOW COMPLETE 3 VIEW RIGHT (10/10/24): No significant abnormality.   Previous progress notes/admissions/labs and medications were reviewed.  Reviewed CMP >60 from [facility] from 8/29/24.   Plan:   At discharge, Patient showed picture of her swelling "egg" on her right elbow from last night; improved with icing. Discussed with patient that oral Toradol was sent;ibuprofen contains soy (listed as allergy). Patient states that she has tolerated ibuprofen 800 mg before.     Olecranon bursitis of right elbow  -     XR ELBOW COMPLETE 3 VIEW RIGHT; Future; Expected date: 10/10/2024  -     ketorolac injection 30 mg  -     ketorolac (TORADOL) 10 mg tablet; Take 1 tablet (10 mg " "total) by mouth every 6 (six) hours as needed for Pain.  Dispense: 16 tablet; Refill: 0  -     BANDAGE ELASTIC 4IN ACE NS  -     Ambulatory referral/consult to Orthopedics    Pain and swelling of right elbow  -     ketorolac injection 30 mg  -     ketorolac (TORADOL) 10 mg tablet; Take 1 tablet (10 mg total) by mouth every 6 (six) hours as needed for Pain.  Dispense: 16 tablet; Refill: 0  -     BANDAGE ELASTIC 4IN ACE NS  -     Ambulatory referral/consult to Orthopedics                  1) See orders for this visit as documented in the electronic medical record.  2) Symptomatic therapy suggested: use acetaminophen/ibuprofen every 6-8 hours prn pain or fever, push fluids.   3) Call or return to clinic prn if these symptoms worsen or fail to improve as anticipated.    Discussed results/diagnosis/plan with patient in clinic.  We had shared decision making for patient's treatment. Patient verbalized understanding and in agreement with current treatment plan.     Patient was instructed to return for re-evaluation with urgent care or PCP for continued outpatient workup and management if symptoms do not improve/worsening symptoms. Strict ED versus clinic precautions given in depth.    Discharge and follow-up instructions given verbally/printed with the patient who expressed understanding. The instructions and results are also [facility] on [facility]              Amisha "Guevara Vera PA-C          Patient Instructions   Discussed with patient that if he/she is in pain today, only take tylenol due to toradol injection received in clinic. You can continue NSAIDS tomorrow such as ibuprofen (Motrin/Advil), naproxen (Aleve), Mobic (Meloxicam).     Immediate treatment of sprains or strains includes RICE - Rest, ice, compression and elevation to the affected joint or limb as needed.  Rest. Do not put any pressure on your elbow until your doctor tells you it is OK.  Ice. Place an ice pack or a bag of frozen peas wrapped in a towel " over the painful part. Never put ice right on the skin. Do not leave the ice on more than 10 to 15 minutes at a time.  Compression. Ask your doctor if you should use an elastic bandage to help with swelling.  Elevation. Prop your arm on pillows to help with swelling.  If you have a sling, wear it as you have been told by your doctor. Make sure to move your arm and shoulder from time to time. This will help to keep from getting shoulder problems, like a frozen shoulder      If you were prescribed a narcotic or muscle relaxer (Flexeril or Robaxin), do not drive or operate heavy equipment or machinery while taking these medications. These medications can make you drowsy. If you are driving, it is recommended to take ibuprofen TID prn pain and take flexeril/robaxin at night.  If not allergic, take Naproxen 500 mg BID prn pain or  Tylenol (Acetaminophen) 650 mg to  1 g every 6 hours as needed as needed for fever/pain and/or Motrin (Ibuprofen) 600 to 800 mg every 6 hours as needed for pain and/or fever    If you were not prescribed an anti-inflammatory medication, and if you do not have any history of stomach/intestinal ulcers, or kidney disease, or are not taking a blood thinner such as Coumadin, Plavix, Pradaxa, Eloquis, or Xaralta for example, it is OK to take over the counter Ibuprofen or Advil or Motrin or Aleve as directed.  Do not take these medications on an empty stomach.    Please drink plenty of fluids.  Please get plenty of rest.      Please remember that you have received care at an urgent care today. Urgent cares are not emergency rooms and are not equipped to handle life threatening emergencies and cannot rule in or out certain medical conditions and you may be released before all of your medical problems are known or treated. Please arrange follow up with your primary care physician or speciality clinic (orthopedics) within 2-5 days if your signs and symptoms have not resolved or worsen.     Patient can  call our Referral Hotline at [phone] to make an appointment.    Please return here or go to the Emergency Department for any concerns or worsening of condition.Patient was educated on signs/symptoms that would warrant emergent medical attention.   The arm with the cast becomes swollen or starts to hurt more.  Your cast becomes too tight and uncomfortable or your fingers turn cold or blue.  There is a bad smell or drainage coming from your cast.  Your cast feels too loose.  You notice a crack in your cast or it becomes soft.  You have less feeling or movement in your fingers.  The skin becomes red and irritated around the cast.  The cast gets wet and is not supposed to get wet.

## 2024-10-10 NOTE — LETTER
"     October 10, 2024      [facility]  [address] [address]  Phone: [phone]  Fax: [phone]       Patient: Jossy Leslie   YOB: 1985  Date of Visit: 10/10/2024    To Whom It May Concern:    Lisa Leslie  was at [facility] on 10/10/2024. The patient may return to work/school on 10/10/24 with no restrictions. If you have any questions or concerns, or if I can be of further assistance, please do not hesitate to contact me.    Sincerely,        Amisha Vera PA-C (Jackie)     "

## 2024-10-10 NOTE — PATIENT INSTRUCTIONS
Discussed with patient that if he/she is in pain today, only take tylenol due to toradol injection received in clinic. You can continue NSAIDS tomorrow such as ibuprofen (Motrin/Advil), naproxen (Aleve), Mobic (Meloxicam).     Immediate treatment of sprains or strains includes RICE - Rest, ice, compression and elevation to the affected joint or limb as needed.  Rest. Do not put any pressure on your elbow until your doctor tells you it is OK.  Ice. Place an ice pack or a bag of frozen peas wrapped in a towel over the painful part. Never put ice right on the skin. Do not leave the ice on more than 10 to 15 minutes at a time.  Compression. Ask your doctor if you should use an elastic bandage to help with swelling.  Elevation. Prop your arm on pillows to help with swelling.  If you have a sling, wear it as you have been told by your doctor. Make sure to move your arm and shoulder from time to time. This will help to keep from getting shoulder problems, like a frozen shoulder      If you were prescribed a narcotic or muscle relaxer (Flexeril or Robaxin), do not drive or operate heavy equipment or machinery while taking these medications. These medications can make you drowsy. If you are driving, it is recommended to take ibuprofen TID prn pain and take flexeril/robaxin at night.  If not allergic, take Naproxen 500 mg BID prn pain or  Tylenol (Acetaminophen) 650 mg to  1 g every 6 hours as needed as needed for fever/pain and/or Motrin (Ibuprofen) 600 to 800 mg every 6 hours as needed for pain and/or fever    If you were not prescribed an anti-inflammatory medication, and if you do not have any history of stomach/intestinal ulcers, or kidney disease, or are not taking a blood thinner such as Coumadin, Plavix, Pradaxa, Eloquis, or Xaralta for example, it is OK to take over the counter Ibuprofen or Advil or Motrin or Aleve as directed.  Do not take these medications on an empty stomach.    Please drink plenty of  fluids.  Please get plenty of rest.      Please remember that you have received care at an urgent care today. Urgent cares are not emergency rooms and are not equipped to handle life threatening emergencies and cannot rule in or out certain medical conditions and you may be released before all of your medical problems are known or treated. Please arrange follow up with your primary care physician or speciality clinic (orthopedics) within 2-5 days if your signs and symptoms have not resolved or worsen.     Patient can call our Referral Hotline at [phone] to make an appointment.    Please return here or go to the Emergency Department for any concerns or worsening of condition.Patient was educated on signs/symptoms that would warrant emergent medical attention.   The arm with the cast becomes swollen or starts to hurt more.  Your cast becomes too tight and uncomfortable or your fingers turn cold or blue.  There is a bad smell or drainage coming from your cast.  Your cast feels too loose.  You notice a crack in your cast or it becomes soft.  You have less feeling or movement in your fingers.  The skin becomes red and irritated around the cast.  The cast gets wet and is not supposed to get wet.

## 2024-10-16 DIAGNOSIS — Z12.31 OTHER SCREENING MAMMOGRAM: ICD-10-CM

## 2024-11-07 ENCOUNTER — OFFICE VISIT (OUTPATIENT)
Dept: NEUROLOGY | Facility: CLINIC | Age: 39
End: 2024-11-07
Payer: COMMERCIAL

## 2024-11-07 VITALS
SYSTOLIC BLOOD PRESSURE: 126 MMHG | HEIGHT: 62 IN | HEART RATE: 101 BPM | DIASTOLIC BLOOD PRESSURE: 82 MMHG | BODY MASS INDEX: 36.95 KG/M2 | WEIGHT: 200.81 LBS

## 2024-11-07 DIAGNOSIS — G47.00 INSOMNIA, UNSPECIFIED TYPE: ICD-10-CM

## 2024-11-07 DIAGNOSIS — G43.709 CHRONIC MIGRAINE WITHOUT AURA WITHOUT STATUS MIGRAINOSUS, NOT INTRACTABLE: Primary | ICD-10-CM

## 2024-11-07 PROCEDURE — 3079F DIAST BP 80-89 MM HG: CPT | Mod: CPTII,S$GLB,, | Performed by: STUDENT IN AN ORGANIZED HEALTH CARE EDUCATION/TRAINING PROGRAM

## 2024-11-07 PROCEDURE — 99215 OFFICE O/P EST HI 40 MIN: CPT | Mod: S$GLB,,, | Performed by: STUDENT IN AN ORGANIZED HEALTH CARE EDUCATION/TRAINING PROGRAM

## 2024-11-07 PROCEDURE — 3008F BODY MASS INDEX DOCD: CPT | Mod: CPTII,S$GLB,, | Performed by: STUDENT IN AN ORGANIZED HEALTH CARE EDUCATION/TRAINING PROGRAM

## 2024-11-07 PROCEDURE — 1159F MED LIST DOCD IN RCRD: CPT | Mod: CPTII,S$GLB,, | Performed by: STUDENT IN AN ORGANIZED HEALTH CARE EDUCATION/TRAINING PROGRAM

## 2024-11-07 PROCEDURE — 3074F SYST BP LT 130 MM HG: CPT | Mod: CPTII,S$GLB,, | Performed by: STUDENT IN AN ORGANIZED HEALTH CARE EDUCATION/TRAINING PROGRAM

## 2024-11-07 PROCEDURE — 99999 PR PBB SHADOW E&M-EST. PATIENT-LVL III: CPT | Mod: PBBFAC,,, | Performed by: STUDENT IN AN ORGANIZED HEALTH CARE EDUCATION/TRAINING PROGRAM

## 2024-11-07 PROCEDURE — 4010F ACE/ARB THERAPY RXD/TAKEN: CPT | Mod: CPTII,S$GLB,, | Performed by: STUDENT IN AN ORGANIZED HEALTH CARE EDUCATION/TRAINING PROGRAM

## 2024-11-07 RX ORDER — RIMEGEPANT SULFATE 75 MG/75MG
75 TABLET, ORALLY DISINTEGRATING ORAL ONCE AS NEEDED
COMMUNITY

## 2024-11-07 NOTE — PROGRESS NOTES
Chief Complaint and Duration     Chief Complaint   Patient presents with    Migraine     Patient also reports neck pain    For years    History of Present Illness     Jossy Leslie is a 39 y.o. with a history of Hashimoto's with hypothyroidism.  Has been treated with prior neurologists for years.  Endorses no significant change the characteristics of her migraines, can also be at the back of her head, sensitive to light and noise.  Can occur up to twice a week.  Has been on Elavil 25 mg at night, has been taking Imitrex as well as a CGRP for abortive.    Review of patient's allergies indicates:   Allergen Reactions    Pumpkin Anaphylaxis    Corn containing products     Shellfish containing products Itching    Soy     Wheat containing prod      Current Outpatient Medications   Medication Sig Dispense Refill    amitriptyline (ELAVIL) 25 MG tablet Take 1 tablet by mouth in the evening 90 tablet 3    amLODIPine (NORVASC) 2.5 MG tablet Take 1 tablet (2.5 mg total) by mouth once daily. 90 tablet 3    aspirin-acetaminophen-caffeine 250-250-65 mg (EXCEDRIN MIGRAINE) 250-250-65 mg per tablet Excedrin Migraine           drospirenone, contraceptive, (SLYND) 4 mg (28) Tab Take 1 tablet (4 mg total) by mouth Daily. 84 tablet 3    DUPIXENT  mg/2 mL PnIj Inject into the skin.      levothyroxine (SYNTHROID) 100 MCG tablet TAKE 1 TABLET BY MOUTH BEFORE  BREAKFAST 90 tablet 1    ondansetron (ZOFRAN-ODT) 4 MG TbDL Take 1 tablet (4 mg total) by mouth every 8 (eight) hours as needed (Nausea). 10 tablet 0    rimegepant (NURTEC) 75 mg odt Take 75 mg by mouth once as needed for Migraine. Place ODT tablet on the tongue; alternatively the ODT tablet may be placed under the tongue      sertraline (ZOLOFT) 100 MG tablet       sumatriptan (IMITREX) 100 MG tablet Take 1 tablet (100 mg total) by mouth as needed for Migraine. Take at the onset of headache, may take 1 more in 1 hour if needed. 9 tablet 5    tiZANidine (ZANAFLEX) 4 MG  tablet Take 1 tablet (4 mg total) by mouth every 8 (eight) hours as needed. 90 tablet 0    tretinoin (RETIN-A) 0.025 % cream Compound tretinoin 0.025% / azelaic acid 8% / niacinamide 2% cream. Apply a pea-sized amount to entire face qhs. 30 g 5    valsartan (DIOVAN) 160 MG tablet Take 1 tablet (160 mg total) by mouth once daily. 90 tablet 1     No current facility-administered medications for this visit.       Medical History     Past Medical History:   Diagnosis Date    Asthma     Duodenal ulcer     [facility] 2015    Eosinophilic esophagitis     2019    Hiatal hernia     Hypertension 02/19/2021    Hypothyroidism due to Hashimoto's thyroiditis     Ovarian cyst     Thyroid disease      Past Surgical History:   Procedure Laterality Date    ESOPHAGOGASTRODUODENOSCOPY      ESOPHAGOGASTRODUODENOSCOPY N/A 04/18/2019    Procedure: ESOPHAGOGASTRODUODENOSCOPY (EGD);  Surgeon: Karmen Marquez MD;  Location: [facility] ENDO;  Service: Endoscopy;  Laterality: N/A;    HYSTEROSCOPY N/A 05/17/2019    Procedure: HYSTEROSCOPY;  Surgeon: Eli Blevins MD;  Location: [facility] OR;  Service: OB/GYN;  Laterality: N/A;    LAPAROSCOPIC SURGICAL REMOVAL OF CYST OF OVARY Right 05/17/2019    Procedure: EXCISION, CYST, OVARY, LAPAROSCOPIC;  Surgeon: Eli Blevins MD;  Location: [facility] OR;  Service: OB/GYN;  Laterality: Right;  video    TONSILLECTOMY       Family History   Problem Relation Name Age of Onset    Hypertension Mother Sisi     Depression Mother Sisi     Hypertension Father Mp     Hyperlipidemia Father Mp     Heart disease Maternal Grandmother Lorena     Colon cancer Maternal Grandfather  70    Diabetes Paternal Grandmother [place]     Asbestos Paternal Grandmother Karla     Heart attack Paternal Grandfather  70    Hypertension Sister Inocencia     No Known Problems Maternal Aunt      No Known Problems Maternal Uncle      No Known Problems Paternal Aunt      No Known Problems Paternal Uncle      Hypertension Other niece     Breast cancer  Paternal Aunt  50    Amblyopia Neg Hx      Blindness Neg Hx      Cataracts Neg Hx      Glaucoma Neg Hx      Macular degeneration Neg Hx      Retinal detachment Neg Hx      Strabismus Neg Hx      Stroke Neg Hx      Thyroid disease Neg Hx      Ovarian cancer Neg Hx       Social History     Socioeconomic History    Marital status: Single   Occupational History    Occupation: nurse   Tobacco Use    Smoking status: Never     Passive exposure: Never    Smokeless tobacco: Never   Substance and Sexual Activity    Alcohol use: Yes     Comment: socially, minimal    Drug use: No    Sexual activity: Yes     Partners: Male     Birth control/protection: Coitus interruptus, Condom     Social Drivers of Health     Financial Resource Strain: Medium Risk (10/15/2024)    Received from [facility]    Overall Financial Resource Strain (CARDIA)     Difficulty of Paying Living Expenses: Somewhat hard   Food Insecurity: No Food Insecurity (10/15/2024)    Received from [facility]    Hunger Vital Sign     Worried About Running Out of Food in the Last Year: Never true     Ran Out of Food in the Last Year: Never true   Transportation Needs: No Transportation Needs (10/15/2024)    Received from [facility]    PRAPARE - Transportation     Lack of Transportation (Medical): No     Lack of Transportation (Non-Medical): No   Physical Activity: Sufficiently Active (10/15/2024)    Received from [facility]    Exercise Vital Sign     Days of Exercise per Week: 4 days     Minutes of Exercise per Session: 40 min   Stress: No Stress Concern Present (10/15/2024)    Received from [facility]    [employer] [place] [employer] - Occupational Stress Questionnaire     Feeling of Stress : Not at all   Housing Stability: High Risk (10/15/2024)    Received from [facility]    Housing Stability Vital Sign     Unable to Pay for Housing in the Last Year: Yes       Exam     Vitals:    11/07/24 0701   BP: 126/82   Pulse: 101      Physical Exam:  General: Not  in acute distress. Not ill-appearing.   HENT: Normocephalic and atraumatic. Moist mucous membranes.  Eyes: Conjunctivae normal.   Pulmonary: Pulmonary effort is normal.   Skin: Skin is warm and dry. No rashes.   Psychiatric: Mood normal.        Neurologic Exam   Mental status: oriented to person, place, and time  Attention: Normal. Concentration: normal.  Speech: speech is normal.  Cranial Nerves: EOMI intact, Symmetric facies. Hearing grossly intact.     Motor exam: bulk and tone normal. Strength strength 5/5 grossly in bilateral upper and lower extremities    Sensory exam: light touch intact    Gait exam: normal  Romberg: negative    Labs and Imaging     Labs: reviewed  No results found for this or any previous visit (from the past 24 hours).    HgA1C%:  5.4  Vit B12: 749    Imaging:   I have personally reviewed the images performed.   MRI of the brain in December of 2021 with no acute intracranial process, MRA of the brain obtained also showed no significant stenosis or no evidence of intracranial aneurysms    Assessment and Plan     Problem List Items Addressed This Visit          Neuro    Chronic migraine without aura without status migrainosus, not intractable - Primary (Chronic)    Overview     Patient did not respond well to extended release Topamax to control her headaches, so at this time she will be treated only with abortive medications as needed.  Discussion has been started as to which medication would be the best for prevention in the future.  The patient seems to think that injectable medication such as a CGRP receptor blocker would be her best option         Relevant Orders    Ambulatory referral/consult to Neurology       Other    Insomnia     This is a 39-year-old female new to me with a history of chronic migraines.  Can occur up to twice a week, patient has been on a well-established regimen with prior neurologists.  Was taking Imitrex for breakthrough as well as superimposed with CGRP.   Discussed with the patient, we can start patient on a good preventative, however would also change her abortive and prefers to do monotherapy or 1 type of abortive.  Patient has been well established on regimen.  Discussed with the patient's reasonable to get different opinion as to how best to treat patient's migraines with safety in mind.  Has been tolerating current medication list with no reported side effects.    Discussed with the patient multifactorial nature of headaches and migraines.  To work on lifestyle modifications including diet and exercise.    Follow-up:  Referral placed for headaches specialists for further assistance.  Patient opportunity to take care of this patient.    This note was created by combination of typed  and M-Modal dictation. Transcription and phonetic errors may be present.  If there are any questions, please contact me.    Time spent on this encounter: 40 minutes. This includes face to face time and non-face to face time preparing to see the patient (eg, review of tests), obtaining and/or reviewing separately obtained history, documenting clinical information in the electronic or other health record, independently interpreting results and communicating results to the patient/family/caregiver, or care coordinator. Time spent coordinating care.

## 2024-11-08 ENCOUNTER — TELEPHONE (OUTPATIENT)
Dept: NEUROLOGY | Facility: CLINIC | Age: 39
End: 2024-11-08

## 2024-11-08 ENCOUNTER — TELEPHONE (OUTPATIENT)
Dept: NEUROLOGY | Facility: CLINIC | Age: 39
End: 2024-11-08
Payer: COMMERCIAL

## 2024-11-08 NOTE — TELEPHONE ENCOUNTER
Called Pt to schedule an appt for her headaches. I was unable to speak with her but left a vm.     ----- Message from EDI Rodriguez sent at 11/8/2024 11:50 AM CST -----  Regarding: RE: Headache Clinic and Botox  Good morning,    Please get pt set up in headache clinic.    ThanksJennifer  ----- Message -----  From: Dayanara Evans MA  Sent: 11/7/2024   8:11 AM CST  To: Jennifer Richter RN  Subject: Headache Clinic and Botox                        Good morning Jennifer,  The patient had an appointment with Dr. Vera this morning. Dr. Vera asked that a message be sent over to you for the patient to get in with a headache specialist and botox clinic. Patient is a previous patient of Dr. Clay Khan     ThanksDayanara

## 2024-11-08 NOTE — TELEPHONE ENCOUNTER
I scheduled the patient for 11/14 at 11:20. Please let me know if this is a good day and time for you.

## 2024-11-08 NOTE — TELEPHONE ENCOUNTER
----- Message from Casey Alicea MD sent at 11/8/2024 12:58 PM CST -----  Regarding: RE: Patient referral  I can see her, can add on  ----- Message -----  From: Nancy Baldwin MA  Sent: 11/8/2024  11:40 AM CST  To: Casey Alicea MD; Jareth CHEN Staff  Subject: Patient referral                                 Good Morning and Happy Friday.     Dr. Gabriella Vera placed a referral for patient to see headache specialist. Would someone be so kind to reach out to Ms. Leslie and get her scheduled at your soonest availability. Wishing you all a wonderful weekend.         Thanks,   VENTURA Cruz :)      [facility]

## 2024-11-08 NOTE — TELEPHONE ENCOUNTER
I left a message for the patient to let her know an appointment is scheduled for her on 11/14 at 11:20. The patient was asked to call back to reschedule if this is not a good day and time for her.

## 2024-11-13 ENCOUNTER — HOSPITAL ENCOUNTER (OUTPATIENT)
Dept: RADIOLOGY | Facility: OTHER | Age: 39
Discharge: HOME | End: 2024-11-13
Attending: FAMILY MEDICINE
Payer: COMMERCIAL

## 2024-11-13 DIAGNOSIS — Z12.31 OTHER SCREENING MAMMOGRAM: ICD-10-CM

## 2024-11-13 PROCEDURE — 77063 BREAST TOMOSYNTHESIS BI: CPT | Mod: TC

## 2024-11-18 ENCOUNTER — PATIENT MESSAGE (OUTPATIENT)
Dept: OBSTETRICS AND GYNECOLOGY | Facility: CLINIC | Age: 39
End: 2024-11-18
Payer: COMMERCIAL

## 2024-11-18 ENCOUNTER — PATIENT MESSAGE (OUTPATIENT)
Dept: NEUROLOGY | Facility: CLINIC | Age: 39
End: 2024-11-18
Payer: COMMERCIAL

## 2024-11-19 ENCOUNTER — PATIENT MESSAGE (OUTPATIENT)
Dept: NEUROLOGY | Facility: CLINIC | Age: 39
End: 2024-11-19
Payer: COMMERCIAL

## 2024-11-19 DIAGNOSIS — G43.709 CHRONIC MIGRAINE WITHOUT AURA WITHOUT STATUS MIGRAINOSUS, NOT INTRACTABLE: Primary | ICD-10-CM

## 2024-11-19 RX ORDER — SUMATRIPTAN SUCCINATE 100 MG/1
100 TABLET ORAL
Qty: 12 TABLET | Refills: 1 | Status: SHIPPED | OUTPATIENT
Start: 2024-11-19 | End: 2025-01-18

## 2024-12-04 ENCOUNTER — OFFICE VISIT (OUTPATIENT)
Dept: URGENT CARE | Facility: CLINIC | Age: 39
End: 2024-12-04
Payer: COMMERCIAL

## 2024-12-04 VITALS
HEART RATE: 88 BPM | RESPIRATION RATE: 18 BRPM | DIASTOLIC BLOOD PRESSURE: 96 MMHG | TEMPERATURE: 98 F | WEIGHT: 200.81 LBS | SYSTOLIC BLOOD PRESSURE: 136 MMHG | OXYGEN SATURATION: 98 % | HEIGHT: 62 IN | BODY MASS INDEX: 36.95 KG/M2

## 2024-12-04 DIAGNOSIS — B96.89 ACUTE BACTERIAL SINUSITIS: Primary | ICD-10-CM

## 2024-12-04 DIAGNOSIS — J01.90 ACUTE BACTERIAL SINUSITIS: Primary | ICD-10-CM

## 2024-12-04 DIAGNOSIS — R06.00 DYSPNEA, UNSPECIFIED TYPE: ICD-10-CM

## 2024-12-04 PROCEDURE — 99214 OFFICE O/P EST MOD 30 MIN: CPT | Mod: S$GLB,,, | Performed by: NURSE PRACTITIONER

## 2024-12-04 PROCEDURE — 71046 X-RAY EXAM CHEST 2 VIEWS: CPT | Mod: S$GLB,,, | Performed by: RADIOLOGY

## 2024-12-04 RX ORDER — DOXYCYCLINE 100 MG/1
100 CAPSULE ORAL 2 TIMES DAILY
Qty: 14 CAPSULE | Refills: 0 | Status: SHIPPED | OUTPATIENT
Start: 2024-12-04 | End: 2024-12-11

## 2024-12-04 RX ORDER — TIRZEPATIDE 2.5 MG/.5ML
INJECTION, SOLUTION SUBCUTANEOUS
COMMUNITY
Start: 2024-10-20

## 2024-12-04 NOTE — PROGRESS NOTES
"Subjective:      Patient ID: Jossy Leslie is a 39 y.o. female.    Vitals:  height is 5' 2" (1.575 m) and weight is 91.1 kg (200 lb 13.4 oz). Her oral temperature is 98.4 °F (36.9 °C). Her blood pressure is 136/96 (abnormal) and her pulse is 88. Her respiration is 18 and oxygen saturation is 98%.     Chief Complaint: Cough and Shortness of Breath    Pt is a 39 year old female who presents with complaint of cough, dyspnea, chest tightness, wheezing, and low grade fever. Pt reports she was sick last week, tested negative for strep/flu/covid.. Dyspnea started 2 days ago (Monday). Today the fever started. Pt reports slight wheezing upon exaltation. Pt did another covid/flu test today (negative). Pt has tried an albuterol inhaler and nasal spray with no relief.     Shortness of Breath  This is a new problem. The current episode started in the past 7 days. The problem occurs constantly. The problem has been unchanged. Associated symptoms include a fever, a sore throat and wheezing. Pertinent negatives include no chest pain, ear pain, headaches, sputum production or vomiting. She has tried beta agonist inhalers for the symptoms. Her past medical history is significant for asthma.     Constitution: Positive for fever. Negative for chills and fatigue.   HENT:  Positive for congestion and sore throat. Negative for ear pain and sinus pain.    Cardiovascular:  Negative for chest pain.   Respiratory:  Positive for chest tightness, cough, shortness of breath and wheezing. Negative for sputum production.    Gastrointestinal:  Negative for nausea, vomiting and diarrhea.   Musculoskeletal:  Negative for muscle ache.   Neurological:  Negative for headaches.      Objective:     Physical Exam   Constitutional: She is oriented to person, place, and time.   HENT:   Head: Normocephalic.   Ears:   Right Ear: Hearing and external ear normal. No no drainage, swelling or tenderness. Tympanic membrane is not erythematous, not retracted and " not bulging. No middle ear effusion.   Left Ear: Hearing and external ear normal. No no drainage, swelling or tenderness. Tympanic membrane is not erythematous, not retracted and not bulging.  No middle ear effusion.   Nose: Nose normal. No mucosal edema, rhinorrhea or purulent discharge. Right sinus exhibits no maxillary sinus tenderness and no frontal sinus tenderness. Left sinus exhibits no maxillary sinus tenderness and no frontal sinus tenderness.   Mouth/Throat: Uvula is midline, oropharynx is clear and moist and mucous membranes are normal. No uvula swelling. No oropharyngeal exudate, posterior oropharyngeal edema or posterior oropharyngeal erythema.   Cardiovascular: Normal rate and regular rhythm.   Pulmonary/Chest: Breath sounds normal. No accessory muscle usage or stridor. No respiratory distress. She has no decreased breath sounds. She has no wheezes. She has no rhonchi. She has no rales.   Neurological: She is alert and oriented to person, place, and time.   Skin: Skin is warm and dry.   Nursing note and vitals reviewed.    X-Ray Chest PA And Lateral    Result Date: 12/4/2024  EXAMINATION: XR CHEST PA AND LATERAL CLINICAL HISTORY: Dyspnea, unspecified TECHNIQUE: PA and lateral views of the chest were performed. COMPARISON: CT 12/25/2021 FINDINGS: Lungs are clear. No focal consolidation. No pleural effusion. No pneumothorax. Normal heart size.     No acute findings. Electronically signed by: Jessica Max Date:    12/04/2024 Time:    18:03      Assessment:     1. Acute bacterial sinusitis    2. Dyspnea, unspecified type      Pt declines POCT testing and request CXR.    Plan:       Acute bacterial sinusitis  -     doxycycline (VIBRAMYCIN) 100 MG Cap; Take 1 capsule (100 mg total) by mouth 2 (two) times daily. for 7 days  Dispense: 14 capsule; Refill: 0    Dyspnea, unspecified type  -     X-Ray Chest PA And Lateral; Future; Expected date: 12/04/2024      Patient Instructions     Dyspnea, unspecified  "type    -     X-Ray Chest PA And Lateral    -     doxycycline (VIBRAMYCIN) 100 MG Cap; Take 1 capsule (100 mg total) by mouth 2 (two) times daily. for 7 days  Dispense: 14 capsule; Refill: 0      - Rest at home.     - Drink plenty of fluids so you won't get dehydrated.    Avoid taking Decongestants such as pseudoephedrine (ex. Sudafed) or phenylephrine (ex. Mucinex FastMax, Dayquil, Nyquil, or any combo cold meds that say "cold," "sinus" or "-D").      - Cough recommendations:   Warm tea with honey can help with cough. Honey is a natural cough suppressant.  - Dextromethorphan (DM) is a cough suppressant over the counter (ie. mucinex DM OR delsym).        - Congestion recommendations:    - Mucinex (guaifenesin) twice a day (or as directed) to help loosen mucous.       - Fever/Pain recommendations:  Alternate Tylenol or Ibuprofen as directed for fever/pain.   - Motrin/Ibuprofen every 6-8 hours for pain and inflammation. Do not take ibuprofen if you have a history of GI bleeding, kidney disease, or if you take blood thinners.    - Tylenol/acetaminophen every 6-8 hours for added pain relief.  Avoid tylenol if you have a history of liver disease.       -Sore throat recommendations: Warm fluids, warm salt water gargles, throat lozenges, tea, honey, soup, or drinking something cold or frozen.  Throat lozenges or sprays help reduce pain. Gargling with warm saltwater (1/4 teaspoon of salt in 1/2 cup of warm water) or an OTC anesthetic gargle may be useful for irritation.    Follow up with PCP if symptoms worsen or do not resolve fully.    When to seek medical advice  Call your healthcare provider right away if any of these occur:  Fever that is poorly controlled with OTC fever reducing medication  New or worsening ear pain, sinus pain, or headache  Stiff neck  You can't swallow liquids or you can't open your mouth wide because of throat pain  Signs of dehydration. These include very dark urine or no urine, sunken eyes, and " dizziness.  Trouble breathing or noisy breathing  Muffled voice  Rash

## 2024-12-04 NOTE — PATIENT INSTRUCTIONS
"  Dyspnea, unspecified type    -     X-Ray Chest PA And Lateral    -     doxycycline (VIBRAMYCIN) 100 MG Cap; Take 1 capsule (100 mg total) by mouth 2 (two) times daily. for 7 days  Dispense: 14 capsule; Refill: 0      - Rest at home.     - Drink plenty of fluids so you won't get dehydrated.    Avoid taking Decongestants such as pseudoephedrine (ex. Sudafed) or phenylephrine (ex. Mucinex FastMax, Dayquil, Nyquil, or any combo cold meds that say "cold," "sinus" or "-D").      - Cough recommendations:   Warm tea with honey can help with cough. Honey is a natural cough suppressant.  - Dextromethorphan (DM) is a cough suppressant over the counter (ie. mucinex DM OR delsym).        - Congestion recommendations:    - Mucinex (guaifenesin) twice a day (or as directed) to help loosen mucous.       - Fever/Pain recommendations:  Alternate Tylenol or Ibuprofen as directed for fever/pain.   - Motrin/Ibuprofen every 6-8 hours for pain and inflammation. Do not take ibuprofen if you have a history of GI bleeding, kidney disease, or if you take blood thinners.    - Tylenol/acetaminophen every 6-8 hours for added pain relief.  Avoid tylenol if you have a history of liver disease.       -Sore throat recommendations: Warm fluids, warm salt water gargles, throat lozenges, tea, honey, soup, or drinking something cold or frozen.  Throat lozenges or sprays help reduce pain. Gargling with warm saltwater (1/4 teaspoon of salt in 1/2 cup of warm water) or an OTC anesthetic gargle may be useful for irritation.    Follow up with PCP if symptoms worsen or do not resolve fully.    When to seek medical advice  Call your healthcare provider right away if any of these occur:  Fever that is poorly controlled with OTC fever reducing medication  New or worsening ear pain, sinus pain, or headache  Stiff neck  You can't swallow liquids or you can't open your mouth wide because of throat pain  Signs of dehydration. These include very dark urine or no " urine, sunken eyes, and dizziness.  Trouble breathing or noisy breathing  Muffled voice  Rash

## 2024-12-05 ENCOUNTER — TELEPHONE (OUTPATIENT)
Dept: SLEEP MEDICINE | Facility: HOSPITAL | Age: 39
End: 2024-12-05
Payer: COMMERCIAL

## 2024-12-06 ENCOUNTER — TELEPHONE (OUTPATIENT)
Dept: NEUROLOGY | Facility: CLINIC | Age: 39
End: 2024-12-06
Payer: COMMERCIAL

## 2024-12-06 DIAGNOSIS — I10 ESSENTIAL HYPERTENSION: Chronic | ICD-10-CM

## 2024-12-06 NOTE — TELEPHONE ENCOUNTER
No care due was identified.  [facility] Embedded Care Due Messages. Reference number: [id number].   12/06/2024 4:21:01 AM CST

## 2024-12-06 NOTE — TELEPHONE ENCOUNTER
Refill Routing Note   Medication(s) are not appropriate for processing by [facility] for the following reason(s):        Required vitals abnormal    [facility] action(s):  Defer               Appointments  past 12m or future 3m with PCP    Date Provider   Last Visit   7/19/2024 Brent Marcelino Jr., MD   Next Visit   1/22/2025 Brent Marcelino Jr., MD   ED visits in past 90 days: 0        Note composed:5:44 PM 12/06/2024

## 2024-12-12 RX ORDER — VALSARTAN 160 MG/1
160 TABLET ORAL
Qty: 90 TABLET | Refills: 2 | Status: SHIPPED | OUTPATIENT
Start: 2024-12-12

## 2024-12-16 ENCOUNTER — TELEPHONE (OUTPATIENT)
Dept: CARDIOLOGY | Facility: CLINIC | Age: 39
End: 2024-12-16
Payer: COMMERCIAL

## 2025-01-22 ENCOUNTER — OFFICE VISIT (OUTPATIENT)
Dept: CARDIOLOGY | Facility: CLINIC | Age: 40
End: 2025-01-22
Payer: COMMERCIAL

## 2025-01-22 DIAGNOSIS — R06.02 SOB (SHORTNESS OF BREATH): Primary | ICD-10-CM

## 2025-01-22 DIAGNOSIS — R00.2 PALPITATIONS: ICD-10-CM

## 2025-01-22 DIAGNOSIS — E66.812 CLASS 2 SEVERE OBESITY DUE TO EXCESS CALORIES WITH SERIOUS COMORBIDITY AND BODY MASS INDEX (BMI) OF 36.0 TO 36.9 IN ADULT: ICD-10-CM

## 2025-01-22 DIAGNOSIS — E66.01 CLASS 2 SEVERE OBESITY DUE TO EXCESS CALORIES WITH SERIOUS COMORBIDITY AND BODY MASS INDEX (BMI) OF 36.0 TO 36.9 IN ADULT: ICD-10-CM

## 2025-01-22 DIAGNOSIS — I10 ESSENTIAL HYPERTENSION: Chronic | ICD-10-CM

## 2025-01-22 PROCEDURE — 98002 SYNCH AUDIO-VIDEO NEW MOD 45: CPT | Mod: 95,,, | Performed by: INTERNAL MEDICINE

## 2025-01-22 PROCEDURE — 1159F MED LIST DOCD IN RCRD: CPT | Mod: CPTII,95,, | Performed by: INTERNAL MEDICINE

## 2025-01-22 PROCEDURE — 1160F RVW MEDS BY RX/DR IN RCRD: CPT | Mod: CPTII,95,, | Performed by: INTERNAL MEDICINE

## 2025-01-22 NOTE — PROGRESS NOTES
HISTORY:    39-year-old female with a history of hypertension, migraines, hypothyroidism, eosinophilic esophagitis obesity presenting for initial evaluation by me.      Patient comes in today for evaluation of SOB. Referred by allergy. No h/o RAD.    Pt essentially reports having a covid infection in '21 and since then has had MACK. 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 No exercise. Non-specific chest discomfort unrelated. Occasionally has palpitations. Has noted her lips turn purple at times.     The patient denies any previous history of myocardial infarction, coronary artery disease, peripheral arterial disease, stroke, congestive heart failure, or cardiomyopathy. No FH of premature ds. Non-smoker.     Tolerates amlodipine 2.5 x 1. Prescribed valsartan 160x1. Bps run 110-120s/80-90s.    PHYSICAL EXAM:    NAD, A+Ox3.  Breathing comfortably    LABS/STUDIES (imaging reviewed during clinic visit):    July 2024 CBC normal.  Iron 46 going to 15.  August 2024 CMP normal.  TSH 8 with a normal free T4.  2023 /HDL 36//.  2022 A1c normal.    EKG 2023 sinus rhythm with no Q-waves or ST changes.  Holter 2021 sinus rhythm with average heart rate of 70.  No evidence of significant ectopy or arrhythmia.  TTE 2022 normal LV size and function with EF 60%.  Normal diastology.  CVP 3.    Chest x-ray December 2024 normal heart size no infiltrates.    ASSESSMENT & PLAN:    1. SOB (shortness of breath)    2. Class 2 severe obesity due to excess calories with serious comorbidity and body mass index (BMI) of 36.0 to 36.9 in adult    3. Essential hypertension    4. Palpitations        Orders Placed This Encounter    Basic Metabolic Panel    BNP    Lipid Panel    Lipoprotein A (LPA)    Apolipoprotein B    TSH    Hemoglobin A1C    Exercise Stress - EKG    Echo        Pt with chonic symptoms. Unremarkable eval in '21. Low suspicion for  pathological CV ds at this moment. Will check a TST and TTE per pt preference for objective eval.    We discussed the importance of diet modifications and instituting an exercise regimen. Weight has trended up since '20 in concrdance with symptoms. Long haul covid is a nother possible explanation. No e/o RAD, but pt does have allergies and symptoms worse during those flares.     Bps above goal. Would just schedule valsartan with amlodipine. Can drop amlodipine if Bps trend low. 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     Labs in 1 month.    Follow up in about 3 months (around 4/22/2025).      Layne Lazaro MD

## 2025-02-10 ENCOUNTER — TELEPHONE (OUTPATIENT)
Dept: CARDIOLOGY | Facility: CLINIC | Age: 40
End: 2025-02-10
Payer: COMMERCIAL

## 2025-02-11 DIAGNOSIS — G43.709 CHRONIC MIGRAINE WITHOUT AURA WITHOUT STATUS MIGRAINOSUS, NOT INTRACTABLE: Chronic | ICD-10-CM

## 2025-02-11 RX ORDER — AMITRIPTYLINE HYDROCHLORIDE 25 MG/1
25 TABLET, FILM COATED ORAL NIGHTLY
Qty: 90 TABLET | Refills: 1 | Status: SHIPPED | OUTPATIENT
Start: 2025-02-11

## 2025-02-12 NOTE — TELEPHONE ENCOUNTER
Refill Decision Note   Jossy Danielpar  is requesting a refill authorization.  Brief Assessment and Rationale for Refill:  Approve     Medication Therapy Plan:        Comments:     Note composed:9:33 PM 02/11/2025

## 2025-02-12 NOTE — TELEPHONE ENCOUNTER
No care due was identified.  [facility] Embedded Care Due Messages. Reference number: [id number].   2/11/2025 9:19:17 PM CST

## 2025-02-17 ENCOUNTER — HOSPITAL ENCOUNTER (OUTPATIENT)
Dept: CARDIOLOGY | Facility: HOSPITAL | Age: 40
Discharge: HOME | End: 2025-02-17
Attending: INTERNAL MEDICINE
Payer: COMMERCIAL

## 2025-02-17 VITALS
DIASTOLIC BLOOD PRESSURE: 80 MMHG | SYSTOLIC BLOOD PRESSURE: 119 MMHG | HEART RATE: 91 BPM | WEIGHT: 200 LBS | HEIGHT: 62 IN | BODY MASS INDEX: 36.8 KG/M2

## 2025-02-17 DIAGNOSIS — R06.02 SOB (SHORTNESS OF BREATH): ICD-10-CM

## 2025-02-17 LAB
CV STRESS BASE HR: 91 BPM
DIASTOLIC BLOOD PRESSURE: 80 MMHG
OHS CV CPX 1 MINUTE RECOVERY HEART RATE: 150 BPM
OHS CV CPX 85 PERCENT MAX PREDICTED HEART RATE MALE: 153
OHS CV CPX ESTIMATED METS: 12
OHS CV CPX MAX PREDICTED HEART RATE: 180
OHS CV CPX PATIENT IS FEMALE: 1
OHS CV CPX PATIENT IS MALE: 0
OHS CV CPX PEAK DIASTOLIC BLOOD PRESSURE: 83 MMHG
OHS CV CPX PEAK HEAR RATE: 162 BPM
OHS CV CPX PEAK RATE PRESSURE PRODUCT: NORMAL
OHS CV CPX PEAK SYSTOLIC BLOOD PRESSURE: 160 MMHG
OHS CV CPX PERCENT MAX PREDICTED HEART RATE ACHIEVED: 95
OHS CV CPX RATE PRESSURE PRODUCT PRESENTING: NORMAL
STRESS ECHO POST EXERCISE DUR MIN: 7 MINUTES
STRESS ECHO POST EXERCISE DUR SEC: 5 SECONDS
SYSTOLIC BLOOD PRESSURE: 119 MMHG

## 2025-02-17 PROCEDURE — 93017 CV STRESS TEST TRACING ONLY: CPT

## 2025-02-17 RX ORDER — DROSPIRENONE 4 MG/1
1 TABLET, FILM COATED ORAL
Qty: 84 TABLET | Refills: 0 | Status: SHIPPED | OUTPATIENT
Start: 2025-02-17

## 2025-02-17 NOTE — TELEPHONE ENCOUNTER
Refill Decision Note   Jossy Leslie  is requesting a refill authorization.  Brief Assessment and Rationale for Refill:  Approve     Medication Therapy Plan:         Comments:     Note composed:5:44 AM 02/17/2025

## 2025-04-14 ENCOUNTER — OFFICE VISIT (OUTPATIENT)
Dept: CARDIOLOGY | Facility: CLINIC | Age: 40
End: 2025-04-14
Payer: COMMERCIAL

## 2025-04-14 VITALS
SYSTOLIC BLOOD PRESSURE: 127 MMHG | DIASTOLIC BLOOD PRESSURE: 93 MMHG | BODY MASS INDEX: 36.81 KG/M2 | HEART RATE: 97 BPM | WEIGHT: 201.25 LBS

## 2025-04-14 DIAGNOSIS — E66.01 CLASS 2 SEVERE OBESITY DUE TO EXCESS CALORIES WITH SERIOUS COMORBIDITY AND BODY MASS INDEX (BMI) OF 36.0 TO 36.9 IN ADULT: Chronic | ICD-10-CM

## 2025-04-14 DIAGNOSIS — I10 ESSENTIAL HYPERTENSION: Primary | Chronic | ICD-10-CM

## 2025-04-14 DIAGNOSIS — E66.812 CLASS 2 SEVERE OBESITY DUE TO EXCESS CALORIES WITH SERIOUS COMORBIDITY AND BODY MASS INDEX (BMI) OF 36.0 TO 36.9 IN ADULT: Chronic | ICD-10-CM

## 2025-04-14 PROCEDURE — 1159F MED LIST DOCD IN RCRD: CPT | Mod: CPTII,S$GLB,, | Performed by: INTERNAL MEDICINE

## 2025-04-14 PROCEDURE — 3080F DIAST BP >= 90 MM HG: CPT | Mod: CPTII,S$GLB,, | Performed by: INTERNAL MEDICINE

## 2025-04-14 PROCEDURE — 3074F SYST BP LT 130 MM HG: CPT | Mod: CPTII,S$GLB,, | Performed by: INTERNAL MEDICINE

## 2025-04-14 PROCEDURE — 99214 OFFICE O/P EST MOD 30 MIN: CPT | Mod: S$GLB,,, | Performed by: INTERNAL MEDICINE

## 2025-04-14 PROCEDURE — 99999 PR PBB SHADOW E&M-EST. PATIENT-LVL III: CPT | Mod: PBBFAC,,, | Performed by: INTERNAL MEDICINE

## 2025-04-14 PROCEDURE — 3008F BODY MASS INDEX DOCD: CPT | Mod: CPTII,S$GLB,, | Performed by: INTERNAL MEDICINE

## 2025-04-14 PROCEDURE — 1160F RVW MEDS BY RX/DR IN RCRD: CPT | Mod: CPTII,S$GLB,, | Performed by: INTERNAL MEDICINE

## 2025-04-14 RX ORDER — AMLODIPINE BESYLATE AND OLMESARTAN MEDOXOMIL 5; 40 MG/1; MG/1
1 TABLET ORAL DAILY
Qty: 90 TABLET | Refills: 3 | Status: SHIPPED | OUTPATIENT
Start: 2025-04-14 | End: 2026-04-14

## 2025-04-14 NOTE — PROGRESS NOTES
HISTORY:    39-year-old female with a history of hypertension, migraines, hypothyroidism, eosinophilic esophagitis obesity presenting for 2nd visit with me.     Initially evaluated for SOB. Referred by allergy. No h/o RAD.    Pt reports having a covid infection in '21 and since then has had MACK. 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 No exercise. Non-specific chest discomfort unrelated. Occasionally has palpitations. Has noted her lips turn purple at times.     We did a TST for above symptoms that was normal.     The patient denies any previous history of myocardial infarction, coronary artery disease, peripheral arterial disease, stroke, congestive heart failure, or cardiomyopathy. No FH of premature ds. Non-smoker.     Tolerates amlodipine 2.5 x 1. Prescribed valsartan 160x1, but stopped. Felt it was giving her headaches. Bps run 110-120s/80-90s on valsartan and have trended up off to 120/90s.     PHYSICAL EXAM:    Vitals:    04/14/25 1419   BP: (!) 127/93   Pulse: 97       NAD, A+Ox3.  No jvd, no bruit.  RRR nml s1,s2. No murmurs.  CTA B no wheezes or crackles.  No edema.      LABS/STUDIES (imaging reviewed during clinic visit):    July 2024 CBC normal.  Iron 46 going to 15.  August 2024 CMP normal.  TSH 8 with a normal free T4.  2023 /HDL 36//.  2022 A1c normal.    EKG 2023 sinus rhythm with no Q-waves or ST changes.  Holter 2021 sinus rhythm with average heart rate of 70.  No evidence of significant ectopy or arrhythmia.  TTE 2022 normal LV size and function with EF 60%.  Normal diastology.  CVP 3.    TST February 2025 7 minutes on a highr amp w no e/o ischemia.   Chest x-ray December 2024 normal heart size no infiltrates.    ASSESSMENT & PLAN:    1. Essential hypertension    2. Class 2 severe obesity due to excess calories with serious comorbidity and body mass index (BMI) of 36.0 to 36.9 in adult          Orders  Placed This Encounter    Basic Metabolic Panel    amlodipine-olmesartan (CONY) 5-40 mg per tablet          Pt with chonic symptoms. Unremarkable eval in '21. Nml TST '25.     Bps above goal. Didn't like valsartan. On amlodipine 2.5x1. Trial olmesartan-amlodipine 40-5x1. Continue home log.     We discussed the importance of diet modifications and instituting an exercise regimen. Weight has trended up since '20 in concrdance with symptoms. Long haul covid is another possible explanation. No e/o RAD, but pt does have allergies and symptoms worse during those flares.     Follow up in about 6 months (around 10/14/2025).      Layne Lazaro MD

## 2025-06-05 ENCOUNTER — OFFICE VISIT (OUTPATIENT)
Dept: OBSTETRICS AND GYNECOLOGY | Facility: CLINIC | Age: 40
End: 2025-06-05
Attending: OBSTETRICS & GYNECOLOGY
Payer: COMMERCIAL

## 2025-06-05 VITALS
DIASTOLIC BLOOD PRESSURE: 90 MMHG | SYSTOLIC BLOOD PRESSURE: 132 MMHG | HEIGHT: 62 IN | WEIGHT: 207 LBS | BODY MASS INDEX: 38.09 KG/M2

## 2025-06-05 DIAGNOSIS — Z01.419 WOMEN'S ANNUAL ROUTINE GYNECOLOGICAL EXAMINATION: Primary | ICD-10-CM

## 2025-06-05 DIAGNOSIS — Z30.41 ENCOUNTER FOR SURVEILLANCE OF CONTRACEPTIVE PILLS: ICD-10-CM

## 2025-06-05 DIAGNOSIS — N91.4 SECONDARY OLIGOMENORRHEA: ICD-10-CM

## 2025-06-05 DIAGNOSIS — Z12.4 PAP SMEAR FOR CERVICAL CANCER SCREENING: ICD-10-CM

## 2025-06-05 LAB
B-HCG UR QL: NEGATIVE
CTP QC/QA: YES

## 2025-06-05 PROCEDURE — 99999 PR PBB SHADOW E&M-EST. PATIENT-LVL III: CPT | Mod: PBBFAC,,, | Performed by: OBSTETRICS & GYNECOLOGY

## 2025-06-05 RX ORDER — DROSPIRENONE 4 MG/1
1 TABLET, FILM COATED ORAL DAILY
Qty: 84 TABLET | Refills: 3 | Status: SHIPPED | OUTPATIENT
Start: 2025-06-05

## 2025-06-09 ENCOUNTER — PATIENT MESSAGE (OUTPATIENT)
Dept: ADMINISTRATIVE | Facility: HOSPITAL | Age: 40
End: 2025-06-09
Payer: COMMERCIAL

## 2025-06-10 ENCOUNTER — PATIENT MESSAGE (OUTPATIENT)
Dept: OBSTETRICS AND GYNECOLOGY | Facility: CLINIC | Age: 40
End: 2025-06-10
Payer: COMMERCIAL

## 2025-06-10 NOTE — PROGRESS NOTES
New Patient     SUBJECTIVE:  Patient ID: Jossy Leslie   MRN: [id number]  Referred By: Dr. Gabriella Vera  Chief Complaint: Migraine      History of Present Illness:   39 y.o. female with migraines, Hashimotos, asthma, HTN, hiatal hernia, eosinophilic esophagitis , who presents to clinic alone for evaluation of headaches.     Patient previous saw Dr. Vera with neurology and is transferring care to me for evaluation and continued management of known migraines. Current regimen includes: elavil 25mg, imitrex 100mg & tizanidine 4mg prn Still taking excedrin but rarely. She has had stomach ulcers in the past from it so she tries to limit intake.     MRI/MRA done in 2021 - negative    PMHx negative for TBI, Meningitis, Aneurysms, Kidney Stones, GI bleed, osteoporosis, CAD/MI, CVA/TIA, DM, cancer, pregnancy     Family Hx positive for Migraines mother    Headache History:  Onset - teenager - worsened after covid  Previous Hx of HA -   Location/Radiation - bilateral occipitalis, frontalis, can be entire head  Quality - throbbing/pounding, pressure, can be burning  Duration - 1 day - 3 days  Intensity (range) - 10/10  Frequency - 30/30 ha days per month, 8-12/30 are debilitating  Triggers - weather, stress,neck pain/position, poor eating,   Aggravating Factors - light, sound   Alleviating Factors - dark, quiet room, lying down, face mask, ice caps  Recent Changes - no, just persisting   Prodrome/Aura - no  HA today? - 3/10  Time of day of most headaches- anytime, usually mid day - can wake up with them - rarely will wake her up at night but only if sick with something else at the time  Sleep - +mild snoring, morning grogginess, morning headache     Associated symptoms with the headache:   Meningeal symptoms - photophobia, phonophobia, exercise intolerance   Nausea/vomitting  Nasal drainage   Visual blurriness   Pallor/flushing  Dizziness - sometimes  Vertigo  Confusion  Impaired concentration   Pain worsened with physical  "activity   Neck pain     Cluster headache symptoms:   Swollen or droopy eyelid  Swelling under or around the eye (may affect both eyes)  Excessive tearing  Red eye  Rhinorrhea or one-sided nasal congestion   Red, flushed face   Forehead and facial sweating    Symptoms of increased intracranial pressure:   Whooshing sounds   Visual spots/blotches     Basilar migraine symptoms:  Dysarthria  Vertigo  Tinnitus  Hypacusia  Diplopia  Simultaneous visual symptoms in both temporal and nasal fields of both eyes  Ataxia  Reduced level of consciousness  Bilateral paresthesias      Treatments Tried   Excedrin   Imitrex   Maxalt   Relpax  Ubrelvy    Elavil   Zoloft   Trokendi  Amlodipine-olmesartan   Medrol dose pack  Flexeril    PT    Social History  Alcohol - rare b/c alcohol makes headache worse  Smoke - denies  Recreational Drug Use- denies    Current Medications:  Current Medications[1]    Review of Systems - as per HPI, otherwise a balanced 10 systems review is negative.    OBJECTIVE:  Vitals:  BP (!) 129/93   Pulse 105   Ht 5' 2" (1.575 m)   Wt 94.3 kg (208 lb)   LMP 01/15/2025 (Approximate)   BMI 38.04 kg/m²     Physical Exam   Constitutional: she appears well-developed and well-nourished. she is well groomed. NAD  HENT:    Head: Normocephalic and atraumatic, Frontalis was NTTP, temporalis was NTTP   Eyes: Conjunctivae and EOM are normal. Pupils are equal, round, and reactive to light   Neck: Neck supple. Occiput and trapezius NTTP   Musculoskeletal: Normal range of motion. No joint stiffness. No vertebral point tenderness.  Skin: Skin is warm and dry.  Psychiatric: Normal mood and affect.     Neuro exam:    Mental status:  The patient is alert and oriented to person, place and time.  Language is intact and fluent  Remote and recent memory are intact  Normal attention and concentration  Mood is stable    Cranial Nerves:  Fundoscopic examination does not reveal any occult papilledema.    Pupils are equal and reactive " to light.    Extraocular movements are intact and without nystagmus.    Visual fields are full to confrontation testing.   Facial movement is symmetric.  Facial sensation is intact.    Hearing is intact   FROM of neck in all (6) directions without pain  Shoulder shrug symmetrical.    Coordination:     Finger to nose - normal and symmetric bilaterally   Heel to shin test - normal and symmetric bilaterally     Motor:  Normal muscle bulk and symmetry. No fasciculations were noted.   Tremor not apparent   Pronator drift not apparent.    strength was strong and symmetric  Finger extension strength was strong and symmetric  RUE:appropriate against gravity and medium force as tested 5/5  LUE: appropriate against gravity and medium force as tested 5/5  RLE:appropriate against gravity and medium force as tested 5/5              LLE: appropriate against gravity and medium force as tested 5/5    Reflexes:  Right Brachioradialis 2+  Left Brachioradialis 2+  Right Biceps 2+  Left Biceps 2+  Right Patellar2+  Left Patellar 2+      Sensory:  RUE  intact light touch  LUE intact light touch  RLE intact light touch  LLE intact light touch    Gait:   Romberg - negative  Normal gait  Tandem, Heel, and Toe Walk - able to perform without difficulty    Review of Data:   Notes from neurology reviewed   Labs:  Office Visit on 06/05/2025   Component Date Value Ref Range Status    POC Preg Test, Ur 06/05/2025 Negative  Negative Final     Acceptable 06/05/2025 Yes   Final    Case Report 06/05/2025    Final                    Value:Daquan samantha - [facility]                                Case: [id number]                                Authorizing Provider:  Oneal Garcia MD    Collected:           06/05/2025 05:02 PM          Ordering Location:     Psychiatric              Received:            06/05/2025 05:00 PM          First Screen:          Nancy Collado CT(ASCP)                                                       Specimen:    Liquid-Based Pap Test, Screening, Cervix                                                   Clinical Findings 06/05/2025    Final                    Value:Routine    Specimen Adequacy 06/05/2025 Satisfactory for interpretation. Endocervical component is present   Final    Final Diagnostic Interpretation 06/05/2025 Negative for intraepithelial lesion or malignancy    Final    [place] Category 06/05/2025 Negative for intraepithelial lesion or malignancy   Final    LMP Date 06/05/2025    Final                    Value:01/15/2025     06/05/2025    Final                    Value:No    Estrogen replacement therapy 06/05/2025    Final                    Value:No    Prior treatment 06/05/2025    Final                    Value:No    Previous Bx 06/05/2025    Final                    Value:No    Disclaimer 06/05/2025    Final                    Value:The Pap smear is a screening test that aids in the detection of cervical cancer and cancer precursors. Both false positive and false negative results can occur. The test should be used at regular intervals, and positive results should be confirmed before definitive therapy.    This liquid based specimen is processed using the , , or [employer] ThinPrep PAP System. This specimen has been analyzed by the ThinPrep Imaging System ([employer]), an automated imaging and review system which assists the laboratory in evaluating cells on ThinPrep PAP tests. Following automated imaging, selected fields from every slide are reviewed by a cytologist and/or pathologist.         Performing Location 06/05/2025    Final                    Value:Screening performed at [facility], [address] [place] [address].    Sign out performed at [facility], [address] [place] [address]        HPV High Risk Type 16, PCR 06/05/2025 Negative  Negative Final    HPV  High Risk Type 18, PCR 06/05/2025 Negative  Negative Final    HPV High Risk Type Other, PCR 06/05/2025 Negative  Negative Final     Imaging:  Results for orders placed or performed during the hospital encounter of 01/04/22   MRA Brain    Narrative    EXAMINATION:  MRA BRAIN WITHOUT CONTRAST    CLINICAL HISTORY:  Dizziness, persistent/recurrent, cardiac or vascular cause suspected; Dizziness and giddiness.    TECHNIQUE:  Non-contrast 3-D time-of-flight intracranial MR angiography was performed through the [place] with MIP reformatting.    COMPARISON:  7/10/20    FINDINGS:  Anterior intracranial circulation: No high-grade focal stenosis, occlusion, aneurysm, or vascular malformation.    Posterior intracranial circulation: No high-grade focal stenosis, occlusion, aneurysm, or vascular malformation.      Impression    Normal intracranial MRA      Electronically signed by: Gerald Mcbride MD  Date:    01/04/2022  Time:    13:37   Results for orders placed or performed during the hospital encounter of 01/04/22   MRI Brain W WO Contrast    Narrative    EXAMINATION:  MRI BRAIN W WO CONTRAST    CLINICAL HISTORY:  Dizziness, non-specific; Dizziness and headaches.    TECHNIQUE:  Multiplanar multisequence MR imaging of the brain was performed before and after the administration of 7.5 mL Gadavist intravenous contrast.    COMPARISON:  MRI dated 06/09/2021 MRA dated 07/10/2020    FINDINGS:  There is new artifact partially obscuring the frontal lobes presumably from dental hardware.    Within the limitations of the artifact, there is no evidence of hydrocephalus mass effect gross intracranial hemorrhage or visible acute infarct.  The brain parenchyma grossly maintains normal signal intensity including the cerebellum.    After the intravenous administration of contrast, no enhancing lesion is identified.    The venous sinuses appear patent.    The paranasal sinuses are not adequately evaluated however the mastoid air  cells appear clear.    MRA at the level of the [id number] demonstrates a developmentally hypoplastic right A1 segment.  No evidence of aneurysm or major branch stenosis/occlusion is identified.      Impression    No acute intracranial process or enhancing lesion is identified.    No major branch stenosis/occlusion at the [id number]-Gmmvtm.  No evidence of intracranial aneurysm.      Electronically signed by: Emre Gant  Date:    01/04/2022  Time:    12:56   Results for orders placed or performed during the hospital encounter of 06/09/21   MRV Brain Without Contrast    Narrative    EXAMINATION:  MRV BRAIN WITHOUT CONTRAST    CLINICAL HISTORY:  Vertigo, central;  Vertigo of central origin    TECHNIQUE:  MRV of the brain without contrast    COMPARISON:  None    FINDINGS:  The major dural venous sinuses are patent.  The left transverse sinus is hypoplastic when compared to the right but patent.  The superior sagittal sinus and other major dural venous sinuses are also patent.      Impression    Major dural venous sinuses are patent.  No evidence of dural venous sinus thrombosis.      Electronically signed by: Jesse Osborn MD  Date:    06/10/2021  Time:    11:06   Results for orders placed or performed during the hospital encounter of 07/10/20   MRI Brain Without Contrast    Narrative    EXAMINATION:  MRI BRAIN WITHOUT CONTRAST; MRA BRAIN WITHOUT CONTRAST    CLINICAL HISTORY:  Dizziness, non-specific;  Dizziness and giddiness    TECHNIQUE:  Sagittal and axial T1, axial T2, axial FLAIR, axial gradient and axial diffusion imaging of the whole brain without contrast.    In a separate acquisition MRA of the head was performed with 3D time-of-flight technique and 3D MIP reformations.    COMPARISON:  None    FINDINGS:  MRI brain: The brain parenchyma is normal in signal and contour.  The ventricles are normal in size and configuration without evidence for hydrocephalus.  There is no midline shift or mass effect.   There is no abnormal parenchymal susceptibility to suggest parenchymal hemorrhage.  No restricted diffusion to suggest acute infarction.  There is no abnormal intra or extra-axial fluid collection.  The major intracranial T2 flow voids are present.  Severe opacification right maxillary antra with mild moderate mucosal thickening left maxillary antra.  Mild moderate patchy ethmoid air cell opacities.    MRA head: Anterior circulation: Bilateral distal cervical, petrous, cavernous, and supraclinoid segments of the ICAs are patent without significant focal stenosis or aneurysm.    Posterior circulation: Distal vertebral arteries, basilar artery and posterior cerebral arteries are patent without focal stenosis or aneurysm.      Impression    MRI brain: Unremarkable MRI of the head without contrast as detailed above specifically without evidence for acute infarction or hydrocephalus.    Please note there is patchy paranasal sinus disease with severe near complete opacification right maxillary antra and moderate mucosal thickening left maxillary antra.    MRA head: Unremarkable MRA head as detailed above.      Electronically signed by: Onofre Blackburn DO  Date:    07/12/2020  Time:    06:35     Note: I have independently reviewed any/all imaging/labs/tests and agree with the report (s) as documented.  Any discrepancies will be as noted/demarcated by free text.  PONCHO, FNP-C 6/12/2025    ASSESSMENT:  1. Chronic migraine without aura without status migrainosus, not intractable    2. Cervicogenic headache    3. Morning headache    4. Snoring    5. Class 2 severe obesity due to excess calories with serious comorbidity and body mass index (BMI) of 36.0 to 36.9 in adult          PLAN:  - Discussed symptoms appear to be consistent with chronic migraine c/b cervicogenic headache, discussed treatment options and patient agreed with the following plan:    - preventative: will request prior authorization to start botox. Continue elavil  25mg. Patient having daily migraines lasting longer than 4 hours and unrelieved with multiple other preventative medication options (see above).   - rescue: continue imitrex 100mg. Take at onset of migraine and repeat in two hours if pain still present. Do not take more than 2 doses per day.   - cervicalgia:  robaxin 500mg as needed for muscle pain/tension. Declines PT at this time.   - snoring/morning headaches: referral to sleep medicine  - risks, benefits, and potential side effects of botox, robaxin, imitrex discussed   - alternative treatment options offered   - importance of healthy diet, regular exercise and sleep hygiene in the treatment of headaches    - Start tracking headaches via Migraine Buddy gamal on phone   - RTC in 1 month to begin botox.     Orders Placed This Encounter    Ambulatory referral/consult to Sleep Disorders    Prior authorization Order    methocarbamoL (ROBAXIN) 500 MG Tab    sumatriptan (IMITREX) 100 MG tablet    amitriptyline (ELAVIL) 25 MG tablet       I have discussed realistic goals of care with patient at length as well as medication options, and need for lifestyle adjustment. I have explained that treatment will take time. We have agreed that the goal will be to reduce frequency/intensity/quantity of HA, not to be completely HA free. I have explained my non narcotic policy regarding headache treatment.    Patient agreeable to work on lifestyle adjustments.    Discussed potential for teratogenicity with treatment, patient understands if her family planning status should change she will contact office immediately and we will safely adjust medications as needed.     Questions and concerns were sought and answered to the patient's stated verbal satisfaction.  The patient verbalizes understanding and agreement with the above stated treatment plan.     CC: Brent Marcelino Jr., MD Monique Smith, FNP-C  [facility]  [phone]    Dr. Swanson was available during  today's encounter.     I spent a total of 41 minutes on the day of the visit.  This includes face to face time and non-face to face time preparing to see the patient (eg, review of tests), obtaining and/or reviewing separately obtained history, documenting clinical information in the electronic or other health record, independently interpreting results and communicating results to the patient/family/caregiver, or care coordinator.           [1]   Current Outpatient Medications:     amlodipine-olmesartan (CONY) 5-40 mg per tablet, Take 1 tablet by mouth once daily., Disp: 90 tablet, Rfl: 3    aspirin-acetaminophen-caffeine 250-250-65 mg (EXCEDRIN MIGRAINE) 250-250-65 mg per tablet, Excedrin Migraine     , Disp: , Rfl:     drospirenone, contraceptive, (SLYND) 4 mg (28) Tab, Take 1 tablet (4 mg total) by mouth Daily., Disp: 84 tablet, Rfl: 3    DUPIXENT  mg/2 mL PnIj, Inject into the skin., Disp: , Rfl:     EPINEPHrine (EPIPEN) 0.3 mg/0.3 mL AtIn, SMARTSIG:IM As Directed PRN, Disp: , Rfl:     levothyroxine (SYNTHROID) 100 MCG tablet, TAKE 1 TABLET BY MOUTH BEFORE  BREAKFAST, Disp: 90 tablet, Rfl: 1    montelukast (SINGULAIR) 10 mg tablet, Take 10 mg by mouth., Disp: , Rfl:     ondansetron (ZOFRAN-ODT) 4 MG TbDL, Take 1 tablet (4 mg total) by mouth every 8 (eight) hours as needed (Nausea)., Disp: 10 tablet, Rfl: 0    sertraline (ZOLOFT) 100 MG tablet, , Disp: , Rfl:     tretinoin (RETIN-A) 0.025 % cream, Compound tretinoin 0.025% / azelaic acid 8% / niacinamide 2% cream. Apply a pea-sized amount to entire face qhs., Disp: 30 g, Rfl: 5    amitriptyline (ELAVIL) 25 MG tablet, Take 1 tablet (25 mg total) by mouth every evening., Disp: 90 tablet, Rfl: 0    methocarbamoL (ROBAXIN) 500 MG Tab, Take 1 tablet (500 mg total) by mouth 3 (three) times daily., Disp: 90 tablet, Rfl: 0    sumatriptan (IMITREX) 100 MG tablet, Take 1 tablet (100 mg total) by mouth as needed for Migraine. Take at the onset of headache, may take  2nd dose in 2 hrs, no more than 2 in 24 hrs., Disp: 12 tablet, Rfl: 3

## 2025-06-11 ENCOUNTER — OFFICE VISIT (OUTPATIENT)
Facility: CLINIC | Age: 40
End: 2025-06-11
Payer: COMMERCIAL

## 2025-06-11 VITALS
HEIGHT: 62 IN | HEART RATE: 105 BPM | BODY MASS INDEX: 38.28 KG/M2 | DIASTOLIC BLOOD PRESSURE: 93 MMHG | WEIGHT: 208 LBS | SYSTOLIC BLOOD PRESSURE: 129 MMHG

## 2025-06-11 DIAGNOSIS — R06.83 SNORING: ICD-10-CM

## 2025-06-11 DIAGNOSIS — E66.812 CLASS 2 SEVERE OBESITY DUE TO EXCESS CALORIES WITH SERIOUS COMORBIDITY AND BODY MASS INDEX (BMI) OF 36.0 TO 36.9 IN ADULT: Chronic | ICD-10-CM

## 2025-06-11 DIAGNOSIS — G43.709 CHRONIC MIGRAINE WITHOUT AURA WITHOUT STATUS MIGRAINOSUS, NOT INTRACTABLE: Primary | ICD-10-CM

## 2025-06-11 DIAGNOSIS — G44.86 CERVICOGENIC HEADACHE: ICD-10-CM

## 2025-06-11 DIAGNOSIS — R51.9 MORNING HEADACHE: ICD-10-CM

## 2025-06-11 DIAGNOSIS — E66.01 CLASS 2 SEVERE OBESITY DUE TO EXCESS CALORIES WITH SERIOUS COMORBIDITY AND BODY MASS INDEX (BMI) OF 36.0 TO 36.9 IN ADULT: Chronic | ICD-10-CM

## 2025-06-11 PROCEDURE — 99999 PR PBB SHADOW E&M-EST. PATIENT-LVL III: CPT | Mod: PBBFAC,,,

## 2025-06-11 RX ORDER — METHOCARBAMOL 500 MG/1
500 TABLET, FILM COATED ORAL 3 TIMES DAILY
Qty: 90 TABLET | Refills: 0 | Status: SHIPPED | OUTPATIENT
Start: 2025-06-11 | End: 2025-07-11

## 2025-06-11 RX ORDER — MONTELUKAST SODIUM 10 MG/1
10 TABLET ORAL
COMMUNITY

## 2025-06-11 RX ORDER — AMITRIPTYLINE HYDROCHLORIDE 25 MG/1
25 TABLET, FILM COATED ORAL NIGHTLY
Qty: 90 TABLET | Refills: 0 | Status: SHIPPED | OUTPATIENT
Start: 2025-06-11

## 2025-06-11 RX ORDER — SUMATRIPTAN SUCCINATE 100 MG/1
100 TABLET ORAL
Qty: 12 TABLET | Refills: 3 | Status: SHIPPED | OUTPATIENT
Start: 2025-06-11 | End: 2025-08-10

## 2025-06-11 RX ORDER — EPINEPHRINE 0.3 MG/.3ML
INJECTION SUBCUTANEOUS
COMMUNITY

## 2025-06-16 ENCOUNTER — PATIENT OUTREACH (OUTPATIENT)
Dept: ADMINISTRATIVE | Facility: HOSPITAL | Age: 40
End: 2025-06-16
Payer: COMMERCIAL

## 2025-06-16 VITALS — SYSTOLIC BLOOD PRESSURE: 107 MMHG | DIASTOLIC BLOOD PRESSURE: 75 MMHG

## 2025-06-23 ENCOUNTER — OFFICE VISIT (OUTPATIENT)
Dept: FAMILY MEDICINE | Facility: CLINIC | Age: 40
End: 2025-06-23
Payer: COMMERCIAL

## 2025-06-23 DIAGNOSIS — I10 ESSENTIAL HYPERTENSION: Chronic | ICD-10-CM

## 2025-06-23 DIAGNOSIS — F32.A ANXIETY AND DEPRESSION: Primary | ICD-10-CM

## 2025-06-23 DIAGNOSIS — F41.9 ANXIETY AND DEPRESSION: Primary | ICD-10-CM

## 2025-06-23 DIAGNOSIS — E06.3 HYPOTHYROIDISM DUE TO HASHIMOTO'S THYROIDITIS: Chronic | ICD-10-CM

## 2025-06-23 PROCEDURE — 1160F RVW MEDS BY RX/DR IN RCRD: CPT | Mod: CPTII,95,, | Performed by: FAMILY MEDICINE

## 2025-06-23 PROCEDURE — 4010F ACE/ARB THERAPY RXD/TAKEN: CPT | Mod: CPTII,95,, | Performed by: FAMILY MEDICINE

## 2025-06-23 PROCEDURE — 98006 SYNCH AUDIO-VIDEO EST MOD 30: CPT | Mod: 95,,, | Performed by: FAMILY MEDICINE

## 2025-06-23 PROCEDURE — 1159F MED LIST DOCD IN RCRD: CPT | Mod: CPTII,95,, | Performed by: FAMILY MEDICINE

## 2025-06-23 RX ORDER — SERTRALINE HYDROCHLORIDE 100 MG/1
100 TABLET, FILM COATED ORAL DAILY
Qty: 5 TABLET | Refills: 0 | Status: SHIPPED | OUTPATIENT
Start: 2025-06-23 | End: 2025-06-28

## 2025-06-23 NOTE — PROGRESS NOTES
"The patient location is: [place], [place]  The chief complaint leading to consultation is: Medication Refill     Visit type: audiovisual    Face to Face time with patient: 8 minutes  10 minutes of total time spent on the encounter, which includes face to face time and non-face to face time preparing to see the patient (eg, review of tests), Obtaining and/or reviewing separately obtained history, Documenting clinical information in the electronic or other health record, Independently interpreting results (not separately reported) and communicating results to the patient/family/caregiver, or Care coordination (not separately reported).         Each patient to whom he or she provides medical services by telemedicine is:  (1) informed of the relationship between the physician and patient and the respective role of any other health care provider with respect to management of the patient; and (2) notified that he or she may decline to receive medical services by telemedicine and may withdraw from such care at any time.    Notes:       Patient Name: Jossy Leslie    : 1985  MRN: [id number]      Subjective:     Patient ID: Jossy is a 39 y.o. female    Chief Complaint:  Medication Refill    History of Present Illness    Jossy presents today for medication refill of sertraline.  This is initially prescribed by another provider she has not been able to reach them and she is out of medication.  She is requesting a five day fill the medication.    She reports experiencing anxiety and depression following long COVID. She is currently taking sertraline 100 mg and reports doing well on the medication. Today, she left work early due to experiencing withdrawal-like symptoms, describing feeling "disoriented".      ROS:  General: -fever, -chills, -fatigue, -weight gain, -weight loss  Eyes: -vision changes, -redness, -discharge  ENT: -ear pain, -nasal congestion, -sore throat  Cardiovascular: -chest pain, -palpitations, -lower " extremity edema  Respiratory: -cough, -shortness of breath  Gastrointestinal: -abdominal pain, -nausea, -vomiting, -diarrhea, -constipation, -blood in stool  Genitourinary: -dysuria, -hematuria, -frequency  Musculoskeletal: -joint pain, -muscle pain  Skin: -rash, -lesion  Neurological: -headache, -dizziness, -numbness, -tingling  Psychiatric: +anxiety, +depression, -sleep difficulty         Review of Systems     Objective:   LMP 01/15/2025 (Approximate)     Physical Exam  Pulmonary:      Effort: Pulmonary effort is normal.   Neurological:      Mental Status: She is alert.          Assessment        ICD-10-CM ICD-9-CM   1. Anxiety and depression  F41.9 300.00    F32.A 311   2. Essential hypertension  I10 401.9   3. Hypothyroidism due to Hashimoto's thyroiditis  E06.3 245.2         Plan:   Assessment & Plan            1. Anxiety and depression  -     sertraline (ZOLOFT) 100 MG tablet; Take 1 tablet (100 mg total) by mouth once daily. for 5 days  Dispense: 5 tablet; Refill: 0  Sertraline refilled as per her request.    2. Essential hypertension  -     Comprehensive Metabolic Panel; Future; Expected date: 07/21/2025  -     CBC Auto Differential; Future; Expected date: 07/21/2025  -     Lipid Panel; Future; Expected date: 07/21/2025  Continue current blood pressure regimen.    Labs ordered to be done prior to next visit.    3. Hypothyroidism due to Hashimoto's thyroiditis  Overview:  TSH   Date Value Ref Range Status   07/19/2024 8.030 (H) 0.400 - 4.000 uIU/mL Final   05/17/2024 4.427 (H) 0.400 - 4.000 uIU/mL Final   11/11/2023 1.242 0.400 - 4.000 uIU/mL Final     Free T4   Date Value Ref Range Status   07/19/2024 0.94 0.71 - 1.51 ng/dL Final   05/17/2024 0.96 0.71 - 1.51 ng/dL Final   11/11/2023 0.81 0.71 - 1.51 ng/dL Final      Thyroperoxidase Antibodies   Date Value Ref Range Status   10/19/2018 1229.2 (H) <6.0 IU/mL Final         Orders:  -     TSH; Future; Expected date: 07/21/2025  -     T4, Free; Future; Expected  date: 07/21/2025  Check thyroid function prior to next visit.           -Brent Marcelino Jr., MD, AAHIVS      This note was generated with the assistance of ambient listening technology. Verbal consent was obtained by the patient and accompanying visitor(s) for the recording of patient appointment to facilitate this note. I attest to having reviewed and edited the generated note for accuracy, though some syntax or spelling errors may persist. Please contact the author of this note for any clarification.      There are no Patient Instructions on file for this visit.      Follow up in about 6 weeks (around 8/4/2025) for Annual with fasting labs a week prior.   Future Appointments   Date Time Provider Department Center   7/8/2025 12:30 PM Amy Hutchins FNP [facility]   7/28/2025  7:30 AM LAB, [facility]   8/5/2025  2:20 PM Brent Marcelino Jr., MD [facility]   10/14/2025  2:00 PM Layne Lazaro MD [facility] [place]

## 2025-07-07 ENCOUNTER — TELEPHONE (OUTPATIENT)
Facility: CLINIC | Age: 40
End: 2025-07-07
Payer: COMMERCIAL

## 2025-07-07 NOTE — TELEPHONE ENCOUNTER
"Copied from CRM #[id number]. Topic: General Inquiry - Patient Advice  >> Jul 7, 2025  2:32 PM Patsy wrote:  .Name Of Caller: Self     Contact Preference?:[phone]     What is the nature of the call?:in reference to needing to know was appt 7/8/25, Botox injection was approved, pls call      Additional Notes:  "Thank you for all that you do for our patients"  "

## 2025-07-08 ENCOUNTER — PATIENT MESSAGE (OUTPATIENT)
Facility: CLINIC | Age: 40
End: 2025-07-08

## 2025-07-08 ENCOUNTER — PROCEDURE VISIT (OUTPATIENT)
Facility: CLINIC | Age: 40
End: 2025-07-08
Payer: COMMERCIAL

## 2025-07-08 VITALS
HEART RATE: 108 BPM | WEIGHT: 200 LBS | HEIGHT: 62 IN | DIASTOLIC BLOOD PRESSURE: 79 MMHG | SYSTOLIC BLOOD PRESSURE: 116 MMHG | BODY MASS INDEX: 36.8 KG/M2

## 2025-07-08 DIAGNOSIS — G43.709 CHRONIC MIGRAINE WITHOUT AURA WITHOUT STATUS MIGRAINOSUS, NOT INTRACTABLE: Primary | ICD-10-CM

## 2025-07-08 PROCEDURE — 64615 CHEMODENERV MUSC MIGRAINE: CPT | Mod: S$GLB,,,

## 2025-07-08 NOTE — PROCEDURES
PROCEDURE NOTE:  BOTOX was performed as an indicated therapy for intractable chronic migraine headaches given that the patient failed more than 2 headache medications. Patient has continued to achieve a greater than 50% reduction in the frequency of their migraines with continued Botox injections.  Wishes to proceed with today's injections as scheduled.      A time out was conducted just before the start of the procedure to verify the correct patient and procedure, procedure location, and all relevant critical information.      Botulinum Toxin Injection Procedure      PROCEDURE PERFORMED: Botulinum toxin injection (77145)  CLINICAL INDICATION: Chronic Migraines  After risks and benefits were explained including bleeding, infection, worsening of pain, damage to the areas being injected, weakness of muscles, loss of muscle control, dysphagia if injecting the head or neck, facial droop if injecting the facial area, painful injection, allergic or other reaction to the medications being injected, and the failure of the procedure to help the problem, a signed consent was obtained.   The patient was placed in a comfortable area and the sites to be treated were identified.The area to be treated was prepped three times with alcohol and the alcohol allowed to dry. Next, a 30 gauge needle was used to inject the medication in the area to be treated.      Total Botox used: 155 Units   Unavoidable waste: 45 Units      Injection sites:    muscle bilaterally ( a total of 10 units divided into 2 sites)   Procerus muscle (5 units)   Frontalis muscle bilaterally (a total of 20 units divided into 4 sites)   Temporalis muscle bilaterally (a total of 40 units divided into 8 sites)   Occipitalis muscle bilaterally (a total of 30 units divided into 6 sites)   Cervical paraspinal muscles (a total of 20 units divided into 4 sites)   Trapezius muscle bilaterally (a total of 30 units divided into 6 sites)     Complications: none    RTC for the next Botox injection: 12 weeks     Problem List Items Addressed This Visit    None           SUSANA Chery  [facility]   [phone]

## 2025-08-04 ENCOUNTER — PATIENT MESSAGE (OUTPATIENT)
Dept: FAMILY MEDICINE | Facility: CLINIC | Age: 40
End: 2025-08-04
Payer: COMMERCIAL

## (undated) DEVICE — NDL INSUF ULTRA VERESS 120MM

## (undated) DEVICE — ELECTRODE REM PLYHSV RETURN 9

## (undated) DEVICE — TROCAR ENDOPATH XCEL 11MM 10CM

## (undated) DEVICE — POWDER ARISTA AH 3G

## (undated) DEVICE — SEE MEDLINE ITEM 157131

## (undated) DEVICE — SEALER LIGASURE LAP 37CM 5MM

## (undated) DEVICE — SEE MEDLINE ITEM 146355

## (undated) DEVICE — KIT ANTIFOG

## (undated) DEVICE — DRESSING TELFA N ADH 3X8

## (undated) DEVICE — APPLICATOR CHLORAPREP ORN 26ML

## (undated) DEVICE — SUT 0 VICRYL / UR6 (J603)

## (undated) DEVICE — CATH URETHRAL 16FR RED

## (undated) DEVICE — APPLICATOR ARISTA FLEX XL

## (undated) DEVICE — PAD PREP 50/CA

## (undated) DEVICE — IRRIGATOR ENDOSCOPY DISP.

## (undated) DEVICE — SEE MEDLINE ITEM 157116

## (undated) DEVICE — SET IRR URLGY 2LINE UNIV SPIKE

## (undated) DEVICE — TROCAR ENDOPATH XCEL 5MM 7.5CM

## (undated) DEVICE — TUBING INSUFFLATION 10

## (undated) DEVICE — GLOVE PROTEXIS HYDROGEL SZ6

## (undated) DEVICE — DRAPE SURGICAL STERI IRRG PCH

## (undated) DEVICE — SEE MEDLINE ITEM 154981

## (undated) DEVICE — SEE MEDLINE ITEM 152622

## (undated) DEVICE — Device

## (undated) DEVICE — COVER OVERHEAD SURG LT BLUE

## (undated) DEVICE — DRAPE LAVH LAPAROSCOPY W/FLUID

## (undated) DEVICE — CURETTE ENDOMETRIAL PIPELLE

## (undated) DEVICE — BAG TISS RETRV MONARCH 10MM

## (undated) DEVICE — SYR 10CC LUER LOCK

## (undated) DEVICE — SPONGE DERMA 8PLY 2X2

## (undated) DEVICE — DRESSING TEGADERM 2 3/8 X 2.75

## (undated) DEVICE — OS LOCATOR

## (undated) DEVICE — BLADE SURG CARBON STEEL SZ11

## (undated) DEVICE — SEE MEDLINE ITEM 156955

## (undated) DEVICE — SUT MCRYL PLUS 4-0 PS2 27IN

## (undated) DEVICE — TROCAR ENDOPATH XCEL 5X75MM

## (undated) DEVICE — TUBING ARTRL PRESS MNTR M-F 4

## (undated) DEVICE — SYR 50CC LL

## (undated) DEVICE — SCISSOR 5MMX35CM DIRECT DRIVE

## (undated) DEVICE — MANIFOLD 4 PORT

## (undated) DEVICE — SEE MEDLINE ITEM 157117